# Patient Record
Sex: FEMALE | Race: WHITE | NOT HISPANIC OR LATINO | URBAN - METROPOLITAN AREA
[De-identification: names, ages, dates, MRNs, and addresses within clinical notes are randomized per-mention and may not be internally consistent; named-entity substitution may affect disease eponyms.]

---

## 2017-09-11 ENCOUNTER — INPATIENT (INPATIENT)
Facility: HOSPITAL | Age: 72
LOS: 10 days | Discharge: HOME HEALTH | DRG: 674 | End: 2017-09-22
Attending: HOSPITALIST | Admitting: HOSPITALIST
Payer: MEDICARE

## 2017-09-11 VITALS
SYSTOLIC BLOOD PRESSURE: 122 MMHG | OXYGEN SATURATION: 98 % | HEIGHT: 62 IN | WEIGHT: 115.96 LBS | RESPIRATION RATE: 16 BRPM | HEART RATE: 87 BPM | DIASTOLIC BLOOD PRESSURE: 77 MMHG | TEMPERATURE: 98 F

## 2017-09-11 DIAGNOSIS — Z90.49 ACQUIRED ABSENCE OF OTHER SPECIFIED PARTS OF DIGESTIVE TRACT: Chronic | ICD-10-CM

## 2017-09-11 LAB
ALBUMIN SERPL ELPH-MCNC: 3.1 G/DL — LOW (ref 3.3–5.2)
ALP SERPL-CCNC: 172 U/L — HIGH (ref 40–120)
ALT FLD-CCNC: 36 U/L — HIGH
ANION GAP SERPL CALC-SCNC: 24 MMOL/L — HIGH (ref 5–17)
APPEARANCE UR: ABNORMAL
APTT BLD: 24 SEC — LOW (ref 27.5–37.4)
AST SERPL-CCNC: 31 U/L — SIGNIFICANT CHANGE UP
BASOPHILS # BLD AUTO: 0 K/UL — SIGNIFICANT CHANGE UP (ref 0–0.2)
BASOPHILS NFR BLD AUTO: 0.1 % — SIGNIFICANT CHANGE UP (ref 0–2)
BILIRUB SERPL-MCNC: 0.2 MG/DL — LOW (ref 0.4–2)
BILIRUB UR-MCNC: NEGATIVE — SIGNIFICANT CHANGE UP
BUN SERPL-MCNC: 78 MG/DL — HIGH (ref 8–20)
CALCIUM SERPL-MCNC: 9 MG/DL — SIGNIFICANT CHANGE UP (ref 8.6–10.2)
CHLORIDE SERPL-SCNC: 86 MMOL/L — LOW (ref 98–107)
CK SERPL-CCNC: 82 U/L — SIGNIFICANT CHANGE UP (ref 25–170)
CO2 SERPL-SCNC: 19 MMOL/L — LOW (ref 22–29)
COLOR SPEC: YELLOW — SIGNIFICANT CHANGE UP
CREAT ?TM UR-MCNC: 57 MG/DL — SIGNIFICANT CHANGE UP
CREAT SERPL-MCNC: 6.45 MG/DL — HIGH (ref 0.5–1.3)
DIFF PNL FLD: ABNORMAL
EOSINOPHIL # BLD AUTO: 0.1 K/UL — SIGNIFICANT CHANGE UP (ref 0–0.5)
EOSINOPHIL NFR BLD AUTO: 0.8 % — SIGNIFICANT CHANGE UP (ref 0–6)
GLUCOSE SERPL-MCNC: 116 MG/DL — HIGH (ref 70–115)
GLUCOSE UR QL: NEGATIVE MG/DL — SIGNIFICANT CHANGE UP
HCT VFR BLD CALC: 33.7 % — LOW (ref 37–47)
HGB BLD-MCNC: 11.6 G/DL — LOW (ref 12–16)
INR BLD: 1.03 RATIO — SIGNIFICANT CHANGE UP (ref 0.88–1.16)
KETONES UR-MCNC: NEGATIVE — SIGNIFICANT CHANGE UP
LEUKOCYTE ESTERASE UR-ACNC: ABNORMAL
LYMPHOCYTES # BLD AUTO: 1.3 K/UL — SIGNIFICANT CHANGE UP (ref 1–4.8)
LYMPHOCYTES # BLD AUTO: 15.1 % — LOW (ref 20–55)
MCHC RBC-ENTMCNC: 33.4 PG — HIGH (ref 27–31)
MCHC RBC-ENTMCNC: 34.4 G/DL — SIGNIFICANT CHANGE UP (ref 32–36)
MCV RBC AUTO: 97.1 FL — SIGNIFICANT CHANGE UP (ref 81–99)
MONOCYTES # BLD AUTO: 0.6 K/UL — SIGNIFICANT CHANGE UP (ref 0–0.8)
MONOCYTES NFR BLD AUTO: 6.9 % — SIGNIFICANT CHANGE UP (ref 3–10)
NEUTROPHILS # BLD AUTO: 6.5 K/UL — SIGNIFICANT CHANGE UP (ref 1.8–8)
NEUTROPHILS NFR BLD AUTO: 77 % — HIGH (ref 37–73)
NITRITE UR-MCNC: POSITIVE
PH UR: 6.5 — SIGNIFICANT CHANGE UP (ref 5–8)
PLATELET # BLD AUTO: 242 K/UL — SIGNIFICANT CHANGE UP (ref 150–400)
POTASSIUM SERPL-MCNC: 3 MMOL/L — LOW (ref 3.5–5.3)
POTASSIUM SERPL-SCNC: 3 MMOL/L — LOW (ref 3.5–5.3)
PROT SERPL-MCNC: 7.3 G/DL — SIGNIFICANT CHANGE UP (ref 6.6–8.7)
PROT UR-MCNC: 100 MG/DL
PROTHROM AB SERPL-ACNC: 11.3 SEC — SIGNIFICANT CHANGE UP (ref 9.8–12.7)
RBC # BLD: 3.47 M/UL — LOW (ref 4.4–5.2)
RBC # FLD: 13.5 % — SIGNIFICANT CHANGE UP (ref 11–15.6)
SODIUM SERPL-SCNC: 129 MMOL/L — LOW (ref 135–145)
SODIUM UR-SCNC: 42 MMOL/L — SIGNIFICANT CHANGE UP
SP GR SPEC: 1.01 — SIGNIFICANT CHANGE UP (ref 1.01–1.02)
TROPONIN T SERPL-MCNC: <0.01 NG/ML — SIGNIFICANT CHANGE UP (ref 0–0.06)
UROBILINOGEN FLD QL: NEGATIVE MG/DL — SIGNIFICANT CHANGE UP
WBC # BLD: 8.4 K/UL — SIGNIFICANT CHANGE UP (ref 4.8–10.8)
WBC # FLD AUTO: 8.4 K/UL — SIGNIFICANT CHANGE UP (ref 4.8–10.8)

## 2017-09-11 PROCEDURE — 70450 CT HEAD/BRAIN W/O DYE: CPT | Mod: 26

## 2017-09-11 PROCEDURE — 71010: CPT | Mod: 26

## 2017-09-11 PROCEDURE — 74176 CT ABD & PELVIS W/O CONTRAST: CPT | Mod: 26

## 2017-09-11 RX ORDER — CEFTRIAXONE 500 MG/1
1 INJECTION, POWDER, FOR SOLUTION INTRAMUSCULAR; INTRAVENOUS ONCE
Qty: 0 | Refills: 0 | Status: COMPLETED | OUTPATIENT
Start: 2017-09-11 | End: 2017-09-11

## 2017-09-11 RX ORDER — ALPRAZOLAM 0.25 MG
0.25 TABLET ORAL ONCE
Qty: 0 | Refills: 0 | Status: DISCONTINUED | OUTPATIENT
Start: 2017-09-11 | End: 2017-09-11

## 2017-09-11 RX ORDER — SODIUM CHLORIDE 9 MG/ML
1500 INJECTION INTRAMUSCULAR; INTRAVENOUS; SUBCUTANEOUS ONCE
Qty: 0 | Refills: 0 | Status: COMPLETED | OUTPATIENT
Start: 2017-09-11 | End: 2017-09-11

## 2017-09-11 RX ORDER — ONDANSETRON 8 MG/1
4 TABLET, FILM COATED ORAL ONCE
Qty: 0 | Refills: 0 | Status: COMPLETED | OUTPATIENT
Start: 2017-09-11 | End: 2017-09-11

## 2017-09-11 RX ORDER — POTASSIUM CHLORIDE 20 MEQ
40 PACKET (EA) ORAL ONCE
Qty: 0 | Refills: 0 | Status: COMPLETED | OUTPATIENT
Start: 2017-09-11 | End: 2017-09-11

## 2017-09-11 RX ORDER — POTASSIUM CHLORIDE 20 MEQ
10 PACKET (EA) ORAL ONCE
Qty: 0 | Refills: 0 | Status: DISCONTINUED | OUTPATIENT
Start: 2017-09-11 | End: 2017-09-11

## 2017-09-11 RX ADMIN — SODIUM CHLORIDE 750 MILLILITER(S): 9 INJECTION INTRAMUSCULAR; INTRAVENOUS; SUBCUTANEOUS at 20:24

## 2017-09-11 RX ADMIN — Medication 40 MILLIEQUIVALENT(S): at 21:33

## 2017-09-11 RX ADMIN — CEFTRIAXONE 100 GRAM(S): 500 INJECTION, POWDER, FOR SOLUTION INTRAMUSCULAR; INTRAVENOUS at 21:33

## 2017-09-11 RX ADMIN — Medication 0.25 MILLIGRAM(S): at 23:33

## 2017-09-11 RX ADMIN — ONDANSETRON 4 MILLIGRAM(S): 8 TABLET, FILM COATED ORAL at 21:38

## 2017-09-11 NOTE — ED PROVIDER NOTE - MEDICAL DECISION MAKING DETAILS
ataxia possibly due to subacute cva vs dehydration vs colitis. will r/o ICH, C diff. urinary incontinence likely due to UTI. no concerns for cauda equina or spinal cord pathology  -cth -ekg -labs -ivf -zofran -cxr -likely admit

## 2017-09-11 NOTE — ED ADULT TRIAGE NOTE - CHIEF COMPLAINT QUOTE
patient not feeling well states that she has diahrrea and not been able to hold anything down - patient states that she has been fallin

## 2017-09-11 NOTE — ED ADULT NURSE NOTE - OBJECTIVE STATEMENT
assumed pt care at 2000.  pt awake alert and oriented x3 c/o generalized weakness, nausea, body aches, painful urination, new onset incontinence (stool and urine) and increased falling.  pt presents with right sided facial ecchymosis and scab to under eye.  unknown LOC, unknown if pt fell and hit head.

## 2017-09-11 NOTE — ED PROVIDER NOTE - OBJECTIVE STATEMENT
72yo F pmh COPD, MI? (not on asa or plavix or pci), colitis with chronic watery diarrhea, depression p/w multiple complaints, most acute of which is unsteady gait. for past 1 wk pt has felt ataxic, new onset, fallen multiple times, unwitnessed with head trauma, unknown LOC. most recent fall friday night. pt also c/o of nausea & decreased po intake. 72yo F pmh COPD, MI? (not on asa or plavix or pci), colitis with chronic watery diarrhea, depression p/w multiple complaints, most acute of which is unsteady gait. for past 1 wk pt has felt ataxic, new onset, fallen multiple times, unwitnessed with head trauma, unknown LOC. most recent fall friday night. pt also new urinary incontinence & dysuria over past 1 week. c/o of nausea & decreased po intake & generalized weakness. also has had chronic daily waterry diarrhea, no recent changes. denies any cp, sob, ha, abdominal pain, f/c, vertigo 72yo F pmh COPD, MI? (not on asa or plavix or pci), colitis with chronic watery diarrhea, depression, CKD (told she needs HD but refused) p/w multiple complaints, most acute of which is unsteady gait. for past 1 wk pt has felt ataxic, new onset, fallen multiple times, unwitnessed with head trauma, unknown LOC. most recent fall friday night. pt also new urinary incontinence & dysuria over past 1 week. c/o of nausea & decreased po intake & generalized weakness. also has had chronic daily watery diarrhea, no recent changes. denies any cp, sob, ha, abdominal pain, f/c, vertigo

## 2017-09-11 NOTE — ED PROVIDER NOTE - ATTENDING CONTRIBUTION TO CARE
pt with renal failure.  lkely dehydration + cri   aware of obstructive uropathy - though pt does not appear to be symptomatic at this time - will eval in am  admit to med

## 2017-09-11 NOTE — ED PROVIDER NOTE - CARE PLAN
Principal Discharge DX:	Chronic kidney disease, unspecified CKD stage  Secondary Diagnosis:	Hypokalemia  Secondary Diagnosis:	Cystitis

## 2017-09-12 DIAGNOSIS — E87.1 HYPO-OSMOLALITY AND HYPONATREMIA: ICD-10-CM

## 2017-09-12 DIAGNOSIS — Z29.9 ENCOUNTER FOR PROPHYLACTIC MEASURES, UNSPECIFIED: ICD-10-CM

## 2017-09-12 DIAGNOSIS — F41.9 ANXIETY DISORDER, UNSPECIFIED: ICD-10-CM

## 2017-09-12 DIAGNOSIS — N13.30 UNSPECIFIED HYDRONEPHROSIS: ICD-10-CM

## 2017-09-12 DIAGNOSIS — N18.9 CHRONIC KIDNEY DISEASE, UNSPECIFIED: ICD-10-CM

## 2017-09-12 DIAGNOSIS — I25.10 ATHEROSCLEROTIC HEART DISEASE OF NATIVE CORONARY ARTERY WITHOUT ANGINA PECTORIS: ICD-10-CM

## 2017-09-12 DIAGNOSIS — E87.6 HYPOKALEMIA: ICD-10-CM

## 2017-09-12 DIAGNOSIS — N20.0 CALCULUS OF KIDNEY: ICD-10-CM

## 2017-09-12 LAB
ANION GAP SERPL CALC-SCNC: 19 MMOL/L — HIGH (ref 5–17)
BUN SERPL-MCNC: 78 MG/DL — HIGH (ref 8–20)
CALCIUM SERPL-MCNC: 8.3 MG/DL — LOW (ref 8.6–10.2)
CHLORIDE SERPL-SCNC: 95 MMOL/L — LOW (ref 98–107)
CO2 SERPL-SCNC: 18 MMOL/L — LOW (ref 22–29)
CORTIS AM PEAK SERPL-MCNC: 21.3 UG/DL — HIGH (ref 6–18.4)
CREAT SERPL-MCNC: 6.44 MG/DL — HIGH (ref 0.5–1.3)
GLUCOSE SERPL-MCNC: 100 MG/DL — SIGNIFICANT CHANGE UP (ref 70–115)
HCT VFR BLD CALC: 30.5 % — LOW (ref 37–47)
HGB BLD-MCNC: 10.5 G/DL — LOW (ref 12–16)
MCHC RBC-ENTMCNC: 33.4 PG — HIGH (ref 27–31)
MCHC RBC-ENTMCNC: 34.4 G/DL — SIGNIFICANT CHANGE UP (ref 32–36)
MCV RBC AUTO: 97.1 FL — SIGNIFICANT CHANGE UP (ref 81–99)
PHOSPHATE SERPL-MCNC: 5.4 MG/DL — HIGH (ref 2.4–4.7)
PLATELET # BLD AUTO: 257 K/UL — SIGNIFICANT CHANGE UP (ref 150–400)
POTASSIUM SERPL-MCNC: 3.7 MMOL/L — SIGNIFICANT CHANGE UP (ref 3.5–5.3)
POTASSIUM SERPL-SCNC: 3.7 MMOL/L — SIGNIFICANT CHANGE UP (ref 3.5–5.3)
RBC # BLD: 3.14 M/UL — LOW (ref 4.4–5.2)
RBC # FLD: 13.8 % — SIGNIFICANT CHANGE UP (ref 11–15.6)
SODIUM SERPL-SCNC: 132 MMOL/L — LOW (ref 135–145)
URATE SERPL-MCNC: 13.9 MG/DL — HIGH (ref 2.4–5.7)
WBC # BLD: 9 K/UL — SIGNIFICANT CHANGE UP (ref 4.8–10.8)
WBC # FLD AUTO: 9 K/UL — SIGNIFICANT CHANGE UP (ref 4.8–10.8)

## 2017-09-12 PROCEDURE — 93010 ELECTROCARDIOGRAM REPORT: CPT

## 2017-09-12 PROCEDURE — 76775 US EXAM ABDO BACK WALL LIM: CPT | Mod: 26

## 2017-09-12 PROCEDURE — 93306 TTE W/DOPPLER COMPLETE: CPT | Mod: 26

## 2017-09-12 PROCEDURE — 99285 EMERGENCY DEPT VISIT HI MDM: CPT

## 2017-09-12 PROCEDURE — 12345: CPT | Mod: NC

## 2017-09-12 RX ORDER — MIRTAZAPINE 45 MG/1
1 TABLET, ORALLY DISINTEGRATING ORAL
Qty: 0 | Refills: 0 | COMMUNITY

## 2017-09-12 RX ORDER — DIPHENOXYLATE HCL/ATROPINE 2.5-.025MG
0 TABLET ORAL
Qty: 0 | Refills: 0 | COMMUNITY

## 2017-09-12 RX ORDER — SODIUM BICARBONATE 1 MEQ/ML
1300 SYRINGE (ML) INTRAVENOUS
Qty: 0 | Refills: 0 | Status: DISCONTINUED | OUTPATIENT
Start: 2017-09-12 | End: 2017-09-22

## 2017-09-12 RX ORDER — BUPRENORPHINE 10 UG/H
1 PATCH, EXTENDED RELEASE TRANSDERMAL
Qty: 0 | Refills: 0 | COMMUNITY

## 2017-09-12 RX ORDER — MORPHINE SULFATE 50 MG/1
2 CAPSULE, EXTENDED RELEASE ORAL EVERY 6 HOURS
Qty: 0 | Refills: 0 | Status: DISCONTINUED | OUTPATIENT
Start: 2017-09-12 | End: 2017-09-14

## 2017-09-12 RX ORDER — TRAZODONE HCL 50 MG
1 TABLET ORAL
Qty: 0 | Refills: 0 | COMMUNITY

## 2017-09-12 RX ORDER — VENLAFAXINE HCL 75 MG
1 CAPSULE, EXT RELEASE 24 HR ORAL
Qty: 0 | Refills: 0 | COMMUNITY

## 2017-09-12 RX ORDER — SODIUM CHLORIDE 9 MG/ML
1000 INJECTION, SOLUTION INTRAVENOUS
Qty: 0 | Refills: 0 | Status: DISCONTINUED | OUTPATIENT
Start: 2017-09-12 | End: 2017-09-12

## 2017-09-12 RX ORDER — ALPRAZOLAM 0.25 MG
0.25 TABLET ORAL THREE TIMES A DAY
Qty: 0 | Refills: 0 | Status: DISCONTINUED | OUTPATIENT
Start: 2017-09-12 | End: 2017-09-19

## 2017-09-12 RX ORDER — ACETAMINOPHEN 500 MG
650 TABLET ORAL EVERY 6 HOURS
Qty: 0 | Refills: 0 | Status: DISCONTINUED | OUTPATIENT
Start: 2017-09-12 | End: 2017-09-22

## 2017-09-12 RX ORDER — DEXTROSE MONOHYDRATE, SODIUM CHLORIDE, AND POTASSIUM CHLORIDE 50; .745; 4.5 G/1000ML; G/1000ML; G/1000ML
1000 INJECTION, SOLUTION INTRAVENOUS
Qty: 0 | Refills: 0 | Status: DISCONTINUED | OUTPATIENT
Start: 2017-09-12 | End: 2017-09-12

## 2017-09-12 RX ORDER — CEFTRIAXONE 500 MG/1
1 INJECTION, POWDER, FOR SOLUTION INTRAMUSCULAR; INTRAVENOUS EVERY 24 HOURS
Qty: 0 | Refills: 0 | Status: DISCONTINUED | OUTPATIENT
Start: 2017-09-12 | End: 2017-09-15

## 2017-09-12 RX ORDER — SODIUM CHLORIDE 9 MG/ML
1000 INJECTION, SOLUTION INTRAVENOUS
Qty: 0 | Refills: 0 | Status: DISCONTINUED | OUTPATIENT
Start: 2017-09-12 | End: 2017-09-18

## 2017-09-12 RX ADMIN — Medication 1300 MILLIGRAM(S): at 12:26

## 2017-09-12 RX ADMIN — Medication 0.25 MILLIGRAM(S): at 12:29

## 2017-09-12 RX ADMIN — SODIUM CHLORIDE 125 MILLILITER(S): 9 INJECTION, SOLUTION INTRAVENOUS at 16:06

## 2017-09-12 RX ADMIN — CEFTRIAXONE 100 GRAM(S): 500 INJECTION, POWDER, FOR SOLUTION INTRAMUSCULAR; INTRAVENOUS at 20:16

## 2017-09-12 RX ADMIN — SODIUM CHLORIDE 125 MILLILITER(S): 9 INJECTION, SOLUTION INTRAVENOUS at 22:08

## 2017-09-12 RX ADMIN — MORPHINE SULFATE 2 MILLIGRAM(S): 50 CAPSULE, EXTENDED RELEASE ORAL at 18:59

## 2017-09-12 RX ADMIN — MORPHINE SULFATE 2 MILLIGRAM(S): 50 CAPSULE, EXTENDED RELEASE ORAL at 08:39

## 2017-09-12 RX ADMIN — Medication 1300 MILLIGRAM(S): at 19:16

## 2017-09-12 RX ADMIN — DEXTROSE MONOHYDRATE, SODIUM CHLORIDE, AND POTASSIUM CHLORIDE 100 MILLILITER(S): 50; .745; 4.5 INJECTION, SOLUTION INTRAVENOUS at 06:41

## 2017-09-12 NOTE — H&P ADULT - FAMILY HISTORY
Mother  Still living? Unknown  Family history of chronic renal impairment, Age at diagnosis: Age Unknown

## 2017-09-12 NOTE — ED CLERICAL - NS ED CLERK UNITS
2GUL/mon &  ISO mon & /2GUL [id number] [facility] mon &  [id number]

## 2017-09-12 NOTE — H&P ADULT - HISTORY OF PRESENT ILLNESS
72 y/o female with CRI, B/L kidney stones started to feel right side abdominal pain 10 days ago. pain radiated to the right flank. chills and loss of apatite CT scan show obstructing stone in the right ureteropelvic junction with moderate hydronephrosis. Dr Evans, Urologist was contacted by ER , who recommended to keep the patient NPO for morning intervention.

## 2017-09-12 NOTE — H&P ADULT - PMH
Anxiety    Hearing loss, unspecified hearing loss type, unspecified laterality    Kidney stones    MI (myocardial infarction)

## 2017-09-12 NOTE — PROGRESS NOTE ADULT - PROBLEM SELECTOR PLAN 2
Treat  kidney stone Obstructive uropathy as above.  Continue IVF  CKD stage unspecified.  B/C 78/6.4.  Will contact PMD for baseline. Treat  kidney stone Obstructive uropathy as above.  Continue IVF  CKD stage unspecified.  B/C 78/6.4.  Dr Ruiz contacted for baseline renal fct which was normal.  Patient with ARF. Nephrology Consult pending. Treat  kidney stone Obstructive uropathy as above.  Continue IVF  CKD stage unspecified.  B/C 78/6.4.  Dr Ruiz contacted for baseline renal fct which was normal.  Patient with ARF. Nephrology Consult appreciated.

## 2017-09-12 NOTE — CONSULT NOTE ADULT - ASSESSMENT
Etiology of renal insufficiency is unclear. Pt has a long hx of bilateral nephrolithiasis. Has a 4mm right UPJ stone. and no hydronephrosis on left. Pt has no pain on exam.

## 2017-09-12 NOTE — CONSULT NOTE ADULT - PROBLEM SELECTOR RECOMMENDATION 9
Would hydrate and reevaluate with a renal sonogram . Doubt stone is causing this degree of renal insufficiency . However should hydro persist and renal insufficiency not improve with hydration , a right percutaneous nephrostomy could be placed by IR. Pt should not be anticoagulated. Will follow.

## 2017-09-12 NOTE — CONSULT NOTE ADULT - SUBJECTIVE AND OBJECTIVE BOX
HPI:  70 y/o female with CKD, B/L kidney stones started to feel right side abdominal pain 10 days ago. pain radiated to the right flank. chills and loss of apatite CT scan show obstructing stone in the right ureteropelvic junction with moderate hydronephrosis. Dr Evans, Urologist was contacted by ER. Found to have cr 6 denies NSAID use takes excedrin almost daily for HA. No urinary complaints no edema sitting up in ER bed saying " I am going to go home to day doc"    ROS: denies HA CP N/V/D no SOB no uremic symptoms      PAST MEDICAL & SURGICAL HISTORY:  Anxiety  Hearing loss, unspecified hearing loss type, unspecified laterality  Kidney stones  MI (myocardial infarction)  S/P cholecystectomy   DM 2, MO, hypothyroidism, prior DVT and IVC filter; Cholecystectomy    FAMILY HISTORY:  Family history of chronic renal impairment  [place]    Social History:Non smoker    MEDICATIONS  (STANDING):  cefTRIAXone   IVPB 1 Gram(s) IV Intermittent every 24 hours  sodium bicarbonate 1300 milliGRAM(s) Oral three times a day before meals  sodium chloride 0.45%. 1000 milliLiter(s) (100 mL/Hr) IV Continuous <Continuous>    MEDICATIONS  (PRN):  morphine  - Injectable 2 milliGRAM(s) IV Push every 6 hours PRN Moderate Pain (4 - 6)  ALPRAZolam 0.25 milliGRAM(s) Oral three times a day PRN anxiety   Meds reviewed    Vital Signs Last 24 Hrs  T(C): 36.2 (12 Sep 2017 11:17), Max: 36.9 (12 Sep 2017 07:27)  T(F): 97.1 (12 Sep 2017 11:17), Max: 98.4 (12 Sep 2017 07:27)  HR: 101 (12 Sep 2017 11:17) (63 - 101)  BP: 123/62 (12 Sep 2017 11:17) (116/57 - 143/63)  BP(mean): --  RR: 26 (12 Sep 2017 11:17) (16 - 26)  SpO2: 96% (12 Sep 2017 11:17) (96% - 100%)  Daily Height in cm: 157.48 (11 Sep 2017 19:20)    Daily     PHYSICAL EXAM:    GENERAL: appears chronically ill, NAD  HEAD:  Atraumatic, Normocephalic  EYES: EOMI  NECK: Supple, neck  veins full  NERVOUS SYSTEM:  Alert & Oriented X3  CHEST/LUNG: Clear to percussion bilaterally; No rales  HEART: Regular rate and rhythm; No murmurs  ABDOMEN: Soft, Nontender, Nondistended; Bowel sounds present  EXTREMITIES:  No edema       LABS:                        10.5   9.0   )-----------( 257      ( 12 Sep 2017 09:04 )             30.5     09-12    132<L>  |  95<L>  |  78.0<H>  ----------------------------<  100  3.7   |  18.0<L>  |  6.44<H>    Ca    8.3<L>      12 Sep 2017 09:04    TPro  7.3  /  Alb  3.1<L>  /  TBili  0.2<L>  /  DBili  x   /  AST  31  /  ALT  36<H>  /  AlkPhos  172<H>      PT/INR - ( 11 Sep 2017 20:34 )   PT: 11.3 sec;   INR: 1.03 ratio         PTT - ( 11 Sep 2017 20:34 )  PTT:24.0 sec  Urinalysis Basic - ( 11 Sep 2017 20:57 )    Color: Yellow / Appearance: Cloudy / S.010 / pH: x  Gluc: x / Ketone: Negative  / Bili: Negative / Urobili: Negative mg/dL   Blood: x / Protein: 100 mg/dL / Nitrite: Positive   Leuk Esterase: Moderate / RBC: 11-25 /HPF / WBC 6-10   Sq Epi: x / Non Sq Epi: Few / Bacteria: Moderate              RADIOLOGY & ADDITIONAL TESTS:

## 2017-09-12 NOTE — PROGRESS NOTE ADULT - SUBJECTIVE AND OBJECTIVE BOX
INTERVAL HPI/OVERNIGHT EVENTS:  HPI:  72 y/o female with CRI, B/L kidney stones started to feel right side abdominal pain 10 days ago. pain radiated to the right flank. chills and loss of apatite CT scan show obstructing stone in the right ureteropelvic junction with moderate hydronephrosis. Dr Evans, Urologist was contacted by ER , who recommended to keep the patient NPO for morning intervention. (12 Sep 2017 02:14)    The patient was seen and examined.  She is sitting up in bed A&O in NAD conversing freely.  The patient  reports that she is feeling better and is not in any pain. She denies any new actute complaints.    She thought she was feeling well enough to go home.  She has an obstructing 4 mm Right ureteropelvic jct kidney stone and a Urology consult is pending.  Rocephin 1 gm iv q 24 hrs was started for a UTI. Test results, consultant recommendations and The Plan of Care were including the possibility of ureteral stent were discussed with the patient.      MEDICATIONS  (STANDING):  sodium chloride 0.9% with potassium chloride 20 mEq/L 1000 milliLiter(s) (100 mL/Hr) IV Continuous <Continuous>  cefTRIAXone   IVPB 1 Gram(s) IV Intermittent every 24 hours    MEDICATIONS  (PRN):  morphine  - Injectable 2 milliGRAM(s) IV Push every 6 hours PRN Moderate Pain (4 - 6)      Allergies:  No Known Allergies        Vital Signs Last 24 Hrs  T(C): 36.9 (12 Sep 2017 07:27), Max: 36.9 (12 Sep 2017 07:27)  T(F): 98.4 (12 Sep 2017 07:27), Max: 98.4 (12 Sep 2017 07:27)  HR: 94 (12 Sep 2017 07:27) (63 - 94)  BP: 116/57 (12 Sep 2017 07:27) (116/57 - 143/63)  BP(mean): --  RR: 20 (12 Sep 2017 07:27) (16 - 20)  SpO2: 88% (12 Sep 2017 07:27) (88% - 100%)    General:   HEENT:  NCAT,  PERRLA, EOMI, Nares Patent, Pharynx Clear, Uvula midline,   Neck: no lymphadenopathy, no JVD,   Lungs: Clear to auscultation, no wheezes, no rhonchi, no rales b/l.  CV: RRR,  S1,S2,  no Murmur  ABD: +BS x 4; soft,  nontender, no distension,  No guarding,   MS: FROM throughout.  Muscle tone and strength equal b/l ,  no deformity  EXT:  No cyanosis, no clubbing, no edema b/l  +2 dorsalis pedis b/l  Neuro: A&O x 3; CN II- XII grossly intact.  No focal deficits,  No sensory or motor deficits. (Strength 5/5)     LABS:                          10.5   9.0   )-----------( 257      ( 12 Sep 2017 09:04 )             30.5     09-12    132<L>  |  95<L>  |  78.0<H>  ----------------------------<  100  3.7   |  18.0<L>  |  6.44<H>    Ca    8.3<L>      12 Sep 2017 09:04    TPro  7.3  /  Alb  3.1<L>  /  TBili  0.2<L>  /  DBili  x   /  AST  31  /  ALT  36<H>  /  AlkPhos  172<H>      PT/INR - ( 11 Sep 2017 20:34 )   PT: 11.3 sec;   INR: 1.03 ratio         PTT - ( 11 Sep 2017 20:34 )  PTT:24.0 sec  Urinalysis Basic - ( 11 Sep 2017 20:57 )    Color: Yellow / Appearance: Cloudy / S.010 / pH: x  Gluc: x / Ketone: Negative  / Bili: Negative / Urobili: Negative mg/dL   Blood: x / Protein: 100 mg/dL / Nitrite: Positive   Leuk Esterase: Moderate / RBC: 11-25 /HPF / WBC 6-10   Sq Epi: x / Non Sq Epi: Few / Bacteria: Moderate      RADIOLOGY & ADDITIONAL TESTS:  CT HEAD:  No acute hemorrhage, infarct, mass, or mass effect.  CXR:  No acute cardiopulmonary disease.  CT A&P:  Obstructing 4 mm right ureteropelvic junction calculus                Indeterminate left adrenal nodule. INTERVAL HPI/OVERNIGHT EVENTS:  HPI:  72 y/o female with CRI, B/L kidney stones started to feel right side abdominal pain 10 days ago. pain radiated to the right flank. chills and loss of apatite CT scan show obstructing stone in the right ureteropelvic junction with moderate hydronephrosis. Dr Evans, Urologist was contacted by ER , who recommended to keep the patient NPO for morning intervention. (12 Sep 2017 02:14)    The patient was seen and examined.  She is sitting up in bed A&O in NAD conversing freely.  The patient  reports that she is feeling better and is not in any pain. She denies any new acute complaints.    She thought she was feeling well enough to go home.  She has an obstructing 4 mm Right ureteropelvic jct kidney stone and a Urology consult is pending.  Rocephin 1 gm iv q 24 hrs was started for a UTI. Test results, consultant recommendations and The Plan of Care were including the possibility of ureteral stent were discussed with the patient.      MEDICATIONS  (STANDING):  sodium chloride 0.9% with potassium chloride 20 mEq/L 1000 milliLiter(s) (100 mL/Hr) IV Continuous <Continuous>  cefTRIAXone   IVPB 1 Gram(s) IV Intermittent every 24 hours    MEDICATIONS  (PRN):  morphine  - Injectable 2 milliGRAM(s) IV Push every 6 hours PRN Moderate Pain (4 - 6)      Allergies:  No Known Allergies        Vital Signs Last 24 Hrs  T(C): 36.9 (12 Sep 2017 07:27), Max: 36.9 (12 Sep 2017 07:27)  T(F): 98.4 (12 Sep 2017 07:27), Max: 98.4 (12 Sep 2017 07:27)  HR: 94 (12 Sep 2017 07:27) (63 - 94)  BP: 116/57 (12 Sep 2017 07:27) (116/57 - 143/63)  BP(mean): --  RR: 20 (12 Sep 2017 07:27) (16 - 20)  SpO2: 88% (12 Sep 2017 07:27) (88% - 100%)    General:   HEENT:  NCAT,  PERRLA, EOMI, Nares Patent, Pharynx Clear, Uvula midline,   Neck: no lymphadenopathy, no JVD,   Lungs: Clear to auscultation, no wheezes, no rhonchi, no rales b/l.  CV: RRR,  S1,S2,  no Murmur  ABD: +BS x 4; soft,  nontender, no distension,  No guarding,   MS: FROM throughout.  Muscle tone and strength equal b/l ,  no deformity  EXT:  No cyanosis, no clubbing, no edema b/l  +2 dorsalis pedis b/l  Neuro: A&O x 3; CN II- XII grossly intact.  No focal deficits,  No sensory or motor deficits. (Strength 5/5)     LABS:                          10.5   9.0   )-----------( 257      ( 12 Sep 2017 09:04 )             30.5     09-12    132<L>  |  95<L>  |  78.0<H>  ----------------------------<  100  3.7   |  18.0<L>  |  6.44<H>    Ca    8.3<L>      12 Sep 2017 09:04    TPro  7.3  /  Alb  3.1<L>  /  TBili  0.2<L>  /  DBili  x   /  AST  31  /  ALT  36<H>  /  AlkPhos  172<H>      PT/INR - ( 11 Sep 2017 20:34 )   PT: 11.3 sec;   INR: 1.03 ratio         PTT - ( 11 Sep 2017 20:34 )  PTT:24.0 sec  Urinalysis Basic - ( 11 Sep 2017 20:57 )    Color: Yellow / Appearance: Cloudy / S.010 / pH: x  Gluc: x / Ketone: Negative  / Bili: Negative / Urobili: Negative mg/dL   Blood: x / Protein: 100 mg/dL / Nitrite: Positive   Leuk Esterase: Moderate / RBC: 11-25 /HPF / WBC 6-10   Sq Epi: x / Non Sq Epi: Few / Bacteria: Moderate      RADIOLOGY & ADDITIONAL TESTS:  CT HEAD:  No acute hemorrhage, infarct, mass, or mass effect.  CXR:  No acute cardiopulmonary disease.  CT A&P:  Obstructing 4 mm right ureteropelvic junction calculus                Indeterminate left adrenal nodule.

## 2017-09-12 NOTE — PROGRESS NOTE ADULT - PROBLEM SELECTOR PLAN 1
Right ureteropelvic junction stone with moderate hydronephrosis on CT scan with a hx of b/l kidney stones.  Continue IVF.  Urology Consult pending.  NPO for Urology intervention by Dr. Evans. Right ureteropelvic junction stone with moderate hydronephrosis on CT scan with a hx of b/l kidney stones.  Continue IVF.  Urology Consult, Dr. Evans, pending with possible intervention by IR. Right ureteropelvic junction stone with moderate hydronephrosis on CT scan with a hx of b/l kidney stones.  Continue IVF.  Urology Consult, Dr. Evans, pending.  Nephrology consult appreciated.  Recommend hydration and if no improvement in renal fct possible intervention by IR for a renal stent. Right ureteropelvic junction stone with moderate hydronephrosis on CT scan with a hx of b/l kidney stones.  Continue IVF.  Urology Consult, Dr. Evans, pending.  Nephrology consult appreciated.  Recommend hydration and if no improvement in renal fct possible intervention by IR for a nephrostomy tube.

## 2017-09-12 NOTE — CONSULT NOTE ADULT - SUBJECTIVE AND OBJECTIVE BOX
INTERVAL HPI/OVERNIGHT EVENTS:    MEDICATIONS  (STANDING):  cefTRIAXone   IVPB 1 Gram(s) IV Intermittent every 24 hours  sodium bicarbonate 1300 milliGRAM(s) Oral three times a day before meals    MEDICATIONS  (PRN):  morphine  - Injectable 2 milliGRAM(s) IV Push every 6 hours PRN Moderate Pain (4 - 6)      Allergies    No Known Allergies    Intolerances        Vital Signs Last 24 Hrs  T(C): 36.9 (12 Sep 2017 07:27), Max: 36.9 (12 Sep 2017 07:27)  T(F): 98.4 (12 Sep 2017 07:27), Max: 98.4 (12 Sep 2017 07:27)  HR: 94 (12 Sep 2017 07:27) (63 - 94)  BP: 116/57 (12 Sep 2017 07:) (116/57 - 143/63)  BP(mean): --  RR: 20 (12 Sep 2017 07:27) (16 - 20)  SpO2: 88% (12 Sep 2017 07:) (88% - 100%)     ON PE:  General: alert and awake  Abdomen: soft ND/NT BS+ No flank pain noted  : bladder not disteneded.    LABS:                        10.5   9.0   )-----------( 257      ( 12 Sep 2017 09:04 )             30.5     09-12    132<L>  |  95<L>  |  78.0<H>  ----------------------------<  100  3.7   |  18.0<L>  |  6.44<H>    Ca    8.3<L>      12 Sep 2017 09:04    TPro  7.3  /  Alb  3.1<L>  /  TBili  0.2<L>  /  DBili  x   /  AST  31  /  ALT  36<H>  /  AlkPhos  172<H>      PT/INR - ( 11 Sep 2017 20:34 )   PT: 11.3 sec;   INR: 1.03 ratio         PTT - ( 11 Sep 2017 20:34 )  PTT:24.0 sec  Urinalysis Basic - ( 11 Sep 2017 20:57 )    Color: Yellow / Appearance: Cloudy / S.010 / pH: x  Gluc: x / Ketone: Negative  / Bili: Negative / Urobili: Negative mg/dL   Blood: x / Protein: 100 mg/dL / Nitrite: Positive   Leuk Esterase: Moderate / RBC: 11-25 /HPF / WBC 6-10   Sq Epi: x / Non Sq Epi: Few / Bacteria: Moderate        RADIOLOGY & ADDITIONAL TESTS:

## 2017-09-12 NOTE — PROGRESS NOTE ADULT - ASSESSMENT
This 72 yo female with a pmh of CRI, CAD This 70 yo female with a pmh of CAD, CRI, b/l Kidney stones started to feel right sided abdominal pain 10 days ago.  The pain radiated to her right flank with chills and loss of appetite.  She was noted on CT Scan to have an obstructing stone in the right ureteropelvic junction with moderate hydro nephrosis.  Urine analysis revealed a UTI and she was started on Rocephin 1 gm IV q 24 hrs.  She reports she is feeling better and her pain is being controlled with morphine prn.  CTA&P also showed a indeterminate left adrenal nodule.  A Urology Consult with Dr. Evans is pending and the patient is being kept NPO for a Urology intervention.   The Plan of Care was discussed with the patient and Dr. Garcia. This 72 yo female with a pmh of CAD, anxiety, depression, ulcerative colitis, , CRI, b/l Kidney stones started to feel right sided abdominal pain 10 days ago.  The pain radiated to her right flank with chills and loss of appetite.  She was noted on CT Scan to have an obstructing stone in the right ureteropelvic junction with moderate hydro nephrosis.  Urine analysis revealed a UTI and she was started on Rocephin 1 gm IV q 24 hrs.  She reports she is feeling better and her pain is being controlled with morphine prn.  CTA&P also showed a indeterminate left adrenal nodule.  A Urology Consult with Dr. Evans is pending and the patient is being kept NPO for a Urology intervention. A Nephrol;ogy consult is pending for ARF B/C 78/6.4.  The Plan of Care was discussed with the patient and Dr. Garcia.  Family was updated by Dr. Jimenez via telephone.  The patient does not have the capacity to leave AMA at this time.  She may not sign out AMA. This 72 yo female with a pmh of CAD, anxiety, depression, ulcerative colitis, , CRI, b/l Kidney stones started to feel right sided abdominal pain 10 days ago.  The pain radiated to her right flank with chills and loss of appetite.  She was noted on CT Scan to have an obstructing stone in the right ureteropelvic junction with moderate hydro nephrosis.  Urine analysis revealed a UTI and she was started on Rocephin 1 gm IV q 24 hrs.  She reports she is feeling better and her pain is being controlled with morphine prn.  CTA&P also showed a indeterminate left adrenal nodule.  A Urology Consult with Dr. Evnas is pending and the patient is being kept NPO for a Urology intervention. A Nephrol;ogy consult is recommended hydration and if no improvement nephrology stent by IR.  The Plan of Care was discussed with the patient and Dr. Garcia.  Family was updated by Dr. Jimenez via telephone.  The patient does not have the capacity to leave AMA at this time.  She may not sign out AMA. This 70 yo female with a pmh of CAD, anxiety, depression, ulcerative colitis, , CRI, b/l Kidney stones started to feel right sided abdominal pain 10 days ago.  The pain radiated to her right flank with chills and loss of appetite.  She was noted on CT Scan to have an obstructing stone in the right ureteropelvic junction with moderate hydro nephrosis.  Urine analysis revealed a UTI and she was started on Rocephin 1 gm IV q 24 hrs.  She reports she is feeling better and her pain is being controlled with morphine prn.  CTA&P also showed a indeterminate left adrenal nodule.  A Urology Consult with Dr. Evans is pending and the patient is being kept NPO for a Urology intervention. A Nephrol;ogy consult is recommended hydration and if no improvement placement of a nephrostomy stent by IR.  The Plan of Care was discussed with the patient and Dr. Garcia.  Family was updated by Dr. Jimenez via telephone.  The patient does not have the capacity to leave AMA at this time.  She may not sign out AMA.

## 2017-09-12 NOTE — ED ADULT NURSE REASSESSMENT NOTE - NS ED NURSE REASSESS COMMENT FT1
MD Ernst @ bedside for assessment
Report given to TRENTON Humphries, Pt to be moved to [id number]
pt transported to ultrasound, prior to transport pt remained a/ox3 with no signs of acute distress.  report given to TRENTON Shrestha
Pt baseline mental status, requesting tylenol for HA 2/10 pain, MD Lange made aware, MD states he will order IV tylenol for pt, pending MD orders @ this time, pt updated on POC, warm blanket provided

## 2017-09-12 NOTE — PROGRESS NOTE ADULT - SUBJECTIVE AND OBJECTIVE BOX
pt has right hydronephrosis and renal failure. Have discussed with IR to have right nephrostomy placed tomorrow Please hold anticoagulants.

## 2017-09-12 NOTE — CONSULT NOTE ADULT - ASSESSMENT
CLIFF etiology? there is an CLIFF etiology? denies NSAID use looks euvolemic  pt has R hydronephrosis and CLIFF may need PCNT   -Stone is not that big doubt this is etiology of CLIFF  -Will check RPRN seroligies  -At this point will check renal sono  -Cont IVF I have adjusted them  -Need strict I&Os, 24hr urine  -Urology f/u noted  -AM labs

## 2017-09-13 DIAGNOSIS — N13.2 HYDRONEPHROSIS WITH RENAL AND URETERAL CALCULOUS OBSTRUCTION: ICD-10-CM

## 2017-09-13 DIAGNOSIS — N17.9 ACUTE KIDNEY FAILURE, UNSPECIFIED: ICD-10-CM

## 2017-09-13 DIAGNOSIS — N20.0 CALCULUS OF KIDNEY: ICD-10-CM

## 2017-09-13 DIAGNOSIS — F41.8 OTHER SPECIFIED ANXIETY DISORDERS: ICD-10-CM

## 2017-09-13 DIAGNOSIS — N39.0 URINARY TRACT INFECTION, SITE NOT SPECIFIED: ICD-10-CM

## 2017-09-13 DIAGNOSIS — K51.90 ULCERATIVE COLITIS, UNSPECIFIED, WITHOUT COMPLICATIONS: ICD-10-CM

## 2017-09-13 LAB
-  AMIKACIN: SIGNIFICANT CHANGE UP
-  AMPICILLIN/SULBACTAM: SIGNIFICANT CHANGE UP
-  AMPICILLIN: SIGNIFICANT CHANGE UP
-  AZTREONAM: SIGNIFICANT CHANGE UP
-  CEFAZOLIN: SIGNIFICANT CHANGE UP
-  CEFEPIME: SIGNIFICANT CHANGE UP
-  CEFOXITIN: SIGNIFICANT CHANGE UP
-  CEFTAZIDIME: SIGNIFICANT CHANGE UP
-  CEFTRIAXONE: SIGNIFICANT CHANGE UP
-  CIPROFLOXACIN: SIGNIFICANT CHANGE UP
-  ERTAPENEM: SIGNIFICANT CHANGE UP
-  GENTAMICIN: SIGNIFICANT CHANGE UP
-  IMIPENEM: SIGNIFICANT CHANGE UP
-  LEVOFLOXACIN: SIGNIFICANT CHANGE UP
-  MEROPENEM: SIGNIFICANT CHANGE UP
-  NITROFURANTOIN: SIGNIFICANT CHANGE UP
-  PIPERACILLIN/TAZOBACTAM: SIGNIFICANT CHANGE UP
-  TOBRAMYCIN: SIGNIFICANT CHANGE UP
-  TRIMETHOPRIM/SULFAMETHOXAZOLE: SIGNIFICANT CHANGE UP
ANION GAP SERPL CALC-SCNC: 20 MMOL/L — HIGH (ref 5–17)
BUN SERPL-MCNC: 77 MG/DL — HIGH (ref 8–20)
CALCIUM SERPL-MCNC: 8.2 MG/DL — LOW (ref 8.6–10.2)
CHLORIDE SERPL-SCNC: 97 MMOL/L — LOW (ref 98–107)
CO2 SERPL-SCNC: 21 MMOL/L — LOW (ref 22–29)
CREAT SERPL-MCNC: 6.59 MG/DL — HIGH (ref 0.5–1.3)
CREATININE, URINE RESULT: 33 MG/DL — SIGNIFICANT CHANGE UP
CULTURE RESULTS: SIGNIFICANT CHANGE UP
GLUCOSE SERPL-MCNC: 83 MG/DL — SIGNIFICANT CHANGE UP (ref 70–115)
HCT VFR BLD CALC: 28.4 % — LOW (ref 37–47)
HGB BLD-MCNC: 9.7 G/DL — LOW (ref 12–16)
MCHC RBC-ENTMCNC: 33.9 PG — HIGH (ref 27–31)
MCHC RBC-ENTMCNC: 34.2 G/DL — SIGNIFICANT CHANGE UP (ref 32–36)
MCV RBC AUTO: 99.3 FL — HIGH (ref 81–99)
METHOD TYPE: SIGNIFICANT CHANGE UP
ORGANISM # SPEC MICROSCOPIC CNT: SIGNIFICANT CHANGE UP
ORGANISM # SPEC MICROSCOPIC CNT: SIGNIFICANT CHANGE UP
PLATELET # BLD AUTO: 299 K/UL — SIGNIFICANT CHANGE UP (ref 150–400)
POTASSIUM SERPL-MCNC: 3.8 MMOL/L — SIGNIFICANT CHANGE UP (ref 3.5–5.3)
POTASSIUM SERPL-SCNC: 3.8 MMOL/L — SIGNIFICANT CHANGE UP (ref 3.5–5.3)
PROT ?TM UR-MCNC: 66 MG/DL — HIGH (ref 0–12)
RBC # BLD: 2.86 M/UL — LOW (ref 4.4–5.2)
RBC # FLD: 13.5 % — SIGNIFICANT CHANGE UP (ref 11–15.6)
SODIUM SERPL-SCNC: 138 MMOL/L — SIGNIFICANT CHANGE UP (ref 135–145)
SPECIMEN SOURCE: SIGNIFICANT CHANGE UP
WBC # BLD: 8.2 K/UL — SIGNIFICANT CHANGE UP (ref 4.8–10.8)
WBC # FLD AUTO: 8.2 K/UL — SIGNIFICANT CHANGE UP (ref 4.8–10.8)

## 2017-09-13 PROCEDURE — 50432 PLMT NEPHROSTOMY CATHETER: CPT | Mod: RT

## 2017-09-13 PROCEDURE — 99223 1ST HOSP IP/OBS HIGH 75: CPT

## 2017-09-13 PROCEDURE — 99233 SBSQ HOSP IP/OBS HIGH 50: CPT

## 2017-09-13 RX ORDER — VENLAFAXINE HCL 75 MG
75 CAPSULE, EXT RELEASE 24 HR ORAL DAILY
Qty: 0 | Refills: 0 | Status: DISCONTINUED | OUTPATIENT
Start: 2017-09-13 | End: 2017-09-14

## 2017-09-13 RX ORDER — ASPIRIN/CALCIUM CARB/MAGNESIUM 324 MG
81 TABLET ORAL DAILY
Qty: 0 | Refills: 0 | Status: DISCONTINUED | OUTPATIENT
Start: 2017-09-14 | End: 2017-09-15

## 2017-09-13 RX ORDER — DIPHENOXYLATE HCL/ATROPINE 2.5-.025MG
2 TABLET ORAL
Qty: 0 | Refills: 0 | COMMUNITY

## 2017-09-13 RX ORDER — ALPRAZOLAM 0.25 MG
1 TABLET ORAL
Qty: 0 | Refills: 0 | COMMUNITY

## 2017-09-13 RX ADMIN — MORPHINE SULFATE 2 MILLIGRAM(S): 50 CAPSULE, EXTENDED RELEASE ORAL at 20:40

## 2017-09-13 RX ADMIN — Medication 0.25 MILLIGRAM(S): at 22:12

## 2017-09-13 RX ADMIN — CEFTRIAXONE 100 GRAM(S): 500 INJECTION, POWDER, FOR SOLUTION INTRAMUSCULAR; INTRAVENOUS at 20:09

## 2017-09-13 RX ADMIN — MORPHINE SULFATE 2 MILLIGRAM(S): 50 CAPSULE, EXTENDED RELEASE ORAL at 20:09

## 2017-09-13 NOTE — PROGRESS NOTE ADULT - PROBLEM SELECTOR PLAN 9
restart xanax and home meds restart xanax   family requesting psych evaluation as believe patient takes too much (effexor, wellbutrin, trazadone, xanax, remeron)

## 2017-09-13 NOTE — PROGRESS NOTE ADULT - ASSESSMENT
70 yo female with a pmh of CAD, anxiety, depression, ulcerative colitis, , CRI, b/l Kidney stones started to feel right sided abdominal pain 10 days ago.  The pain radiated to her right flank with chills and loss of appetite.  Frequent falls per family.    Patient found to have right moderate hydronephrosis with small stone on CT a/p and underwent right nephrostomy tube placement by IR on 9/13/17.

## 2017-09-13 NOTE — PROGRESS NOTE ADULT - SUBJECTIVE AND OBJECTIVE BOX
seen for ARF    no acute complaints  wants to go home  ROS negative    MEDICATIONS  (STANDING):  cefTRIAXone   IVPB 1 Gram(s) IV Intermittent every 24 hours  sodium bicarbonate 1300 milliGRAM(s) Oral three times a day before meals  sodium chloride 0.45% 1000 milliLiter(s) (125 mL/Hr) IV Continuous <Continuous>  venlafaxine XR. 75 milliGRAM(s) Oral daily    MEDICATIONS  (PRN):  morphine  - Injectable 2 milliGRAM(s) IV Push every 6 hours PRN Moderate Pain (4 - 6)  ALPRAZolam 0.25 milliGRAM(s) Oral three times a day PRN anxiety  acetaminophen   Tablet. 650 milliGRAM(s) Oral every 6 hours PRN mild to moderate pain      Allergies    No Known Allergies    Vital Signs Last 24 Hrs  T(C): 36.7 (13 Sep 2017 15:00), Max: 36.9 (12 Sep 2017 20:31)  T(F): 98 (13 Sep 2017 15:00), Max: 98.4 (12 Sep 2017 20:31)  HR: 84 (13 Sep 2017 15:00) (84 - 96)  BP: 123/78 (13 Sep 2017 15:00) (101/66 - 146/86)  BP(mean): --  RR: 18 (13 Sep 2017 15:00) (18 - 20)  SpO2: 94% (13 Sep 2017 15:00) (94% - 97%)    PHYSICAL EXAM:    GENERAL: NAD  CHEST/LUNG: ctab  HEART: Regular rate and rhythm; S1 S2  ABDOMEN: Soft, Nontender, Nondistended; Bowel sounds present  EXTREMITIES:  no edema  : + right nephro tube   NERVOUS SYSTEM:  alert, responsive, nonfocal, episodes of confusion    LABS:                        9.7    8.2   )-----------( 299      ( 13 Sep 2017 06:30 )             28.4     09-13    138  |  97<L>  |  77.0<H>  ----------------------------<  83  3.8   |  21.0<L>  |  6.59<H>    Ca    8.2<L>      13 Sep 2017 06:30  Phos  5.4     09-12    TPro  7.3  /  Alb  3.1<L>  /  TBili  0.2<L>  /  DBili  x   /  AST  31  /  ALT  36<H>  /  AlkPhos  172<H>      PT/INR - ( 11 Sep 2017 20:34 )   PT: 11.3 sec;   INR: 1.03 ratio         PTT - ( 11 Sep 2017 20:34 )  PTT:24.0 sec  Urinalysis Basic - ( 11 Sep 2017 20:57 )    Color: Yellow / Appearance: Cloudy / S.010 / pH: x  Gluc: x / Ketone: Negative  / Bili: Negative / Urobili: Negative mg/dL   Blood: x / Protein: 100 mg/dL / Nitrite: Positive   Leuk Esterase: Moderate / RBC: 11-25 /HPF / WBC 6-10   Sq Epi: x / Non Sq Epi: Few / Bacteria: Moderate        CAPILLARY BLOOD GLUCOSE            RADIOLOGY & ADDITIONAL TESTS:

## 2017-09-13 NOTE — BEHAVIORAL HEALTH ASSESSMENT NOTE - NSBHSUICPROTECTFACT_PSY_A_CORE
Identifies reasons for living/Supportive social network or family/Responsibility to family and others

## 2017-09-13 NOTE — PROGRESS NOTE ADULT - SUBJECTIVE AND OBJECTIVE BOX
INTERVAL HPI/OVERNIGHT EVENTS: No new complaints.    MEDICATIONS  (STANDING):  cefTRIAXone   IVPB 1 Gram(s) IV Intermittent every 24 hours  sodium bicarbonate 1300 milliGRAM(s) Oral three times a day before meals  sodium chloride 0.45% 1000 milliLiter(s) (125 mL/Hr) IV Continuous <Continuous>    MEDICATIONS  (PRN):  morphine  - Injectable 2 milliGRAM(s) IV Push every 6 hours PRN Moderate Pain (4 - 6)  ALPRAZolam 0.25 milliGRAM(s) Oral three times a day PRN anxiety  acetaminophen   Tablet. 650 milliGRAM(s) Oral every 6 hours PRN mild to moderate pain      Allergies    No Known Allergies    Intolerances        Vital Signs Last 24 Hrs  T(C): 36.7 (13 Sep 2017 08:20), Max: 36.9 (12 Sep 2017 20:31)  T(F): 98.1 (13 Sep 2017 08:20), Max: 98.4 (12 Sep 2017 20:31)  HR: 91 (13 Sep 2017 08:20) (88 - 101)  BP: 105/52 (13 Sep 2017 08:20) (101/66 - 146/86)  BP(mean): --  RR: 20 (13 Sep 2017 08:20) (19 - 26)  SpO2: 94% (13 Sep 2017 04:00) (94% - 97%)    ROS:  as per HPI     ON PE:  General: Well developed; well nourished; in no acute distress  Head: Normocephalic; atraumatic  Respiratory: No tachypnea  Gastrointestinal: Soft non-tender non-distended;  Genitourinary: No costovertebral angle tenderness.  Urinary bladder is clinically not distended  Extremities: Normal range of motion, No edema  Neurological: Alert and oriented x4  Skin: Warm and dry. No acute rash  Musculoskeletal: Normal gait, tone, without deformities  Psychiatric: Cooperative and appropriate      LABS:                        9.7    8.2   )-----------( 299      ( 13 Sep 2017 06:30 )             28.4     09-13    138  |  97<L>  |  77.0<H>  ----------------------------<  83  3.8   |  21.0<L>  |  6.59<H>    Ca    8.2<L>      13 Sep 2017 06:30  Phos  5.4         TPro  7.3  /  Alb  3.1<L>  /  TBili  0.2<L>  /  DBili  x   /  AST  31  /  ALT  36<H>  /  AlkPhos  172<H>      PT/INR - ( 11 Sep 2017 20:34 )   PT: 11.3 sec;   INR: 1.03 ratio         PTT - ( 11 Sep 2017 20:34 )  PTT:24.0 sec  Urinalysis Basic - ( 11 Sep 2017 20:57 )    Color: Yellow / Appearance: Cloudy / S.010 / pH: x  Gluc: x / Ketone: Negative  / Bili: Negative / Urobili: Negative mg/dL   Blood: x / Protein: 100 mg/dL / Nitrite: Positive   Leuk Esterase: Moderate / RBC: 11-25 /HPF / WBC 6-10   Sq Epi: x / Non Sq Epi: Few / Bacteria: Moderate        RADIOLOGY & ADDITIONAL TESTS:

## 2017-09-13 NOTE — BEHAVIORAL HEALTH ASSESSMENT NOTE - NSBHMEDSOTHERFT_PSY_A_CORE
per [facility] patient is prescribed effexor 75 mg daily and remeron 30 mg qhs only at this time

## 2017-09-13 NOTE — BEHAVIORAL HEALTH ASSESSMENT NOTE - SUMMARY
71 year old woman, history  of major depression for years, no prior psychiatric hospitalizations or suicide attempts, remote history of ETOH abuse, domiciled alone, ,  in nursing home, admitted  with abdominal pain found to have hydronephrosis.  Patient reports stopping antidepressants 4 weeks prior to current admission and is not interested in restarting psychotropics at this time.   would continue xanax however per [facility]inet has been prescribed 0.5 mg TID for the past year and found this dose helpful  patient not currently interested in restarting effexor, and also felt it disturbed her sleep   -if patient agrees would restart remeron at 7.5 mg qhs as this was helpful for sleep

## 2017-09-13 NOTE — PROGRESS NOTE ADULT - SUBJECTIVE AND OBJECTIVE BOX
VIR Procedure Note    Diagnosis: Right obstructive uropathy   Procedure: Right PCN yielded 60cc of grossly purulent fluid. The fluid did not drain well on gravity due to thickness.    Complications: None  Blood loss: 5cc  Sedation: Moderate Sedation    Plan: Gravity drain, hopefully as he urine clears, the tube will drain well. Flush wioth 10cc NS BID to prevent clogging    Zarina Xiong MD  VIR

## 2017-09-13 NOTE — BEHAVIORAL HEALTH ASSESSMENT NOTE - RISK ASSESSMENT
Chronic risk factors inlcude hx of depression. Acute risk factors is medical illness. However patient denies history of suicide attempts, denies current suicidal ideation plan or intent, is not hopeless, identifies reasons to stay alive for her grandchildren, does not have guns, is not abusing substances, has supportive family. Patient assessed to not be at imminent risk of harm to self or others.

## 2017-09-13 NOTE — BEHAVIORAL HEALTH ASSESSMENT NOTE - OTHER PAST PSYCHIATRIC HISTORY (INCLUDE DETAILS REGARDING ONSET, COURSE OF ILLNESS, INPATIENT/OUTPATIENT TREATMENT)
hx of depression for years, given diagnosis of MDD  no hospitalizations, no suicide attempts  in treatment with Dr. Barrios  [phone] at [facility]

## 2017-09-13 NOTE — BEHAVIORAL HEALTH ASSESSMENT NOTE - NSBHCONSULTFOLLOWAFTERCARE_PSY_A_CORE FT
return to treatment with Dr. Barrios at [facility]; patient would benefit from having appointment rescheduled as she missed last appointment, and following appointment was canceled by MD.

## 2017-09-13 NOTE — BEHAVIORAL HEALTH ASSESSMENT NOTE - HPI (INCLUDE ILLNESS QUALITY, SEVERITY, DURATION, TIMING, CONTEXT, MODIFYING FACTORS, ASSOCIATED SIGNS AND SYMPTOMS)
71 year old woman, history  of major depression for years, no prior psychiatric hospitalizations or suicide attempts, remote history of ETOH abuse, domiciled alone, ,  in nursing home, admitted  with abdominal pain found to have hydronephrosis.  Patient reports chronically being depressed  reports worthlessness, guilt, chronic difficulty sleeping, decreased appetite, denies anhedonia and states she enjoys her grandchildren. She denies history of manic symptoms AH, VH and paranoia. She states that she has not been able to see her sons grandchildren as frequently recently because son  from his wife and she can only see the grandchildren when they are with her son, reports this has been a stressor for her.     She reports she decided to stop taking all of her medications about 1 month ago, and has seen no change in her mood since that time, states she does not feel Effexor was helpful at all, states remeron was helpful for sleep and that she has taken Xanax for 20 years as prescribed by PCP and that it is very helpful for her anxiety. She reports she is not interested in restarting psychotropics at this time.

## 2017-09-14 LAB
ANION GAP SERPL CALC-SCNC: 20 MMOL/L — HIGH (ref 5–17)
BUN SERPL-MCNC: 80 MG/DL — HIGH (ref 8–20)
C DIFF BY PCR RESULT: SIGNIFICANT CHANGE UP
C DIFF TOX GENS STL QL NAA+PROBE: SIGNIFICANT CHANGE UP
C-ANCA SER-ACNC: NEGATIVE — SIGNIFICANT CHANGE UP
C3 SERPL-MCNC: 110 MG/DL — SIGNIFICANT CHANGE UP (ref 80–180)
C4 SERPL-MCNC: 32 MG/DL — SIGNIFICANT CHANGE UP (ref 10–45)
CALCIUM SERPL-MCNC: 8.7 MG/DL — SIGNIFICANT CHANGE UP (ref 8.6–10.2)
CHLORIDE SERPL-SCNC: 95 MMOL/L — LOW (ref 98–107)
CO2 SERPL-SCNC: 21 MMOL/L — LOW (ref 22–29)
CREAT SERPL-MCNC: 6.95 MG/DL — HIGH (ref 0.5–1.3)
CULTURE RESULTS: NO GROWTH — SIGNIFICANT CHANGE UP
DSDNA AB SER-ACNC: <12 IU/ML — SIGNIFICANT CHANGE UP
GBM IGG SER-ACNC: <0.2 U — SIGNIFICANT CHANGE UP
GLUCOSE SERPL-MCNC: 130 MG/DL — HIGH (ref 70–115)
HCT VFR BLD CALC: 33 % — LOW (ref 37–47)
HGB BLD-MCNC: 11.3 G/DL — LOW (ref 12–16)
MCHC RBC-ENTMCNC: 33.8 PG — HIGH (ref 27–31)
MCHC RBC-ENTMCNC: 34.2 G/DL — SIGNIFICANT CHANGE UP (ref 32–36)
MCV RBC AUTO: 98.8 FL — SIGNIFICANT CHANGE UP (ref 81–99)
P-ANCA SER-ACNC: NEGATIVE — SIGNIFICANT CHANGE UP
PLATELET # BLD AUTO: 353 K/UL — SIGNIFICANT CHANGE UP (ref 150–400)
POTASSIUM SERPL-MCNC: 3.5 MMOL/L — SIGNIFICANT CHANGE UP (ref 3.5–5.3)
POTASSIUM SERPL-SCNC: 3.5 MMOL/L — SIGNIFICANT CHANGE UP (ref 3.5–5.3)
PROT SERPL-MCNC: 5.4 G/DL — LOW (ref 6–8.3)
PROT SERPL-MCNC: 5.4 G/DL — LOW (ref 6–8.3)
RBC # BLD: 3.34 M/UL — LOW (ref 4.4–5.2)
RBC # FLD: 13.9 % — SIGNIFICANT CHANGE UP (ref 11–15.6)
SODIUM SERPL-SCNC: 136 MMOL/L — SIGNIFICANT CHANGE UP (ref 135–145)
SPECIMEN SOURCE: SIGNIFICANT CHANGE UP
WBC # BLD: 9.4 K/UL — SIGNIFICANT CHANGE UP (ref 4.8–10.8)
WBC # FLD AUTO: 9.4 K/UL — SIGNIFICANT CHANGE UP (ref 4.8–10.8)

## 2017-09-14 PROCEDURE — 99233 SBSQ HOSP IP/OBS HIGH 50: CPT

## 2017-09-14 RX ORDER — MIRTAZAPINE 45 MG/1
7.5 TABLET, ORALLY DISINTEGRATING ORAL AT BEDTIME
Qty: 0 | Refills: 0 | Status: DISCONTINUED | OUTPATIENT
Start: 2017-09-14 | End: 2017-09-14

## 2017-09-14 RX ADMIN — Medication 1300 MILLIGRAM(S): at 17:02

## 2017-09-14 RX ADMIN — Medication 650 MILLIGRAM(S): at 17:02

## 2017-09-14 RX ADMIN — Medication 650 MILLIGRAM(S): at 19:04

## 2017-09-14 RX ADMIN — Medication 1300 MILLIGRAM(S): at 12:26

## 2017-09-14 RX ADMIN — MORPHINE SULFATE 2 MILLIGRAM(S): 50 CAPSULE, EXTENDED RELEASE ORAL at 09:26

## 2017-09-14 RX ADMIN — Medication 0.25 MILLIGRAM(S): at 22:13

## 2017-09-14 RX ADMIN — MORPHINE SULFATE 2 MILLIGRAM(S): 50 CAPSULE, EXTENDED RELEASE ORAL at 10:00

## 2017-09-14 RX ADMIN — Medication 81 MILLIGRAM(S): at 12:26

## 2017-09-14 RX ADMIN — CEFTRIAXONE 100 GRAM(S): 500 INJECTION, POWDER, FOR SOLUTION INTRAMUSCULAR; INTRAVENOUS at 20:56

## 2017-09-14 RX ADMIN — Medication 1300 MILLIGRAM(S): at 06:49

## 2017-09-14 NOTE — PROGRESS NOTE ADULT - PROBLEM SELECTOR PLAN 9
restart xanax   family requesting psych evaluation as believe patient takes too much (effexor, wellbutrin, trazadone, xanax, remeron)  patient wants to continue only xanax, will d/c all others.

## 2017-09-14 NOTE — PROGRESS NOTE ADULT - SUBJECTIVE AND OBJECTIVE BOX
seen for ARF    no acute complaints. confusion much improved  ROS negative      MEDICATIONS  (STANDING):  cefTRIAXone   IVPB 1 Gram(s) IV Intermittent every 24 hours  sodium bicarbonate 1300 milliGRAM(s) Oral three times a day before meals  sodium chloride 0.45% 1000 milliLiter(s) (125 mL/Hr) IV Continuous <Continuous>  aspirin enteric coated 81 milliGRAM(s) Oral daily    MEDICATIONS  (PRN):  morphine  - Injectable 2 milliGRAM(s) IV Push every 6 hours PRN Moderate Pain (4 - 6)  ALPRAZolam 0.25 milliGRAM(s) Oral three times a day PRN anxiety  acetaminophen   Tablet. 650 milliGRAM(s) Oral every 6 hours PRN mild to moderate pain      Allergies    No Known Allergies    Vital Signs Last 24 Hrs  T(C): 37.3 (14 Sep 2017 05:00), Max: 37.3 (14 Sep 2017 05:00)  T(F): 99.1 (14 Sep 2017 05:00), Max: 99.1 (14 Sep 2017 05:00)  HR: 80 (14 Sep 2017 05:00) (80 - 96)  BP: 119/69 (14 Sep 2017 05:00) (114/74 - 133/84)  BP(mean): --  RR: 18 (14 Sep 2017 05:00) (18 - 18)  SpO2: 94% (13 Sep 2017 15:00) (94% - 95%)    PHYSICAL EXAM:    GENERAL: NAD,  CHEST/LUNG ctab  HEART: Regular rate and rhythm; S1 S2; no murmurs noted  ABDOMEN: Soft, Nontender, Nondistended; Bowel sounds present  EXTREMITIES:  no edema.   : +right nephro tube, minimal urine  NERVOUS SYSTEM:  Alert & Oriented X3, nonfocal    LABS:                        11.3   9.4   )-----------( 353      ( 14 Sep 2017 06:11 )             33.0     09-14    136  |  95<L>  |  80.0<H>  ----------------------------<  130<H>  3.5   |  21.0<L>  |  6.95<H>    Ca    8.7      14 Sep 2017 06:11  Phos  5.4     09-12    TPro  5.4<L>  /  Alb  x   /  TBili  x   /  DBili  x   /  AST  x   /  ALT  x   /  AlkPhos  x   09-13          CAPILLARY BLOOD GLUCOSE            RADIOLOGY & ADDITIONAL TESTS:

## 2017-09-14 NOTE — PROGRESS NOTE ADULT - ASSESSMENT
Right hydronephrosis now with a functioning right nephrostomy tube placed.    Renal function is worse

## 2017-09-14 NOTE — PROGRESS NOTE ADULT - SUBJECTIVE AND OBJECTIVE BOX
INTERVAL HPI/OVERNIGHT EVENTS: Resting. Right nephrostomy tube draining well.       MEDICATIONS  (STANDING):  cefTRIAXone   IVPB 1 Gram(s) IV Intermittent every 24 hours  sodium bicarbonate 1300 milliGRAM(s) Oral three times a day before meals  sodium chloride 0.45% 1000 milliLiter(s) (125 mL/Hr) IV Continuous <Continuous>  aspirin enteric coated 81 milliGRAM(s) Oral daily    MEDICATIONS  (PRN):  morphine  - Injectable 2 milliGRAM(s) IV Push every 6 hours PRN Moderate Pain (4 - 6)  ALPRAZolam 0.25 milliGRAM(s) Oral three times a day PRN anxiety  acetaminophen   Tablet. 650 milliGRAM(s) Oral every 6 hours PRN mild to moderate pain      Allergies    No Known Allergies    Intolerances        Vital Signs Last 24 Hrs  T(C): 37.3 (14 Sep 2017 05:00), Max: 37.3 (14 Sep 2017 05:00)  T(F): 99.1 (14 Sep 2017 05:00), Max: 99.1 (14 Sep 2017 05:00)  HR: 80 (14 Sep 2017 05:00) (80 - 86)  BP: 119/69 (14 Sep 2017 05:00) (114/74 - 129/81)  BP(mean): --  RR: 18 (14 Sep 2017 05:00) (18 - 18)  SpO2: --     ON PE:  General: alert and awake  Abdomen: soft, nt, bs+    Right nephrostomy draining well.          LABS:                        11.3   9.4   )-----------( 353      ( 14 Sep 2017 06:11 )             33.0     09-14    136  |  95<L>  |  80.0<H>  ----------------------------<  130<H>  3.5   |  21.0<L>  |  6.95<H>    Ca    8.7      14 Sep 2017 06:11  Phos  5.4     09-12    TPro  5.4<L>  /  Alb  x   /  TBili  x   /  DBili  x   /  AST  x   /  ALT  x   /  AlkPhos  x   09-13          RADIOLOGY & ADDITIONAL TESTS:

## 2017-09-15 DIAGNOSIS — D89.2 HYPERGAMMAGLOBULINEMIA, UNSPECIFIED: ICD-10-CM

## 2017-09-15 LAB
% ALBUMIN: 42.9 % — SIGNIFICANT CHANGE UP
% ALPHA 1: 10.4 % — SIGNIFICANT CHANGE UP
% ALPHA 2: 16.2 % — SIGNIFICANT CHANGE UP
% BETA: 24.9 % — SIGNIFICANT CHANGE UP
% GAMMA, URINE: 42.3 % — SIGNIFICANT CHANGE UP
% GAMMA: 5.6 % — SIGNIFICANT CHANGE UP
% M SPIKE: 11.9 % — SIGNIFICANT CHANGE UP
ALBUMIN 24H MFR UR ELPH: 24.5 % — SIGNIFICANT CHANGE UP
ALBUMIN SERPL ELPH-MCNC: 2.3 G/DL — LOW (ref 3.6–5.5)
ALBUMIN/GLOB SERPL ELPH: 0.7 RATIO — SIGNIFICANT CHANGE UP
ALPHA1 GLOB 24H MFR UR ELPH: 8.2 % — SIGNIFICANT CHANGE UP
ALPHA1 GLOB SERPL ELPH-MCNC: 0.6 G/DL — HIGH (ref 0.1–0.4)
ALPHA2 GLOB 24H MFR UR ELPH: 16 % — SIGNIFICANT CHANGE UP
ALPHA2 GLOB SERPL ELPH-MCNC: 0.9 G/DL — SIGNIFICANT CHANGE UP (ref 0.5–1)
ANION GAP SERPL CALC-SCNC: 18 MMOL/L — HIGH (ref 5–17)
B-GLOBULIN 24H MFR UR ELPH: 9 % — SIGNIFICANT CHANGE UP
B-GLOBULIN SERPL ELPH-MCNC: 1.3 G/DL — HIGH (ref 0.5–1)
BUN SERPL-MCNC: 72 MG/DL — HIGH (ref 8–20)
CALCIUM SERPL-MCNC: 8.5 MG/DL — LOW (ref 8.6–10.2)
CHLORIDE SERPL-SCNC: 94 MMOL/L — LOW (ref 98–107)
CO2 SERPL-SCNC: 25 MMOL/L — SIGNIFICANT CHANGE UP (ref 22–29)
CREAT SERPL-MCNC: 7.13 MG/DL — HIGH (ref 0.5–1.3)
CULTURE RESULTS: SIGNIFICANT CHANGE UP
ERYTHROCYTE [SEDIMENTATION RATE] IN BLOOD: 60 MM/HR — HIGH (ref 0–20)
GAMMA GLOBULIN: 0.3 G/DL — LOW (ref 0.6–1.6)
GLUCOSE SERPL-MCNC: 124 MG/DL — HIGH (ref 70–115)
INTERPRETATION 24H UR IFE-IMP: SIGNIFICANT CHANGE UP
INTERPRETATION 24H UR IFE-IMP: SIGNIFICANT CHANGE UP
INTERPRETATION SERPL IFE-IMP: SIGNIFICANT CHANGE UP
M PROTEIN 24H UR ELPH-MRATE: PRESENT
M-SPIKE: 0.6 G/DL — HIGH (ref 0–0)
POTASSIUM SERPL-MCNC: 3 MMOL/L — LOW (ref 3.5–5.3)
POTASSIUM SERPL-SCNC: 3 MMOL/L — LOW (ref 3.5–5.3)
PROT ?TM UR-MCNC: 66 MG/DL — HIGH (ref 0–12)
PROT PATTERN 24H UR ELPH-IMP: SIGNIFICANT CHANGE UP
PROT PATTERN SERPL ELPH-IMP: SIGNIFICANT CHANGE UP
SODIUM SERPL-SCNC: 137 MMOL/L — SIGNIFICANT CHANGE UP (ref 135–145)
TOTAL VOLUME - 24 HOUR: SIGNIFICANT CHANGE UP ML
URINE CREATININE CALCULATION: SIGNIFICANT CHANGE UP G/24 H (ref 0.8–1.8)

## 2017-09-15 RX ORDER — CALCIUM ACETATE 667 MG
667 TABLET ORAL
Qty: 0 | Refills: 0 | Status: DISCONTINUED | OUTPATIENT
Start: 2017-09-15 | End: 2017-09-22

## 2017-09-15 RX ORDER — POTASSIUM CHLORIDE 20 MEQ
40 PACKET (EA) ORAL ONCE
Qty: 0 | Refills: 0 | Status: COMPLETED | OUTPATIENT
Start: 2017-09-15 | End: 2017-09-15

## 2017-09-15 RX ORDER — CEFTRIAXONE 500 MG/1
1 INJECTION, POWDER, FOR SOLUTION INTRAMUSCULAR; INTRAVENOUS ONCE
Qty: 0 | Refills: 0 | Status: COMPLETED | OUTPATIENT
Start: 2017-09-15 | End: 2017-09-15

## 2017-09-15 RX ORDER — SODIUM CHLORIDE 9 MG/ML
1000 INJECTION INTRAMUSCULAR; INTRAVENOUS; SUBCUTANEOUS
Qty: 0 | Refills: 0 | Status: DISCONTINUED | OUTPATIENT
Start: 2017-09-15 | End: 2017-09-18

## 2017-09-15 RX ORDER — ONDANSETRON 8 MG/1
4 TABLET, FILM COATED ORAL EVERY 6 HOURS
Qty: 0 | Refills: 0 | Status: DISCONTINUED | OUTPATIENT
Start: 2017-09-15 | End: 2017-09-22

## 2017-09-15 RX ADMIN — Medication 650 MILLIGRAM(S): at 05:30

## 2017-09-15 RX ADMIN — SODIUM CHLORIDE 83 MILLILITER(S): 9 INJECTION INTRAMUSCULAR; INTRAVENOUS; SUBCUTANEOUS at 18:29

## 2017-09-15 RX ADMIN — CEFTRIAXONE 100 GRAM(S): 500 INJECTION, POWDER, FOR SOLUTION INTRAMUSCULAR; INTRAVENOUS at 17:00

## 2017-09-15 RX ADMIN — Medication 650 MILLIGRAM(S): at 22:45

## 2017-09-15 RX ADMIN — Medication 650 MILLIGRAM(S): at 11:40

## 2017-09-15 RX ADMIN — Medication 0.25 MILLIGRAM(S): at 16:26

## 2017-09-15 RX ADMIN — Medication 667 MILLIGRAM(S): at 22:45

## 2017-09-15 RX ADMIN — Medication 1300 MILLIGRAM(S): at 11:39

## 2017-09-15 RX ADMIN — Medication 650 MILLIGRAM(S): at 23:15

## 2017-09-15 RX ADMIN — Medication 650 MILLIGRAM(S): at 12:20

## 2017-09-15 RX ADMIN — Medication 1300 MILLIGRAM(S): at 17:01

## 2017-09-15 RX ADMIN — Medication 650 MILLIGRAM(S): at 04:58

## 2017-09-15 RX ADMIN — Medication 40 MILLIEQUIVALENT(S): at 18:30

## 2017-09-15 RX ADMIN — Medication 1300 MILLIGRAM(S): at 06:25

## 2017-09-15 RX ADMIN — Medication 0.25 MILLIGRAM(S): at 22:45

## 2017-09-15 RX ADMIN — ONDANSETRON 4 MILLIGRAM(S): 8 TABLET, FILM COATED ORAL at 17:00

## 2017-09-15 RX ADMIN — Medication 81 MILLIGRAM(S): at 11:39

## 2017-09-15 NOTE — CONSULT NOTE ADULT - SUBJECTIVE AND OBJECTIVE BOX
[facility]  MD Concha Bobo MD  [phone]  Answering Sylvia : [phone]        BECCA [id number]      HPI:    70 y/o female with CKD, B/L kidney stones started to feel right side abdominal pain 10 days ago. pain radiated to the right flank. chills and loss of apatite CT scan show obstructing stone in the right ureteropelvic junction with moderate hydronephrosis.   HAS  had R nephrostomy tube inserted.  Noted to have renal failure on admissionB Cason 77 and creatinine of 6.45.  no significant improvement of renal functions since insertion of nephrostomy tube.  mild degree of anemia with hemoglobin of 11.3 with normocytic normochrom white cell count and  platelet counts normal.total serum protein 5.4  with albumin of 2.3.  Serum immunofixation revealing IgG Kappa band    PAST MEDICAL & SURGICAL HISTORY:  Anxiety  Hearing loss, unspecified hearing loss type, unspecified laterality  Kidney stones  MI (myocardial infarction)  S/P cholecystectomy      ANTIBIOTICS  cefTRIAXone   IVPB 1 Gram(s) IV Intermittent every 24 hours      Allergies    No Known Allergies    Intolerances        SOCIAL HISTORY:    FAMILY HISTORY:  Family history of chronic renal impairment      Vital Signs Last 24 Hrs  T(C): 37.3 (15 Sep 2017 05:08), Max: 37.3 (15 Sep 2017 05:08)  T(F): 99.1 (15 Sep 2017 05:08), Max: 99.1 (15 Sep 2017 05:08)  HR: 87 (15 Sep 2017 05:08) (82 - 87)  BP: 128/83 (15 Sep 2017 05:08) (120/69 - 146/84)  BP(mean): --  RR: 18 (15 Sep 2017 05:08) (18 - 20)  SpO2: --  Drug Dosing Weight  Height (cm): 157.48 (11 Sep 2017 19:20)  Weight (kg): 52.6 (11 Sep 2017 19:20)  BMI (kg/m2): 21.2 (11 Sep 2017 19:20)  BSA (m2): 1.52 (11 Sep 2017 19:20)      REVIEW OF SYSTEMS:    CONSTITUTIONAL:  As per HPI.    HEENT:  Eyes:  No diplopia or blurred vision. ENT:  No earache, sore throat or runny nose.    CARDIOVASCULAR:  No pressure, squeezing, strangling, tightness, heaviness or aching about the chest, neck, axilla or epigastrium.    RESPIRATORY:  No cough, shortness of breath, PND or orthopnea.    GASTROINTESTINAL:  No nausea, vomiting or diarrhea.    GENITOURINARY:  No dysuria, frequency or urgency.    MUSCULOSKELETAL:  As per HPI.    SKIN:  No change in skin, hair or nails.    NEUROLOGIC:  No paresthesias, fasciculations, seizures or weakness.    PSYCHIATRIC:  No disorder of thought or mood.    ENDOCRINE:  No heat or cold intolerance, polyuria or polydipsia.    HEMATOLOGICAL:  No easy bruising or bleeding.           PHYSICAL EXAMINATION:    GENERAL: The patient is a well-developed, well-nourished _____in no apparent distress. ___ is alert and oriented x3.    VITAL SIGNS:     HEENT: Head is normocephalic and atraumatic. Extraocular muscles are intact. Pupils are equal, round, and reactive to light and accommodation. Nares appeared normal. Mouth is well hydrated and without lesions. Mucous membranes are moist. Posterior pharynx clear of any exudate or lesions.    NECK: Supple. No carotid bruits.  No lymphadenopathy or thyromegaly.    LUNGS: Clear to auscultation.    HEART: Regular rate and rhythm without murmur.    ABDOMEN: Soft, nontender, and nondistended.  Positive bowel sounds.  No hepatosplenomegaly was noted.    EXTREMITIES: Without any cyanosis, clubbing, rash, lesions or edema.    NEUROLOGIC: Cranial nerves II through XII are grossly intact.    PSYCHIATRIC: Flat affect, but denies suicidal or homicidal ideations.  SKIN: No ulceration or induration present.    MICROBIOLOGY:      LABS:                        11.3   9.4   )-----------( 353      ( 14 Sep 2017 06:11 )             33.0     09-15    137  |  94<L>  |  72.0<H>  ----------------------------<  124<H>  3.0<L>   |  25.0  |  7.13<H>    Ca    8.5<L>      15 Sep 2017 08:10    TPro  5.4<L>  /  Alb  2.3<L>  /  TBili  x   /  DBili  x   /  AST  x   /  ALT  x   /  AlkPhos  x   09-13          RADIOLOGY & ADDITIONAL STUDIES:      ASSESSMENT:    71-year-old female presents with acute renal failure associated with hydronephrosis of right kidney from obstructing stone. No significant improvement of kidney functions after insertion of nephrostomy tube.  Serum immunofixation showing suspicious IgG kappa Band  ? Rule out presence of underlying plasma celldisorder  PLAN:  Will order free serum light chains and 24 hr  urine protein studies  consider bone marrow  examination pending results of above studies      TONE HECK MD

## 2017-09-15 NOTE — PROGRESS NOTE ADULT - SUBJECTIVE AND OBJECTIVE BOX
NEPHROLOGY INTERVAL HPI/OVERNIGHT EVENTS:    Feels OK   Some nauseau  UO around 1 L  No SOB or CP   + Iv bicarbonate    MEDICATIONS  (STANDING):  sodium bicarbonate 1300 milliGRAM(s) Oral three times a day before meals  sodium chloride 0.45% 1000 milliLiter(s) (125 mL/Hr) IV Continuous <Continuous>  aspirin enteric coated 81 milliGRAM(s) Oral daily    MEDICATIONS  (PRN):  morphine  - Injectable 2 milliGRAM(s) IV Push every 6 hours PRN Moderate Pain (4 - 6)  ALPRAZolam 0.25 milliGRAM(s) Oral three times a day PRN anxiety  acetaminophen   Tablet. 650 milliGRAM(s) Oral every 6 hours PRN mild to moderate pain  ondansetron Injectable 4 milliGRAM(s) IV Push every 6 hours PRN Nausea and/or Vomiting      Allergies    No Known Allergies    Intolerances          Vital Signs Last 24 Hrs  T(C): 36.6 (15 Sep 2017 16:43), Max: 37.3 (15 Sep 2017 05:08)  T(F): 97.8 (15 Sep 2017 16:43), Max: 99.1 (15 Sep 2017 05:08)  HR: 87 (15 Sep 2017 16:43) (77 - 87)  BP: 126/68 (15 Sep 2017 16:43) (126/68 - 146/84)  BP(mean): --  RR: 18 (15 Sep 2017 16:43) (17 - 20)  SpO2: --  Daily     Daily   I&O's Detail    14 Sep 2017 07:01  -  15 Sep 2017 07:00  --------------------------------------------------------  IN:    Oral Fluid: 1330 mL    Solution: 50 mL  Total IN: 1380 mL    OUT:    Nephrostomy Tube: 175 mL    Voided: 490 mL  Total OUT: 665 mL    Total NET: 715 mL      15 Sep 2017 07:01  -  15 Sep 2017 17:50  --------------------------------------------------------  IN:    Oral Fluid: 1140 mL  Total IN: 1140 mL    OUT:    Nephrostomy Tube: 115 mL  Total OUT: 115 mL    Total NET: 1025 mL        I&O's Summary    14 Sep 2017 07:01  -  15 Sep 2017 07:00  --------------------------------------------------------  IN: 1380 mL / OUT: 665 mL / NET: 715 mL    15 Sep 2017 07:01  -  15 Sep 2017 17:50  --------------------------------------------------------  IN: 1140 mL / OUT: 115 mL / NET: 1025 mL        PHYSICAL EXAM:    GENERAL: NAD,   HEAD:  No edema , anicteric  NECK: Supple, No JVD,   CHEST/LUNG: EAE, Clear , No wheeze   HEART: Regular rate and rhythm; No rub   ABDOMEN: Soft, Nontender, Nondistended;+ R PCNT draining clear  EXTREMITIES:  min edema . No Vasculitis        LABS:                        11.3   9.4   )-----------( 353      ( 14 Sep 2017 06:11 )             33.0     09-15    137  |  94<L>  |  72.0<H>  ----------------------------<  124<H>  3.0<L>   |  25.0  |  7.13<H>    Ca    8.5<L>      15 Sep 2017 08:10                  RADIOLOGY & ADDITIONAL TESTS:

## 2017-09-15 NOTE — PROGRESS NOTE ADULT - SUBJECTIVE AND OBJECTIVE BOX
INTERVAL HPI/OVERNIGHT EVENTS:    MEDICATIONS  (STANDING):  cefTRIAXone   IVPB 1 Gram(s) IV Intermittent every 24 hours  sodium bicarbonate 1300 milliGRAM(s) Oral three times a day before meals  sodium chloride 0.45% 1000 milliLiter(s) (125 mL/Hr) IV Continuous <Continuous>  aspirin enteric coated 81 milliGRAM(s) Oral daily    MEDICATIONS  (PRN):  morphine  - Injectable 2 milliGRAM(s) IV Push every 6 hours PRN Moderate Pain (4 - 6)  ALPRAZolam 0.25 milliGRAM(s) Oral three times a day PRN anxiety  acetaminophen   Tablet. 650 milliGRAM(s) Oral every 6 hours PRN mild to moderate pain      Allergies    No Known Allergies    Intolerances        Vital Signs Last 24 Hrs  T(C): 37.3 (15 Sep 2017 05:08), Max: 37.3 (15 Sep 2017 05:08)  T(F): 99.1 (15 Sep 2017 05:08), Max: 99.1 (15 Sep 2017 05:08)  HR: 87 (15 Sep 2017 05:08) (77 - 87)  BP: 128/83 (15 Sep 2017 05:08) (120/69 - 146/84)  BP(mean): --  RR: 18 (15 Sep 2017 05:08) (17 - 20)  SpO2: --     ON PE:  General: alert and awake  Abdomen: N- tube draining well  : bladder not distended.    LABS:                        11.3   9.4   )-----------( 353      ( 14 Sep 2017 06:11 )             33.0     09-14    136  |  95<L>  |  80.0<H>  ----------------------------<  130<H>  3.5   |  21.0<L>  |  6.95<H>    Ca    8.7      14 Sep 2017 06:11    TPro  5.4<L>  /  Alb  2.3<L>  /  TBili  x   /  DBili  x   /  AST  x   /  ALT  x   /  AlkPhos  x   09-13          RADIOLOGY & ADDITIONAL TESTS:

## 2017-09-15 NOTE — PROGRESS NOTE ADULT - ASSESSMENT
70 yo female with a pmh of CAD, anxiety, depression, ulcerative colitis, , CRI, b/l Kidney stones started to feel right sided abdominal pain 10 days ago.  The pain radiated to her right flank with chills and loss of appetite.  Frequent falls per family.    Patient found to have right moderate hydronephrosis with small stone on CT a/p and underwent right nephrostomy tube placement by IR on 9/13/17.  No improvement in bun/cr on IVF and nephro tube, immunofixation with bands identified. Dr Figueroa, oncology following.

## 2017-09-15 NOTE — PROGRESS NOTE ADULT - SUBJECTIVE AND OBJECTIVE BOX
seen for arf, encephalopathy    no acute complaints. mild nausea  ROS negative     MEDICATIONS  (STANDING):  cefTRIAXone   IVPB 1 Gram(s) IV Intermittent every 24 hours  sodium bicarbonate 1300 milliGRAM(s) Oral three times a day before meals  sodium chloride 0.45% 1000 milliLiter(s) (125 mL/Hr) IV Continuous <Continuous>  aspirin enteric coated 81 milliGRAM(s) Oral daily    MEDICATIONS  (PRN):  morphine  - Injectable 2 milliGRAM(s) IV Push every 6 hours PRN Moderate Pain (4 - 6)  ALPRAZolam 0.25 milliGRAM(s) Oral three times a day PRN anxiety  acetaminophen   Tablet. 650 milliGRAM(s) Oral every 6 hours PRN mild to moderate pain  ondansetron Injectable 4 milliGRAM(s) IV Push every 6 hours PRN Nausea and/or Vomiting      Allergies    No Known Allergies    Intolerances      Vital Signs Last 24 Hrs  T(C): 36.8 (15 Sep 2017 10:10), Max: 37.3 (15 Sep 2017 05:08)  T(F): 98.3 (15 Sep 2017 10:10), Max: 99.1 (15 Sep 2017 05:08)  HR: 77 (15 Sep 2017 10:10) (77 - 87)  BP: 129/77 (15 Sep 2017 10:10) (120/69 - 146/84)  BP(mean): --  RR: 17 (15 Sep 2017 10:10) (17 - 20)  SpO2: --    PHYSICAL EXAM:    GENERAL: NAD  CHEST/LUNG: ctab  HEART: Regular rate and rhythm; S1 S2; no murmurs noted  ABDOMEN: Soft, Nontender, Nondistended; Bowel sounds present  EXTREMITIES: no edema  : right nephro tube  NERVOUS SYSTEM:  Alert & Oriented X3, nonfocal    LABS:                        11.3   9.4   )-----------( 353      ( 14 Sep 2017 06:11 )             33.0     09-15    137  |  94<L>  |  72.0<H>  ----------------------------<  124<H>  3.0<L>   |  25.0  |  7.13<H>    Ca    8.5<L>      15 Sep 2017 08:10    TPro  5.4<L>  /  Alb  2.3<L>  /  TBili  x   /  DBili  x   /  AST  x   /  ALT  x   /  AlkPhos  x   09-13          CAPILLARY BLOOD GLUCOSE            RADIOLOGY & ADDITIONAL TESTS:

## 2017-09-16 DIAGNOSIS — N13.2 HYDRONEPHROSIS WITH RENAL AND URETERAL CALCULOUS OBSTRUCTION: ICD-10-CM

## 2017-09-16 LAB
ANION GAP SERPL CALC-SCNC: 19 MMOL/L — HIGH (ref 5–17)
AUTO DIFF PNL BLD: ABNORMAL
BUN SERPL-MCNC: 66 MG/DL — HIGH (ref 8–20)
C3 SERPL-MCNC: 139 MG/DL — SIGNIFICANT CHANGE UP (ref 80–180)
C4 SERPL-MCNC: 34 MG/DL — SIGNIFICANT CHANGE UP (ref 10–45)
CALCIUM SERPL-MCNC: 8.6 MG/DL — SIGNIFICANT CHANGE UP (ref 8.6–10.2)
CHLORIDE SERPL-SCNC: 97 MMOL/L — LOW (ref 98–107)
CO2 SERPL-SCNC: 26 MMOL/L — SIGNIFICANT CHANGE UP (ref 22–29)
CREAT SERPL-MCNC: 6.3 MG/DL — HIGH (ref 0.5–1.3)
GLUCOSE SERPL-MCNC: 99 MG/DL — SIGNIFICANT CHANGE UP (ref 70–115)
HCT VFR BLD CALC: 29.6 % — LOW (ref 37–47)
HGB BLD-MCNC: 10.2 G/DL — LOW (ref 12–16)
MCHC RBC-ENTMCNC: 34.3 PG — HIGH (ref 27–31)
MCHC RBC-ENTMCNC: 34.5 G/DL — SIGNIFICANT CHANGE UP (ref 32–36)
MCV RBC AUTO: 99.7 FL — HIGH (ref 81–99)
PHOSPHATE SERPL-MCNC: 6.6 MG/DL — HIGH (ref 2.4–4.7)
PLATELET # BLD AUTO: 343 K/UL — SIGNIFICANT CHANGE UP (ref 150–400)
POTASSIUM SERPL-MCNC: 3.1 MMOL/L — LOW (ref 3.5–5.3)
POTASSIUM SERPL-SCNC: 3.1 MMOL/L — LOW (ref 3.5–5.3)
RBC # BLD: 2.97 M/UL — LOW (ref 4.4–5.2)
RBC # FLD: 13.7 % — SIGNIFICANT CHANGE UP (ref 11–15.6)
SODIUM SERPL-SCNC: 142 MMOL/L — SIGNIFICANT CHANGE UP (ref 135–145)
WBC # BLD: 6.2 K/UL — SIGNIFICANT CHANGE UP (ref 4.8–10.8)
WBC # FLD AUTO: 6.2 K/UL — SIGNIFICANT CHANGE UP (ref 4.8–10.8)

## 2017-09-16 PROCEDURE — 99233 SBSQ HOSP IP/OBS HIGH 50: CPT

## 2017-09-16 RX ORDER — DIPHENOXYLATE HCL/ATROPINE 2.5-.025MG
1 TABLET ORAL THREE TIMES A DAY
Qty: 0 | Refills: 0 | Status: DISCONTINUED | OUTPATIENT
Start: 2017-09-16 | End: 2017-09-20

## 2017-09-16 RX ORDER — POTASSIUM CHLORIDE 20 MEQ
40 PACKET (EA) ORAL ONCE
Qty: 0 | Refills: 0 | Status: COMPLETED | OUTPATIENT
Start: 2017-09-16 | End: 2017-09-16

## 2017-09-16 RX ADMIN — Medication 667 MILLIGRAM(S): at 08:59

## 2017-09-16 RX ADMIN — Medication 0.25 MILLIGRAM(S): at 09:03

## 2017-09-16 RX ADMIN — SODIUM CHLORIDE 83 MILLILITER(S): 9 INJECTION INTRAMUSCULAR; INTRAVENOUS; SUBCUTANEOUS at 06:35

## 2017-09-16 RX ADMIN — SODIUM CHLORIDE 83 MILLILITER(S): 9 INJECTION INTRAMUSCULAR; INTRAVENOUS; SUBCUTANEOUS at 15:41

## 2017-09-16 RX ADMIN — Medication 650 MILLIGRAM(S): at 05:52

## 2017-09-16 RX ADMIN — Medication 667 MILLIGRAM(S): at 15:40

## 2017-09-16 RX ADMIN — Medication 650 MILLIGRAM(S): at 06:20

## 2017-09-16 RX ADMIN — Medication 650 MILLIGRAM(S): at 21:33

## 2017-09-16 RX ADMIN — Medication 1300 MILLIGRAM(S): at 16:53

## 2017-09-16 RX ADMIN — Medication 667 MILLIGRAM(S): at 21:33

## 2017-09-16 RX ADMIN — Medication 0.25 MILLIGRAM(S): at 21:33

## 2017-09-16 RX ADMIN — Medication 650 MILLIGRAM(S): at 22:00

## 2017-09-16 RX ADMIN — Medication 667 MILLIGRAM(S): at 13:00

## 2017-09-16 RX ADMIN — Medication 1 TABLET(S): at 18:28

## 2017-09-16 RX ADMIN — Medication 40 MILLIEQUIVALENT(S): at 08:58

## 2017-09-16 RX ADMIN — Medication 1300 MILLIGRAM(S): at 06:34

## 2017-09-16 RX ADMIN — Medication 1300 MILLIGRAM(S): at 13:00

## 2017-09-16 NOTE — PROGRESS NOTE ADULT - ASSESSMENT
72 yo female with a pmh of CAD, anxiety, depression, ulcerative colitis, , CRI, b/l Kidney stones started to feel right sided abdominal pain 10 days ago.  The pain radiated to her right flank with chills and loss of appetite.  Frequent falls per family.    Patient found to have right moderate hydronephrosis with small stone on CT a/p and underwent right nephrostomy tube placement by IR on 9/13/17.  No improvement in bun/cr on IVF and nephro tube, immunofixation with bands identified. Dr Figueroa, oncology following.

## 2017-09-16 NOTE — PROGRESS NOTE ADULT - ASSESSMENT
Progressive renal failure ( Baseline creat ? -- Will have to call PMD Dr Ruiz when office open )  S/P R PCNT for obstructing stone  CVD - RPGN Serology negative thus far  + IFA/ + UIFA noted   ?? FH of hearing loss and renal disease ---> ? [place]'s   Normal looking kidneys on sonogram     At this point she is not uremic , hyperkalemic or fluid overloaded  However, if her renal function does not turn around, I explained to her that she will need HD   Luckily , labs better today with IVF   Will continue for now   Follow up pending serology   ASA held , in event go to renal biopsy   Follow up  Serum IgG's , Serum free light chains and Urine Eosinophils   Continue IV NS     Urology follow up noted     Hypokalemia - Dose x 1     RO - Phoslo added

## 2017-09-16 NOTE — PROGRESS NOTE ADULT - SUBJECTIVE AND OBJECTIVE BOX
BECCA JARRELL    [id number]    71y      Female    Patient is a 71y old  Female who presents with a chief complaint of right side abdominal pain, chills and loss of apetite (12 Sep 2017 02:14)      INTERVAL HPI/OVERNIGHT EVENTS:  patient is feeling better, her mentation  has improved, no more confusion, denies fever, chills, chest pain or SOB    REVIEW OF SYSTEMS:    CONSTITUTIONAL: No fever, some fatigue  RESPIRATORY: No cough, No shortness of breath  CARDIOVASCULAR: No chest pain, palpitations  GASTROINTESTINAL: No abdominal, No nausea, vomiting  NEUROLOGICAL: No headaches,  loss of strength.  MISCELLANEOUS: No joint swelling or pain       Vital Signs Last 24 Hrs  T(C): 36.8 (16 Sep 2017 07:34), Max: 36.8 (16 Sep 2017 05:25)  T(F): 98.2 (16 Sep 2017 07:34), Max: 98.2 (16 Sep 2017 05:25)  HR: 78 (16 Sep 2017 07:34) (78 - 96)  BP: 124/76 (16 Sep 2017 07:34) (124/76 - 146/86)  BP(mean): --  RR: 18 (16 Sep 2017 07:34) (18 - 18)  SpO2: 98% (16 Sep 2017 07:34) (96% - 98%)    PHYSICAL EXAM:    GENERAL: Elderly female looking comfortable  HEENT: PERRL, +EOMI  NECK: soft, Supple, No JVD,   CHEST/LUNG: Clear to auscultate bilaterally; No wheezing  HEART: S1S2+, Regular rate and rhythm; No murmurs  ABDOMEN: Soft, Nontender, Nondistended; Bowel sounds present  EXTREMITIES:  1+ Peripheral Pulses, No edema  SKIN: No rashes or lesions  NEURO: AAOX3, no focal deficits, no motor r sensory loss  PSYCH: normal mood  Nephrostomy tube in place     LABS:                        10.2   6.2   )-----------( 343      ( 16 Sep 2017 06:32 )             29.6     09-16    142  |  97<L>  |  66.0<H>  ----------------------------<  99  3.1<L>   |  26.0  |  6.30<H>    Ca    8.6      16 Sep 2017 06:32  Phos  6.6     09-16              I&O's Summary    15 Sep 2017 07:01  -  16 Sep 2017 07:00  --------------------------------------------------------  IN: 2659 mL / OUT: 1065 mL / NET: 1594 mL        MEDICATIONS  (STANDING):  sodium bicarbonate 1300 milliGRAM(s) Oral three times a day before meals  sodium chloride 0.45% 1000 milliLiter(s) (125 mL/Hr) IV Continuous <Continuous>  calcium acetate 667 milliGRAM(s) Oral four times a day with meals  sodium chloride 0.9%. 1000 milliLiter(s) (83 mL/Hr) IV Continuous <Continuous>    MEDICATIONS  (PRN):  morphine  - Injectable 2 milliGRAM(s) IV Push every 6 hours PRN Moderate Pain (4 - 6)  ALPRAZolam 0.25 milliGRAM(s) Oral three times a day PRN anxiety  acetaminophen   Tablet. 650 milliGRAM(s) Oral every 6 hours PRN mild to moderate pain  ondansetron Injectable 4 milliGRAM(s) IV Push every 6 hours PRN Nausea and/or Vomiting

## 2017-09-16 NOTE — PROGRESS NOTE ADULT - SUBJECTIVE AND OBJECTIVE BOX
NEPHROLOGY INTERVAL HPI/OVERNIGHT EVENTS:    Comfortable flat  No SOB or CP   Net UO 1 L ( 400 ml in R PCNT )   No new complaints     MEDICATIONS  (STANDING):  sodium bicarbonate 1300 milliGRAM(s) Oral three times a day before meals  sodium chloride 0.45% 1000 milliLiter(s) (125 mL/Hr) IV Continuous <Continuous>  calcium acetate 667 milliGRAM(s) Oral four times a day with meals  sodium chloride 0.9%. 1000 milliLiter(s) (83 mL/Hr) IV Continuous <Continuous>    MEDICATIONS  (PRN):  morphine  - Injectable 2 milliGRAM(s) IV Push every 6 hours PRN Moderate Pain (4 - 6)  ALPRAZolam 0.25 milliGRAM(s) Oral three times a day PRN anxiety  acetaminophen   Tablet. 650 milliGRAM(s) Oral every 6 hours PRN mild to moderate pain  ondansetron Injectable 4 milliGRAM(s) IV Push every 6 hours PRN Nausea and/or Vomiting      Allergies    No Known Allergies    Intolerances            Vital Signs Last 24 Hrs  T(C): 36.8 (16 Sep 2017 07:34), Max: 36.8 (16 Sep 2017 05:25)  T(F): 98.2 (16 Sep 2017 07:34), Max: 98.2 (16 Sep 2017 05:25)  HR: 78 (16 Sep 2017 07:34) (78 - 96)  BP: 124/76 (16 Sep 2017 07:34) (124/76 - 146/86)  BP(mean): --  RR: 18 (16 Sep 2017 07:34) (18 - 18)  SpO2: 98% (16 Sep 2017 07:34) (96% - 98%)  Daily     Daily   I&O's Detail    15 Sep 2017 07:01  -  16 Sep 2017 07:00  --------------------------------------------------------  IN:    Oral Fluid: 1580 mL    sodium chloride 0.9%.: 1079 mL  Total IN: 2659 mL    OUT:    Nephrostomy Tube: 415 mL    Voided: 650 mL  Total OUT: 1065 mL    Total NET: 1594 mL        I&O's Summary    15 Sep 2017 07:01  -  16 Sep 2017 07:00  --------------------------------------------------------  IN: 2659 mL / OUT: 1065 mL / NET: 1594 mL        PHYSICAL EXAM:  GENERAL: NAD,   HEAD:  No edema , anicteric  NECK: Supple, No JVD,   CHEST/LUNG: EAE, Clear , No wheeze   HEART: Regular rate and rhythm; No rub   ABDOMEN: Soft, Nontender, Nondistended;+ R PCNT draining clear  EXTREMITIES:  min edema . No Vasculitis. No asterixis     LABS:                        10.2   6.2   )-----------( 343      ( 16 Sep 2017 06:32 )             29.6     09-16    142  |  97<L>  |  66.0<H>  ----------------------------<  99  3.1<L>   |  26.0  |  6.30<H>    Ca    8.6      16 Sep 2017 06:32  Phos  6.6     09-16          Phosphorus Level, Serum: 6.6 mg/dL (09-16 @ 06:32)          RADIOLOGY & ADDITIONAL TESTS:

## 2017-09-16 NOTE — PROGRESS NOTE ADULT - SUBJECTIVE AND OBJECTIVE BOX
INTERVAL HPI/OVERNIGHT EVENTS:    MEDICATIONS  (STANDING):  sodium bicarbonate 1300 milliGRAM(s) Oral three times a day before meals  sodium chloride 0.45% 1000 milliLiter(s) (125 mL/Hr) IV Continuous <Continuous>  calcium acetate 667 milliGRAM(s) Oral four times a day with meals  sodium chloride 0.9%. 1000 milliLiter(s) (83 mL/Hr) IV Continuous <Continuous>    MEDICATIONS  (PRN):  morphine  - Injectable 2 milliGRAM(s) IV Push every 6 hours PRN Moderate Pain (4 - 6)  ALPRAZolam 0.25 milliGRAM(s) Oral three times a day PRN anxiety  acetaminophen   Tablet. 650 milliGRAM(s) Oral every 6 hours PRN mild to moderate pain  ondansetron Injectable 4 milliGRAM(s) IV Push every 6 hours PRN Nausea and/or Vomiting      Allergies    No Known Allergies    Intolerances        Vital Signs Last 24 Hrs  T(C): 36.8 (16 Sep 2017 07:34), Max: 36.8 (16 Sep 2017 05:25)  T(F): 98.2 (16 Sep 2017 07:34), Max: 98.2 (16 Sep 2017 05:25)  HR: 78 (16 Sep 2017 07:34) (78 - 96)  BP: 124/76 (16 Sep 2017 07:34) (124/76 - 146/86)  BP(mean): --  RR: 18 (16 Sep 2017 07:34) (18 - 18)  SpO2: 98% (16 Sep 2017 07:34) (96% - 98%)     ON PE:  General: alert and awake  Abdomen: soft ND/NT BS+  : Bladder not distended    LABS:                        10.2   6.2   )-----------( 343      ( 16 Sep 2017 06:32 )             29.6     09-16    142  |  97<L>  |  66.0<H>  ----------------------------<  99  3.1<L>   |  26.0  |  6.30<H>    Ca    8.6      16 Sep 2017 06:32  Phos  6.6     09-16            RADIOLOGY & ADDITIONAL TESTS:

## 2017-09-17 LAB
ANION GAP SERPL CALC-SCNC: 17 MMOL/L — SIGNIFICANT CHANGE UP (ref 5–17)
BUN SERPL-MCNC: 56 MG/DL — HIGH (ref 8–20)
CALCIUM SERPL-MCNC: 8.5 MG/DL — LOW (ref 8.6–10.2)
CHLORIDE SERPL-SCNC: 102 MMOL/L — SIGNIFICANT CHANGE UP (ref 98–107)
CO2 SERPL-SCNC: 24 MMOL/L — SIGNIFICANT CHANGE UP (ref 22–29)
CREAT SERPL-MCNC: 5.91 MG/DL — HIGH (ref 0.5–1.3)
GLUCOSE SERPL-MCNC: 80 MG/DL — SIGNIFICANT CHANGE UP (ref 70–115)
HCT VFR BLD CALC: 29.2 % — LOW (ref 37–47)
HGB BLD-MCNC: 9.5 G/DL — LOW (ref 12–16)
MAGNESIUM SERPL-MCNC: 1.8 MG/DL — SIGNIFICANT CHANGE UP (ref 1.6–2.6)
MCHC RBC-ENTMCNC: 32.5 G/DL — SIGNIFICANT CHANGE UP (ref 32–36)
MCHC RBC-ENTMCNC: 33.5 PG — HIGH (ref 27–31)
MCV RBC AUTO: 102.8 FL — HIGH (ref 81–99)
PHOSPHATE SERPL-MCNC: 6.2 MG/DL — HIGH (ref 2.4–4.7)
PLATELET # BLD AUTO: 304 K/UL — SIGNIFICANT CHANGE UP (ref 150–400)
POTASSIUM SERPL-MCNC: 3.6 MMOL/L — SIGNIFICANT CHANGE UP (ref 3.5–5.3)
POTASSIUM SERPL-SCNC: 3.6 MMOL/L — SIGNIFICANT CHANGE UP (ref 3.5–5.3)
RBC # BLD: 2.84 M/UL — LOW (ref 4.4–5.2)
RBC # FLD: 13.4 % — SIGNIFICANT CHANGE UP (ref 11–15.6)
SODIUM SERPL-SCNC: 143 MMOL/L — SIGNIFICANT CHANGE UP (ref 135–145)
WBC # BLD: 7.9 K/UL — SIGNIFICANT CHANGE UP (ref 4.8–10.8)
WBC # FLD AUTO: 7.9 K/UL — SIGNIFICANT CHANGE UP (ref 4.8–10.8)

## 2017-09-17 PROCEDURE — 99232 SBSQ HOSP IP/OBS MODERATE 35: CPT

## 2017-09-17 RX ORDER — ERYTHROPOIETIN 10000 [IU]/ML
20000 INJECTION, SOLUTION INTRAVENOUS; SUBCUTANEOUS
Qty: 0 | Refills: 0 | Status: DISCONTINUED | OUTPATIENT
Start: 2017-09-17 | End: 2017-09-22

## 2017-09-17 RX ORDER — FOLIC ACID 0.8 MG
1 TABLET ORAL DAILY
Qty: 0 | Refills: 0 | Status: DISCONTINUED | OUTPATIENT
Start: 2017-09-17 | End: 2017-09-22

## 2017-09-17 RX ADMIN — Medication 1300 MILLIGRAM(S): at 16:42

## 2017-09-17 RX ADMIN — Medication 667 MILLIGRAM(S): at 16:41

## 2017-09-17 RX ADMIN — SODIUM CHLORIDE 83 MILLILITER(S): 9 INJECTION INTRAMUSCULAR; INTRAVENOUS; SUBCUTANEOUS at 03:21

## 2017-09-17 RX ADMIN — Medication 1 TABLET(S): at 13:31

## 2017-09-17 RX ADMIN — Medication 667 MILLIGRAM(S): at 22:02

## 2017-09-17 RX ADMIN — SODIUM CHLORIDE 83 MILLILITER(S): 9 INJECTION INTRAMUSCULAR; INTRAVENOUS; SUBCUTANEOUS at 22:10

## 2017-09-17 RX ADMIN — SODIUM CHLORIDE 83 MILLILITER(S): 9 INJECTION INTRAMUSCULAR; INTRAVENOUS; SUBCUTANEOUS at 13:27

## 2017-09-17 RX ADMIN — Medication 650 MILLIGRAM(S): at 03:50

## 2017-09-17 RX ADMIN — Medication 1300 MILLIGRAM(S): at 06:12

## 2017-09-17 RX ADMIN — Medication 1300 MILLIGRAM(S): at 13:25

## 2017-09-17 RX ADMIN — Medication 667 MILLIGRAM(S): at 13:25

## 2017-09-17 RX ADMIN — Medication 650 MILLIGRAM(S): at 03:20

## 2017-09-17 RX ADMIN — Medication 0.25 MILLIGRAM(S): at 13:24

## 2017-09-17 RX ADMIN — Medication 0.25 MILLIGRAM(S): at 03:20

## 2017-09-17 RX ADMIN — Medication 650 MILLIGRAM(S): at 23:30

## 2017-09-17 RX ADMIN — Medication 650 MILLIGRAM(S): at 22:03

## 2017-09-17 RX ADMIN — Medication 667 MILLIGRAM(S): at 07:55

## 2017-09-17 RX ADMIN — Medication 0.25 MILLIGRAM(S): at 22:02

## 2017-09-17 RX ADMIN — Medication 1 MILLIGRAM(S): at 13:26

## 2017-09-17 NOTE — PROGRESS NOTE ADULT - SUBJECTIVE AND OBJECTIVE BOX
INTERVAL HPI/OVERNIGHT EVENTS:    MEDICATIONS  (STANDING):  sodium bicarbonate 1300 milliGRAM(s) Oral three times a day before meals  sodium chloride 0.45% 1000 milliLiter(s) (125 mL/Hr) IV Continuous <Continuous>  calcium acetate 667 milliGRAM(s) Oral four times a day with meals  sodium chloride 0.9%. 1000 milliLiter(s) (83 mL/Hr) IV Continuous <Continuous>  epoetin les Injectable 52428 Unit(s) SubCutaneous every 7 days  folic acid 1 milliGRAM(s) Oral daily    MEDICATIONS  (PRN):  morphine  - Injectable 2 milliGRAM(s) IV Push every 6 hours PRN Moderate Pain (4 - 6)  ALPRAZolam 0.25 milliGRAM(s) Oral three times a day PRN anxiety  acetaminophen   Tablet. 650 milliGRAM(s) Oral every 6 hours PRN mild to moderate pain  ondansetron Injectable 4 milliGRAM(s) IV Push every 6 hours PRN Nausea and/or Vomiting  diphenoxylate/atropine 1 Tablet(s) Oral three times a day PRN Diarrhea      Allergies    No Known Allergies    Intolerances        Vital Signs Last 24 Hrs  T(C): 36.9 (17 Sep 2017 08:24), Max: 37.1 (17 Sep 2017 05:00)  T(F): 98.4 (17 Sep 2017 08:24), Max: 98.7 (17 Sep 2017 05:00)  HR: 78 (17 Sep 2017 08:24) (78 - 88)  BP: 134/75 (17 Sep 2017 08:24) (134/75 - 148/66)  BP(mean): --  RR: 18 (17 Sep 2017 08:24) (18 - 18)  SpO2: 98% (17 Sep 2017 08:24) (97% - 98%)     ON PE:  General: alert and awake  Abdomen: N-tube draining well  : bladder not clinically distended    LABS:                        9.5    7.9   )-----------( 304      ( 17 Sep 2017 08:31 )             29.2     09-17    143  |  102  |  56.0<H>  ----------------------------<  80  3.6   |  24.0  |  5.91<H>    Ca    8.5<L>      17 Sep 2017 08:31  Phos  6.2     09-17  Mg     1.8     09-17            RADIOLOGY & ADDITIONAL TESTS:

## 2017-09-17 NOTE — PROGRESS NOTE ADULT - SUBJECTIVE AND OBJECTIVE BOX
BECCA JARRELL    [id number]    71y      Female    Patient is a 71y old  Female who presents with a chief complaint of right side abdominal pain, chills and loss of apetite (12 Sep 2017 02:14)      INTERVAL HPI/OVERNIGHT EVENTS:    Patient is feeling better, her mentation  has improved, no more confusion, her kidney function is improving very slowly, denies fever, chills, chest pain or SOB    REVIEW OF SYSTEMS:    CONSTITUTIONAL: No fever, some fatigue  RESPIRATORY: No cough, No shortness of breath  CARDIOVASCULAR: No chest pain, palpitations  GASTROINTESTINAL: No abdominal, No nausea, vomiting  NEUROLOGICAL: No headaches,  loss of strength.  MISCELLANEOUS: No joint swelling or pain       Vital Signs Last 24 Hrs  T(C): 36.9 (17 Sep 2017 08:24), Max: 37.1 (17 Sep 2017 05:00)  T(F): 98.4 (17 Sep 2017 08:24), Max: 98.7 (17 Sep 2017 05:00)  HR: 78 (17 Sep 2017 08:24) (78 - 88)  BP: 134/75 (17 Sep 2017 08:24) (134/75 - 148/66)  BP(mean): --  RR: 18 (17 Sep 2017 08:24) (18 - 18)  SpO2: 98% (17 Sep 2017 08:24) (97% - 98%)    PHYSICAL EXAM:    GENERAL: Elderly female looking comfortable  HEENT: PERRL, +EOMI  NECK: soft, Supple, No JVD,   CHEST/LUNG: Clear to auscultate bilaterally; No wheezing  HEART: S1S2+, Regular rate and rhythm; No murmurs  ABDOMEN: Soft, Nontender, Nondistended; Bowel sounds present  EXTREMITIES:  1+ Peripheral Pulses, No edema  SKIN: No rashes or lesions  NEURO: AAOX3, no focal deficits, no motor r sensory loss  PSYCH: normal mood  Nephrostomy tube in place       LABS:                        9.5    7.9   )-----------( 304      ( 17 Sep 2017 08:31 )             29.2     09-17    143  |  102  |  56.0<H>  ----------------------------<  80  3.6   |  24.0  |  5.91<H>    Ca    8.5<L>      17 Sep 2017 08:31  Phos  6.2     09-17  Mg     1.8     09-17              I&O's Summary    16 Sep 2017 07:01  -  17 Sep 2017 07:00  --------------------------------------------------------  IN: 1459 mL / OUT: 200 mL / NET: 1259 mL    17 Sep 2017 07:01  -  17 Sep 2017 13:14  --------------------------------------------------------  IN: 360 mL / OUT: 1 mL / NET: 359 mL        MEDICATIONS  (STANDING):  sodium bicarbonate 1300 milliGRAM(s) Oral three times a day before meals  sodium chloride 0.45% 1000 milliLiter(s) (125 mL/Hr) IV Continuous <Continuous>  calcium acetate 667 milliGRAM(s) Oral four times a day with meals  sodium chloride 0.9%. 1000 milliLiter(s) (83 mL/Hr) IV Continuous <Continuous>  epoetin les Injectable 25568 Unit(s) SubCutaneous every 7 days  folic acid 1 milliGRAM(s) Oral daily    MEDICATIONS  (PRN):  morphine  - Injectable 2 milliGRAM(s) IV Push every 6 hours PRN Moderate Pain (4 - 6)  ALPRAZolam 0.25 milliGRAM(s) Oral three times a day PRN anxiety  acetaminophen   Tablet. 650 milliGRAM(s) Oral every 6 hours PRN mild to moderate pain  ondansetron Injectable 4 milliGRAM(s) IV Push every 6 hours PRN Nausea and/or Vomiting  diphenoxylate/atropine 1 Tablet(s) Oral three times a day PRN Diarrhea

## 2017-09-17 NOTE — PROGRESS NOTE ADULT - SUBJECTIVE AND OBJECTIVE BOX
NEPHROLOGY INTERVAL HPI/OVERNIGHT EVENTS:    Fells better   No SOB laying flat  Clear urine in R PCNT    MEDICATIONS  (STANDING):  sodium bicarbonate 1300 milliGRAM(s) Oral three times a day before meals  sodium chloride 0.45% 1000 milliLiter(s) (125 mL/Hr) IV Continuous <Continuous>  calcium acetate 667 milliGRAM(s) Oral four times a day with meals  sodium chloride 0.9%. 1000 milliLiter(s) (83 mL/Hr) IV Continuous <Continuous>    MEDICATIONS  (PRN):  morphine  - Injectable 2 milliGRAM(s) IV Push every 6 hours PRN Moderate Pain (4 - 6)  ALPRAZolam 0.25 milliGRAM(s) Oral three times a day PRN anxiety  acetaminophen   Tablet. 650 milliGRAM(s) Oral every 6 hours PRN mild to moderate pain  ondansetron Injectable 4 milliGRAM(s) IV Push every 6 hours PRN Nausea and/or Vomiting  diphenoxylate/atropine 1 Tablet(s) Oral three times a day PRN Diarrhea      Allergies    No Known Allergies    Intolerances        Vital Signs Last 24 Hrs  T(C): 36.9 (17 Sep 2017 08:24), Max: 37.1 (17 Sep 2017 05:00)  T(F): 98.4 (17 Sep 2017 08:24), Max: 98.7 (17 Sep 2017 05:00)  HR: 78 (17 Sep 2017 08:24) (78 - 88)  BP: 134/75 (17 Sep 2017 08:24) (134/75 - 148/66)  BP(mean): --  RR: 18 (17 Sep 2017 08:24) (18 - 18)  SpO2: 98% (17 Sep 2017 08:24) (97% - 98%)  Daily     Daily   I&O's Detail    16 Sep 2017 07:01  -  17 Sep 2017 07:00  --------------------------------------------------------  IN:    Oral Fluid: 380 mL    sodium chloride 0.9%.: 1079 mL  Total IN: 1459 mL    OUT:    Nephrostomy Tube: 200 mL  Total OUT: 200 mL    Total NET: 1259 mL      17 Sep 2017 07:01  -  17 Sep 2017 12:13  --------------------------------------------------------  IN:    Oral Fluid: 360 mL  Total IN: 360 mL    OUT:    Voided: 1 mL  Total OUT: 1 mL    Total NET: 359 mL        I&O's Summary    16 Sep 2017 07:01  -  17 Sep 2017 07:00  --------------------------------------------------------  IN: 1459 mL / OUT: 200 mL / NET: 1259 mL    17 Sep 2017 07:01  -  17 Sep 2017 12:13  --------------------------------------------------------  IN: 360 mL / OUT: 1 mL / NET: 359 mL        PHYSICAL EXAM:  GENERAL: NAD,   HEAD:  No edema , anicteric  NECK: Supple, No JVD,   CHEST/LUNG: EAE, Clear , No wheeze   HEART: Regular rate and rhythm; No rub   ABDOMEN: Soft, Nontender, Nondistended;+ R PCNT draining clear  EXTREMITIES:  min edema . No Vasculitis. No asterixis       LABS:                        9.5    7.9   )-----------( 304      ( 17 Sep 2017 08:31 )             29.2     09-17    143  |  102  |  56.0<H>  ----------------------------<  80  3.6   |  24.0  |  5.91<H>    Ca    8.5<L>      17 Sep 2017 08:31  Phos  6.2     09-17  Mg     1.8     09-17          Phosphorus Level, Serum: 6.2 mg/dL (09-17 @ 08:31)  Magnesium, Serum: 1.8 mg/dL (09-17 @ 08:31)          RADIOLOGY & ADDITIONAL TESTS:

## 2017-09-17 NOTE — PROGRESS NOTE ADULT - ASSESSMENT
Progressive renal failure ( Baseline creat ? -- Will have to call PMD Dr Ruiz when office open )  S/P R PCNT for obstructing stone  CVD - RPGN Serology negative thus far  + IFA/ + UIFA noted   ?? FH of hearing loss and renal disease ---> ? [place]'s   Normal looking kidneys on sonogram     At this point she is not uremic , hyperkalemic or fluid overloaded  However, if her renal function does not turn around, I explained to her that she will need HD   Luckily , labs trending better  with IVF   Will continue for now   Follow up pending serology ordered 9-15  ASA held , in event go to renal biopsy   Follow up  Serum IgG's , Serum free light chains and Urine Eosinophils   Continue IV NS     Urology follow up noted     Anemia - Add epogen and folate     RO - Phoslo added

## 2017-09-18 DIAGNOSIS — N17.9 ACUTE KIDNEY FAILURE, UNSPECIFIED: ICD-10-CM

## 2017-09-18 LAB
ANION GAP SERPL CALC-SCNC: 18 MMOL/L — HIGH (ref 5–17)
AUTO DIFF PNL BLD: NEGATIVE — SIGNIFICANT CHANGE UP
BUN SERPL-MCNC: 51 MG/DL — HIGH (ref 8–20)
C-ANCA SER-ACNC: NEGATIVE — SIGNIFICANT CHANGE UP
CALCIUM SERPL-MCNC: 8.2 MG/DL — LOW (ref 8.6–10.2)
CHLORIDE SERPL-SCNC: 104 MMOL/L — SIGNIFICANT CHANGE UP (ref 98–107)
CO2 SERPL-SCNC: 23 MMOL/L — SIGNIFICANT CHANGE UP (ref 22–29)
CREAT SERPL-MCNC: 5.55 MG/DL — HIGH (ref 0.5–1.3)
GLUCOSE SERPL-MCNC: 85 MG/DL — SIGNIFICANT CHANGE UP (ref 70–115)
HCT VFR BLD CALC: 24.8 % — LOW (ref 37–47)
HGB BLD-MCNC: 8.2 G/DL — LOW (ref 12–16)
IGA FLD-MCNC: 1140 MG/DL — HIGH (ref 68–378)
IGA FLD-MCNC: 1170 MG/DL — HIGH (ref 68–378)
IGG FLD-MCNC: 349 MG/DL — LOW (ref 694–1618)
IGG FLD-MCNC: 369 MG/DL — LOW (ref 694–1618)
IGG4 SER-MCNC: 15.3 MG/DL — SIGNIFICANT CHANGE UP (ref 2.4–121)
IGM SERPL-MCNC: 22 MG/DL — LOW (ref 40–230)
IGM SERPL-MCNC: 23 MG/DL — LOW (ref 40–230)
KAPPA LC SER QL IFE: 55.4 MG/DL — HIGH (ref 0.33–1.94)
KAPPA LC SER QL IFE: 55.4 MG/DL — HIGH (ref 0.33–1.94)
KAPPA LC SER QL IFE: 78.4 MG/DL — HIGH (ref 0.33–1.94)
KAPPA LC SER QL IFE: 78.4 MG/DL — HIGH (ref 0.33–1.94)
KAPPA/LAMBDA FREE LIGHT CHAIN RATIO, SERUM: 34.41 RATIO — HIGH (ref 0.26–1.65)
KAPPA/LAMBDA FREE LIGHT CHAIN RATIO, SERUM: 34.41 RATIO — HIGH (ref 0.26–1.65)
KAPPA/LAMBDA FREE LIGHT CHAIN RATIO, SERUM: 46.67 RATIO — HIGH (ref 0.26–1.65)
KAPPA/LAMBDA FREE LIGHT CHAIN RATIO, SERUM: 46.67 RATIO — HIGH (ref 0.26–1.65)
LAMBDA LC SER QL IFE: 1.61 MG/DL — SIGNIFICANT CHANGE UP (ref 0.57–2.63)
LAMBDA LC SER QL IFE: 1.61 MG/DL — SIGNIFICANT CHANGE UP (ref 0.57–2.63)
LAMBDA LC SER QL IFE: 1.68 MG/DL — SIGNIFICANT CHANGE UP (ref 0.57–2.63)
LAMBDA LC SER QL IFE: 1.68 MG/DL — SIGNIFICANT CHANGE UP (ref 0.57–2.63)
MCHC RBC-ENTMCNC: 33.1 G/DL — SIGNIFICANT CHANGE UP (ref 32–36)
MCHC RBC-ENTMCNC: 33.1 PG — HIGH (ref 27–31)
MCV RBC AUTO: 100 FL — HIGH (ref 81–99)
P-ANCA SER-ACNC: NEGATIVE — SIGNIFICANT CHANGE UP
PHOSPHATE SERPL-MCNC: 6 MG/DL — HIGH (ref 2.4–4.7)
PLATELET # BLD AUTO: 258 K/UL — SIGNIFICANT CHANGE UP (ref 150–400)
POTASSIUM SERPL-MCNC: 2.9 MMOL/L — CRITICAL LOW (ref 3.5–5.3)
POTASSIUM SERPL-SCNC: 2.9 MMOL/L — CRITICAL LOW (ref 3.5–5.3)
RBC # BLD: 2.48 M/UL — LOW (ref 4.4–5.2)
RBC # FLD: 13.9 % — SIGNIFICANT CHANGE UP (ref 11–15.6)
SODIUM SERPL-SCNC: 145 MMOL/L — SIGNIFICANT CHANGE UP (ref 135–145)
WBC # BLD: 6.6 K/UL — SIGNIFICANT CHANGE UP (ref 4.8–10.8)
WBC # FLD AUTO: 6.6 K/UL — SIGNIFICANT CHANGE UP (ref 4.8–10.8)

## 2017-09-18 PROCEDURE — 99232 SBSQ HOSP IP/OBS MODERATE 35: CPT

## 2017-09-18 RX ORDER — SODIUM CHLORIDE 9 MG/ML
1000 INJECTION, SOLUTION INTRAVENOUS
Qty: 0 | Refills: 0 | Status: DISCONTINUED | OUTPATIENT
Start: 2017-09-18 | End: 2017-09-22

## 2017-09-18 RX ORDER — POTASSIUM CHLORIDE 20 MEQ
20 PACKET (EA) ORAL ONCE
Qty: 0 | Refills: 0 | Status: COMPLETED | OUTPATIENT
Start: 2017-09-18 | End: 2017-09-18

## 2017-09-18 RX ORDER — TAMSULOSIN HYDROCHLORIDE 0.4 MG/1
0.4 CAPSULE ORAL AT BEDTIME
Qty: 0 | Refills: 0 | Status: DISCONTINUED | OUTPATIENT
Start: 2017-09-18 | End: 2017-09-22

## 2017-09-18 RX ADMIN — Medication 1300 MILLIGRAM(S): at 06:36

## 2017-09-18 RX ADMIN — Medication 650 MILLIGRAM(S): at 22:14

## 2017-09-18 RX ADMIN — Medication 20 MILLIEQUIVALENT(S): at 08:57

## 2017-09-18 RX ADMIN — Medication 1300 MILLIGRAM(S): at 12:22

## 2017-09-18 RX ADMIN — Medication 1300 MILLIGRAM(S): at 17:11

## 2017-09-18 RX ADMIN — Medication 650 MILLIGRAM(S): at 04:26

## 2017-09-18 RX ADMIN — SODIUM CHLORIDE 83 MILLILITER(S): 9 INJECTION INTRAMUSCULAR; INTRAVENOUS; SUBCUTANEOUS at 12:28

## 2017-09-18 RX ADMIN — Medication 667 MILLIGRAM(S): at 12:22

## 2017-09-18 RX ADMIN — ONDANSETRON 4 MILLIGRAM(S): 8 TABLET, FILM COATED ORAL at 12:23

## 2017-09-18 RX ADMIN — ERYTHROPOIETIN 20000 UNIT(S): 10000 INJECTION, SOLUTION INTRAVENOUS; SUBCUTANEOUS at 17:49

## 2017-09-18 RX ADMIN — Medication 650 MILLIGRAM(S): at 22:45

## 2017-09-18 RX ADMIN — Medication 0.25 MILLIGRAM(S): at 03:13

## 2017-09-18 RX ADMIN — Medication 667 MILLIGRAM(S): at 17:12

## 2017-09-18 RX ADMIN — Medication 667 MILLIGRAM(S): at 08:57

## 2017-09-18 RX ADMIN — Medication 1 MILLIGRAM(S): at 12:21

## 2017-09-18 RX ADMIN — Medication 1 TABLET(S): at 22:13

## 2017-09-18 RX ADMIN — Medication 650 MILLIGRAM(S): at 03:13

## 2017-09-18 RX ADMIN — TAMSULOSIN HYDROCHLORIDE 0.4 MILLIGRAM(S): 0.4 CAPSULE ORAL at 22:12

## 2017-09-18 RX ADMIN — Medication 0.25 MILLIGRAM(S): at 17:48

## 2017-09-18 RX ADMIN — Medication 667 MILLIGRAM(S): at 22:12

## 2017-09-18 RX ADMIN — SODIUM CHLORIDE 100 MILLILITER(S): 9 INJECTION, SOLUTION INTRAVENOUS at 17:49

## 2017-09-18 NOTE — PROGRESS NOTE ADULT - ASSESSMENT
Progressive renal failure some improvement w IVF   ( I called PMD Dr Ruiz he tells me BUN/Cr was 12/0.6 on 11/1/16)  S/P R PCNT for obstructing stone clear UOP from PCNT  CVD - RPGN Serology negative thus far  + IFA/ + UIFA noted   Has FH of hearing loss and renal disease ---> ? [place]'s   Normal looking kidneys on sonogram   May need renal Bx if no meaningful recovery    Urology follow up noted     Anemia - Add epogen and folate     RO - cont Phoslo

## 2017-09-18 NOTE — PROGRESS NOTE ADULT - SUBJECTIVE AND OBJECTIVE BOX
INTERVAL HPI/OVERNIGHT EVENTS: Patient without new complaints.  No pain.    MEDICATIONS  (STANDING):  sodium bicarbonate 1300 milliGRAM(s) Oral three times a day before meals  calcium acetate 667 milliGRAM(s) Oral four times a day with meals  epoetin les Injectable 81252 Unit(s) SubCutaneous every 7 days  folic acid 1 milliGRAM(s) Oral daily  sodium chloride 0.45% 1000 milliLiter(s) (100 mL/Hr) IV Continuous <Continuous>    MEDICATIONS  (PRN):  morphine  - Injectable 2 milliGRAM(s) IV Push every 6 hours PRN Moderate Pain (4 - 6)  ALPRAZolam 0.25 milliGRAM(s) Oral three times a day PRN anxiety  acetaminophen   Tablet. 650 milliGRAM(s) Oral every 6 hours PRN mild to moderate pain  ondansetron Injectable 4 milliGRAM(s) IV Push every 6 hours PRN Nausea and/or Vomiting  diphenoxylate/atropine 1 Tablet(s) Oral three times a day PRN Diarrhea      Allergies    No Known Allergies    Intolerances        Vital Signs Last 24 Hrs  T(C): 36.8 (18 Sep 2017 04:38), Max: 36.8 (18 Sep 2017 04:38)  T(F): 98.2 (18 Sep 2017 04:38), Max: 98.2 (18 Sep 2017 04:38)  HR: 65 (18 Sep 2017 04:38) (65 - 82)  BP: 140/73 (18 Sep 2017 04:38) (140/73 - 157/78)  BP(mean): --  RR: 18 (18 Sep 2017 04:38) (18 - 18)  SpO2: 94% (18 Sep 2017 04:38) (94% - 97%)    ROS:  Constitutional: No fever, weight loss or fatigue  Respiratory: No cough, wheezing, chills or hemoptysis  Cardiovascular: No chest pain, palpitations, shortness of breath, dizziness or leg swelling  Genitourinary: No dysuria, frequency, hematuria or incontinence  Skin: No itching, burning, rashes or lesions   Musculoskeletal: No joint pain or swelling; No muscle, back or extremity pain  Psychiatric: No depression, anxiety, mood swings or difficulty sleeping  Allergy and Immunologic: No hives or eczema     ON PE:  General: Well developed; well nourished; in no acute distress  Head: Normocephalic; atraumatic  Respiratory: No tachypnea  Gastrointestinal: Soft non-tender non-distended;  Genitourinary: No costovertebral angle tenderness.  Urinary bladder is clinically not distended  right NT in place draining clear  Extremities: Normal range of motion, No edema  Neurological: Alert and oriented x4  Skin: Warm and dry. No acute rash  Psychiatric: Cooperative and appropriate      LABS:                        8.2    6.6   )-----------( 258      ( 18 Sep 2017 06:19 )             24.8     09-18    145  |  104  |  51.0<H>  ----------------------------<  85  2.9<LL>   |  23.0  |  5.55<H>    Ca    8.2<L>      18 Sep 2017 06:19  Phos  6.0     09-18  Mg     1.8     09-17            RADIOLOGY & ADDITIONAL TESTS:

## 2017-09-18 NOTE — PROGRESS NOTE ADULT - SUBJECTIVE AND OBJECTIVE BOX
NEPHROLOGY INTERVAL HPI/OVERNIGHT EVENTS:    Examined earlier  Looks comfortable    MEDICATIONS  (STANDING):  sodium bicarbonate 1300 milliGRAM(s) Oral three times a day before meals  sodium chloride 0.45% 1000 milliLiter(s) (125 mL/Hr) IV Continuous <Continuous>  calcium acetate 667 milliGRAM(s) Oral four times a day with meals  sodium chloride 0.9%. 1000 milliLiter(s) (83 mL/Hr) IV Continuous <Continuous>  epoetin les Injectable 01825 Unit(s) SubCutaneous every 7 days  folic acid 1 milliGRAM(s) Oral daily    MEDICATIONS  (PRN):  morphine  - Injectable 2 milliGRAM(s) IV Push every 6 hours PRN Moderate Pain (4 - 6)  ALPRAZolam 0.25 milliGRAM(s) Oral three times a day PRN anxiety  acetaminophen   Tablet. 650 milliGRAM(s) Oral every 6 hours PRN mild to moderate pain  ondansetron Injectable 4 milliGRAM(s) IV Push every 6 hours PRN Nausea and/or Vomiting  diphenoxylate/atropine 1 Tablet(s) Oral three times a day PRN Diarrhea      Allergies    No Known Allergies    Intolerances        Vital Signs Last 24 Hrs  T(C): 36.8 (18 Sep 2017 04:38), Max: 36.8 (18 Sep 2017 04:38)  T(F): 98.2 (18 Sep 2017 04:38), Max: 98.2 (18 Sep 2017 04:38)  HR: 65 (18 Sep 2017 04:38) (65 - 82)  BP: 140/73 (18 Sep 2017 04:38) (140/73 - 157/78)  BP(mean): --  RR: 18 (18 Sep 2017 04:38) (18 - 18)  SpO2: 94% (18 Sep 2017 04:38) (94% - 97%)  Daily     Daily Weight in k.1 (18 Sep 2017 10:05)    PHYSICAL EXAM:  GENERAL: NAD,   HEAD:  No edema , anicteric  NECK: Supple, No JVD,   CHEST/LUNG: EAE, Clear , No wheeze   HEART: Regular rate and rhythm; No rub   ABDOMEN: Soft, Nontender, Nondistended;+ R PCNT draining clear  EXTREMITIES:  min edema . No Vasculitis. No asterixis       LABS:                        8.2    6.6   )-----------( 258      ( 18 Sep 2017 06:19 )             24.8         145  |  104  |  51.0<H>  ----------------------------<  85  2.9<LL>   |  23.0  |  5.55<H>    Ca    8.2<L>      18 Sep 2017 06:19  Phos  6.0       Mg     1.8               Phosphorus Level, Serum: 6.0 mg/dL ( @ 06:19)          RADIOLOGY & ADDITIONAL TESTS:

## 2017-09-18 NOTE — PROGRESS NOTE ADULT - SUBJECTIVE AND OBJECTIVE BOX
BECCA JARRELL    [id number]    71y      Female    Patient is a 71y old  Female who presents with a chief complaint of right side abdominal pain, chills and loss of apetite (12 Sep 2017 02:14)      INTERVAL HPI/OVERNIGHT EVENTS:    Patient is feeling better, her kidney function is improving very slowly, denies fever, chills, chest pain or SOB    REVIEW OF SYSTEMS:    CONSTITUTIONAL: No fever, some fatigue  RESPIRATORY: No cough, No shortness of breath  CARDIOVASCULAR: No chest pain, palpitations  GASTROINTESTINAL: No abdominal, No nausea, vomiting  NEUROLOGICAL: No headaches,  loss of strength.  MISCELLANEOUS: No joint swelling or pain     Vital Signs Last 24 Hrs  T(C): 36.8 (18 Sep 2017 04:38), Max: 36.8 (18 Sep 2017 04:38)  T(F): 98.2 (18 Sep 2017 04:38), Max: 98.2 (18 Sep 2017 04:38)  HR: 65 (18 Sep 2017 04:38) (65 - 82)  BP: 140/73 (18 Sep 2017 04:38) (140/73 - 157/78)  RR: 18 (18 Sep 2017 04:38) (18 - 18)  SpO2: 94% (18 Sep 2017 04:38) (94% - 97%)    PHYSICAL EXAM:    GENERAL: Elderly female looking comfortable  HEENT: PERRL, +EOMI  NECK: soft, Supple, No JVD,   CHEST/LUNG: Clear to auscultate bilaterally; No wheezing  HEART: S1S2+, Regular rate and rhythm; No murmurs  ABDOMEN: Soft, Nontender, Nondistended; Bowel sounds present  EXTREMITIES:  1+ Peripheral Pulses, No edema  SKIN: No rashes or lesions  NEURO: AAOX3, no focal deficits, no motor r sensory loss  PSYCH: normal mood  Nephrostomy tube in place       LABS:                        8.2    6.6   )-----------( 258      ( 18 Sep 2017 06:19 )             24.8     09-18    145  |  104  |  51.0<H>  ----------------------------<  85  2.9<LL>   |  23.0  |  5.55<H>    Ca    8.2<L>      18 Sep 2017 06:19  Phos  6.0     09-18  Mg     1.8     09-17              I&O's Summary    17 Sep 2017 07:01  -  18 Sep 2017 07:00  --------------------------------------------------------  IN: 2179 mL / OUT: 3 mL / NET: 2176 mL        MEDICATIONS  (STANDING):  sodium bicarbonate 1300 milliGRAM(s) Oral three times a day before meals  calcium acetate 667 milliGRAM(s) Oral four times a day with meals  epoetin les Injectable 17923 Unit(s) SubCutaneous every 7 days  folic acid 1 milliGRAM(s) Oral daily  sodium chloride 0.45% 1000 milliLiter(s) (100 mL/Hr) IV Continuous <Continuous>    MEDICATIONS  (PRN):  morphine  - Injectable 2 milliGRAM(s) IV Push every 6 hours PRN Moderate Pain (4 - 6)  ALPRAZolam 0.25 milliGRAM(s) Oral three times a day PRN anxiety  acetaminophen   Tablet. 650 milliGRAM(s) Oral every 6 hours PRN mild to moderate pain  ondansetron Injectable 4 milliGRAM(s) IV Push every 6 hours PRN Nausea and/or Vomiting  diphenoxylate/atropine 1 Tablet(s) Oral three times a day PRN Diarrhea

## 2017-09-19 LAB
ANION GAP SERPL CALC-SCNC: 18 MMOL/L — HIGH (ref 5–17)
BUN SERPL-MCNC: 41 MG/DL — HIGH (ref 8–20)
CALCIUM SERPL-MCNC: 8.6 MG/DL — SIGNIFICANT CHANGE UP (ref 8.6–10.2)
CHLORIDE SERPL-SCNC: 98 MMOL/L — SIGNIFICANT CHANGE UP (ref 98–107)
CO2 SERPL-SCNC: 24 MMOL/L — SIGNIFICANT CHANGE UP (ref 22–29)
CREAT SERPL-MCNC: 5.1 MG/DL — HIGH (ref 0.5–1.3)
GLUCOSE SERPL-MCNC: 110 MG/DL — SIGNIFICANT CHANGE UP (ref 70–115)
KAPPA LC 24H UR-MCNC: 28.9 MG/DL — HIGH
KAPPA LC 24H UR-MRATE: 346.8 MG/24 H — SIGNIFICANT CHANGE UP
LAMBDA LC 24H UR-MCNC: 1.12 MG/DL — SIGNIFICANT CHANGE UP
LAMBDA LC 24H UR-MRATE: 13.44 MG/24 H — SIGNIFICANT CHANGE UP
MAGNESIUM SERPL-MCNC: 1.4 MG/DL — LOW (ref 1.6–2.6)
POTASSIUM SERPL-MCNC: 3.1 MMOL/L — LOW (ref 3.5–5.3)
POTASSIUM SERPL-SCNC: 3.1 MMOL/L — LOW (ref 3.5–5.3)
SODIUM SERPL-SCNC: 140 MMOL/L — SIGNIFICANT CHANGE UP (ref 135–145)
SPECIMEN VOL 24H UR: 1200 ML/24 H — SIGNIFICANT CHANGE UP

## 2017-09-19 PROCEDURE — 99232 SBSQ HOSP IP/OBS MODERATE 35: CPT

## 2017-09-19 RX ORDER — FERROUS SULFATE 325(65) MG
325 TABLET ORAL DAILY
Qty: 0 | Refills: 0 | Status: DISCONTINUED | OUTPATIENT
Start: 2017-09-19 | End: 2017-09-22

## 2017-09-19 RX ORDER — POTASSIUM CHLORIDE 20 MEQ
20 PACKET (EA) ORAL ONCE
Qty: 0 | Refills: 0 | Status: COMPLETED | OUTPATIENT
Start: 2017-09-19 | End: 2017-09-19

## 2017-09-19 RX ORDER — POTASSIUM CHLORIDE 20 MEQ
20 PACKET (EA) ORAL ONCE
Qty: 0 | Refills: 0 | Status: DISCONTINUED | OUTPATIENT
Start: 2017-09-19 | End: 2017-09-19

## 2017-09-19 RX ORDER — POTASSIUM CHLORIDE 20 MEQ
40 PACKET (EA) ORAL ONCE
Qty: 0 | Refills: 0 | Status: DISCONTINUED | OUTPATIENT
Start: 2017-09-19 | End: 2017-09-19

## 2017-09-19 RX ORDER — MAGNESIUM SULFATE 500 MG/ML
1 VIAL (ML) INJECTION ONCE
Qty: 0 | Refills: 0 | Status: COMPLETED | OUTPATIENT
Start: 2017-09-19 | End: 2017-09-19

## 2017-09-19 RX ADMIN — Medication 20 MILLIEQUIVALENT(S): at 21:40

## 2017-09-19 RX ADMIN — Medication 1300 MILLIGRAM(S): at 06:10

## 2017-09-19 RX ADMIN — Medication 1 MILLIGRAM(S): at 12:15

## 2017-09-19 RX ADMIN — Medication 0.25 MILLIGRAM(S): at 08:57

## 2017-09-19 RX ADMIN — Medication 100 GRAM(S): at 12:18

## 2017-09-19 RX ADMIN — Medication 325 MILLIGRAM(S): at 12:14

## 2017-09-19 RX ADMIN — Medication 667 MILLIGRAM(S): at 08:57

## 2017-09-19 RX ADMIN — Medication 650 MILLIGRAM(S): at 06:45

## 2017-09-19 RX ADMIN — Medication 0.25 MILLIGRAM(S): at 00:08

## 2017-09-19 RX ADMIN — Medication 667 MILLIGRAM(S): at 17:59

## 2017-09-19 RX ADMIN — Medication 0.25 MILLIGRAM(S): at 18:03

## 2017-09-19 RX ADMIN — Medication 1300 MILLIGRAM(S): at 17:59

## 2017-09-19 RX ADMIN — Medication 1300 MILLIGRAM(S): at 12:15

## 2017-09-19 RX ADMIN — Medication 20 MILLIEQUIVALENT(S): at 13:17

## 2017-09-19 RX ADMIN — TAMSULOSIN HYDROCHLORIDE 0.4 MILLIGRAM(S): 0.4 CAPSULE ORAL at 21:32

## 2017-09-19 RX ADMIN — Medication 650 MILLIGRAM(S): at 06:10

## 2017-09-19 RX ADMIN — Medication 667 MILLIGRAM(S): at 12:14

## 2017-09-19 RX ADMIN — Medication 667 MILLIGRAM(S): at 21:32

## 2017-09-19 NOTE — PROGRESS NOTE ADULT - PROBLEM SELECTOR PLAN 1
Oncology following, Found to have elevated IgA level, Oncology is going to do bone marrow biopsy in AM, Skeletal survey is ordered as well, will follow.

## 2017-09-19 NOTE — PROGRESS NOTE ADULT - ASSESSMENT
Progressive renal failure some improvement w IVF   Light chain nephropathy  IgA monoclonal gammopathy plans for BM Bx- Heme eval noted  ( I called PMD Dr Ruiz he tells me BUN/Cr was 12/0.6 on 11/1/16)  S/P R PCNT for obstructing stone clear UOP from PCNT  CVD - RPGN Serology negative thus far  + IFA/ + UIFA noted   Has FH of hearing loss and renal disease ---> ? [place]'s   Normal looking kidneys on sonogram   May need renal Bx if no meaningful recovery    Urology follow up noted     Anemia - Add epogen and folate     RO - cont Phoslo

## 2017-09-19 NOTE — PROGRESS NOTE ADULT - PROBLEM SELECTOR PLAN 1
daily chem 10    pain control as needed    should be discharged with the right nephrostomy    Will manage the stone as an outpatient    Will follow

## 2017-09-19 NOTE — PROGRESS NOTE ADULT - SUBJECTIVE AND OBJECTIVE BOX
INTERVAL HPI/OVERNIGHT EVENTS:   Feels well.  No pain.  Right nephrostomy draining clear urine  MEDICATIONS  (STANDING):  sodium bicarbonate 1300 milliGRAM(s) Oral three times a day before meals  calcium acetate 667 milliGRAM(s) Oral four times a day with meals  epoetin les Injectable 75847 Unit(s) SubCutaneous every 7 days  folic acid 1 milliGRAM(s) Oral daily  sodium chloride 0.45% 1000 milliLiter(s) (100 mL/Hr) IV Continuous <Continuous>  tamsulosin 0.4 milliGRAM(s) Oral at bedtime    MEDICATIONS  (PRN):  morphine  - Injectable 2 milliGRAM(s) IV Push every 6 hours PRN Moderate Pain (4 - 6)  ALPRAZolam 0.25 milliGRAM(s) Oral three times a day PRN anxiety  acetaminophen   Tablet. 650 milliGRAM(s) Oral every 6 hours PRN mild to moderate pain  ondansetron Injectable 4 milliGRAM(s) IV Push every 6 hours PRN Nausea and/or Vomiting  diphenoxylate/atropine 1 Tablet(s) Oral three times a day PRN Diarrhea      Allergies    No Known Allergies    Intolerances        Vital Signs Last 24 Hrs  T(C): 36.9 (19 Sep 2017 04:35), Max: 37.2 (18 Sep 2017 17:51)  T(F): 98.5 (19 Sep 2017 04:35), Max: 98.9 (18 Sep 2017 17:51)  HR: 90 (19 Sep 2017 04:35) (77 - 90)  BP: 146/85 (19 Sep 2017 04:35) (139/77 - 147/83)  BP(mean): --  RR: 18 (19 Sep 2017 04:35) (18 - 18)  SpO2: 97% (18 Sep 2017 17:51) (97% - 97%)     ON PE:  General: alert and awake  Abdomen: soft, nt, nd, bs+       LABS:                        8.2    6.6   )-----------( 258      ( 18 Sep 2017 06:19 )             24.8     09-19    140  |  98  |  41.0<H>  ----------------------------<  110  3.1<L>   |  24.0  |  5.10<H>    Ca    8.6      19 Sep 2017 06:58  Phos  6.0     09-18  Mg     1.4     09-19            RADIOLOGY & ADDITIONAL TESTS:

## 2017-09-19 NOTE — PROGRESS NOTE ADULT - SUBJECTIVE AND OBJECTIVE BOX
NEPHROLOGY INTERVAL HPI/OVERNIGHT EVENTS:    Examined earlier  Feeling better  OK UOP    MEDICATIONS  (STANDING):  sodium bicarbonate 1300 milliGRAM(s) Oral three times a day before meals  calcium acetate 667 milliGRAM(s) Oral four times a day with meals  epoetin les Injectable 95916 Unit(s) SubCutaneous every 7 days  folic acid 1 milliGRAM(s) Oral daily  sodium chloride 0.45% 1000 milliLiter(s) (100 mL/Hr) IV Continuous <Continuous>  tamsulosin 0.4 milliGRAM(s) Oral at bedtime  ferrous    sulfate 325 milliGRAM(s) Oral daily    MEDICATIONS  (PRN):  morphine  - Injectable 2 milliGRAM(s) IV Push every 6 hours PRN Moderate Pain (4 - 6)  ALPRAZolam 0.25 milliGRAM(s) Oral three times a day PRN anxiety  acetaminophen   Tablet. 650 milliGRAM(s) Oral every 6 hours PRN mild to moderate pain  ondansetron Injectable 4 milliGRAM(s) IV Push every 6 hours PRN Nausea and/or Vomiting  diphenoxylate/atropine 1 Tablet(s) Oral three times a day PRN Diarrhea      Allergies    No Known Allergies    Intolerances        Vital Signs Last 24 Hrs  T(C): 36.7 (19 Sep 2017 10:50), Max: 37.2 (18 Sep 2017 17:51)  T(F): 98.1 (19 Sep 2017 10:50), Max: 98.9 (18 Sep 2017 17:51)  HR: 91 (19 Sep 2017 10:50) (78 - 91)  BP: 156/88 (19 Sep 2017 10:50) (146/85 - 156/88)  BP(mean): --  RR: 18 (19 Sep 2017 10:50) (18 - 18)  SpO2: 97% (18 Sep 2017 17:51) (97% - 97%)  Daily     Daily     PHYSICAL EXAM:  GENERAL: NAD,   HEAD:  No edema , anicteric  NECK: Supple, No JVD,   CHEST/LUNG: EAE, Clear , No wheeze   HEART: Regular rate and rhythm; No rub   ABDOMEN: Soft, Nontender, Nondistended;+ R PCNT draining clear  EXTREMITIES:  min edema . No Vasculitis. No asterixis       LABS:                        8.2    6.6   )-----------( 258      ( 18 Sep 2017 06:19 )             24.8     09-19    140  |  98  |  41.0<H>  ----------------------------<  110  3.1<L>   |  24.0  |  5.10<H>    Ca    8.6      19 Sep 2017 06:58  Phos  6.0     09-18  Mg     1.4     09-19          Magnesium, Serum: 1.4 mg/dL (09-19 @ 06:58)          RADIOLOGY & ADDITIONAL TESTS:

## 2017-09-19 NOTE — CHART NOTE - NSCHARTNOTEFT_GEN_A_CORE
Medicine PA Note    Called to evaluate patient fall from standing near shower in patient bathroom,  found by nursing sitting on floor. As per patient, slippery shower floor caused her imbalance and did not have dizziness, loc, visual disturbances or nausea or other symptoms prior to fall.        VSS      PHYSICAL EXAM:   · CONSTITUTIONAL: Well appearing, well nourished, awake, alert, oriented to person, place, time/situation and in no apparent distress.  · ENMT: Airway patent, . Mouth with normal mucosa.  · EYES: Clear bilaterally, pupils equal, round and reactive to light.  · CARDIAC: Normal rate, regular rhythm.  Heart sounds S1, S2.  No murmurs, rubs or gallops.  · RESPIRATORY: Breath sounds clear and equal bilaterally.  · GASTROINTESTINAL: Abdomen soft, non-tender, no guarding.  · MUSCULOSKELETAL: Spine appears normal, range of motion is not limited, no muscle or joint tenderness  · NEUROLOGICAL: Alert and oriented, no focal deficits, no motor or sensory deficits.  · SKIN: Skin normal color for race, warm, dry and intact. No evidence of rash.      A/P:  FALL,  no injury    1) monitor vitals per routine  2) assistance with shower   3) cont. mgmt per attending

## 2017-09-19 NOTE — PROGRESS NOTE ADULT - SUBJECTIVE AND OBJECTIVE BOX
BECCA JARRELL    [id number]    71y      Female    Patient is a 71y old  Female who presents with a chief complaint of right side abdominal pain, chills and loss of apetite (12 Sep 2017 02:14)      INTERVAL HPI/OVERNIGHT EVENTS:    Patient is feeling better, her kidney function is improving very slowly, denies fever, chills, chest pain or SOB, She found to have elevated IgA level as well as some drop in H&H    REVIEW OF SYSTEMS:    CONSTITUTIONAL: No fever, some fatigue  RESPIRATORY: No cough, No shortness of breath  CARDIOVASCULAR: No chest pain, palpitations  GASTROINTESTINAL: No abdominal, No nausea, vomiting  NEUROLOGICAL: No headaches,  loss of strength.  MISCELLANEOUS: No joint swelling or pain       Vital Signs Last 24 Hrs  T(C): 37.4 (19 Sep 2017 14:04), Max: 37.4 (19 Sep 2017 14:04)  T(F): 99.3 (19 Sep 2017 14:04), Max: 99.3 (19 Sep 2017 14:04)  HR: 98 (19 Sep 2017 14:04) (78 - 98)  BP: 151/86 (19 Sep 2017 14:04) (146/85 - 156/88)  RR: 19 (19 Sep 2017 14:04) (18 - 19)  SpO2: 97% (18 Sep 2017 17:51) (97% - 97%)    PHYSICAL EXAM:     GENERAL: Elderly female looking comfortable  HEENT: PERRL, +EOMI  NECK: soft, Supple, No JVD,   CHEST/LUNG: Clear to auscultate bilaterally; No wheezing  HEART: S1S2+, Regular rate and rhythm; No murmurs  ABDOMEN: Soft, Nontender, Nondistended; Bowel sounds present  EXTREMITIES:  1+ Peripheral Pulses, No edema  SKIN: No rashes or lesions  NEURO: AAOX3, no focal deficits, no motor r sensory loss  PSYCH: normal mood  Nephrostomy tube in place       LABS:                        8.2    6.6   )-----------( 258      ( 18 Sep 2017 06:19 )             24.8     09-19    140  |  98  |  41.0<H>  ----------------------------<  110  3.1<L>   |  24.0  |  5.10<H>    Ca    8.6      19 Sep 2017 06:58  Phos  6.0     09-18  Mg     1.4     09-19              I&O's Summary    18 Sep 2017 07:01  -  19 Sep 2017 07:00  --------------------------------------------------------  IN: 2961 mL / OUT: 410 mL / NET: 2551 mL    19 Sep 2017 07:01  -  19 Sep 2017 14:41  --------------------------------------------------------  IN: 0 mL / OUT: 100 mL / NET: -100 mL        MEDICATIONS  (STANDING):  sodium bicarbonate 1300 milliGRAM(s) Oral three times a day before meals  calcium acetate 667 milliGRAM(s) Oral four times a day with meals  epoetin les Injectable 84034 Unit(s) SubCutaneous every 7 days  folic acid 1 milliGRAM(s) Oral daily  sodium chloride 0.45% 1000 milliLiter(s) (100 mL/Hr) IV Continuous <Continuous>  tamsulosin 0.4 milliGRAM(s) Oral at bedtime  ferrous    sulfate 325 milliGRAM(s) Oral daily  potassium chloride   Powder 40 milliEquivalent(s) Oral once    MEDICATIONS  (PRN):  morphine  - Injectable 2 milliGRAM(s) IV Push every 6 hours PRN Moderate Pain (4 - 6)  ALPRAZolam 0.25 milliGRAM(s) Oral three times a day PRN anxiety  acetaminophen   Tablet. 650 milliGRAM(s) Oral every 6 hours PRN mild to moderate pain  ondansetron Injectable 4 milliGRAM(s) IV Push every 6 hours PRN Nausea and/or Vomiting  diphenoxylate/atropine 1 Tablet(s) Oral three times a day PRN Diarrhea

## 2017-09-19 NOTE — PROGRESS NOTE ADULT - SUBJECTIVE AND OBJECTIVE BOX
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 Detailed discuaaion on plans for a BM examintio, rational and side effects and alternatives.    Vital Signs Last 24 Hrs  T(C): 36.9 (19 Sep 2017 04:35), Max: 37.2 (18 Sep 2017 17:51)  T(F): 98.5 (19 Sep 2017 04:35), Max: 98.9 (18 Sep 2017 17:51)  HR: 90 (19 Sep 2017 04:35) (78 - 90)  BP: 146/85 (19 Sep 2017 04:35) (146/85 - 147/83)  BP(mean): --  RR: 18 (19 Sep 2017 04:35) (18 - 18)  SpO2: 97% (18 Sep 2017 17:51) (97% - 97%)  ANTIBIOTICS  epoetin les Injectable 28715 Unit(s) SubCutaneous every 7 days      Allergies    No Known Allergies    Intolerances        REVIEW OF SYSTEMS:    CONSTITUTIONAL:  As per HPI.    HEENT:  Eyes:  No diplopia or blurred vision. ENT:  No earache, sore throat or runny nose.    CARDIOVASCULAR:  No pressure, squeezing, strangling, tightness, heaviness or aching about the chest, neck, axilla or epigastrium.    RESPIRATORY:  No cough, shortness of breath, PND or orthopnea.    GASTROINTESTINAL:  No nausea, vomiting or diarrhea.    GENITOURINARY:  No dysuria, frequency or urgency.    MUSCULOSKELETAL:  As per HPI.    SKIN:  No change in skin, hair or nails.    NEUROLOGIC:  CNI,MOTOR WNL, SENSORY WNL.    ENDOCRINE:  No heat or cold intolerance, polyuria or polydipsia.    HEMATOLOGICAL:  No easy bruising or bleeding.           PHYSICAL EXAMINATION:    GENERAL: The patient is a well-developed, well-nourished _____in no apparent distress. ___ is alert and oriented x3.    VITAL SIGNS:     HEENT: Head is normocephalic and atraumatic. Extraocular muscles are intact. Pupils are equal, round, and reactive to light and accommodation. Nares appeared normal. Mouth is well hydrated and without lesions. Mucous membranes are moist. Posterior pharynx clear of any exudate or lesions.    NECK: Supple. No carotid bruits.  No lymphadenopathy or thyromegaly.    LUNGS: Clear to auscultation.    HEART: Regular rate and rhythm without murmur.    ABDOMEN: Soft, nontender, and nondistended.  Positive bowel sounds.  No hepatosplenomegaly was noted.    EXTREMITIES: Without any cyanosis, clubbing, rash, lesions or edema.    NEUROLOGIC: Cranial nerves II through XII are grossly intact.    PSYCHIATRIC: Flat affect, but denies suicidal or homicidal ideations.  SKIN: No ulceration or induration present.      LABS:                        8.2    6.6   )-----------( 258      ( 18 Sep 2017 06:19 )             24.8     09-19    140  |  98  |  41.0<H>  ----------------------------<  110  3.1<L>   |  24.0  |  5.10<H>    Ca    8.6      19 Sep 2017 06:58  Phos  6.0     09-18  Mg     1.4     09-19      ASSESSMENT:  IgA monoclonal gammopathy  ARF      PLAN:  Plan BM examination in tomorrow  Skeletal survey ordered.  Entire visit entailed 35 min, 50% spent on counselling.      Yung Huffman M.D.

## 2017-09-20 LAB
ANION GAP SERPL CALC-SCNC: 15 MMOL/L — SIGNIFICANT CHANGE UP (ref 5–17)
BUN SERPL-MCNC: 41 MG/DL — HIGH (ref 8–20)
CALCIUM SERPL-MCNC: 8.7 MG/DL — SIGNIFICANT CHANGE UP (ref 8.6–10.2)
CHLORIDE SERPL-SCNC: 102 MMOL/L — SIGNIFICANT CHANGE UP (ref 98–107)
CO2 SERPL-SCNC: 25 MMOL/L — SIGNIFICANT CHANGE UP (ref 22–29)
CREAT SERPL-MCNC: 4.92 MG/DL — HIGH (ref 0.5–1.3)
GLUCOSE SERPL-MCNC: 97 MG/DL — SIGNIFICANT CHANGE UP (ref 70–115)
HCT VFR BLD CALC: 27.5 % — LOW (ref 37–47)
HGB BLD-MCNC: 8.9 G/DL — LOW (ref 12–16)
MAGNESIUM SERPL-MCNC: 1.9 MG/DL — SIGNIFICANT CHANGE UP (ref 1.6–2.6)
MCHC RBC-ENTMCNC: 32.4 G/DL — SIGNIFICANT CHANGE UP (ref 32–36)
MCHC RBC-ENTMCNC: 33.3 PG — HIGH (ref 27–31)
MCV RBC AUTO: 103 FL — HIGH (ref 81–99)
PLATELET # BLD AUTO: 229 K/UL — SIGNIFICANT CHANGE UP (ref 150–400)
POTASSIUM SERPL-MCNC: 3.5 MMOL/L — SIGNIFICANT CHANGE UP (ref 3.5–5.3)
POTASSIUM SERPL-SCNC: 3.5 MMOL/L — SIGNIFICANT CHANGE UP (ref 3.5–5.3)
RBC # BLD: 2.67 M/UL — LOW (ref 4.4–5.2)
RBC # FLD: 13.1 % — SIGNIFICANT CHANGE UP (ref 11–15.6)
SODIUM SERPL-SCNC: 142 MMOL/L — SIGNIFICANT CHANGE UP (ref 135–145)
WBC # BLD: 6.6 K/UL — SIGNIFICANT CHANGE UP (ref 4.8–10.8)
WBC # FLD AUTO: 6.6 K/UL — SIGNIFICANT CHANGE UP (ref 4.8–10.8)

## 2017-09-20 PROCEDURE — 88188 FLOWCYTOMETRY/READ 9-15: CPT

## 2017-09-20 PROCEDURE — 99232 SBSQ HOSP IP/OBS MODERATE 35: CPT

## 2017-09-20 PROCEDURE — 77074 RADEX OSSEOUS SURVEY LMTD: CPT | Mod: 26

## 2017-09-20 RX ORDER — ALPRAZOLAM 0.25 MG
0.25 TABLET ORAL THREE TIMES A DAY
Qty: 0 | Refills: 0 | Status: DISCONTINUED | OUTPATIENT
Start: 2017-09-20 | End: 2017-09-22

## 2017-09-20 RX ORDER — TIOTROPIUM BROMIDE 18 UG/1
1 CAPSULE ORAL; RESPIRATORY (INHALATION) DAILY
Qty: 0 | Refills: 0 | Status: DISCONTINUED | OUTPATIENT
Start: 2017-09-20 | End: 2017-09-22

## 2017-09-20 RX ORDER — ASPIRIN/CALCIUM CARB/MAGNESIUM 324 MG
81 TABLET ORAL DAILY
Qty: 0 | Refills: 0 | Status: DISCONTINUED | OUTPATIENT
Start: 2017-09-20 | End: 2017-09-22

## 2017-09-20 RX ORDER — POTASSIUM CHLORIDE 20 MEQ
40 PACKET (EA) ORAL ONCE
Qty: 0 | Refills: 0 | Status: COMPLETED | OUTPATIENT
Start: 2017-09-20 | End: 2017-09-20

## 2017-09-20 RX ORDER — MORPHINE SULFATE 50 MG/1
2 CAPSULE, EXTENDED RELEASE ORAL EVERY 6 HOURS
Qty: 0 | Refills: 0 | Status: DISCONTINUED | OUTPATIENT
Start: 2017-09-20 | End: 2017-09-22

## 2017-09-20 RX ORDER — CARVEDILOL PHOSPHATE 80 MG/1
3.12 CAPSULE, EXTENDED RELEASE ORAL EVERY 12 HOURS
Qty: 0 | Refills: 0 | Status: DISCONTINUED | OUTPATIENT
Start: 2017-09-20 | End: 2017-09-22

## 2017-09-20 RX ORDER — PANTOPRAZOLE SODIUM 20 MG/1
40 TABLET, DELAYED RELEASE ORAL
Qty: 0 | Refills: 0 | Status: DISCONTINUED | OUTPATIENT
Start: 2017-09-20 | End: 2017-09-22

## 2017-09-20 RX ORDER — ALPRAZOLAM 0.25 MG
0.5 TABLET ORAL ONCE
Qty: 0 | Refills: 0 | Status: DISCONTINUED | OUTPATIENT
Start: 2017-09-20 | End: 2017-09-20

## 2017-09-20 RX ORDER — DEXAMETHASONE 0.5 MG/5ML
20 ELIXIR ORAL DAILY
Qty: 0 | Refills: 0 | Status: DISCONTINUED | OUTPATIENT
Start: 2017-09-20 | End: 2017-09-22

## 2017-09-20 RX ORDER — DIPHENOXYLATE HCL/ATROPINE 2.5-.025MG
1 TABLET ORAL
Qty: 0 | Refills: 0 | Status: DISCONTINUED | OUTPATIENT
Start: 2017-09-20 | End: 2017-09-22

## 2017-09-20 RX ORDER — ALPRAZOLAM 0.25 MG
0.25 TABLET ORAL ONCE
Qty: 0 | Refills: 0 | Status: DISCONTINUED | OUTPATIENT
Start: 2017-09-20 | End: 2017-09-20

## 2017-09-20 RX ADMIN — Medication 0.25 MILLIGRAM(S): at 08:33

## 2017-09-20 RX ADMIN — Medication 1 MILLIGRAM(S): at 12:25

## 2017-09-20 RX ADMIN — Medication 0.25 MILLIGRAM(S): at 00:34

## 2017-09-20 RX ADMIN — Medication 667 MILLIGRAM(S): at 12:25

## 2017-09-20 RX ADMIN — Medication 667 MILLIGRAM(S): at 08:33

## 2017-09-20 RX ADMIN — Medication 1300 MILLIGRAM(S): at 17:55

## 2017-09-20 RX ADMIN — CARVEDILOL PHOSPHATE 3.12 MILLIGRAM(S): 80 CAPSULE, EXTENDED RELEASE ORAL at 17:55

## 2017-09-20 RX ADMIN — Medication 40 MILLIEQUIVALENT(S): at 17:54

## 2017-09-20 RX ADMIN — Medication 650 MILLIGRAM(S): at 00:34

## 2017-09-20 RX ADMIN — Medication 0.5 MILLIGRAM(S): at 12:56

## 2017-09-20 RX ADMIN — TAMSULOSIN HYDROCHLORIDE 0.4 MILLIGRAM(S): 0.4 CAPSULE ORAL at 21:17

## 2017-09-20 RX ADMIN — Medication 20 MILLIGRAM(S): at 17:55

## 2017-09-20 RX ADMIN — Medication 1300 MILLIGRAM(S): at 06:27

## 2017-09-20 RX ADMIN — Medication 650 MILLIGRAM(S): at 01:00

## 2017-09-20 RX ADMIN — Medication 667 MILLIGRAM(S): at 21:17

## 2017-09-20 RX ADMIN — Medication 1 TABLET(S): at 21:17

## 2017-09-20 RX ADMIN — Medication 667 MILLIGRAM(S): at 17:54

## 2017-09-20 RX ADMIN — Medication 1300 MILLIGRAM(S): at 12:25

## 2017-09-20 RX ADMIN — Medication 325 MILLIGRAM(S): at 12:25

## 2017-09-20 NOTE — PROGRESS NOTE ADULT - SUBJECTIVE AND OBJECTIVE BOX
BECCA JARRELL    [id number]    72y      Female    Patient is a 72y old  Female who presents with a chief complaint of right side abdominal pain, chills and loss of apetite (12 Sep 2017 02:14)      INTERVAL HPI/OVERNIGHT EVENTS:    Patient is feeling better, her kidney function is improving very slowly, denies fever, chills, chest pain or SOB, She found to have elevated IgA level as well as some drop in H&H, getting bone marrow biopsy.     REVIEW OF SYSTEMS:    CONSTITUTIONAL: No fever, some fatigue  RESPIRATORY: No cough, No shortness of breath  CARDIOVASCULAR: No chest pain, palpitations  GASTROINTESTINAL: No abdominal, No nausea, vomiting  NEUROLOGICAL: No headaches,  loss of strength.  MISCELLANEOUS: No joint swelling or pain       Vital Signs Last 24 Hrs  T(C): 36.8 (20 Sep 2017 09:43), Max: 36.8 (20 Sep 2017 09:43)  T(F): 98.3 (20 Sep 2017 09:43), Max: 98.3 (20 Sep 2017 09:43)  HR: 90 (20 Sep 2017 09:43) (76 - 90)  BP: 157/89 (20 Sep 2017 09:43) (135/78 - 157/89)  RR: 18 (20 Sep 2017 09:43) (18 - 20)  SpO2: 94% (20 Sep 2017 04:40) (94% - 98%)    PHYSICAL EXAM:    GENERAL: Elderly female looking comfortable  HEENT: PERRL, +EOMI  NECK: soft, Supple, No JVD,   CHEST/LUNG: Clear to auscultate bilaterally; No wheezing  HEART: S1S2+, Regular rate and rhythm; No murmurs  ABDOMEN: Soft, Nontender, Nondistended; Bowel sounds present  EXTREMITIES:  1+ Peripheral Pulses, No edema  SKIN: No rashes or lesions  NEURO: AAOX3, no focal deficits, no motor r sensory loss  PSYCH: normal mood  Nephrostomy tube in place      LABS:                        8.9    6.6   )-----------( 229      ( 20 Sep 2017 05:52 )             27.5     09-20    142  |  102  |  41.0<H>  ----------------------------<  97  3.5   |  25.0  |  4.92<H>    Ca    8.7      20 Sep 2017 05:52  Mg     1.9     09-20              I&O's Summary    19 Sep 2017 07:01  -  20 Sep 2017 07:00  --------------------------------------------------------  IN: 200 mL / OUT: 160 mL / NET: 40 mL    20 Sep 2017 07:01  -  20 Sep 2017 14:52  --------------------------------------------------------  IN: 720 mL / OUT: 3 mL / NET: 717 mL        MEDICATIONS  (STANDING):  sodium bicarbonate 1300 milliGRAM(s) Oral three times a day before meals  calcium acetate 667 milliGRAM(s) Oral four times a day with meals  epoetin les Injectable 57082 Unit(s) SubCutaneous every 7 days  folic acid 1 milliGRAM(s) Oral daily  sodium chloride 0.45% 1000 milliLiter(s) (100 mL/Hr) IV Continuous <Continuous>  tamsulosin 0.4 milliGRAM(s) Oral at bedtime  ferrous    sulfate 325 milliGRAM(s) Oral daily  potassium chloride    Tablet ER 40 milliEquivalent(s) Oral once    MEDICATIONS  (PRN):  acetaminophen   Tablet. 650 milliGRAM(s) Oral every 6 hours PRN mild to moderate pain  ondansetron Injectable 4 milliGRAM(s) IV Push every 6 hours PRN Nausea and/or Vomiting  diphenoxylate/atropine 1 Tablet(s) Oral three times a day PRN Diarrhea  ALPRAZolam 0.25 milliGRAM(s) Oral three times a day PRN Anxiety  morphine  - Injectable 2 milliGRAM(s) IV Push every 6 hours PRN Severe Pain (7 - 10)

## 2017-09-20 NOTE — PROCEDURE NOTE - PROCEDURE
<<-----Click on this checkbox to enter Procedure Bone marrow aspirate &biopsy  09/20/2017  Bone biopsy and aspirate  Active  ESYGACO

## 2017-09-20 NOTE — PROGRESS NOTE ADULT - SUBJECTIVE AND OBJECTIVE BOX
[facility]  MD Concha Bobo MD  [phone]  Answering Sylvia : [phone]    [id number]    INTERVAL HPI/OVERNIGHT EVENTS:  patient and family seen and advised on my plan to perform a BM asp/biopsy. Detailed discussion done re. specifics of the procedure, side effects and potential complication as well as rationasl for the procedure. Patient also advised on subsequent plans for mgt. based on my suspicion of a plasma cell dyscrasia.      Vital Signs Last 24 Hrs  T(C): 36.8 (20 Sep 2017 09:43), Max: 36.8 (20 Sep 2017 09:43)  T(F): 98.3 (20 Sep 2017 09:43), Max: 98.3 (20 Sep 2017 09:43)  HR: 90 (20 Sep 2017 09:43) (76 - 90)  BP: 157/89 (20 Sep 2017 09:43) (135/78 - 157/89)  BP(mean): --  RR: 18 (20 Sep 2017 09:43) (18 - 20)  SpO2: 94% (20 Sep 2017 04:40) (94% - 98%)  ANTIBIOTICS  epoetin les Injectable 69288 Unit(s) SubCutaneous every 7 days      Allergies    No Known Allergies    Intolerances        REVIEW OF SYSTEMS:    CONSTITUTIONAL:  As per HPI.    HEENT:  Eyes:  No diplopia or blurred vision. ENT:  No earache, sore throat or runny nose.    CARDIOVASCULAR:  No pressure, squeezing, strangling, tightness, heaviness or aching about the chest, neck, axilla or epigastrium.    RESPIRATORY:  No cough, shortness of breath, PND or orthopnea.    GASTROINTESTINAL:  No nausea, vomiting or diarrhea.    GENITOURINARY:  No dysuria, frequency or urgency.    MUSCULOSKELETAL:  As per HPI.    SKIN:  No change in skin, hair or nails.    NEUROLOGIC:  CNI,MOTOR WNL, SENSORY WNL.    ENDOCRINE:  No heat or cold intolerance, polyuria or polydipsia.    HEMATOLOGICAL:  No easy bruising or bleeding.           PHYSICAL EXAMINATION:    GENERAL: The patient is a well-developed, well-nourished _____in no apparent distress. ___ is alert and oriented x3.    VITAL SIGNS:     HEENT: Head is normocephalic and atraumatic. Extraocular muscles are intact. Pupils are equal, round, and reactive to light and accommodation. Nares appeared normal. Mouth is well hydrated and without lesions. Mucous membranes are moist. Posterior pharynx clear of any exudate or lesions.    NECK: Supple. No carotid bruits.  No lymphadenopathy or thyromegaly.    LUNGS: Clear to auscultation.    HEART: Regular rate and rhythm without murmur.    ABDOMEN: Soft, nontender, and nondistended.  Positive bowel sounds.  No hepatosplenomegaly was noted.    NEUROLOGIC: Cranial nerves II through XII are grossly intact.    PSYCHIATRIC: Flat affect, but denies suicidal or homicidal ideations.  SKIN: No ulceration or induration present.      LABS:                        8.9    6.6   )-----------( 229      ( 20 Sep 2017 05:52 )             27.5     09-20    142  |  102  |  41.0<H>  ----------------------------<  97  3.5   |  25.0  |  4.92<H>    Ca    8.7      20 Sep 2017 05:52  Mg     1.9     09-20    ASSESSMENT:  Monoclonal gammopathy  ARF  ACD      PLAN:  Plan to empirically y start on Decadron 20 mg daily x 4 days.  start on moderate hydration daily.  Monitor renal function daily.  start on Allopurinol 100 mg daily.  Entire visit entailed 45 min, 50% spent on counselling.        Yung Huffman M.D.

## 2017-09-20 NOTE — PROCEDURE NOTE - ADDITIONAL PROCEDURE DETAILS
Area of the left posterior iliac crest aseptically cleansed. lidocaine was infiltrated and jamshidi and [place] needle used to get a good aspirate and biopsy with minimal bleeding. Patient tolerated it well

## 2017-09-20 NOTE — PROGRESS NOTE ADULT - SUBJECTIVE AND OBJECTIVE BOX
INTERVAL HPI/OVERNIGHT EVENTS:  No pain.  Nephrostomy draining well.      MEDICATIONS  (STANDING):  sodium bicarbonate 1300 milliGRAM(s) Oral three times a day before meals  calcium acetate 667 milliGRAM(s) Oral four times a day with meals  epoetin les Injectable 30054 Unit(s) SubCutaneous every 7 days  folic acid 1 milliGRAM(s) Oral daily  sodium chloride 0.45% 1000 milliLiter(s) (100 mL/Hr) IV Continuous <Continuous>  tamsulosin 0.4 milliGRAM(s) Oral at bedtime  ferrous    sulfate 325 milliGRAM(s) Oral daily    MEDICATIONS  (PRN):  acetaminophen   Tablet. 650 milliGRAM(s) Oral every 6 hours PRN mild to moderate pain  ondansetron Injectable 4 milliGRAM(s) IV Push every 6 hours PRN Nausea and/or Vomiting  diphenoxylate/atropine 1 Tablet(s) Oral three times a day PRN Diarrhea      Allergies    No Known Allergies    Intolerances        Vital Signs Last 24 Hrs  T(C): 36.6 (20 Sep 2017 04:40), Max: 37.4 (19 Sep 2017 14:04)  T(F): 97.8 (20 Sep 2017 04:40), Max: 99.3 (19 Sep 2017 14:04)  HR: 76 (20 Sep 2017 04:40) (76 - 98)  BP: 135/78 (20 Sep 2017 04:40) (135/78 - 156/88)  BP(mean): --  RR: 18 (20 Sep 2017 04:40) (18 - 20)  SpO2: 94% (20 Sep 2017 04:40) (94% - 98%)     ON PE:  General: alert and awake  Abdomen: soft, nt, nd, bs+    Right nephrostomy is draining well.       LABS:                        8.2    6.6   )-----------( 258      ( 18 Sep 2017 06:19 )             24.8     09-19    140  |  98  |  41.0<H>  ----------------------------<  110  3.1<L>   |  24.0  |  5.10<H>    Ca    8.6      19 Sep 2017 06:58  Phos  6.0     09-18  Mg     1.4     09-19            RADIOLOGY & ADDITIONAL TESTS:

## 2017-09-20 NOTE — PROGRESS NOTE ADULT - PROBLEM SELECTOR PLAN 1
Oncology following, Found to have elevated IgA level, Oncology is going to do bone marrow biopsy tomorrow, Skeletal survey done result pending, will follow.

## 2017-09-20 NOTE — PROGRESS NOTE ADULT - SUBJECTIVE AND OBJECTIVE BOX
Patient seen and examined    Feels fine  no c/o CP SOB NV   awaiting BM bx when seen earlier  mild swelling feet    Vital Signs Last 24 Hrs  T(C): 36.9 (09-20-17 @ 16:29), Max: 36.9 (09-20-17 @ 16:29)  T(F): 98.5 (09-20-17 @ 16:29), Max: 98.5 (09-20-17 @ 16:29)  HR: 90 (09-20-17 @ 16:29) (76 - 90)  BP: 144/89 (09-20-17 @ 16:29) (135/78 - 157/89)  BP(mean): --  RR: 18 (09-20-17 @ 16:29) (18 - 20)  SpO2: 93% (09-20-17 @ 16:29) (93% - 98%)    Chest   clear  CV   no murmur  Abd   soft, NT BS+  Extr   mild edema  Neuro  grossly iintact motor    Patient overall stable  Labs and Meds reviewed    Continue same treatment        20 Sep 2017 05:52    142    |  102    |  41.0   ----------------------------<  97     3.5     |  25.0   |  4.92     Ca    8.7        20 Sep 2017 05:52  Mg     1.9       20 Sep 2017 05:52                            8.9    6.6   )-----------( 229      ( 20 Sep 2017 05:52 )             27.5

## 2017-09-21 ENCOUNTER — RESULT REVIEW (OUTPATIENT)
Age: 72
End: 2017-09-21

## 2017-09-21 LAB
ANION GAP SERPL CALC-SCNC: 18 MMOL/L — HIGH (ref 5–17)
BUN SERPL-MCNC: 42 MG/DL — HIGH (ref 8–20)
CALCIUM SERPL-MCNC: 9.8 MG/DL — SIGNIFICANT CHANGE UP (ref 8.6–10.2)
CHLORIDE SERPL-SCNC: 99 MMOL/L — SIGNIFICANT CHANGE UP (ref 98–107)
CO2 SERPL-SCNC: 26 MMOL/L — SIGNIFICANT CHANGE UP (ref 22–29)
CREAT SERPL-MCNC: 4.57 MG/DL — HIGH (ref 0.5–1.3)
GLUCOSE SERPL-MCNC: 125 MG/DL — HIGH (ref 70–115)
HCT VFR BLD CALC: 31.2 % — LOW (ref 37–47)
HGB BLD-MCNC: 10.6 G/DL — LOW (ref 12–16)
MCHC RBC-ENTMCNC: 34 G/DL — SIGNIFICANT CHANGE UP (ref 32–36)
MCHC RBC-ENTMCNC: 34.5 PG — HIGH (ref 27–31)
MCV RBC AUTO: 101.6 FL — HIGH (ref 81–99)
PLATELET # BLD AUTO: 258 K/UL — SIGNIFICANT CHANGE UP (ref 150–400)
POTASSIUM SERPL-MCNC: 4.2 MMOL/L — SIGNIFICANT CHANGE UP (ref 3.5–5.3)
POTASSIUM SERPL-SCNC: 4.2 MMOL/L — SIGNIFICANT CHANGE UP (ref 3.5–5.3)
RBC # BLD: 3.07 M/UL — LOW (ref 4.4–5.2)
RBC # FLD: 13.9 % — SIGNIFICANT CHANGE UP (ref 11–15.6)
SODIUM SERPL-SCNC: 143 MMOL/L — SIGNIFICANT CHANGE UP (ref 135–145)
WBC # BLD: 5.9 K/UL — SIGNIFICANT CHANGE UP (ref 4.8–10.8)
WBC # FLD AUTO: 5.9 K/UL — SIGNIFICANT CHANGE UP (ref 4.8–10.8)

## 2017-09-21 PROCEDURE — 88305 TISSUE EXAM BY PATHOLOGIST: CPT | Mod: 26

## 2017-09-21 PROCEDURE — 88360 TUMOR IMMUNOHISTOCHEM/MANUAL: CPT | Mod: 26

## 2017-09-21 PROCEDURE — 99232 SBSQ HOSP IP/OBS MODERATE 35: CPT

## 2017-09-21 PROCEDURE — 85097 BONE MARROW INTERPRETATION: CPT

## 2017-09-21 PROCEDURE — 74000: CPT | Mod: 26

## 2017-09-21 PROCEDURE — 88313 SPECIAL STAINS GROUP 2: CPT | Mod: 26

## 2017-09-21 RX ORDER — SODIUM CHLORIDE 9 MG/ML
1000 INJECTION INTRAMUSCULAR; INTRAVENOUS; SUBCUTANEOUS
Qty: 0 | Refills: 0 | Status: DISCONTINUED | OUTPATIENT
Start: 2017-09-21 | End: 2017-09-22

## 2017-09-21 RX ADMIN — Medication 1300 MILLIGRAM(S): at 11:05

## 2017-09-21 RX ADMIN — Medication 667 MILLIGRAM(S): at 21:39

## 2017-09-21 RX ADMIN — Medication 650 MILLIGRAM(S): at 14:56

## 2017-09-21 RX ADMIN — Medication 1300 MILLIGRAM(S): at 17:09

## 2017-09-21 RX ADMIN — Medication 667 MILLIGRAM(S): at 12:26

## 2017-09-21 RX ADMIN — CARVEDILOL PHOSPHATE 3.12 MILLIGRAM(S): 80 CAPSULE, EXTENDED RELEASE ORAL at 06:14

## 2017-09-21 RX ADMIN — Medication 667 MILLIGRAM(S): at 09:02

## 2017-09-21 RX ADMIN — SODIUM CHLORIDE 80 MILLILITER(S): 9 INJECTION INTRAMUSCULAR; INTRAVENOUS; SUBCUTANEOUS at 13:34

## 2017-09-21 RX ADMIN — TAMSULOSIN HYDROCHLORIDE 0.4 MILLIGRAM(S): 0.4 CAPSULE ORAL at 21:39

## 2017-09-21 RX ADMIN — Medication 0.25 MILLIGRAM(S): at 00:01

## 2017-09-21 RX ADMIN — Medication 0.25 MILLIGRAM(S): at 23:43

## 2017-09-21 RX ADMIN — Medication 0.25 MILLIGRAM(S): at 14:31

## 2017-09-21 RX ADMIN — Medication 81 MILLIGRAM(S): at 12:26

## 2017-09-21 RX ADMIN — Medication 20 MILLIGRAM(S): at 06:14

## 2017-09-21 RX ADMIN — Medication 650 MILLIGRAM(S): at 14:32

## 2017-09-21 RX ADMIN — Medication 325 MILLIGRAM(S): at 11:05

## 2017-09-21 RX ADMIN — PANTOPRAZOLE SODIUM 40 MILLIGRAM(S): 20 TABLET, DELAYED RELEASE ORAL at 06:14

## 2017-09-21 RX ADMIN — Medication 1 MILLIGRAM(S): at 11:05

## 2017-09-21 RX ADMIN — CARVEDILOL PHOSPHATE 3.12 MILLIGRAM(S): 80 CAPSULE, EXTENDED RELEASE ORAL at 17:09

## 2017-09-21 RX ADMIN — Medication 1300 MILLIGRAM(S): at 06:14

## 2017-09-21 RX ADMIN — TIOTROPIUM BROMIDE 1 CAPSULE(S): 18 CAPSULE ORAL; RESPIRATORY (INHALATION) at 09:34

## 2017-09-21 RX ADMIN — Medication 1 TABLET(S): at 00:01

## 2017-09-21 RX ADMIN — Medication 667 MILLIGRAM(S): at 17:09

## 2017-09-21 NOTE — PROGRESS NOTE ADULT - PROBLEM SELECTOR PLAN 1
Oncology following, Found to have elevated IgA level, Oncology did bone marrow biopsy yesterday, Skeletal survey done shows no acute findings, has been started on oral steroids and allopunrinole, will continue

## 2017-09-21 NOTE — PROGRESS NOTE ADULT - SUBJECTIVE AND OBJECTIVE BOX
BECCA JARRELL    [id number]    72y      Female    Patient is a 72y old  Female who presents with a chief complaint of right side abdominal pain, chills and loss of apetite (12 Sep 2017 02:14)      INTERVAL HPI/OVERNIGHT EVENTS:    Patient is feeling better, her kidney function is improving very slowly, denies fever, chills, chest pain or SOB, She found to have elevated IgA level as well as some drop in H&H, got bone marrow biopsy yesterday    REVIEW OF SYSTEMS:    CONSTITUTIONAL: No fever, some fatigue  RESPIRATORY: No cough, No shortness of breath  CARDIOVASCULAR: No chest pain, palpitations  GASTROINTESTINAL: No abdominal, No nausea, vomiting  NEUROLOGICAL: No headaches,  loss of strength.  MISCELLANEOUS: No joint swelling or pain         Vital Signs Last 24 Hrs  T(C): 36.3 (21 Sep 2017 09:47), Max: 36.9 (20 Sep 2017 16:29)  T(F): 97.3 (21 Sep 2017 09:47), Max: 98.5 (20 Sep 2017 16:29)  HR: 78 (21 Sep 2017 09:47) (78 - 90)  BP: 149/85 (21 Sep 2017 09:47) (144/89 - 160/90)  BP(mean): --  RR: 18 (21 Sep 2017 09:47) (17 - 20)  SpO2: 96% (21 Sep 2017 04:35) (93% - 96%)    PHYSICAL EXAM:    GENERAL: Elderly female looking comfortable  HEENT: PERRL, +EOMI  NECK: soft, Supple, No JVD,   CHEST/LUNG: Clear to auscultate bilaterally; No wheezing  HEART: S1S2+, Regular rate and rhythm; No murmurs  ABDOMEN: Soft, Nontender, Nondistended; Bowel sounds present  EXTREMITIES:  1+ Peripheral Pulses, No edema  SKIN: No rashes or lesions  NEURO: AAOX3, no focal deficits, no motor r sensory loss  PSYCH: normal mood  Nephrostomy tube in place      LABS:                        10.6   5.9   )-----------( 258      ( 21 Sep 2017 07:12 )             31.2     09-21    143  |  99  |  42.0<H>  ----------------------------<  125<H>  4.2   |  26.0  |  4.57<H>    Ca    9.8      21 Sep 2017 07:12  Mg     1.9     09-20              I&O's Summary    20 Sep 2017 07:01  -  21 Sep 2017 07:00  --------------------------------------------------------  IN: 1460 mL / OUT: 127 mL / NET: 1333 mL        MEDICATIONS  (STANDING):  sodium bicarbonate 1300 milliGRAM(s) Oral three times a day before meals  calcium acetate 667 milliGRAM(s) Oral four times a day with meals  epoetin les Injectable 60343 Unit(s) SubCutaneous every 7 days  folic acid 1 milliGRAM(s) Oral daily  sodium chloride 0.45% 1000 milliLiter(s) (100 mL/Hr) IV Continuous <Continuous>  tamsulosin 0.4 milliGRAM(s) Oral at bedtime  ferrous    sulfate 325 milliGRAM(s) Oral daily  aspirin  chewable 81 milliGRAM(s) Oral daily  carvedilol 3.125 milliGRAM(s) Oral every 12 hours  tiotropium 18 MICROgram(s) Capsule 1 Capsule(s) Inhalation daily  dexamethasone     Tablet 20 milliGRAM(s) Oral daily  pantoprazole    Tablet 40 milliGRAM(s) Oral before breakfast  sodium chloride 0.9%. 1000 milliLiter(s) (80 mL/Hr) IV Continuous <Continuous>    MEDICATIONS  (PRN):  acetaminophen   Tablet. 650 milliGRAM(s) Oral every 6 hours PRN mild to moderate pain  ondansetron Injectable 4 milliGRAM(s) IV Push every 6 hours PRN Nausea and/or Vomiting  ALPRAZolam 0.25 milliGRAM(s) Oral three times a day PRN Anxiety  morphine  - Injectable 2 milliGRAM(s) IV Push every 6 hours PRN Severe Pain (7 - 10)  diphenoxylate/atropine 1 Tablet(s) Oral four times a day PRN Diarrhea

## 2017-09-21 NOTE — PROGRESS NOTE ADULT - PROBLEM SELECTOR PLAN 5
s/p 5 days of rocephin
resolved post IVF
s/p 5 days of rocephin
s/p 5 days of rocephin.
s/p 5 days of rocephin.
resolved post IVF
Potassium 3.7.  Hypokalemia resolved.

## 2017-09-21 NOTE — PROGRESS NOTE ADULT - PROBLEM SELECTOR PLAN 7
got supplement, will monitor
aspirin
got supplement, will monitor
resolved, trend
restart aspirin in am
PMH. Currently stable.  Outpatient regime and follow up.

## 2017-09-21 NOTE — PROGRESS NOTE ADULT - PROBLEM SELECTOR PLAN 1
Adjusted NT connections.  Perhaps the leakage is from a loose connection.  Creatinine appears to be slowly improving.

## 2017-09-21 NOTE — PROGRESS NOTE ADULT - PROBLEM SELECTOR PROBLEM 10
Anxiety and depression
Prophylactic measure

## 2017-09-21 NOTE — PROGRESS NOTE ADULT - PROBLEM SELECTOR PLAN 10
restart xanax   family requesting psych evaluation as believe patient takes too much (effexor, wellbutrin, trazadone, xanax, remeron)  patient wants to continue only xanax, will d/c all others.
restart xanax   family requesting psych evaluation as believe patient takes too much (effexor, wellbutrin, trazadone, xanax, remeron)  patient wants to continue only xanax, d/c all others, she is mentating well, she will follow her psych as out patient.
restart xanax   family requesting psych evaluation as believe patient takes too much (effexor, wellbutrin, trazadone, xanax, remeron)  patient wants to continue only xanax, d/c all others.
restart xanax   family requesting psych evaluation as believe patient takes too much (effexor, wellbutrin, trazadone, xanax, remeron)  patient wants to continue only xanax, will d/c all others.
DVT:  Sequential Compression Devices.

## 2017-09-21 NOTE — PROGRESS NOTE ADULT - PROBLEM SELECTOR PROBLEM 6
Hyponatremia
Coronary artery disease involving native coronary artery of native heart without angina pectoris
Hypokalemia
Hyponatremia
Hypokalemia

## 2017-09-21 NOTE — PROGRESS NOTE ADULT - PROBLEM SELECTOR PLAN 2
Stone management as outpatient.  Will obtain KUB to determine radio-opacity and to determine any change in stone.

## 2017-09-21 NOTE — PROGRESS NOTE ADULT - SUBJECTIVE AND OBJECTIVE BOX
[facility]  MD Concha Bobo MD  [phone]  Answering Sylvia : [phone]    [id number]    INTERVAL HPI/OVERNIGHT EVENTS:  Patient seen and examined. Looks well. Insist on going home tomorrow. Discussed risks of renal failure    Vital Signs Last 24 Hrs  T(C): 36.3 (21 Sep 2017 09:47), Max: 36.9 (20 Sep 2017 16:29)  T(F): 97.3 (21 Sep 2017 09:47), Max: 98.5 (20 Sep 2017 16:29)  HR: 78 (21 Sep 2017 09:47) (78 - 90)  BP: 149/85 (21 Sep 2017 09:47) (144/89 - 160/90)  BP(mean): --  RR: 18 (21 Sep 2017 09:47) (17 - 20)  SpO2: 96% (21 Sep 2017 04:35) (93% - 96%)  ANTIBIOTICS  epoetin les Injectable 73432 Unit(s) SubCutaneous every 7 days      Allergies    No Known Allergies    Intolerances        REVIEW OF SYSTEMS:    CONSTITUTIONAL:  As per HPI.    HEENT:  Eyes:  No diplopia or blurred vision. ENT:  No earache, sore throat or runny nose.    CARDIOVASCULAR:  No pressure, squeezing, strangling, tightness, heaviness or aching about the chest, neck, axilla or epigastrium.    RESPIRATORY:  No cough, shortness of breath, PND or orthopnea.    GASTROINTESTINAL:  No nausea, vomiting or diarrhea.    GENITOURINARY:  No dysuria, frequency or urgency.    MUSCULOSKELETAL:  As per HPI.    SKIN:  No change in skin, hair or nails.    NEUROLOGIC:  CNI,MOTOR WNL, SENSORY WNL.    ENDOCRINE:  No heat or cold intolerance, polyuria or polydipsia.    HEMATOLOGICAL:  No easy bruising or bleeding.           PHYSICAL EXAMINATION:    GENERAL: The patient is a well-developed, well-nourished _____in no apparent distress. ___ is alert and oriented x3.    VITAL SIGNS:     HEENT: Head is normocephalic and atraumatic. Extraocular muscles are intact. Pupils are equal, round, and reactive to light and accommodation. Nares appeared normal. Mouth is well hydrated and without lesions. Mucous membranes are moist. Posterior pharynx clear of any exudate or lesions.    NECK: Supple. No carotid bruits.  No lymphadenopathy or thyromegaly.    LUNGS: Clear to auscultation.    HEART: Regular rate and rhythm without murmur.    ABDOMEN: Soft, nontender, and nondistended.  Positive bowel sounds.  No hepatosplenomegaly was noted.post biopsy site clean, no bleed non tender    EXTREMITIES: Without any cyanosis, clubbing, rash, lesions or edema.    NEUROLOGIC: Cranial nerves II through XII are grossly intact.    PSYCHIATRIC: Flat affect, but denies suicidal or homicidal ideations.  SKIN: No ulceration or induration present.      LABS:                        10.6   5.9   )-----------( 258      ( 21 Sep 2017 07:12 )             31.2     09-21    143  |  99  |  42.0<H>  ----------------------------<  125<H>  4.2   |  26.0  |  4.57<H>    Ca    9.8      21 Sep 2017 07:12  Mg     1.9     09-20    ASSESSMENT:  ARF  IgA monoclonal gammopathy      PLAN:  Recommend continued hydration.  Allopurinol 100 mg daily continuously for now  Will follow      Yung Huffman M.D.

## 2017-09-21 NOTE — PROGRESS NOTE ADULT - PROBLEM SELECTOR PROBLEM 2
Nephrolithiasis
Nephrolithiasis
ARF (acute renal failure)
Hydronephrosis
Chronic kidney disease, unspecified CKD stage
ARF (acute renal failure)
Hydronephrosis

## 2017-09-21 NOTE — PROGRESS NOTE ADULT - PROBLEM SELECTOR PROBLEM 5
Hypokalemia
Hyponatremia
UTI (urinary tract infection)
Hyponatremia
UTI (urinary tract infection)

## 2017-09-21 NOTE — PROGRESS NOTE ADULT - PROBLEM SELECTOR PLAN 6
resolved post IVF
resolved, trend
resolved, trend
ASA was not started.

## 2017-09-21 NOTE — PROGRESS NOTE ADULT - ATTENDING COMMENTS
COPD: will resume Spiriva.
COPD: will resume Spiriva.
acute renal failure , hydronephrosis with small renal stone.  urology and nephrology consulted  d/w PMD Dr Huntley: patient with normal renal function.    IVF, IV abx for UTI and plans per urology/nephro.  renal ultrasound.  restart psych meds.    spoke to daughter over the phone in detail. all questions answered.  patient has moments of confusion and I suspect may not understand gravity of medical condition and thus may not be medically able to make informed decisions hayden re AMA.  She is under impression that she can get a prescription and go to PMD office for further management.

## 2017-09-21 NOTE — PROGRESS NOTE ADULT - SUBJECTIVE AND OBJECTIVE BOX
INTERVAL HPI/OVERNIGHT EVENTS:  The patient reports that she is having a little leak around NT.  Otherwise she is without complaints.    MEDICATIONS  (STANDING):  sodium bicarbonate 1300 milliGRAM(s) Oral three times a day before meals  calcium acetate 667 milliGRAM(s) Oral four times a day with meals  epoetin les Injectable 54232 Unit(s) SubCutaneous every 7 days  folic acid 1 milliGRAM(s) Oral daily  sodium chloride 0.45% 1000 milliLiter(s) (100 mL/Hr) IV Continuous <Continuous>  tamsulosin 0.4 milliGRAM(s) Oral at bedtime  ferrous    sulfate 325 milliGRAM(s) Oral daily  aspirin  chewable 81 milliGRAM(s) Oral daily  carvedilol 3.125 milliGRAM(s) Oral every 12 hours  tiotropium 18 MICROgram(s) Capsule 1 Capsule(s) Inhalation daily  dexamethasone     Tablet 20 milliGRAM(s) Oral daily  pantoprazole    Tablet 40 milliGRAM(s) Oral before breakfast  sodium chloride 0.9%. 1000 milliLiter(s) (80 mL/Hr) IV Continuous <Continuous>    MEDICATIONS  (PRN):  acetaminophen   Tablet. 650 milliGRAM(s) Oral every 6 hours PRN mild to moderate pain  ondansetron Injectable 4 milliGRAM(s) IV Push every 6 hours PRN Nausea and/or Vomiting  ALPRAZolam 0.25 milliGRAM(s) Oral three times a day PRN Anxiety  morphine  - Injectable 2 milliGRAM(s) IV Push every 6 hours PRN Severe Pain (7 - 10)  diphenoxylate/atropine 1 Tablet(s) Oral four times a day PRN Diarrhea      Allergies    No Known Allergies    Intolerances        Vital Signs Last 24 Hrs  T(C): 36.3 (21 Sep 2017 09:47), Max: 36.9 (20 Sep 2017 16:29)  T(F): 97.3 (21 Sep 2017 09:47), Max: 98.5 (20 Sep 2017 16:29)  HR: 78 (21 Sep 2017 09:47) (78 - 90)  BP: 149/85 (21 Sep 2017 09:47) (144/89 - 160/90)  BP(mean): --  RR: 18 (21 Sep 2017 09:47) (17 - 20)  SpO2: 96% (21 Sep 2017 04:35) (93% - 96%)    ROS:  as per HPI     ON PE:  General: Well developed; well nourished; in no acute distress  Head: Normocephalic; atraumatic  Respiratory: No tachypnea  Gastrointestinal: Soft non-tender non-distended;  Genitourinary: No costovertebral angle tenderness.  Urinary bladder is clinically not distended  Right NT in place - connection a little loose  Extremities: Normal range of motion, No edema  Neurological: Alert and oriented x4  Skin: Warm and dry. No acute rash  Musculoskeletal: Normal gait, tone, without deformities  Psychiatric: Cooperative and appropriate      LABS:                        10.6   5.9   )-----------( 258      ( 21 Sep 2017 07:12 )             31.2     09-21    143  |  99  |  42.0<H>  ----------------------------<  125<H>  4.2   |  26.0  |  4.57<H>    Ca    9.8      21 Sep 2017 07:12  Mg     1.9     09-20            RADIOLOGY & ADDITIONAL TESTS:

## 2017-09-21 NOTE — PROGRESS NOTE ADULT - PROBLEM SELECTOR PLAN 4
flomax  doubt stone is causing degree of renal failure
flomax, doubt stone is causing degree of renal failure, urology is following.
rocephin, f/u cultures
rocephin, f/u cultures
 Continue IVF Serum Cortisol results pending.
flomax, doubt stone is causing degree of renal failure, urology is following.

## 2017-09-21 NOTE — PROGRESS NOTE ADULT - PROBLEM SELECTOR PLAN 3
stable s/p placement of NT.
Nephro following, normal renal function per PMD Dr Ponce, LABS in chart, last cr 0.65, patient is s/p nephrostomy tube, her kidney function is improving slowly, will monitor BMP, daily weight, I/os, will continue with IV fluids, as per patient her mother was on HD for 10 years, given family Hx of kidney failure and hearing problem along with proteinuria, she might eventually need renal biopsy, she might has alpart syndrome, will follow BMP, renal function is improving slowly.
flomax  doubt stone is causing degree of renal failure
nephro following  normal renal function per PMD Dr Ponce--LABS in chart, last cr 0.65
nephro following, normal renal function per PMD Dr Ponce, LABS in chart, last cr 0.65, patient is s/p nephrostomy tube, her kidney function is improving slowly, will monitor BMP, daily weight, I/os, will continue with IV fluids, as per patient her mother was on HD for 10 years.
nephro following, normal renal function per PMD Dr Ponce, LABS in chart, last cr 0.65, patient is s/p nephrostomy tube, will monitor BMP, daily weight, I/os, will continue with IV fluids, as per patient her mother was on HD for 10 years.
flomax  doubt stone is causing degree of renal failure
As above.
Nephro following, normal renal function per PMD Dr Ponce, LABS in chart, last cr 0.65, patient is s/p nephrostomy tube, her kidney function is improving slowly, will monitor BMP, daily weight, I/os, will continue with IV fluids, as per patient her mother was on HD for 10 years, given family Hx of kidney failure and hearing problem along with proteinuria, she might eventually need renal biopsy, she might has alpart syndrome, will follow BMP.

## 2017-09-21 NOTE — PROGRESS NOTE ADULT - SUBJECTIVE AND OBJECTIVE BOX
Patient seen and examined    Feels fine; OOB/Ch  NS pain at BM Bx site  no c/o CP SOB NV   No swelling feet    Patient without any significant change  no specific c/o    Vital Signs Last 24 Hrs  T(C): 36.8 (09-21-17 @ 16:07), Max: 36.8 (09-21-17 @ 16:07)  T(F): 98.2 (09-21-17 @ 16:07), Max: 98.2 (09-21-17 @ 16:07)  HR: 90 (09-21-17 @ 16:07) (78 - 90)  BP: 139/83 (09-21-17 @ 16:07) (139/83 - 160/90)  BP(mean): --  RR: 18 (09-21-17 @ 16:07) (17 - 20)  SpO2: 92% (09-21-17 @ 16:07) (92% - 96%)    Chest   clear  CV   no murmur  Abd   soft, NT BS+  Extr   No edema  Neuro  grossly iintact motor      21 Sep 2017 07:12    143    |  99     |  42.0   ----------------------------<  125    4.2     |  26.0   |  4.57     Ca    9.8        21 Sep 2017 07:12  Mg     1.9       20 Sep 2017 05:52                            10.6   5.9   )-----------( 258      ( 21 Sep 2017 07:12 )             31.2       CLIFF - slowly improving  r/o MM/LCD - await Bx

## 2017-09-21 NOTE — PROGRESS NOTE ADULT - PROBLEM SELECTOR PROBLEM 4
Hyponatremia
Nephrolithiasis
UTI (urinary tract infection)
UTI (urinary tract infection)
Nephrolithiasis

## 2017-09-22 ENCOUNTER — TRANSCRIPTION ENCOUNTER (OUTPATIENT)
Age: 72
End: 2017-09-22

## 2017-09-22 VITALS
OXYGEN SATURATION: 95 % | HEART RATE: 70 BPM | TEMPERATURE: 98 F | SYSTOLIC BLOOD PRESSURE: 160 MMHG | RESPIRATION RATE: 17 BRPM | DIASTOLIC BLOOD PRESSURE: 89 MMHG

## 2017-09-22 LAB
ANION GAP SERPL CALC-SCNC: 17 MMOL/L — SIGNIFICANT CHANGE UP (ref 5–17)
BUN SERPL-MCNC: 45 MG/DL — HIGH (ref 8–20)
CALCIUM SERPL-MCNC: 9.4 MG/DL — SIGNIFICANT CHANGE UP (ref 8.6–10.2)
CHLORIDE SERPL-SCNC: 102 MMOL/L — SIGNIFICANT CHANGE UP (ref 98–107)
CO2 SERPL-SCNC: 26 MMOL/L — SIGNIFICANT CHANGE UP (ref 22–29)
CREAT SERPL-MCNC: 4.11 MG/DL — HIGH (ref 0.5–1.3)
GLUCOSE SERPL-MCNC: 93 MG/DL — SIGNIFICANT CHANGE UP (ref 70–115)
HCT VFR BLD CALC: 29.9 % — LOW (ref 37–47)
HGB BLD-MCNC: 9.9 G/DL — LOW (ref 12–16)
MAGNESIUM SERPL-MCNC: 1.9 MG/DL — SIGNIFICANT CHANGE UP (ref 1.8–2.6)
MCHC RBC-ENTMCNC: 33.1 G/DL — SIGNIFICANT CHANGE UP (ref 32–36)
MCHC RBC-ENTMCNC: 33.9 PG — HIGH (ref 27–31)
MCV RBC AUTO: 102.4 FL — HIGH (ref 81–99)
PHOSPHATE SERPL-MCNC: 4 MG/DL — SIGNIFICANT CHANGE UP (ref 2.4–4.7)
PLATELET # BLD AUTO: 271 K/UL — SIGNIFICANT CHANGE UP (ref 150–400)
POTASSIUM SERPL-MCNC: 3.7 MMOL/L — SIGNIFICANT CHANGE UP (ref 3.5–5.3)
POTASSIUM SERPL-SCNC: 3.7 MMOL/L — SIGNIFICANT CHANGE UP (ref 3.5–5.3)
RBC # BLD: 2.92 M/UL — LOW (ref 4.4–5.2)
RBC # FLD: 14 % — SIGNIFICANT CHANGE UP (ref 11–15.6)
SODIUM SERPL-SCNC: 145 MMOL/L — SIGNIFICANT CHANGE UP (ref 135–145)
URATE SERPL-MCNC: 9.1 MG/DL — HIGH (ref 2.4–5.7)
WBC # BLD: 9.7 K/UL — SIGNIFICANT CHANGE UP (ref 4.8–10.8)
WBC # FLD AUTO: 9.7 K/UL — SIGNIFICANT CHANGE UP (ref 4.8–10.8)

## 2017-09-22 PROCEDURE — 99239 HOSP IP/OBS DSCHRG MGMT >30: CPT

## 2017-09-22 RX ORDER — FLUTICASONE PROPIONATE AND SALMETEROL 50; 250 UG/1; UG/1
1 POWDER ORAL; RESPIRATORY (INHALATION)
Qty: 0 | Refills: 0 | COMMUNITY

## 2017-09-22 RX ORDER — DIPHENOXYLATE HCL/ATROPINE 2.5-.025MG
1 TABLET ORAL
Qty: 60 | Refills: 0 | OUTPATIENT
Start: 2017-09-22 | End: 2017-10-07

## 2017-09-22 RX ORDER — VENLAFAXINE HCL 75 MG
1 CAPSULE, EXT RELEASE 24 HR ORAL
Qty: 0 | Refills: 0 | COMMUNITY

## 2017-09-22 RX ORDER — ALPRAZOLAM 0.25 MG
1 TABLET ORAL
Qty: 0 | Refills: 0 | COMMUNITY

## 2017-09-22 RX ORDER — TAMSULOSIN HYDROCHLORIDE 0.4 MG/1
1 CAPSULE ORAL
Qty: 30 | Refills: 0 | OUTPATIENT
Start: 2017-09-22 | End: 2017-10-22

## 2017-09-22 RX ORDER — DEXAMETHASONE 0.5 MG/5ML
5 ELIXIR ORAL
Qty: 5 | Refills: 0 | OUTPATIENT
Start: 2017-09-22 | End: 2017-09-23

## 2017-09-22 RX ORDER — FERROUS SULFATE 325(65) MG
1 TABLET ORAL
Qty: 90 | Refills: 0 | OUTPATIENT
Start: 2017-09-22 | End: 2017-10-22

## 2017-09-22 RX ORDER — BUPROPION HYDROCHLORIDE 150 MG/1
1 TABLET, EXTENDED RELEASE ORAL
Qty: 0 | Refills: 0 | COMMUNITY

## 2017-09-22 RX ORDER — CARVEDILOL PHOSPHATE 80 MG/1
1 CAPSULE, EXTENDED RELEASE ORAL
Qty: 0 | Refills: 0 | COMMUNITY

## 2017-09-22 RX ORDER — FOLIC ACID 0.8 MG
1 TABLET ORAL
Qty: 0 | Refills: 0 | COMMUNITY
Start: 2017-09-22

## 2017-09-22 RX ORDER — TAMSULOSIN HYDROCHLORIDE 0.4 MG/1
1 CAPSULE ORAL
Qty: 30 | Refills: 0
Start: 2017-09-22 | End: 2017-10-22

## 2017-09-22 RX ORDER — VENLAFAXINE HCL 75 MG
2 CAPSULE, EXT RELEASE 24 HR ORAL
Qty: 0 | Refills: 0 | COMMUNITY

## 2017-09-22 RX ORDER — CARVEDILOL PHOSPHATE 80 MG/1
1 CAPSULE, EXTENDED RELEASE ORAL
Qty: 60 | Refills: 0 | OUTPATIENT
Start: 2017-09-22 | End: 2017-10-22

## 2017-09-22 RX ORDER — FLUTICASONE PROPIONATE AND SALMETEROL 50; 250 UG/1; UG/1
1 POWDER ORAL; RESPIRATORY (INHALATION)
Qty: 1 | Refills: 0
Start: 2017-09-22 | End: 2017-10-22

## 2017-09-22 RX ORDER — ALPRAZOLAM 0.25 MG
1 TABLET ORAL
Qty: 45 | Refills: 0 | OUTPATIENT
Start: 2017-09-22 | End: 2017-10-07

## 2017-09-22 RX ORDER — MIRTAZAPINE 45 MG/1
1 TABLET, ORALLY DISINTEGRATING ORAL
Qty: 0 | Refills: 0 | COMMUNITY

## 2017-09-22 RX ORDER — DIPHENOXYLATE HCL/ATROPINE 2.5-.025MG
1 TABLET ORAL
Qty: 0 | Refills: 0 | COMMUNITY

## 2017-09-22 RX ORDER — FLUTICASONE PROPIONATE AND SALMETEROL 50; 250 UG/1; UG/1
1 POWDER ORAL; RESPIRATORY (INHALATION)
Qty: 1 | Refills: 0 | OUTPATIENT
Start: 2017-09-22 | End: 2017-10-22

## 2017-09-22 RX ORDER — ACETAMINOPHEN 500 MG
2 TABLET ORAL
Qty: 0 | Refills: 0 | COMMUNITY
Start: 2017-09-22

## 2017-09-22 RX ORDER — PANTOPRAZOLE SODIUM 20 MG/1
1 TABLET, DELAYED RELEASE ORAL
Qty: 15 | Refills: 0 | OUTPATIENT
Start: 2017-09-22 | End: 2017-10-07

## 2017-09-22 RX ORDER — CALCIUM ACETATE 667 MG
1 TABLET ORAL
Qty: 0 | Refills: 0 | COMMUNITY
Start: 2017-09-22

## 2017-09-22 RX ORDER — TRAZODONE HCL 50 MG
1 TABLET ORAL
Qty: 0 | Refills: 0 | COMMUNITY

## 2017-09-22 RX ORDER — ALPRAZOLAM 0.25 MG
1 TABLET ORAL
Qty: 0 | Refills: 0 | COMMUNITY
Start: 2017-09-22 | End: 2017-10-07

## 2017-09-22 RX ADMIN — PANTOPRAZOLE SODIUM 40 MILLIGRAM(S): 20 TABLET, DELAYED RELEASE ORAL at 06:08

## 2017-09-22 RX ADMIN — CARVEDILOL PHOSPHATE 3.12 MILLIGRAM(S): 80 CAPSULE, EXTENDED RELEASE ORAL at 06:09

## 2017-09-22 RX ADMIN — Medication 81 MILLIGRAM(S): at 11:02

## 2017-09-22 RX ADMIN — Medication 20 MILLIGRAM(S): at 06:08

## 2017-09-22 RX ADMIN — Medication 0.25 MILLIGRAM(S): at 11:02

## 2017-09-22 RX ADMIN — Medication 650 MILLIGRAM(S): at 08:28

## 2017-09-22 RX ADMIN — Medication 1300 MILLIGRAM(S): at 11:02

## 2017-09-22 RX ADMIN — Medication 1300 MILLIGRAM(S): at 06:09

## 2017-09-22 RX ADMIN — CARVEDILOL PHOSPHATE 3.12 MILLIGRAM(S): 80 CAPSULE, EXTENDED RELEASE ORAL at 17:30

## 2017-09-22 RX ADMIN — Medication 325 MILLIGRAM(S): at 11:02

## 2017-09-22 RX ADMIN — Medication 1 MILLIGRAM(S): at 11:02

## 2017-09-22 RX ADMIN — Medication 667 MILLIGRAM(S): at 17:30

## 2017-09-22 RX ADMIN — Medication 667 MILLIGRAM(S): at 08:27

## 2017-09-22 RX ADMIN — Medication 650 MILLIGRAM(S): at 09:00

## 2017-09-22 RX ADMIN — Medication 1300 MILLIGRAM(S): at 17:30

## 2017-09-22 NOTE — DISCHARGE NOTE ADULT - CARE PLAN
Principal Discharge DX:	Acute renal failure, unspecified acute renal failure type  Goal:	Follow up with nephrology on Monday  Instructions for follow-up, activity and diet:	Low potassium and low protein diet, ask you nephrologist to check you kidney function, please drink plenty of water.  Secondary Diagnosis:	Hydronephrosis  Goal:	Follow up with Urology  Instructions for follow-up, activity and diet:	Nephrostomy tube care as jones have been educated  Secondary Diagnosis:	Hypokalemia  Goal:	resolved  Secondary Diagnosis:	Hyponatremia  Goal:	resolved  Secondary Diagnosis:	Nephrolithiasis  Goal:	urology follow up  Secondary Diagnosis:	UTI (urinary tract infection)  Goal:	resolved  Secondary Diagnosis:	Monoclonal paraproteinemia  Goal:	Follow up with Dr Huffman next week, he will follow your pending bone marrow biopsy. Principal Discharge DX:	Acute renal failure, unspecified acute renal failure type  Goal:	Follow up with nephrology on Monday  Instructions for follow-up, activity and diet:	Low potassium and low protein diet, ask you nephrologist to check you kidney function, please drink plenty of water.  Secondary Diagnosis:	Hydronephrosis  Goal:	Follow up with Urology  Instructions for follow-up, activity and diet:	Nephrostomy tube care as jones have been educated  Secondary Diagnosis:	Hypokalemia  Goal:	resolved  Secondary Diagnosis:	Hyponatremia  Goal:	resolved  Secondary Diagnosis:	Nephrolithiasis  Goal:	urology follow up  Secondary Diagnosis:	UTI (urinary tract infection)  Goal:	resolved  Secondary Diagnosis:	Monoclonal paraproteinemia  Goal:	Follow up with Dr Huffman next week Monday, he will follow your pending bone marrow biopsy.  Instructions for follow-up, activity and diet:	he will also get your blood work.

## 2017-09-22 NOTE — PROGRESS NOTE ADULT - SUBJECTIVE AND OBJECTIVE BOX
INTERVAL HPI/OVERNIGHT EVENTS:  Minimal discomfort from the right nephrostomy tube.    MEDICATIONS  (STANDING):  sodium bicarbonate 1300 milliGRAM(s) Oral three times a day before meals  calcium acetate 667 milliGRAM(s) Oral four times a day with meals  epoetin les Injectable 25502 Unit(s) SubCutaneous every 7 days  folic acid 1 milliGRAM(s) Oral daily  sodium chloride 0.45% 1000 milliLiter(s) (100 mL/Hr) IV Continuous <Continuous>  tamsulosin 0.4 milliGRAM(s) Oral at bedtime  ferrous    sulfate 325 milliGRAM(s) Oral daily  aspirin  chewable 81 milliGRAM(s) Oral daily  carvedilol 3.125 milliGRAM(s) Oral every 12 hours  tiotropium 18 MICROgram(s) Capsule 1 Capsule(s) Inhalation daily  dexamethasone     Tablet 20 milliGRAM(s) Oral daily  pantoprazole    Tablet 40 milliGRAM(s) Oral before breakfast  sodium chloride 0.9%. 1000 milliLiter(s) (80 mL/Hr) IV Continuous <Continuous>    MEDICATIONS  (PRN):  acetaminophen   Tablet. 650 milliGRAM(s) Oral every 6 hours PRN mild to moderate pain  ondansetron Injectable 4 milliGRAM(s) IV Push every 6 hours PRN Nausea and/or Vomiting  ALPRAZolam 0.25 milliGRAM(s) Oral three times a day PRN Anxiety  morphine  - Injectable 2 milliGRAM(s) IV Push every 6 hours PRN Severe Pain (7 - 10)  diphenoxylate/atropine 1 Tablet(s) Oral four times a day PRN Diarrhea      Allergies    No Known Allergies    Intolerances        Vital Signs Last 24 Hrs  T(C): 36.8 (22 Sep 2017 04:33), Max: 36.8 (21 Sep 2017 16:07)  T(F): 98.3 (22 Sep 2017 04:33), Max: 98.3 (22 Sep 2017 04:33)  HR: 70 (22 Sep 2017 04:33) (70 - 90)  BP: 160/89 (22 Sep 2017 04:33) (138/85 - 160/89)  BP(mean): --  RR: 17 (22 Sep 2017 04:33) (17 - 18)  SpO2: 95% (22 Sep 2017 04:33) (92% - 95%)     ON PE:  General: alert and awake  Abdomen: soft, nt, nd, bs+    Clear urine from the right nephrostomy tube       LABS:                        9.9    9.7   )-----------( 271      ( 22 Sep 2017 08:23 )             29.9     09-22    145  |  102  |  45.0<H>  ----------------------------<  93  3.7   |  26.0  |  4.11<H>    Ca    9.4      22 Sep 2017 08:23            RADIOLOGY & ADDITIONAL TESTS:

## 2017-09-22 NOTE — DISCHARGE NOTE ADULT - HOSPITAL COURSE
72 yo female with a pmh of CAD, anxiety, depression, ulcerative colitis, , CRI, b/l Kidney stones started to feel right sided abdominal pain 10 days ago.  The pain radiated to her right flank with chills and loss of appetite.  Frequent falls per family.     patient was admitted under medicine and was given IV fluids, she was found to have right moderate hydronephrosis with small stone on CT a/p and underwent right nephrostomy tube placement by IR on 9/13/17, patient was found to have worsening kidney function, Nephro consulted and followed, patient had normal renal function per PMD Dr Ponce, LABS in chart, last cr 0.65, patient is s/p nephrostomy tube, her kidney function is improving slowly, monitoring of BMP, daily weight, I/os, she was also given IV fluids, patient will be continue to have nephrostomy tube and she will be followed by urology as out patient, she was given Flomax as well for her stone.     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    She was found to have UTI and she got 5 days of rocephin, she has no more symptoms.   She also had Hyponatremia at the beginning of the course, it resolved post IVF and she was supplemented with potassium for her hypokalemia.     patient is doing better, she is being discharged home with home care for the Nephrostomy care, she is stable at the time of discharge.    Vital Signs Last 24 Hrs  T(C): 36.8 (22 Sep 2017 04:33), Max: 36.8 (21 Sep 2017 16:07)  T(F): 98.3 (22 Sep 2017 04:33), Max: 98.3 (22 Sep 2017 04:33)  HR: 70 (22 Sep 2017 04:33) (70 - 90)  BP: 160/89 (22 Sep 2017 04:33) (138/85 - 160/89)  RR: 17 (22 Sep 2017 04:33) (17 - 18)  SpO2: 95% (22 Sep 2017 04:33) (92% - 95%)     GENERAL: Elderly female looking comfortable  HEENT: PERRL, +EOMI  NECK: soft, Supple, No JVD,   CHEST/LUNG: Clear to auscultate bilaterally; No wheezing  HEART: S1S2+, Regular rate and rhythm; No murmurs  ABDOMEN: Soft, Nontender, Nondistended; Bowel sounds present  EXTREMITIES:  1+ Peripheral Pulses, No edema  SKIN: No rashes or lesions  NEURO: AAOX3, no focal deficits, no motor r sensory loss  PSYCH: normal mood  Nephrostomy tube in place    Total time spent 40 minutes 70 yo female with a pmh of CAD, anxiety, depression, ulcerative colitis, , CRI, b/l Kidney stones started to feel right sided abdominal pain 10 days ago.  The pain radiated to her right flank with chills and loss of appetite.  Frequent falls per family.     patient was admitted under medicine and was given IV fluids, she was found to have right moderate hydronephrosis with small stone on CT a/p and underwent right nephrostomy tube placement by IR on 9/13/17, patient was found to have worsening kidney function, Nephro consulted and followed, patient had normal renal function per PMD Dr Ponce, LABS in chart, last cr 0.65, patient is s/p nephrostomy tube, her kidney function is improving slowly, monitoring of BMP, daily weight, I/os, she was also given IV fluids, patient will be continue to have nephrostomy tube and she will be followed by urology as out patient, she was given Flomax as well for her stone.     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    She was found to have UTI and she got 5 days of rocephin, she has no more symptoms.   She also had Hyponatremia at the beginning of the course, it resolved post IVF and she was supplemented with potassium for her hypokalemia.     Patient is doing better, she is being discharged home with home care for the Nephrostomy care, she is stable at the time of discharge.    Vital Signs Last 24 Hrs  T(C): 36.8 (22 Sep 2017 04:33), Max: 36.8 (21 Sep 2017 16:07)  T(F): 98.3 (22 Sep 2017 04:33), Max: 98.3 (22 Sep 2017 04:33)  HR: 70 (22 Sep 2017 04:33) (70 - 90)  BP: 160/89 (22 Sep 2017 04:33) (138/85 - 160/89)  RR: 17 (22 Sep 2017 04:33) (17 - 18)  SpO2: 95% (22 Sep 2017 04:33) (92% - 95%)     GENERAL: Elderly female looking comfortable  HEENT: PERRL, +EOMI  NECK: soft, Supple, No JVD,   CHEST/LUNG: Clear to auscultate bilaterally; No wheezing  HEART: S1S2+, Regular rate and rhythm; No murmurs  ABDOMEN: Soft, Nontender, Nondistended; Bowel sounds present  EXTREMITIES:  1+ Peripheral Pulses, No edema  SKIN: No rashes or lesions  NEURO: AAOX3, no focal deficits, no motor r sensory loss  PSYCH: normal mood  Nephrostomy tube in place    Total time spent 40 minutes

## 2017-09-22 NOTE — PROGRESS NOTE ADULT - PROBLEM SELECTOR PLAN 1
Nephrostomy to gravity    When stable for discharge please discharge with the right nephrostomy    Pain control as needed    Will follow

## 2017-09-22 NOTE — PROGRESS NOTE ADULT - PROBLEM SELECTOR PROBLEM 1
Acute renal failure, unspecified acute renal failure type
Hydronephrosis with urinary obstruction due to renal calculus
Hydronephrosis with urinary obstruction due to renal calculus
Hydronephrosis with urinary obstruction due to ureteral calculus
Kidney stones
Hydronephrosis
Hydronephrosis with urinary obstruction due to renal calculus
Hydronephrosis with urinary obstruction due to ureteral calculus
Nephrolithiasis
Paraproteinemia
Kidney stones
Hydronephrosis
Paraproteinemia
Paraproteinemia

## 2017-09-22 NOTE — PROGRESS NOTE ADULT - SUBJECTIVE AND OBJECTIVE BOX
Patient seen and examined    Feels fine  no c/o CP SOB NV   No swelling feet    Vital Signs Last 24 Hrs  T(C): 36.8 (09-22-17 @ 04:33), Max: 36.8 (09-21-17 @ 16:07)  T(F): 98.3 (09-22-17 @ 04:33), Max: 98.3 (09-22-17 @ 04:33)  HR: 70 (09-22-17 @ 04:33) (70 - 90)  BP: 160/89 (09-22-17 @ 04:33) (138/85 - 160/89)  BP(mean): --  RR: 17 (09-22-17 @ 04:33) (17 - 18)  SpO2: 95% (09-22-17 @ 04:33) (92% - 95%)    Chest   clear  CV   no murmur  Abd   soft, NT BS+  Extr   No edema  Neuro  grossly iintact motor      22 Sep 2017 08:23    145    |  102    |  45.0   ----------------------------<  93     3.7     |  26.0   |  4.11     Ca    9.4        22 Sep 2017 08:23  Phos  4.0       21 Sep 2017 07:12  Mg     1.9       21 Sep 2017 07:12                            9.9    9.7   )-----------( 271      ( 22 Sep 2017 08:23 )             29.9

## 2017-09-22 NOTE — DISCHARGE NOTE ADULT - PLAN OF CARE
Follow up with nephrology on Monday Low potassium and low protein diet, ask you nephrologist to check you kidney function, please drink plenty of water. Follow up with Urology Nephrostomy tube care as jones have been educated resolved urology follow up Follow up with Dr Huffman next week, he will follow your pending bone marrow biopsy. Follow up with Dr Huffman next week Monday, he will follow your pending bone marrow biopsy. he will also get your blood work.

## 2017-09-22 NOTE — DISCHARGE NOTE ADULT - PROVIDER TOKENS
FREE:[LAST:[Nathan HERNANDEZ MD],PHONE:[(   )    -],FAX:[(   )    -],ADDRESS:[Address: [address], [place] [address]  Phone: [phone]  Urology, please follow with him next week, please call his office and get an appionment]],FREE:[LAST:[Isaias Mac MD],PHONE:[(   )    -],FAX:[(   )    -],ADDRESS:[Address: [address] [place] [address]  Phone: [phone]  Nephrologist, please follow him next week, please call his office and get an appiontment]],FREE:[LAST:[Armida Barragan I],PHONE:[(   )    -],FAX:[(   )    -],ADDRESS:[Address: [address] [place] [address]  Phone: [phone]  on Monday please call to make sure you have appiontment]],FREE:[LAST:[Dr Sara pérez],PHONE:[(   )    -],FAX:[(   )    -],ADDRESS:[PMD   in 1 week, please call his office and get an appiontment]]

## 2017-09-22 NOTE — DISCHARGE NOTE ADULT - REASON FOR ADMISSION
right side abdominal pain, chills and loss of apetite right side abdominal pain, chills and loss of apatite

## 2017-09-22 NOTE — DISCHARGE NOTE ADULT - MEDICATION SUMMARY - MEDICATIONS TO TAKE
I will START or STAY ON the medications listed below when I get home from the hospital:    dexamethasone 4 mg oral tablet  -- 5 tab(s) by mouth once a day tomorrow is last dose   -- Indication: For Elevated protien    aspirin 81 mg oral tablet  -- 1 tab(s) by mouth once a day  -- Indication: For Cardiac protection    acetaminophen 325 mg oral tablet  -- 2 tab(s) by mouth every 6 hours, As needed, mild to moderate pain  -- Indication: For Pain and fever     tamsulosin 0.4 mg oral capsule  -- 1 cap(s) by mouth once a day (at bedtime)  -- Indication: For Kidney stones    diphenoxylate-atropine 2.5 mg-0.025 mg oral tablet  -- 1 tab(s) by mouth 4 times a day, As Needed MDD:4  -- Indication: For Diarrhea    ALPRAZolam 0.5 mg oral tablet  -- 1 tab(s) by mouth 3 times a day MDD:3  -- Indication: For Anxity     carvedilol 3.125 mg oral tablet  -- 1 tab(s) by mouth 2 times a day  -- Indication: For HTN    Advair Diskus 250 mcg-50 mcg inhalation powder  -- 1 puff(s) inhaled 2 times a day  -- Indication: For SOB    Spiriva 18 mcg inhalation capsule  -- 1 cap(s) inhaled once a day  -- Indication: For SOB     ferrous sulfate 325 mg (65 mg elemental iron) oral tablet  -- 1 tab(s) by mouth 3 times a day  -- Indication: For Anemia    calcium acetate 667 mg oral tablet  -- 1 tab(s) by mouth 4 times a day  -- Indication: For Supplement    pantoprazole 40 mg oral delayed release tablet  -- 1 tab(s) by mouth once a day (before a meal)  -- Indication: For GERD    folic acid 1 mg oral tablet  -- 1 tab(s) by mouth once a day  -- Indication: For Supplement I will START or STAY ON the medications listed below when I get home from the hospital:    aspirin 81 mg oral tablet  -- 1 tab(s) by mouth once a day  -- Indication: For Cardiac protection    acetaminophen 325 mg oral tablet  -- 2 tab(s) by mouth every 6 hours, As needed, mild to moderate pain  -- Indication: For Pain and fever     tamsulosin 0.4 mg oral capsule  -- 1 cap(s) by mouth once a day (at bedtime)  -- Indication: For Kidney stones    diphenoxylate-atropine 2.5 mg-0.025 mg oral tablet  -- 1 tab(s) by mouth 4 times a day, As Needed MDD:4  -- Indication: For Diarrhea    allopurinol 100 mg oral tablet  -- 1 tab(s) by mouth once a day   -- It is very important that you take or use this exactly as directed.  Do not skip doses or discontinue unless directed by your doctor.  May cause drowsiness or dizziness.  Medication should be taken with plenty of water.    -- Indication: For Uric acid    ALPRAZolam 0.5 mg oral tablet  -- 1 tab(s) by mouth 3 times a day MDD:3  -- Indication: For Anxity     carvedilol 3.125 mg oral tablet  -- 1 tab(s) by mouth 2 times a day  -- Indication: For HTN    Advair Diskus 250 mcg-50 mcg inhalation powder  -- 1 puff(s) inhaled 2 times a day  -- Indication: For SOB    Spiriva 18 mcg inhalation capsule  -- 1 cap(s) inhaled once a day  -- Indication: For SOB     ferrous sulfate 325 mg (65 mg elemental iron) oral tablet  -- 1 tab(s) by mouth 3 times a day  -- Indication: For Anemia    calcium acetate 667 mg oral capsule  -- 1 cap(s) by mouth 3 times a day   -- Indication: For Lower phosphate     pantoprazole 40 mg oral delayed release tablet  -- 1 tab(s) by mouth once a day   -- It is very important that you take or use this exactly as directed.  Do not skip doses or discontinue unless directed by your doctor.  Obtain medical advice before taking any non-prescription drugs as some may affect the action of this medication.  Swallow whole.  Do not crush.    -- Indication: For GERD    folic acid 1 mg oral tablet  -- 1 tab(s) by mouth once a day   -- Indication: For Supplement

## 2017-09-22 NOTE — DISCHARGE NOTE ADULT - MEDICATION SUMMARY - MEDICATIONS TO STOP TAKING
I will STOP taking the medications listed below when I get home from the hospital:    dipnenoxylate - Atropine  -- 2.5 - 0.025  Take  1   4x daily    diphenoxylate-atropine 2.5 mg-0.025 mg oral tablet  -- 1 tab(s) by mouth 4 times a day, As Needed    mirtazapine 30 mg oral tablet  -- 1 tab(s) by mouth once a day    buPROPion 150 mg/12 hours (SR) oral tablet, extended release  -- 1 tab(s) by mouth 2 times a day    traZODone 50 mg oral tablet  -- 1 tab(s) by mouth once a day (at bedtime)    venlafaxine 75 mg oral tablet, extended release  -- 2 tab(s) by mouth once a day (in the morning)    venlafaxine 75 mg oral tablet, extended release  -- 1 tab(s) by mouth once a day after lunch

## 2017-09-22 NOTE — DISCHARGE NOTE ADULT - CARE PROVIDER_API CALL
Nathan HERNANDEZ MD,   Address: [address], [place] [address]  Phone: [phone]  Urology, please follow with him next week, please call his office and get an appionment  Phone: (   )    -  Fax: (   )    -    Isaias Mac MD,   Address: [address] [place] [address]  Phone: [phone]  Nephrologist, please follow him next week, please call his office and get an appiontment  Phone: (   )    -  Fax: (   )    -    Armida Barragan I,   Address: [address] [place] [address]  Phone: [phone]  on Monday please call to make sure you have appiontment  Phone: (   )    -  Fax: (   )    -    Dr Sara pérez,   PMD   in 1 week, please call his office and get an appiontment  Phone: (   )    -  Fax: (   )    -

## 2017-09-22 NOTE — DISCHARGE NOTE ADULT - PATIENT PORTAL LINK FT
“You can access the Follow[facility] Patient Portal, offered by [facility], by registering with the following website: [email or web address]”

## 2017-09-22 NOTE — PROGRESS NOTE ADULT - PROVIDER SPECIALTY LIST ADULT
Heme/Onc
Hospitalist
Intervent Radiology
Nephrology
Urology
Internal Medicine
Nephrology
Hospitalist
Urology
Urology
Hospitalist

## 2017-09-22 NOTE — DISCHARGE NOTE ADULT - NSFTFATTESTRESTRICTRD_GEN_ALL_CORE
Medical Assistant Note:  Audra Hayden presents today for Blood draw.    Patient seen by provider today: No.   present during visit today: Not Applicable.    Concerns: No Concerns.    Procedure:  Lab draw site: LAC, Needle type: BF, Gauge: 21.    Post Assessment:  Labs drawn without difficulty: Yes.    Discharge Plan:  Departure Mode: Ambulatory.    Face to Face Time: 5 minutes.    Dominga Cantor             ATTENDING CERTIFICATION

## 2017-09-22 NOTE — PROGRESS NOTE ADULT - ASSESSMENT
CLIFF - cause unclear  On Decadron for ? MM?LCD; BX results pending  Creat improving steadily - either spont or d/t steroids- unlikely d/t MM/LCD- improving too fast  Being d/c home - will f/u as outpt

## 2017-09-22 NOTE — DISCHARGE NOTE ADULT - SECONDARY DIAGNOSIS.
Hydronephrosis Hypokalemia Hyponatremia Nephrolithiasis UTI (urinary tract infection) Monoclonal paraproteinemia

## 2017-09-23 RX ORDER — PANTOPRAZOLE SODIUM 20 MG/1
1 TABLET, DELAYED RELEASE ORAL
Qty: 30 | Refills: 0
Start: 2017-09-23 | End: 2017-10-23

## 2017-09-23 RX ORDER — CALCIUM ACETATE 667 MG
1 TABLET ORAL
Qty: 90 | Refills: 0 | OUTPATIENT
Start: 2017-09-23 | End: 2017-10-23

## 2017-09-23 RX ORDER — FOLIC ACID 0.8 MG
1 TABLET ORAL
Qty: 30 | Refills: 0
Start: 2017-09-23 | End: 2017-10-23

## 2017-09-23 RX ORDER — ALLOPURINOL 300 MG
1 TABLET ORAL
Qty: 30 | Refills: 0 | OUTPATIENT
Start: 2017-09-23 | End: 2017-10-23

## 2017-09-26 LAB — TM INTERPRETATION: SIGNIFICANT CHANGE UP

## 2017-09-28 LAB — CHROM ANALY INTERPHASE BLD FISH-IMP: SIGNIFICANT CHANGE UP

## 2017-10-03 LAB — CHROM ANALY OVERALL INTERP SPEC-IMP: SIGNIFICANT CHANGE UP

## 2017-10-19 PROCEDURE — 83735 ASSAY OF MAGNESIUM: CPT

## 2017-10-19 PROCEDURE — 93005 ELECTROCARDIOGRAM TRACING: CPT

## 2017-10-19 PROCEDURE — 88275 CYTOGENETICS 100-300: CPT

## 2017-10-19 PROCEDURE — 82550 ASSAY OF CK (CPK): CPT

## 2017-10-19 PROCEDURE — 82533 TOTAL CORTISOL: CPT

## 2017-10-19 PROCEDURE — 83516 IMMUNOASSAY NONANTIBODY: CPT

## 2017-10-19 PROCEDURE — 84300 ASSAY OF URINE SODIUM: CPT

## 2017-10-19 PROCEDURE — 84100 ASSAY OF PHOSPHORUS: CPT

## 2017-10-19 PROCEDURE — 84155 ASSAY OF PROTEIN SERUM: CPT

## 2017-10-19 PROCEDURE — 88184 FLOWCYTOMETRY/ TC 1 MARKER: CPT

## 2017-10-19 PROCEDURE — 96374 THER/PROPH/DIAG INJ IV PUSH: CPT

## 2017-10-19 PROCEDURE — 74176 CT ABD & PELVIS W/O CONTRAST: CPT

## 2017-10-19 PROCEDURE — 83521 IG LIGHT CHAINS FREE EACH: CPT

## 2017-10-19 PROCEDURE — 87493 C DIFF AMPLIFIED PROBE: CPT

## 2017-10-19 PROCEDURE — 88271 CYTOGENETICS DNA PROBE: CPT

## 2017-10-19 PROCEDURE — 74018 RADEX ABDOMEN 1 VIEW: CPT

## 2017-10-19 PROCEDURE — 86160 COMPLEMENT ANTIGEN: CPT

## 2017-10-19 PROCEDURE — 96375 TX/PRO/DX INJ NEW DRUG ADDON: CPT

## 2017-10-19 PROCEDURE — 88313 SPECIAL STAINS GROUP 2: CPT

## 2017-10-19 PROCEDURE — 88264 CHROMOSOME ANALYSIS 20-25: CPT

## 2017-10-19 PROCEDURE — 88360 TUMOR IMMUNOHISTOCHEM/MANUAL: CPT

## 2017-10-19 PROCEDURE — 93306 TTE W/DOPPLER COMPLETE: CPT

## 2017-10-19 PROCEDURE — 82570 ASSAY OF URINE CREATININE: CPT

## 2017-10-19 PROCEDURE — 85610 PROTHROMBIN TIME: CPT

## 2017-10-19 PROCEDURE — 85027 COMPLETE CBC AUTOMATED: CPT

## 2017-10-19 PROCEDURE — 86225 DNA ANTIBODY NATIVE: CPT

## 2017-10-19 PROCEDURE — C1894: CPT

## 2017-10-19 PROCEDURE — 84165 PROTEIN E-PHORESIS SERUM: CPT

## 2017-10-19 PROCEDURE — 36415 COLL VENOUS BLD VENIPUNCTURE: CPT

## 2017-10-19 PROCEDURE — 88280 CHROMOSOME KARYOTYPE STUDY: CPT

## 2017-10-19 PROCEDURE — 94640 AIRWAY INHALATION TREATMENT: CPT

## 2017-10-19 PROCEDURE — C1769: CPT

## 2017-10-19 PROCEDURE — 81001 URINALYSIS AUTO W/SCOPE: CPT

## 2017-10-19 PROCEDURE — 84484 ASSAY OF TROPONIN QUANT: CPT

## 2017-10-19 PROCEDURE — 86334 IMMUNOFIX E-PHORESIS SERUM: CPT

## 2017-10-19 PROCEDURE — 80053 COMPREHEN METABOLIC PANEL: CPT

## 2017-10-19 PROCEDURE — 86036 ANCA SCREEN EACH ANTIBODY: CPT

## 2017-10-19 PROCEDURE — 80048 BASIC METABOLIC PNL TOTAL CA: CPT

## 2017-10-19 PROCEDURE — 71045 X-RAY EXAM CHEST 1 VIEW: CPT

## 2017-10-19 PROCEDURE — 82784 ASSAY IGA/IGD/IGG/IGM EACH: CPT

## 2017-10-19 PROCEDURE — 87086 URINE CULTURE/COLONY COUNT: CPT

## 2017-10-19 PROCEDURE — 99285 EMERGENCY DEPT VISIT HI MDM: CPT | Mod: 25

## 2017-10-19 PROCEDURE — 84550 ASSAY OF BLOOD/URIC ACID: CPT

## 2017-10-19 PROCEDURE — 88305 TISSUE EXAM BY PATHOLOGIST: CPT

## 2017-10-19 PROCEDURE — 77074 RADEX OSSEOUS SURVEY LMTD: CPT

## 2017-10-19 PROCEDURE — 70450 CT HEAD/BRAIN W/O DYE: CPT

## 2017-10-19 PROCEDURE — 87186 SC STD MICRODIL/AGAR DIL: CPT

## 2017-10-19 PROCEDURE — 88185 FLOWCYTOMETRY/TC ADD-ON: CPT

## 2017-10-19 PROCEDURE — 76775 US EXAM ABDO BACK WALL LIM: CPT

## 2017-10-19 PROCEDURE — 76942 ECHO GUIDE FOR BIOPSY: CPT

## 2017-10-19 PROCEDURE — C1729: CPT

## 2017-10-19 PROCEDURE — 84166 PROTEIN E-PHORESIS/URINE/CSF: CPT

## 2017-10-19 PROCEDURE — 85730 THROMBOPLASTIN TIME PARTIAL: CPT

## 2017-10-19 PROCEDURE — 85652 RBC SED RATE AUTOMATED: CPT

## 2017-10-19 PROCEDURE — 85097 BONE MARROW INTERPRETATION: CPT

## 2017-10-19 PROCEDURE — 88237 TISSUE CULTURE BONE MARROW: CPT

## 2018-03-01 ENCOUNTER — INPATIENT (INPATIENT)
Facility: HOSPITAL | Age: 73
LOS: 4 days | Discharge: HOME HEALTH | DRG: 191 | End: 2018-03-06
Attending: INTERNAL MEDICINE | Admitting: INTERNAL MEDICINE
Payer: MEDICARE

## 2018-03-01 VITALS
DIASTOLIC BLOOD PRESSURE: 64 MMHG | HEIGHT: 62 IN | HEART RATE: 100 BPM | RESPIRATION RATE: 20 BRPM | SYSTOLIC BLOOD PRESSURE: 106 MMHG | OXYGEN SATURATION: 94 % | WEIGHT: 104.06 LBS | TEMPERATURE: 99 F

## 2018-03-01 DIAGNOSIS — F17.200 NICOTINE DEPENDENCE, UNSPECIFIED, UNCOMPLICATED: ICD-10-CM

## 2018-03-01 DIAGNOSIS — J44.1 CHRONIC OBSTRUCTIVE PULMONARY DISEASE WITH (ACUTE) EXACERBATION: ICD-10-CM

## 2018-03-01 DIAGNOSIS — F41.9 ANXIETY DISORDER, UNSPECIFIED: ICD-10-CM

## 2018-03-01 DIAGNOSIS — Z29.9 ENCOUNTER FOR PROPHYLACTIC MEASURES, UNSPECIFIED: ICD-10-CM

## 2018-03-01 DIAGNOSIS — Z90.49 ACQUIRED ABSENCE OF OTHER SPECIFIED PARTS OF DIGESTIVE TRACT: Chronic | ICD-10-CM

## 2018-03-01 DIAGNOSIS — C90.00 MULTIPLE MYELOMA NOT HAVING ACHIEVED REMISSION: ICD-10-CM

## 2018-03-01 PROBLEM — I21.3 ST ELEVATION (STEMI) MYOCARDIAL INFARCTION OF UNSPECIFIED SITE: Chronic | Status: ACTIVE | Noted: 2017-09-11

## 2018-03-01 LAB
ALBUMIN SERPL ELPH-MCNC: 3.4 G/DL — SIGNIFICANT CHANGE UP (ref 3.3–5.2)
ALP SERPL-CCNC: 62 U/L — SIGNIFICANT CHANGE UP (ref 40–120)
ALT FLD-CCNC: 6 U/L — SIGNIFICANT CHANGE UP
ANION GAP SERPL CALC-SCNC: 14 MMOL/L — SIGNIFICANT CHANGE UP (ref 5–17)
ANISOCYTOSIS BLD QL: SLIGHT — SIGNIFICANT CHANGE UP
APTT BLD: 26.7 SEC — LOW (ref 27.5–37.4)
AST SERPL-CCNC: 9 U/L — SIGNIFICANT CHANGE UP
BASE EXCESS BLDA CALC-SCNC: -0.5 MMOL/L — SIGNIFICANT CHANGE UP (ref -2–2)
BASOPHILS # BLD AUTO: 0.1 K/UL — SIGNIFICANT CHANGE UP (ref 0–0.2)
BASOPHILS NFR BLD AUTO: 0 % — SIGNIFICANT CHANGE UP (ref 0–2)
BILIRUB SERPL-MCNC: 0.2 MG/DL — LOW (ref 0.4–2)
BUN SERPL-MCNC: 25 MG/DL — HIGH (ref 8–20)
CALCIUM SERPL-MCNC: 9.5 MG/DL — SIGNIFICANT CHANGE UP (ref 8.6–10.2)
CHLORIDE SERPL-SCNC: 98 MMOL/L — SIGNIFICANT CHANGE UP (ref 98–107)
CO2 SERPL-SCNC: 24 MMOL/L — SIGNIFICANT CHANGE UP (ref 22–29)
CREAT SERPL-MCNC: 1.49 MG/DL — HIGH (ref 0.5–1.3)
D DIMER BLD IA.RAPID-MCNC: 239 NG/ML DDU — HIGH
EOSINOPHIL # BLD AUTO: 0.1 K/UL — SIGNIFICANT CHANGE UP (ref 0–0.5)
EOSINOPHIL NFR BLD AUTO: 3 % — SIGNIFICANT CHANGE UP (ref 0–5)
GAS PNL BLDA: SIGNIFICANT CHANGE UP
GLUCOSE SERPL-MCNC: 106 MG/DL — SIGNIFICANT CHANGE UP (ref 70–115)
HCO3 BLDA-SCNC: 24 MMOL/L — SIGNIFICANT CHANGE UP (ref 20–26)
HCT VFR BLD CALC: 32 % — LOW (ref 37–47)
HGB BLD-MCNC: 10.6 G/DL — LOW (ref 12–16)
HOROWITZ INDEX BLDA+IHG-RTO: SIGNIFICANT CHANGE UP
INR BLD: 1.01 RATIO — SIGNIFICANT CHANGE UP (ref 0.88–1.16)
LACTATE BLDV-MCNC: 1.2 MMOL/L — SIGNIFICANT CHANGE UP (ref 0.5–2)
LYMPHOCYTES # BLD AUTO: 1.3 K/UL — SIGNIFICANT CHANGE UP (ref 1–4.8)
LYMPHOCYTES # BLD AUTO: 8 % — LOW (ref 20–55)
MACROCYTES BLD QL: SLIGHT — SIGNIFICANT CHANGE UP
MCHC RBC-ENTMCNC: 31 PG — SIGNIFICANT CHANGE UP (ref 27–31)
MCHC RBC-ENTMCNC: 33.1 G/DL — SIGNIFICANT CHANGE UP (ref 32–36)
MCV RBC AUTO: 93.6 FL — SIGNIFICANT CHANGE UP (ref 81–99)
MICROCYTES BLD QL: SLIGHT — SIGNIFICANT CHANGE UP
MONOCYTES # BLD AUTO: 1.7 K/UL — HIGH (ref 0–0.8)
MONOCYTES NFR BLD AUTO: 12 % — HIGH (ref 3–10)
NEUTROPHILS # BLD AUTO: 6.1 K/UL — SIGNIFICANT CHANGE UP (ref 1.8–8)
NEUTROPHILS NFR BLD AUTO: 69 % — SIGNIFICANT CHANGE UP (ref 37–73)
NEUTS BAND # BLD: 5 % — SIGNIFICANT CHANGE UP (ref 0–8)
NT-PROBNP SERPL-SCNC: 767 PG/ML — HIGH (ref 0–300)
OVALOCYTES BLD QL SMEAR: SLIGHT — SIGNIFICANT CHANGE UP
PCO2 BLDA: 42 MMHG — SIGNIFICANT CHANGE UP (ref 35–45)
PH BLDA: 7.38 — SIGNIFICANT CHANGE UP (ref 7.35–7.45)
PLAT MORPH BLD: NORMAL — SIGNIFICANT CHANGE UP
PLATELET # BLD AUTO: 96 K/UL — LOW (ref 150–400)
PLATELET COUNT - ESTIMATE: ABNORMAL
PO2 BLDA: 86 MMHG — SIGNIFICANT CHANGE UP (ref 83–108)
POIKILOCYTOSIS BLD QL AUTO: SLIGHT — SIGNIFICANT CHANGE UP
POTASSIUM SERPL-MCNC: 3.6 MMOL/L — SIGNIFICANT CHANGE UP (ref 3.5–5.3)
POTASSIUM SERPL-SCNC: 3.6 MMOL/L — SIGNIFICANT CHANGE UP (ref 3.5–5.3)
PROT SERPL-MCNC: 6.1 G/DL — LOW (ref 6.6–8.7)
PROTHROM AB SERPL-ACNC: 11.1 SEC — SIGNIFICANT CHANGE UP (ref 9.8–12.7)
RBC # BLD: 3.42 M/UL — LOW (ref 4.4–5.2)
RBC # FLD: 17.8 % — HIGH (ref 11–15.6)
RBC BLD AUTO: ABNORMAL
SAO2 % BLDA: 94 % — LOW (ref 95–99)
SODIUM SERPL-SCNC: 136 MMOL/L — SIGNIFICANT CHANGE UP (ref 135–145)
TROPONIN T SERPL-MCNC: <0.01 NG/ML — SIGNIFICANT CHANGE UP (ref 0–0.06)
VARIANT LYMPHS # BLD: 3 % — SIGNIFICANT CHANGE UP (ref 0–6)
WBC # BLD: 9.9 K/UL — SIGNIFICANT CHANGE UP (ref 4.8–10.8)
WBC # FLD AUTO: 9.9 K/UL — SIGNIFICANT CHANGE UP (ref 4.8–10.8)

## 2018-03-01 PROCEDURE — 71045 X-RAY EXAM CHEST 1 VIEW: CPT | Mod: 26

## 2018-03-01 PROCEDURE — 99223 1ST HOSP IP/OBS HIGH 75: CPT

## 2018-03-01 PROCEDURE — 93010 ELECTROCARDIOGRAM REPORT: CPT

## 2018-03-01 PROCEDURE — 99285 EMERGENCY DEPT VISIT HI MDM: CPT

## 2018-03-01 RX ORDER — ALPRAZOLAM 0.25 MG
0.25 TABLET ORAL ONCE
Qty: 0 | Refills: 0 | Status: DISCONTINUED | OUTPATIENT
Start: 2018-03-01 | End: 2018-03-02

## 2018-03-01 RX ORDER — PIPERACILLIN AND TAZOBACTAM 4; .5 G/20ML; G/20ML
2.25 INJECTION, POWDER, LYOPHILIZED, FOR SOLUTION INTRAVENOUS ONCE
Qty: 0 | Refills: 0 | Status: COMPLETED | OUTPATIENT
Start: 2018-03-01 | End: 2018-03-01

## 2018-03-01 RX ORDER — IPRATROPIUM/ALBUTEROL SULFATE 18-103MCG
3 AEROSOL WITH ADAPTER (GRAM) INHALATION ONCE
Qty: 0 | Refills: 0 | Status: COMPLETED | OUTPATIENT
Start: 2018-03-01 | End: 2018-03-01

## 2018-03-01 RX ORDER — POTASSIUM ACETATE 196 MG/ML
0 INJECTION, SOLUTION INTRAVENOUS
Qty: 0 | Refills: 0 | COMMUNITY

## 2018-03-01 RX ORDER — ALPRAZOLAM 0.25 MG
0.25 TABLET ORAL ONCE
Qty: 0 | Refills: 0 | Status: DISCONTINUED | OUTPATIENT
Start: 2018-03-01 | End: 2018-03-01

## 2018-03-01 RX ORDER — SODIUM CHLORIDE 9 MG/ML
3 INJECTION INTRAMUSCULAR; INTRAVENOUS; SUBCUTANEOUS ONCE
Qty: 0 | Refills: 0 | Status: COMPLETED | OUTPATIENT
Start: 2018-03-01 | End: 2018-03-01

## 2018-03-01 RX ORDER — ACETAMINOPHEN 500 MG
650 TABLET ORAL ONCE
Qty: 0 | Refills: 0 | Status: COMPLETED | OUTPATIENT
Start: 2018-03-01 | End: 2018-03-02

## 2018-03-01 RX ORDER — VANCOMYCIN HCL 1 G
700 VIAL (EA) INTRAVENOUS ONCE
Qty: 0 | Refills: 0 | Status: COMPLETED | OUTPATIENT
Start: 2018-03-01 | End: 2018-03-01

## 2018-03-01 RX ADMIN — Medication 3 MILLILITER(S): at 23:27

## 2018-03-01 RX ADMIN — Medication 150 MILLIGRAM(S): at 22:33

## 2018-03-01 RX ADMIN — SODIUM CHLORIDE 3 MILLILITER(S): 9 INJECTION INTRAMUSCULAR; INTRAVENOUS; SUBCUTANEOUS at 18:29

## 2018-03-01 RX ADMIN — Medication 3 MILLILITER(S): at 18:32

## 2018-03-01 RX ADMIN — Medication 0.25 MILLIGRAM(S): at 18:32

## 2018-03-01 RX ADMIN — Medication 125 MILLIGRAM(S): at 18:32

## 2018-03-01 NOTE — ED PROVIDER NOTE - CONSTITUTIONAL, MLM
normal... Well appearing, thin, awake, alert, oriented to person, place, time/situation and in mild distress.

## 2018-03-01 NOTE — H&P ADULT - PROBLEM SELECTOR PLAN 3
- smoking cessation counseling provided  - nicotine replacement therapy offerred and accepted, low dose nicotine patch - smoking cessation counseling provided  - nicotine replacement therapy offered and accepted, low dose nicotine patch

## 2018-03-01 NOTE — ED PROVIDER NOTE - MEDICAL DECISION MAKING DETAILS
Will assess response to COPD treatment, will investigate for cardiac cause given hx of CAD as well as PE given hx of Myeloma and smoking.

## 2018-03-01 NOTE — ED PROVIDER NOTE - PLAN OF CARE
Monitor SOB and cough, wheezing on exam, failed outpt Levaquin tx, pt likely in COPD exacerbation  CBC, CMP, Troponin, EKG, Blood cx, d-dimer   CXR  IV Solumedrol, Duoneb  Vanc and zosyn, pending cultures

## 2018-03-01 NOTE — ED ADULT TRIAGE NOTE - ACCOMPANIED BY
The patient's mother came to the office today to drop off forms to Dr. Perales for her employer.    EMT/paramedic

## 2018-03-01 NOTE — H&P ADULT - ASSESSMENT
72 year-old female active smoker, with COPD, multiple myeloma, and CKD, who presents to the ED with a COPD exacerbation. 72 year-old female active smoker, with COPD, multiple myeloma, and CKD, who presents to the ED with a COPD exacerbation. Initially with hypoxemia (high 80's RA) in the ED, with improvement s/p nebs and NC O2. She warrants admission for IV steroids and aggressive treatment of acute COPD exacerbation.

## 2018-03-01 NOTE — ED PROVIDER NOTE - CARE PLAN
Principal Discharge DX:	COPD exacerbation  Goal:	Monitor  Assessment and plan of treatment:	SOB and cough, wheezing on exam, failed outpt Levaquin tx, pt likely in COPD exacerbation  CBC, CMP, Troponin, EKG, Blood cx, d-dimer   CXR  IV Solumedrol, Duoneb  Vanc and zosyn, pending cultures

## 2018-03-01 NOTE — ED PROVIDER NOTE - ENMT, MLM
Airway patent, Nasal mucosa clear. Mouth with dry mucosa, Throat has no vesicles, no oropharyngeal exudates and uvula is midline.

## 2018-03-01 NOTE — ED ADULT TRIAGE NOTE - CHIEF COMPLAINT QUOTE
Patient brought in by ambulance A/Ox3, c/o increased SOB, patient on levaquin for bronchitis x 1 week.  Hx of multiple myeloma.

## 2018-03-01 NOTE — H&P ADULT - PROBLEM SELECTOR PLAN 1
- admit to medicine resident service  -  bed  - ambulate as tolerated  - DASH-TLC diet  - duonebs q6  - albuterol q3 PRN  - s/p solu-medrol load, continue 60 mg IV q 6  - budesonide 0.5 mg BID  - azithromycin 500 mg PO x 5 days as adjuvant   - incentive spirometry  - smoking cessation  - NC O2 titrate SpO2 90-96%  - pulm consult in AM - admit to medicine resident service  -  bed  - ambulate as tolerated  - DASH-TLC diet  - duonebs q6  - albuterol q3 PRN  - s/p solu-medrol load, continue 60 mg IV q 6  - budesonide 0.5 mg BID  - azithromycin 500 mg PO x 5 days as adjuvant   - incentive spirometry  - smoking cessation  - NC O2 titrate SpO2 90-96%  - pulm consult in AM  - watch electrolytes, f/u AM labs  - f/u RVP

## 2018-03-01 NOTE — H&P ADULT - NSHPPHYSICALEXAM_GEN_ALL_CORE
Vital Signs Last 24 Hrs  T(C): 37.7 (01 Mar 2018 16:10), Max: 37.7 (01 Mar 2018 16:10)  T(F): 99.9 (01 Mar 2018 16:10), Max: 99.9 (01 Mar 2018 16:10)  HR: 100 (01 Mar 2018 13:57) (100 - 100)  BP: 106/64 (01 Mar 2018 13:57) (106/64 - 106/64)  RR: 20 (01 Mar 2018 13:57) (20 - 20)  SpO2: 94% (01 Mar 2018 13:57) (94% - 94%)    General: thin adult female sitting upright on stretcher, in no acute distress.    HEENT: Pupils equal, reactive to light.  Symmetric.  NECK: Supple, no lymphadenopathy, trachea midline  RESP: diffuse inspiratory and expiratory wheezes, diffuse rhonchi posteriorly, vigorous coughing with deep inspiration  CV: Regular rate and rhythm, no murmurs appreciated  ABD: Soft, nondistended, nontender, normoactive bowel sounds  EXT: No edema, nontender  SKIN: Warm and well perfused, no rashes noted.  NEURO: Alert, oriented, interactive, nonfocal

## 2018-03-01 NOTE — ED ADULT NURSE NOTE - OBJECTIVE STATEMENT
Patient found laying in stretcher, awake, alert, As per family, patient has been having some periods of confusing since 2/23/18 , breathing unlabored at present time.  Patient states has had SOB, and cough since Friday.  productive cough with green phlegm

## 2018-03-01 NOTE — ED PROVIDER NOTE - PROGRESS NOTE DETAILS
pt still with wheezing - but does feel better on oxygen sats improved with o2 from the low 90s - will admit for copd exacerbation and treat for possible failed CAP

## 2018-03-01 NOTE — H&P ADULT - PROBLEM SELECTOR PLAN 2
- patient is receiving chemotherapy as outpatient, last dose 2/26/18 - patient is receiving chemotherapy as outpatient, most recent dose 2/26/18

## 2018-03-01 NOTE — ED PROVIDER NOTE - OBJECTIVE STATEMENT
73 yo female with PMH multiple comorbidities including COPD, CAD, Multiple Myeloma, presenting to the ED with worsening SOB for 1 week. Patient also with associated dry cough that since become productive of clear-yellow sputum. Patient was seen by PMD 5 days ago and given Levaquin which she has been taking with actual worsening in her symptoms since then. Of note patient last received chemotherapy for her MM 5 days ago. Patient otherwise denies any fevers, chills, rhinorrhea, nausea, vomiting, chest or abdominal pain, diarrhea, rash, sick contacts, or recent travel.

## 2018-03-01 NOTE — H&P ADULT - HISTORY OF PRESENT ILLNESS
72 year-old female active smoker, with COPD, multiple myeloma, and CKD, who presents to the ED with 1 week of increasing SOB, cough, and increased sputum production.  She denies fevers, chills, body aches, N/V/D, sick contacts or recent travel. She states that she had some leftover levaquin at home, and took 500 mg for 5 days, without relief of her symptoms. Of note, she received her most recent dose of Revlimid on 2/26/18.

## 2018-03-01 NOTE — ED ADULT NURSE NOTE - PMH
Anxiety    Hearing loss, unspecified hearing loss type, unspecified laterality    Kidney stones    MI (myocardial infarction)    Multiple myeloma

## 2018-03-01 NOTE — ED PROVIDER NOTE - ENMT NEGATIVE STATEMENT, MLM
Nose: no nasal congestion and no nasal drainage.Mouth/Throat: no dysphagia, no hoarseness and no throat

## 2018-03-01 NOTE — H&P ADULT - PROBLEM SELECTOR PLAN 4
- continue home xanax 0.5 mg BID PRN and cymbalta 30 mg daily - continue home xanax 0.5 mg BID PRN and cymbalta 30 mg daily  - I spoke to pharmacy advised that cymbalta is not recommended in pations with Cr clearance <30.  She has been on Cymbalta since 9/2017 when her Cr clearance was significantly less than it is now, so I feel that abrupt discontinuation of a SSRI is likely more harmful at this time than continuation despite her reduced renal function.

## 2018-03-02 ENCOUNTER — TRANSCRIPTION ENCOUNTER (OUTPATIENT)
Age: 73
End: 2018-03-02

## 2018-03-02 DIAGNOSIS — R09.02 HYPOXEMIA: ICD-10-CM

## 2018-03-02 DIAGNOSIS — J10.1 INFLUENZA DUE TO OTHER IDENTIFIED INFLUENZA VIRUS WITH OTHER RESPIRATORY MANIFESTATIONS: ICD-10-CM

## 2018-03-02 DIAGNOSIS — N18.9 CHRONIC KIDNEY DISEASE, UNSPECIFIED: ICD-10-CM

## 2018-03-02 LAB
ANION GAP SERPL CALC-SCNC: 16 MMOL/L — SIGNIFICANT CHANGE UP (ref 5–17)
BUN SERPL-MCNC: 26 MG/DL — HIGH (ref 8–20)
CALCIUM SERPL-MCNC: 9.1 MG/DL — SIGNIFICANT CHANGE UP (ref 8.6–10.2)
CHLORIDE SERPL-SCNC: 97 MMOL/L — LOW (ref 98–107)
CO2 SERPL-SCNC: 23 MMOL/L — SIGNIFICANT CHANGE UP (ref 22–29)
CREAT SERPL-MCNC: 1.43 MG/DL — HIGH (ref 0.5–1.3)
FLUAV H3 RNA SPEC QL NAA+PROBE: DETECTED
GLUCOSE SERPL-MCNC: 128 MG/DL — HIGH (ref 70–115)
MAGNESIUM SERPL-MCNC: 1.9 MG/DL — SIGNIFICANT CHANGE UP (ref 1.6–2.6)
PHOSPHATE SERPL-MCNC: 3.7 MG/DL — SIGNIFICANT CHANGE UP (ref 2.4–4.7)
POTASSIUM SERPL-MCNC: 4.1 MMOL/L — SIGNIFICANT CHANGE UP (ref 3.5–5.3)
POTASSIUM SERPL-SCNC: 4.1 MMOL/L — SIGNIFICANT CHANGE UP (ref 3.5–5.3)
RAPID RVP RESULT: DETECTED
SODIUM SERPL-SCNC: 136 MMOL/L — SIGNIFICANT CHANGE UP (ref 135–145)

## 2018-03-02 PROCEDURE — 99233 SBSQ HOSP IP/OBS HIGH 50: CPT | Mod: GC

## 2018-03-02 PROCEDURE — 71250 CT THORAX DX C-: CPT | Mod: 26

## 2018-03-02 PROCEDURE — 99222 1ST HOSP IP/OBS MODERATE 55: CPT

## 2018-03-02 PROCEDURE — 93970 EXTREMITY STUDY: CPT | Mod: 26

## 2018-03-02 RX ORDER — ACETAMINOPHEN 500 MG
650 TABLET ORAL EVERY 6 HOURS
Qty: 0 | Refills: 0 | Status: DISCONTINUED | OUTPATIENT
Start: 2018-03-02 | End: 2018-03-06

## 2018-03-02 RX ORDER — SACCHAROMYCES BOULARDII 250 MG
250 POWDER IN PACKET (EA) ORAL
Qty: 0 | Refills: 0 | Status: DISCONTINUED | OUTPATIENT
Start: 2018-03-02 | End: 2018-03-06

## 2018-03-02 RX ORDER — BUDESONIDE, MICRONIZED 100 %
0.5 POWDER (GRAM) MISCELLANEOUS
Qty: 0 | Refills: 0 | Status: DISCONTINUED | OUTPATIENT
Start: 2018-03-01 | End: 2018-03-02

## 2018-03-02 RX ORDER — ALBUTEROL 90 UG/1
2.5 AEROSOL, METERED ORAL
Qty: 0 | Refills: 0 | Status: DISCONTINUED | OUTPATIENT
Start: 2018-03-01 | End: 2018-03-06

## 2018-03-02 RX ORDER — DULOXETINE HYDROCHLORIDE 30 MG/1
30 CAPSULE, DELAYED RELEASE ORAL DAILY
Qty: 0 | Refills: 0 | Status: DISCONTINUED | OUTPATIENT
Start: 2018-03-02 | End: 2018-03-06

## 2018-03-02 RX ORDER — PANTOPRAZOLE SODIUM 20 MG/1
40 TABLET, DELAYED RELEASE ORAL
Qty: 0 | Refills: 0 | Status: DISCONTINUED | OUTPATIENT
Start: 2018-03-01 | End: 2018-03-06

## 2018-03-02 RX ORDER — IPRATROPIUM/ALBUTEROL SULFATE 18-103MCG
3 AEROSOL WITH ADAPTER (GRAM) INHALATION EVERY 6 HOURS
Qty: 0 | Refills: 0 | Status: DISCONTINUED | OUTPATIENT
Start: 2018-03-01 | End: 2018-03-06

## 2018-03-02 RX ORDER — TAMSULOSIN HYDROCHLORIDE 0.4 MG/1
0.4 CAPSULE ORAL AT BEDTIME
Qty: 0 | Refills: 0 | Status: DISCONTINUED | OUTPATIENT
Start: 2018-03-01 | End: 2018-03-06

## 2018-03-02 RX ORDER — MAGNESIUM SULFATE 500 MG/ML
1 VIAL (ML) INJECTION ONCE
Qty: 0 | Refills: 0 | Status: COMPLETED | OUTPATIENT
Start: 2018-03-02 | End: 2018-03-02

## 2018-03-02 RX ORDER — CARVEDILOL PHOSPHATE 80 MG/1
3.12 CAPSULE, EXTENDED RELEASE ORAL EVERY 12 HOURS
Qty: 0 | Refills: 0 | Status: DISCONTINUED | OUTPATIENT
Start: 2018-03-01 | End: 2018-03-02

## 2018-03-02 RX ORDER — ENOXAPARIN SODIUM 100 MG/ML
30 INJECTION SUBCUTANEOUS EVERY 24 HOURS
Qty: 0 | Refills: 0 | Status: DISCONTINUED | OUTPATIENT
Start: 2018-03-02 | End: 2018-03-06

## 2018-03-02 RX ORDER — NICOTINE POLACRILEX 2 MG
1 GUM BUCCAL DAILY
Qty: 0 | Refills: 0 | Status: DISCONTINUED | OUTPATIENT
Start: 2018-03-02 | End: 2018-03-06

## 2018-03-02 RX ORDER — FOLIC ACID 0.8 MG
1 TABLET ORAL DAILY
Qty: 0 | Refills: 0 | Status: DISCONTINUED | OUTPATIENT
Start: 2018-03-01 | End: 2018-03-06

## 2018-03-02 RX ORDER — ALPRAZOLAM 0.25 MG
0.5 TABLET ORAL
Qty: 0 | Refills: 0 | Status: DISCONTINUED | OUTPATIENT
Start: 2018-03-01 | End: 2018-03-06

## 2018-03-02 RX ORDER — ASPIRIN/CALCIUM CARB/MAGNESIUM 324 MG
81 TABLET ORAL DAILY
Qty: 0 | Refills: 0 | Status: DISCONTINUED | OUTPATIENT
Start: 2018-03-01 | End: 2018-03-06

## 2018-03-02 RX ORDER — AZITHROMYCIN 500 MG/1
500 TABLET, FILM COATED ORAL DAILY
Qty: 0 | Refills: 0 | Status: COMPLETED | OUTPATIENT
Start: 2018-03-01 | End: 2018-03-06

## 2018-03-02 RX ORDER — SODIUM CHLORIDE 9 MG/ML
500 INJECTION, SOLUTION INTRAVENOUS ONCE
Qty: 0 | Refills: 0 | Status: COMPLETED | OUTPATIENT
Start: 2018-03-02 | End: 2018-03-02

## 2018-03-02 RX ADMIN — Medication 30 MILLIGRAM(S): at 13:15

## 2018-03-02 RX ADMIN — Medication 60 MILLIGRAM(S): at 06:51

## 2018-03-02 RX ADMIN — AZITHROMYCIN 500 MILLIGRAM(S): 500 TABLET, FILM COATED ORAL at 13:16

## 2018-03-02 RX ADMIN — ENOXAPARIN SODIUM 30 MILLIGRAM(S): 100 INJECTION SUBCUTANEOUS at 00:58

## 2018-03-02 RX ADMIN — Medication 0.25 MILLIGRAM(S): at 00:58

## 2018-03-02 RX ADMIN — Medication 650 MILLIGRAM(S): at 01:08

## 2018-03-02 RX ADMIN — Medication 650 MILLIGRAM(S): at 17:07

## 2018-03-02 RX ADMIN — Medication 650 MILLIGRAM(S): at 18:00

## 2018-03-02 RX ADMIN — Medication 0.5 MILLIGRAM(S): at 21:00

## 2018-03-02 RX ADMIN — Medication 3 MILLILITER(S): at 15:30

## 2018-03-02 RX ADMIN — Medication 1 PATCH: at 13:15

## 2018-03-02 RX ADMIN — Medication 60 MILLIGRAM(S): at 00:58

## 2018-03-02 RX ADMIN — DULOXETINE HYDROCHLORIDE 30 MILLIGRAM(S): 30 CAPSULE, DELAYED RELEASE ORAL at 13:15

## 2018-03-02 RX ADMIN — PANTOPRAZOLE SODIUM 40 MILLIGRAM(S): 20 TABLET, DELAYED RELEASE ORAL at 06:51

## 2018-03-02 RX ADMIN — Medication 3 MILLILITER(S): at 20:54

## 2018-03-02 RX ADMIN — Medication 0.5 MILLIGRAM(S): at 11:19

## 2018-03-02 RX ADMIN — Medication 250 MILLIGRAM(S): at 06:52

## 2018-03-02 RX ADMIN — Medication 81 MILLIGRAM(S): at 13:16

## 2018-03-02 RX ADMIN — Medication 3 MILLILITER(S): at 02:57

## 2018-03-02 RX ADMIN — Medication 250 MILLIGRAM(S): at 17:07

## 2018-03-02 RX ADMIN — TAMSULOSIN HYDROCHLORIDE 0.4 MILLIGRAM(S): 0.4 CAPSULE ORAL at 21:00

## 2018-03-02 RX ADMIN — Medication 60 MILLIGRAM(S): at 17:07

## 2018-03-02 RX ADMIN — Medication 60 MILLIGRAM(S): at 13:16

## 2018-03-02 RX ADMIN — Medication 60 MILLIGRAM(S): at 23:16

## 2018-03-02 RX ADMIN — Medication 1 MILLIGRAM(S): at 13:15

## 2018-03-02 RX ADMIN — Medication 100 GRAM(S): at 13:15

## 2018-03-02 RX ADMIN — ENOXAPARIN SODIUM 30 MILLIGRAM(S): 100 INJECTION SUBCUTANEOUS at 23:16

## 2018-03-02 RX ADMIN — SODIUM CHLORIDE 1000 MILLILITER(S): 9 INJECTION, SOLUTION INTRAVENOUS at 19:56

## 2018-03-02 RX ADMIN — CARVEDILOL PHOSPHATE 3.12 MILLIGRAM(S): 80 CAPSULE, EXTENDED RELEASE ORAL at 17:06

## 2018-03-02 RX ADMIN — Medication 3 MILLILITER(S): at 08:51

## 2018-03-02 RX ADMIN — Medication 650 MILLIGRAM(S): at 00:58

## 2018-03-02 NOTE — PROGRESS NOTE ADULT - SUBJECTIVE AND OBJECTIVE BOX
72 year-old female active smoker, with COPD, multiple myeloma, and CKD, who is admitted with a COPD exacerbation 2/2 influenza, which did not respond to levaquin.  She reports feeling "much better this morning", however she also endorses sadness about the recent loss of her .      MEDICATIONS  (STANDING):  ALBUTerol/ipratropium for Nebulization 3 milliLiter(s) Nebulizer every 6 hours  aspirin enteric coated 81 milliGRAM(s) Oral daily  azithromycin   Tablet 500 milliGRAM(s) Oral daily  buDESOnide   0.5 milliGRAM(s) Respule 0.5 milliGRAM(s) Inhalation two times a day  carvedilol 3.125 milliGRAM(s) Oral every 12 hours  DULoxetine 30 milliGRAM(s) Oral daily  enoxaparin Injectable 30 milliGRAM(s) SubCutaneous every 24 hours  folic acid 1 milliGRAM(s) Oral daily  methylPREDNISolone sodium succinate Injectable 60 milliGRAM(s) IV Push every 6 hours  nicotine -   7 mG/24Hr(s) Patch 1 patch Transdermal daily  oseltamivir 30 milliGRAM(s) Oral daily  pantoprazole    Tablet 40 milliGRAM(s) Oral before breakfast  saccharomyces boulardii 250 milliGRAM(s) Oral two times a day  tamsulosin 0.4 milliGRAM(s) Oral at bedtime    MEDICATIONS  (PRN):  ALBUTerol    0.083% 2.5 milliGRAM(s) Nebulizer every 3 hours PRN Shortness of Breath and/or Wheezing  ALPRAZolam 0.5 milliGRAM(s) Oral two times a day PRN anxiety      Vital Signs Last 24 Hrs  T(C): 36.6 (02 Mar 2018 06:49), Max: 37.7 (01 Mar 2018 16:10)  T(F): 97.8 (02 Mar 2018 06:49), Max: 99.9 (01 Mar 2018 16:10)  HR: 79 (02 Mar 2018 06:49) (60 - 100)  BP: 93/54 (02 Mar 2018 06:49) (93/54 - 110/70)  RR: 20 (02 Mar 2018 06:49) (20 - 20)  SpO2: 97% (02 Mar 2018 06:49) (94% - 98%)    General: thin adult female sitting upright on stretcher, in no acute distress.    HEENT: Pupils equal, reactive to light.  Symmetric.  NECK: Supple, no lymphadenopathy, trachea midline  RESP: end expiratory wheezes posteriorly, normal inspiratory effort  CV: Regular rate and rhythm, no murmurs appreciated, cap refill <2 sec  ABD: Soft, nondistended, nontender, normoactive bowel sounds  EXT: No edema, nontender  SKIN: Warm and well perfused, no rashes noted.  NEURO: Alert, oriented, interactive, nonfocal  PSYCH: affect sad, speech soft Patient is a 72y old  Female who presents with a chief complaint of Shortness of breath (01 Mar 2018 23:13)    Patient is a 72 year-old female active smoker, with COPD, multiple myeloma, and CKD, who is admitted with a COPD exacerbation 2/2 influenza, which did not respond to levaquin.  She reports feeling "much better this morning", however she also endorses sadness about the recent loss of her .    ROS: No chest pain, palpitations, light headedness/dizziness, fevers/chills, abdominal pain, n/v, diarrhea/constipation, dysuria or increased urinary frequency.,    Vital Signs Last 24 Hrs  T(C): 36.6 (02 Mar 2018 06:49), Max: 37.7 (01 Mar 2018 16:10)  T(F): 97.8 (02 Mar 2018 06:49), Max: 99.9 (01 Mar 2018 16:10)  HR: 79 (02 Mar 2018 06:49) (60 - 100)  BP: 93/54 (02 Mar 2018 06:49) (93/54 - 110/70)  RR: 20 (02 Mar 2018 06:49) (20 - 20)  SpO2: 97% (02 Mar 2018 06:49) (94% - 98%)    PHysical exam:   General: thin adult female sitting upright on stretcher, in no acute distress.    HEENT: eomi, perrla   RESP: end expiratory wheezes posteriorly, normal inspiratory effort  CV: Regular rate and rhythm, no murmurs appreciated, cap refill <2 sec  ABD: Soft, nondistended, nontender, normoactive bowel sounds  EXT: No edema, nontender  Skin: soft, grossly intact     MEDICATIONS  (STANDING):  ALBUTerol/ipratropium for Nebulization 3 milliLiter(s) Nebulizer every 6 hours  aspirin enteric coated 81 milliGRAM(s) Oral daily  azithromycin   Tablet 500 milliGRAM(s) Oral daily  buDESOnide   0.5 milliGRAM(s) Respule 0.5 milliGRAM(s) Inhalation two times a day  carvedilol 3.125 milliGRAM(s) Oral every 12 hours  DULoxetine 30 milliGRAM(s) Oral daily  enoxaparin Injectable 30 milliGRAM(s) SubCutaneous every 24 hours  folic acid 1 milliGRAM(s) Oral daily  methylPREDNISolone sodium succinate Injectable 60 milliGRAM(s) IV Push every 6 hours  nicotine -   7 mG/24Hr(s) Patch 1 patch Transdermal daily  oseltamivir 30 milliGRAM(s) Oral daily  pantoprazole    Tablet 40 milliGRAM(s) Oral before breakfast  saccharomyces boulardii 250 milliGRAM(s) Oral two times a day  tamsulosin 0.4 milliGRAM(s) Oral at bedtime    MEDICATIONS  (PRN):  ALBUTerol    0.083% 2.5 milliGRAM(s) Nebulizer every 3 hours PRN Shortness of Breath and/or Wheezing  ALPRAZolam 0.5 milliGRAM(s) Oral two times a day PRN anxiety

## 2018-03-02 NOTE — DISCHARGE NOTE ADULT - OTHER SIGNIFICANT FINDINGS
< from: CT Chest No Cont (03.02.18 @ 11:59) >  EXAM:  CT CHEST                          PROCEDURE DATE:  03/02/2018      INTERPRETATION:      CT tomographic sections of the chest were performed from the thoracic   inlet to the upper abdomen. Axial sections were performed followed by   reformatted parasagittal and coronal MIP reconstructions. No intravenous   contrast.      History:  Flu symptoms    Comparisons: Chest x-ray dated 3/1/2018    Findings:  Reticulonodular infiltrates predominating in the right upper   lobe. No pleural effusions. Linear scarring left lower lobe and both   apices..  6 mm pleural-based nodule left upper lobe on image #36.   Additional peripheral pleural-based subcentimeter nodules in right upper   lobe. The pulmonary vasculature and aorta are normal in caliber and   contour. Prominent calcifications in the mitral annulus. The heart size   is normal. No pericardial effusion. There is no evidence of   lymphadenopathy in the hilum, mediastinum or subcarinal area. The axilla   appears normal.    Bone window examination is normal for age.    Limited views of the upper abdomen demonstrate a normal size liver and   spleen. The adrenal glands are unremarkable.    Impression: Reticulonodular infiltrates bilaterally. Subcentimeter lung   nodules bilaterally.No pleural effusions.    KAMARI SHAFFER M.D., ATTENDING RADIOLOGIST  This document has been electronically signed. Mar  2 2018 12:29PM    < end of copied text >

## 2018-03-02 NOTE — DISCHARGE NOTE ADULT - CARE PROVIDER_API CALL
Jesus Ruiz), Internal Medicine  [address] [place] [address]  Phone: [phone]  Fax: [phone]    Yung Huffman), Hematology; Medical Oncology  [address] [place] [address]  Phone: [phone]  Fax: [phone]    Efrain Galvan (), Critical Care Medicine; Internal Medicine; Pulmonary Disease  [address] [place] [address]  Phone: [phone]  Fax: [phone]

## 2018-03-02 NOTE — PROGRESS NOTE ADULT - PROBLEM SELECTOR PLAN 4
- continue home xanax 0.5 mg BID PRN and cymbalta 30 mg daily  - I spoke to pharmacy advised that cymbalta is not recommended in pations with Cr clearance <30.  She has been on Cymbalta since 9/2017 when her Cr clearance was significantly less than it is now, so I feel that abrupt discontinuation of a SSRI is likely more harmful at this time than continuation despite her reduced renal function. - continue home xanax 0.5 mg BID PRN and cymbalta 30 mg daily  - I spoke to pharmacy advised that cymbalta is not recommended in patients with Cr clearance <30.  She has been on Cymbalta since 9/2017 when her Cr clearance was significantly less than it is now, so I feel that abrupt discontinuation of a SSRI is likely more harmful at this time than continuation despite her reduced renal function.

## 2018-03-02 NOTE — CONSULT NOTE ADULT - ASSESSMENT
COPD exacerbation, Flu positive, Myeloma, CKD  clinically improved, increased markings on CXR, no focal infiltrate, finished course of levaquin  very mild D dimer noted, will check duplex, but clinical improvement and Flu pos - argues against PE  Needs non contrast CT scan better evaluate lung parenchyma and exclude con comitant pna  continue medrol, nebs, tamiflu, abx  Pulmonary follow up as out pt  check IgG levels given myeloma  smoking cessation, home 02 evaluation prior to d/c

## 2018-03-02 NOTE — CONSULT NOTE ADULT - SUBJECTIVE AND OBJECTIVE BOX
PULMONARY CONSULT NOTE      BECCA JARRELLMACHELLE-[id number]    Patient is a 72y old  Female who presents with a chief complaint of Shortness of breath (01 Mar 2018 23:13)  smoker, hx COPD has home neb, advair and spiriva  worsening dyspnea and weakness, some cough, feels better currently , no chest pain, denies CAD  72 year-old female active smoker, with COPD, multiple myeloma, and CKD, who presents to the ED with 1 week of increasing SOB, cough, and increased sputum production.  She denies fevers, chills, body aches, N/V/D, sick contacts or recent travel. She states that she had some leftover levaquin at home, and took 500 mg for 5 days, without relief of her symptoms. Of note, she received her most recent dose of Revlimid on 2/26/18.   HISTORY OF PRESENT ILLNESS:  as above  MEDICATIONS  (STANDING):  ALBUTerol/ipratropium for Nebulization 3 milliLiter(s) Nebulizer every 6 hours  aspirin enteric coated 81 milliGRAM(s) Oral daily  azithromycin   Tablet 500 milliGRAM(s) Oral daily  buDESOnide   0.5 milliGRAM(s) Respule 0.5 milliGRAM(s) Inhalation two times a day  carvedilol 3.125 milliGRAM(s) Oral every 12 hours  DULoxetine 30 milliGRAM(s) Oral daily  enoxaparin Injectable 30 milliGRAM(s) SubCutaneous every 24 hours  folic acid 1 milliGRAM(s) Oral daily  magnesium sulfate  IVPB 1 Gram(s) IV Intermittent once  methylPREDNISolone sodium succinate Injectable 60 milliGRAM(s) IV Push every 6 hours  nicotine -   7 mG/24Hr(s) Patch 1 patch Transdermal daily  oseltamivir 30 milliGRAM(s) Oral daily  pantoprazole    Tablet 40 milliGRAM(s) Oral before breakfast  saccharomyces boulardii 250 milliGRAM(s) Oral two times a day  tamsulosin 0.4 milliGRAM(s) Oral at bedtime      MEDICATIONS  (PRN):  ALBUTerol    0.083% 2.5 milliGRAM(s) Nebulizer every 3 hours PRN Shortness of Breath and/or Wheezing  ALPRAZolam 0.5 milliGRAM(s) Oral two times a day PRN anxiety      Allergies    penicillin (Unknown)    Intolerances        PAST MEDICAL & SURGICAL HISTORY:  Multiple myeloma  Anxiety  Hearing loss, unspecified hearing loss type, unspecified laterality  Kidney stones  MI (myocardial infarction)  S/P cholecystectomy      FAMILY HISTORY:  Family history of renal failure (Mother)      SOCIAL HISTORY  Smoking History:     REVIEW OF SYSTEMS:    CONSTITUTIONAL:  No fevers, chills, sweats    HEENT:  Eyes:  No diplopia or blurred vision. ENT:  No earache, sore throat or runny nose.    CARDIOVASCULAR:  No pressure, squeezing, tightness, or heaviness about the chest; no palpitations.    RESPIRATORY:  see hpi    GASTROINTESTINAL:  No abdominal pain, nausea, vomiting or diarrhea.    GENITOURINARY:  No dysuria, frequency or urgency.    NEUROLOGIC:  No paresthesias, fasciculations, seizures or weakness.    PSYCHIATRIC:  No disorder of thought or mood.    Vital Signs Last 24 Hrs  T(C): 36.6 (02 Mar 2018 06:49), Max: 37.7 (01 Mar 2018 16:10)  T(F): 97.8 (02 Mar 2018 06:49), Max: 99.9 (01 Mar 2018 16:10)  HR: 79 (02 Mar 2018 06:49) (60 - 100)  BP: 93/54 (02 Mar 2018 06:49) (93/54 - 110/70)  BP(mean): --  RR: 20 (02 Mar 2018 06:49) (20 - 20)  SpO2: 97% (02 Mar 2018 06:49) (94% - 98%)    PHYSICAL EXAMINATION:    GENERAL: The patient is a well-developed, well-nourished in no apparent distress.     HEENT: Head is normocephalic and atraumatic. Extraocular muscles are intact. Mucous membranes are moist.     NECK: Supple.     LUNGS: moderate air entry bilaterally . Respirations unlabored    HEART: Regular rate and rhythm without murmur.    ABDOMEN: Soft, nontender, and nondistended.  No hepatosplenomegaly is noted.    EXTREMITIES: Without any cyanosis, clubbing, rash, lesions or edema.    NEUROLOGIC: Grossly intact.      LABS:                        10.6   9.9   )-----------( 96       ( 01 Mar 2018 17:16 )             32.0     03-02    136  |  97<L>  |  26.0<H>  ----------------------------<  128<H>  4.1   |  23.0  |  1.43<H>    Ca    9.1      02 Mar 2018 06:43  Phos  3.7     03-02  Mg     1.9     03-02    TPro  6.1<L>  /  Alb  3.4  /  TBili  0.2<L>  /  DBili  x   /  AST  9   /  ALT  6   /  AlkPhos  62  03-01    PT/INR - ( 01 Mar 2018 17:16 )   PT: 11.1 sec;   INR: 1.01 ratio         PTT - ( 01 Mar 2018 17:16 )  PTT:26.7 sec    ABG - ( 01 Mar 2018 16:54 )  pH: 7.38  /  pCO2: 42    /  pO2: 86    / HCO3: 24    / Base Excess: -0.5  /  SaO2: 94                CARDIAC MARKERS ( 01 Mar 2018 17:16 )  x     / <0.01 ng/mL / x     / x     / x          D-Dimer Assay, Quantitative: 239 ng/mL DDU (03-01-18 @ 17:16)    Serum Pro-Brain Natriuretic Peptide: 767 pg/mL (03-01-18 @ 17:16)        MICROBIOLOGY:    RADIOLOGY & ADDITIONAL STUDIES:  CXR reviewed and compared

## 2018-03-02 NOTE — DISCHARGE NOTE ADULT - PLAN OF CARE
prevent exacerbations and manage symptoms - you COPD was exacerbated by infection with Flu virus  - Finish taking Tamiflu as prescribed  - Take the steroid in tapered doses as prescribed  -  Take all other chronic medications as prescribed  - Follow up with your pulmonologist and your primary care doctor  - Avoid smoking as this will worsen your condition prevent worsening - follow up with you primary care doctor for monitoring of your kidney function continue treatment -Follow up with your oncologist reduce symptoms - take your medications as prescribed and follow up with your doctor stop smoking - follow up with your primary care doctor for more resources on helping you quit smoking - you COPD was exacerbated by infection with Flu virus  - Take the steroid in tapered doses as prescribed  -  Take all other chronic medications as prescribed  - Follow up with your pulmonologist and your primary care doctor  - Avoid smoking as this will worsen your condition

## 2018-03-02 NOTE — PROGRESS NOTE ADULT - PROBLEM SELECTOR PLAN 5
- avoid nephrotoxic medications  - renally dose medications as indicated - avoid nephrotoxic medications  - renally dose medications as indicated  -stage 3B. monitor closely and encourage oral hydration

## 2018-03-02 NOTE — PATIENT PROFILE ADULT. - NS TRANSFER PATIENT BELONGINGS
Clothing/Electronic Device (specify)/Cell Phone/PDA (specify)/Jewelry/Money (specify)/two rings, pocket book and ipad/Other belongings

## 2018-03-02 NOTE — DISCHARGE NOTE ADULT - CARE PROVIDERS DIRECT ADDRESSES
,DirectAddress_Unknown,DirectAddress_Unknown,[email or web address]

## 2018-03-02 NOTE — PROGRESS NOTE ADULT - PROBLEM SELECTOR PLAN 2
- patient is receiving chemotherapy as outpatient, most recent dose 2/26/18  - outpatient hem/onc f/u when discharged

## 2018-03-02 NOTE — DISCHARGE NOTE ADULT - CARE PLAN
Principal Discharge DX:	COPD exacerbation  Goal:	prevent exacerbations and manage symptoms  Assessment and plan of treatment:	- you COPD was exacerbated by infection with Flu virus  - Finish taking Tamiflu as prescribed  - Take the steroid in tapered doses as prescribed  -  Take all other chronic medications as prescribed  - Follow up with your pulmonologist and your primary care doctor  - Avoid smoking as this will worsen your condition  Secondary Diagnosis:	Chronic kidney disease, unspecified CKD stage  Goal:	prevent worsening  Assessment and plan of treatment:	- follow up with you primary care doctor for monitoring of your kidney function  Secondary Diagnosis:	Multiple myeloma  Goal:	continue treatment  Assessment and plan of treatment:	-Follow up with your oncologist  Secondary Diagnosis:	Anxiety  Goal:	reduce symptoms  Assessment and plan of treatment:	- take your medications as prescribed and follow up with your doctor  Secondary Diagnosis:	Smoking  Goal:	stop smoking  Assessment and plan of treatment:	- follow up with your primary care doctor for more resources on helping you quit smoking Principal Discharge DX:	COPD exacerbation  Goal:	prevent exacerbations and manage symptoms  Assessment and plan of treatment:	- you COPD was exacerbated by infection with Flu virus  - Take the steroid in tapered doses as prescribed  -  Take all other chronic medications as prescribed  - Follow up with your pulmonologist and your primary care doctor  - Avoid smoking as this will worsen your condition  Secondary Diagnosis:	Chronic kidney disease, unspecified CKD stage  Goal:	prevent worsening  Assessment and plan of treatment:	- follow up with you primary care doctor for monitoring of your kidney function  Secondary Diagnosis:	Multiple myeloma  Goal:	continue treatment  Assessment and plan of treatment:	-Follow up with your oncologist  Secondary Diagnosis:	Anxiety  Goal:	reduce symptoms  Assessment and plan of treatment:	- take your medications as prescribed and follow up with your doctor  Secondary Diagnosis:	Smoking  Goal:	stop smoking  Assessment and plan of treatment:	- follow up with your primary care doctor for more resources on helping you quit smoking

## 2018-03-02 NOTE — DISCHARGE NOTE ADULT - MEDICATION SUMMARY - MEDICATIONS TO STOP TAKING
I will STOP taking the medications listed below when I get home from the hospital:    carvedilol 3.125 mg oral tablet  -- 1 tab(s) by mouth 2 times a day    potassium acetate

## 2018-03-02 NOTE — DISCHARGE NOTE ADULT - PATIENT PORTAL LINK FT
You can access the [facility] Patient Portal, offered by [facility], by registering with the following website: [email or web address]

## 2018-03-02 NOTE — PROGRESS NOTE ADULT - PROBLEM SELECTOR PLAN 1
- influenza 2/2  - duonebs q6  - albuterol q3 PRN  - s/p solu-medrol load, continue 60 mg IV q 6  - budesonide 0.5 mg BID  - azithromycin 500 mg PO x 5 days as adjuvant   - incentive spirometry  - smoking cessation  - NC O2 titrate SpO2 90-96%  - pulm consulted  - watch electrolytes, f/u AM labs - influenza 2/2. with improvement since admission   -c/w steroids at current dose due to some persistent wheezing and rhonchi noted   -c/w tamiflu, zithro (day #2)'; florastor   (received 5 days of levaquin)   -c/w duonebs and c/w oxygen support as needed   - budesonide 0.5 mg BID  - smoking cessation  - pulm consulted and rec noted - CT chest noted   - watch electrolytes, f/u AM labs

## 2018-03-02 NOTE — DISCHARGE NOTE ADULT - MEDICATION SUMMARY - MEDICATIONS TO TAKE
I will START or STAY ON the medications listed below when I get home from the hospital:    predniSONE 10 mg oral tablet  -- 6 tab(s) by mouth once a day x1 day  4 tabs by mouth once a day x2 days  2 tabs by mouth once a day x2 days  1 tab by mouth once a day x3 days  -- It is very important that you take or use this exactly as directed.  Do not skip doses or discontinue unless directed by your doctor.  Obtain medical advice before taking any non-prescription drugs as some may affect the action of this medication.  Take with food or milk.    -- Indication: For COPD exacerbation    aspirin 81 mg oral tablet  -- 1 tab(s) by mouth once a day  -- Indication: For Stroke prevention    tamsulosin 0.4 mg oral capsule  -- 1 cap(s) by mouth once a day (at bedtime)  -- Indication: For urinary symptoms    Cymbalta 30 mg oral delayed release capsule  -- 1 cap(s) by mouth once a day  -- Indication: For Depression    diphenoxylate-atropine 2.5 mg-0.025 mg oral tablet  -- 1 tab(s) by mouth 4 times a day, As Needed MDD:4  -- Indication: For Diarrhea    ALPRAZolam 0.5 mg oral tablet  -- 1 tab(s) by mouth 2 times a day, As Needed  -- Indication: For Anxiety    Spiriva 18 mcg inhalation capsule  -- 1 cap(s) inhaled once a day  -- Indication: For COPD    Advair Diskus 250 mcg-50 mcg inhalation powder  -- 1 puff(s) inhaled 2 times a day  -- Indication: For COPD    Revlimid  -- Indication: For Multiple myeloma    pantoprazole 40 mg oral delayed release tablet  -- 1 tab(s) by mouth once a day   -- It is very important that you take or use this exactly as directed.  Do not skip doses or discontinue unless directed by your doctor.  Obtain medical advice before taking any non-prescription drugs as some may affect the action of this medication.  Swallow whole.  Do not crush.    -- Indication: For GERD    folic acid 1 mg oral tablet  -- 1 tab(s) by mouth once a day   -- Indication: For Supplement

## 2018-03-02 NOTE — PROGRESS NOTE ADULT - PROBLEM SELECTOR PLAN 6
- renally dosed lovenox for DVT ppx  - I offered  services as she appears to still be very upset about the recent passing of her , which she declined at this time bust states she may re-consider

## 2018-03-02 NOTE — PROGRESS NOTE ADULT - PROBLEM SELECTOR PLAN 3
- smoking cessation counseling provided  - nicotine replacement therapy offered and accepted, low dose nicotine patch

## 2018-03-02 NOTE — DISCHARGE NOTE ADULT - HOSPITAL COURSE
72 year-old female active smoker, with COPD, multiple myeloma, and CKD, who presents to the ED with 1 week of increasing SOB, cough, and increased sputum production.  She denies fevers, chills, body aches, N/V/D, sick contacts or recent travel. She states that she had some leftover levaquin at home, and took 500 mg for 5 days, without relief of her symptoms. Of note, she received her most recent dose of Revlimid on 2/26/18.     Patient was admitted and treated for COPD exacerbation due to influenza infection. Patient treated with Tamiflu, Azithromycin, Duonebs, Steroids.   Patient is medically optimized and stable for discharge.    Time spent coordinating discharge: 40mins 72 year-old female active smoker, with COPD, multiple myeloma, and CKD, who presents to the ED with 1 week of increasing SOB, cough, and increased sputum production.  She denies fevers, chills, body aches, N/V/D, sick contacts or recent travel. She states that she had some leftover levaquin at home, and took 500 mg for 5 days, without relief of her symptoms. Of note, she received her most recent dose of Revlimid on 2/26/18.     Patient was admitted and treated for COPD exacerbation due to influenza infection. Patient treated with Tamiflu, Azithromycin, Duonebs, Steroids, with slow clinical improvement. Patient required oxygen while inpatient, and was gradually weaned off.    Patient is medically optimized and stable for discharge.    Time spent coordinating discharge: 40mins 72 year-old female active smoker, with COPD, multiple myeloma, and CKD, who presents to the ED with 1 week of increasing SOB, cough, and increased sputum production.  She denies fevers, chills, body aches, N/V/D, sick contacts or recent travel. She states that she had some leftover levaquin at home, and took 500 mg for 5 days, without relief of her symptoms. Of note, she received her most recent dose of Revlimid on 2/26/18.     Patient was admitted and treated for COPD exacerbation due to influenza infection. Patient treated with Tamiflu, Azithromycin, Duonebs, Steroids, with slow clinical improvement. Patient required oxygen while inpatient, and also at discharge, failed 6 min walk test and so Pt discharged Home with Home Oxygen  2L    Patient is medically optimized and stable for discharge.    Disposition :  Home with Home Health care with Home Oxygen 2 L    6 Min walk test on the day of discharge:  Patient oxygen saturations:    SpO2 92% at rest on RA  SpO2 94% at rest with supp O2 2LPM  SpO2 87% ambulating  SpO2 90% ambulating with supp O2 2LPM    Total Time spent in discharge 40 minutes, more than 50 % time spent in counselling and co-ordination of patient's care

## 2018-03-02 NOTE — PATIENT PROFILE ADULT. - MEDICATIONS BROUGHT TO HOSPITAL, PROFILE
Patient notified regarding results.  Patient states she spoke to Dr. Gaffney on her OV about something OTC  to take for cramps in her legs,pt requesting name of medication.   no

## 2018-03-03 LAB
ANION GAP SERPL CALC-SCNC: 16 MMOL/L — SIGNIFICANT CHANGE UP (ref 5–17)
BUN SERPL-MCNC: 31 MG/DL — HIGH (ref 8–20)
CALCIUM SERPL-MCNC: 8.7 MG/DL — SIGNIFICANT CHANGE UP (ref 8.6–10.2)
CHLORIDE SERPL-SCNC: 101 MMOL/L — SIGNIFICANT CHANGE UP (ref 98–107)
CO2 SERPL-SCNC: 22 MMOL/L — SIGNIFICANT CHANGE UP (ref 22–29)
CREAT SERPL-MCNC: 1.17 MG/DL — SIGNIFICANT CHANGE UP (ref 0.5–1.3)
GLUCOSE SERPL-MCNC: 123 MG/DL — HIGH (ref 70–115)
MAGNESIUM SERPL-MCNC: 2.4 MG/DL — SIGNIFICANT CHANGE UP (ref 1.6–2.6)
PHOSPHATE SERPL-MCNC: 3.4 MG/DL — SIGNIFICANT CHANGE UP (ref 2.4–4.7)
POTASSIUM SERPL-MCNC: 4.2 MMOL/L — SIGNIFICANT CHANGE UP (ref 3.5–5.3)
POTASSIUM SERPL-SCNC: 4.2 MMOL/L — SIGNIFICANT CHANGE UP (ref 3.5–5.3)
SODIUM SERPL-SCNC: 139 MMOL/L — SIGNIFICANT CHANGE UP (ref 135–145)

## 2018-03-03 PROCEDURE — 99233 SBSQ HOSP IP/OBS HIGH 50: CPT

## 2018-03-03 PROCEDURE — 99232 SBSQ HOSP IP/OBS MODERATE 35: CPT | Mod: GC

## 2018-03-03 RX ADMIN — Medication 3 MILLILITER(S): at 08:54

## 2018-03-03 RX ADMIN — Medication 3 MILLILITER(S): at 20:27

## 2018-03-03 RX ADMIN — Medication 650 MILLIGRAM(S): at 06:39

## 2018-03-03 RX ADMIN — Medication 650 MILLIGRAM(S): at 21:38

## 2018-03-03 RX ADMIN — Medication 1 PATCH: at 12:04

## 2018-03-03 RX ADMIN — Medication 250 MILLIGRAM(S): at 06:36

## 2018-03-03 RX ADMIN — Medication 3 MILLILITER(S): at 14:56

## 2018-03-03 RX ADMIN — Medication 0.5 MILLIGRAM(S): at 21:38

## 2018-03-03 RX ADMIN — Medication 0.5 MILLIGRAM(S): at 12:08

## 2018-03-03 RX ADMIN — Medication 30 MILLIGRAM(S): at 12:05

## 2018-03-03 RX ADMIN — Medication 1 MILLIGRAM(S): at 12:05

## 2018-03-03 RX ADMIN — DULOXETINE HYDROCHLORIDE 30 MILLIGRAM(S): 30 CAPSULE, DELAYED RELEASE ORAL at 12:04

## 2018-03-03 RX ADMIN — TAMSULOSIN HYDROCHLORIDE 0.4 MILLIGRAM(S): 0.4 CAPSULE ORAL at 21:38

## 2018-03-03 RX ADMIN — Medication 60 MILLIGRAM(S): at 17:40

## 2018-03-03 RX ADMIN — AZITHROMYCIN 500 MILLIGRAM(S): 500 TABLET, FILM COATED ORAL at 12:05

## 2018-03-03 RX ADMIN — PANTOPRAZOLE SODIUM 40 MILLIGRAM(S): 20 TABLET, DELAYED RELEASE ORAL at 06:36

## 2018-03-03 RX ADMIN — Medication 60 MILLIGRAM(S): at 06:36

## 2018-03-03 RX ADMIN — Medication 250 MILLIGRAM(S): at 17:40

## 2018-03-03 RX ADMIN — Medication 81 MILLIGRAM(S): at 12:05

## 2018-03-03 NOTE — PROGRESS NOTE ADULT - PROBLEM SELECTOR PLAN 2
- patient is receiving chemotherapy as outpatient, most recent dose 2/26/18  - outpatient hem/onc f/u when discharged Managed out patient with Heme/Onc and chemo.

## 2018-03-03 NOTE — PROGRESS NOTE ADULT - PROBLEM SELECTOR PLAN 4
- continue home xanax 0.5 mg BID PRN and cymbalta 30 mg daily  - I spoke to pharmacy advised that cymbalta is not recommended in patients with Cr clearance <30.  She has been on Cymbalta since 9/2017 when her Cr clearance was significantly less than it is now, so I feel that abrupt discontinuation of a SSRI is likely more harmful at this time than continuation despite her reduced renal function. - continue home xanax 0.5 mg BID PRN and cymbalta 30 mg daily  - Stable, asymptomatic.   C/w: ALPRAZolam 0.5 milliGRAM(s) Oral two times a day PRN

## 2018-03-03 NOTE — PROGRESS NOTE ADULT - PROBLEM SELECTOR PLAN 1
- influenza 2/2. with improvement since admission   -c/w steroids at current dose due to some persistent wheezing and rhonchi noted   -c/w tamiflu, zithro (day #2)'; florastor   (received 5 days of levaquin)   -c/w duonebs and c/w oxygen support as needed   - budesonide 0.5 mg BID  - smoking cessation  - pulm consulted and rec noted - CT chest noted   - watch electrolytes, f/u AM labs - influenza 2/2. with improvement since admission   -c/w steroids at current dose due to some persistent wheezing and rhonchi noted   -c/w tamiflu, zithro (day #2)'; florastor   (received 5 days of levaquin)   -c/w duonebs and c/w oxygen support as needed   - budesonide 0.5 mg BID  - smoking cessation  - pulm consulted and rec noted - CT chest noted   decreased solumedrol from Q 6 to Q 12 Stable, improving but currently VERAS and b/l rhonchi. Likely 2/2 Influenza + URI.  C/w azithromycin   Tablet 500 milliGRAM(s) Oral daily end date 03/06/2018  oseltamivir 30 milliGRAM(s) Oral daily end date 03/06/2018  methylPREDNISolone sodium succinate Injectable 60 milliGRAM(s) IV Push every 6   ALBUTerol    0.083% 2.5 milliGRAM(s) Nebulizer every 3 hours PRN  ALBUTerol/ipratropium for Nebulization 3 milliLiter(s) Nebulizer every 6 hours

## 2018-03-03 NOTE — PROGRESS NOTE ADULT - SUBJECTIVE AND OBJECTIVE BOX
PULMONARY PROGRESS NOTE      [id number]    Patient is a 72y old  Female who presents with a chief complaint of Shortness of breath (02 Mar 2018 18:36)      INTERVAL HPI/OVERNIGHT EVENTS:  better than admission, not at baseline  no chest pain, sputum improved  MEDICATIONS  (STANDING):  ALBUTerol/ipratropium for Nebulization 3 milliLiter(s) Nebulizer every 6 hours  aspirin enteric coated 81 milliGRAM(s) Oral daily  azithromycin   Tablet 500 milliGRAM(s) Oral daily  DULoxetine 30 milliGRAM(s) Oral daily  enoxaparin Injectable 30 milliGRAM(s) SubCutaneous every 24 hours  folic acid 1 milliGRAM(s) Oral daily  methylPREDNISolone sodium succinate Injectable 60 milliGRAM(s) IV Push every 12 hours  nicotine -   7 mG/24Hr(s) Patch 1 patch Transdermal daily  oseltamivir 30 milliGRAM(s) Oral daily  pantoprazole    Tablet 40 milliGRAM(s) Oral before breakfast  saccharomyces boulardii 250 milliGRAM(s) Oral two times a day  tamsulosin 0.4 milliGRAM(s) Oral at bedtime      MEDICATIONS  (PRN):  acetaminophen   Tablet 650 milliGRAM(s) Oral every 6 hours PRN For Temp greater than 38 C (100.4 F)  acetaminophen   Tablet. 650 milliGRAM(s) Oral every 6 hours PRN Mild Pain (1 - 3)  ALBUTerol    0.083% 2.5 milliGRAM(s) Nebulizer every 3 hours PRN Shortness of Breath and/or Wheezing  ALPRAZolam 0.5 milliGRAM(s) Oral two times a day PRN anxiety      Allergies    penicillin (Unknown)    Intolerances        PAST MEDICAL & SURGICAL HISTORY:  Multiple myeloma  Anxiety  Hearing loss, unspecified hearing loss type, unspecified laterality  Kidney stones  MI (myocardial infarction)  S/P cholecystectomy      SOCIAL HISTORY  Smoking History:       REVIEW OF SYSTEMS:    CONSTITUTIONAL:  No distress    HEENT:  Eyes:  No diplopia or blurred vision. ENT:  No earache, sore throat or runny nose.    CARDIOVASCULAR:  No pressure, squeezing, tightness, heaviness or aching about the chest; no palpitations.    RESPIRATORY:  see hpi    GASTROINTESTINAL:  No nausea, vomiting or diarrhea.    GENITOURINARY:  No dysuria, frequency or urgency.    NEUROLOGIC:  No paresthesias, fasciculations, seizures or weakness.    PSYCHIATRIC:  No disorder of thought or mood.    Vital Signs Last 24 Hrs  T(C): 36.6 (03 Mar 2018 15:38), Max: 36.7 (03 Mar 2018 00:02)  T(F): 97.8 (03 Mar 2018 15:38), Max: 98 (03 Mar 2018 00:02)  HR: 107 (03 Mar 2018 15:38) (86 - 107)  BP: 90/58 (03 Mar 2018 15:38) (82/50 - 95/56)  BP(mean): --  RR: 20 (03 Mar 2018 15:38) (18 - 20)  SpO2: 93% (03 Mar 2018 15:38) (91% - 98%)    PHYSICAL EXAMINATION:    GENERAL: The patient is awake and alert in no apparent distress.     HEENT: Head is normocephalic and atraumatic. Extraocular muscles are intact. Mucous membranes are moist.    NECK: Supple.    LUNGS: moderate air entry with expiratory rhonchi bilat respirations unlabored    HEART: Regular rate and rhythm without murmur.    ABDOMEN: Soft, nontender, and nondistended.      EXTREMITIES: Without any cyanosis, clubbing, rash, lesions or edema.    NEUROLOGIC: Grossly intact.    LABS:    03-03    139  |  101  |  31.0<H>  ----------------------------<  123<H>  4.2   |  22.0  |  1.17    Ca    8.7      03 Mar 2018 08:49  Phos  3.4     03-03  Mg     2.4     03-03                  Serum Pro-Brain Natriuretic Peptide: 767 pg/mL (03-01-18 @ 17:16)          MICROBIOLOGY:    RADIOLOGY & ADDITIONAL STUDIES:  duplex and CT scan reviewed

## 2018-03-03 NOTE — PROGRESS NOTE ADULT - PROBLEM SELECTOR PLAN 3
- smoking cessation counseling provided  - nicotine replacement therapy offered and accepted, low dose nicotine patch Encouraged smoking cessation counseling provided  c/w nicotine -   7 mG/24Hr(s) Patch 1 patch Transdermal daily

## 2018-03-03 NOTE — PROGRESS NOTE ADULT - PROBLEM SELECTOR PLAN 6
- renally dosed lovenox for DVT ppx  - I offered  services as she appears to still be very upset about the recent passing of her , which she declined at this time bust states she may re-consider - renally dosed lovenox for DVT ppx DVT PPx: Renally dosed Lovenox, enoxaparin Injectable 30 milliGRAM(s) SQ QD

## 2018-03-03 NOTE — PROGRESS NOTE ADULT - SUBJECTIVE AND OBJECTIVE BOX
Patient is a 72y old  Female who presents with a chief complaint of Shortness of breath (02 Mar 2018 18:36)      INTERVAL HPI/OVERNIGHT EVENTS:  72y  Female seen and examined at bedside with no acute events overnight. Pt reports VERAS, tolerating PO intake, voiding, + bowel movements. Pt denies fever, chills, HA, SOB at rest, CP, abd pain, nausea, vomiting, LE pain.      Vital Signs Last 24 Hrs  T(C): 36.7 (03 Mar 2018 00:02), Max: 37.1 (02 Mar 2018 16:14)  T(F): 98 (03 Mar 2018 00:02), Max: 98.8 (02 Mar 2018 16:14)  HR: 97 (03 Mar 2018 00:02) (70 - 97)  BP: 95/56 (03 Mar 2018 00:02) (82/50 - 121/57)  BP(mean): --  RR: 19 (03 Mar 2018 00:02) (18 - 20)  SpO2: 92% (03 Mar 2018 00:02) (92% - 99%)  ABG - ( 01 Mar 2018 16:54 )  pH: 7.38  /  pCO2: 42    /  pO2: 86    / HCO3: 24    / Base Excess: -0.5  /  SaO2: 94            Daily     Daily   I&O's Detail        REVIEW OF SYSTEMS:    Noted above    PHYSICAL EXAM:    GENERAL: NAD, well-groomed, well-developed  HEAD:  Atraumatic, Normocephalic  EYES: EOMI, PERRLA, conjunctiva and sclera clear  NECK: Supple, No JVD, Normal thyroid  NERVOUS SYSTEM:  Alert & Oriented X3, Good concentration; Motor Strength 5/5 B/L upper and lower extremities  CHEST/LUNG: Clear to percussion bilaterally; Mild rt basilar rhonchi  HEART: Regular rate and rhythm; No murmurs, rubs, or gallops  ABDOMEN: Soft, Nontender, Nondistended; Bowel sounds present  EXTREMITIES:  2+ Peripheral Pulses, No clubbing, cyanosis, or edema  SKIN: No rashes or lesions      LABS:                        10.6   9.9   )-----------( 96       ( 01 Mar 2018 17:16 )             32.0     CBC Full  -  ( 01 Mar 2018 17:16 )  WBC Count : 9.9 K/uL  Hemoglobin : 10.6 g/dL  Hematocrit : 32.0 %  Platelet Count - Automated : 96 K/uL  Mean Cell Volume : 93.6 fl  Mean Cell Hemoglobin : 31.0 pg  Mean Cell Hemoglobin Concentration : 33.1 g/dL  Auto Neutrophil # : 6.1 K/uL  Auto Lymphocyte # : 1.3 K/uL  Auto Monocyte # : 1.7 K/uL  Auto Eosinophil # : 0.1 K/uL  Auto Basophil # : 0.1 K/uL  Auto Neutrophil % : 69.0 %  Auto Lymphocyte % : 8.0 %  Auto Monocyte % : 12.0 %  Auto Eosinophil % : 3.0 %  Auto Basophil % : 0.0 %    03-02    136  |  97<L>  |  26.0<H>  ----------------------------<  128<H>  4.1   |  23.0  |  1.43<H>    Ca    9.1      02 Mar 2018 06:43  Phos  3.7     03-02  Mg     1.9     03-02    TPro  6.1<L>  /  Alb  3.4  /  TBili  0.2<L>  /  DBili  x   /  AST  9   /  ALT  6   /  AlkPhos  62  03-01    PT/INR - ( 01 Mar 2018 17:16 )   PT: 11.1 sec;   INR: 1.01 ratio         PTT - ( 01 Mar 2018 17:16 )  PTT:26.7 sec        Serum Pro-Brain Natriuretic Peptide: 767 pg/mL (03-01 @ 17:16)    ABG - ( 01 Mar 2018 16:54 )  pH: 7.38  /  pCO2: 42    /  pO2: 86    / HCO3: 24    / Base Excess: -0.5  /  SaO2: 94                  LIVER FUNCTIONS - ( 01 Mar 2018 17:16 )  Alb: 3.4 g/dL / Pro: 6.1 g/dL / ALK PHOS: 62 U/L / ALT: 6 U/L / AST: 9 U/L / GGT: x             RADIOLOGY & ADDITIONAL TESTS:      ANTIMICROBIAL:  azithromycin   Tablet 500 milliGRAM(s) Oral daily  oseltamivir 30 milliGRAM(s) Oral daily    CARDIOVASCULAR:  tamsulosin 0.4 milliGRAM(s) Oral at bedtime    PULMONARY:  ALBUTerol    0.083% 2.5 milliGRAM(s) Nebulizer every 3 hours PRN  ALBUTerol/ipratropium for Nebulization 3 milliLiter(s) Nebulizer every 6 hours    NEUROLOGIC:  acetaminophen   Tablet 650 milliGRAM(s) Oral every 6 hours PRN  acetaminophen   Tablet. 650 milliGRAM(s) Oral every 6 hours PRN  ALPRAZolam 0.5 milliGRAM(s) Oral two times a day PRN  DULoxetine 30 milliGRAM(s) Oral daily    ONCOLOGIC:    HEMATOLOGIC:  aspirin enteric coated 81 milliGRAM(s) Oral daily  enoxaparin Injectable 30 milliGRAM(s) SubCutaneous every 24 hours    GATROINTESTINAL:  pantoprazole    Tablet 40 milliGRAM(s) Oral before breakfast     MEDS:    ENDO/METABOLIC:  methylPREDNISolone sodium succinate Injectable 60 milliGRAM(s) IV Push every 6 hours    IV FLUID/NUTRITION:  folic acid 1 milliGRAM(s) Oral daily    TOPICAL:    IMMUNOLOGIC & OTHER  nicotine -   7 mG/24Hr(s) Patch 1 patch Transdermal daily  saccharomyces boulardii 250 milliGRAM(s) Oral two times a day      Allergies    penicillin (Unknown)    Intolerances Patient is a 72y old  Female who presents with a chief complaint of Shortness of breath (02 Mar 2018 18:36)      INTERVAL HPI/OVERNIGHT EVENTS:  72y  Female seen and examined at bedside with no acute events overnight. Pt reports VERAS, tolerating PO intake, voiding, + bowel movements. Pt denies fever, chills, HA, SOB at rest, CP, abd pain, nausea, vomiting, LE pain.      Vital Signs Last 24 Hrs  T(C): 36.7 (03 Mar 2018 00:02), Max: 37.1 (02 Mar 2018 16:14)  T(F): 98 (03 Mar 2018 00:02), Max: 98.8 (02 Mar 2018 16:14)  HR: 97 (03 Mar 2018 00:02) (70 - 97)  BP: 95/56 (03 Mar 2018 00:02) (82/50 - 121/57)  BP(mean): --  RR: 19 (03 Mar 2018 00:02) (18 - 20)  SpO2: 92% (03 Mar 2018 00:02) (92% - 99%)  ABG - ( 01 Mar 2018 16:54 )  pH: 7.38  /  pCO2: 42    /  pO2: 86    / HCO3: 24    / Base Excess: -0.5  /  SaO2: 94            Daily     Daily   I&O's Detail        REVIEW OF SYSTEMS:    Noted above    PHYSICAL EXAM:    GENERAL: NAD, well-groomed, well-developed  HEAD:  Atraumatic, Normocephalic  EYES: EOMI, PERRLA, conjunctiva and sclera clear  NECK: Supple, No JVD, Normal thyroid  NERVOUS SYSTEM:  Alert & Oriented X3, Good concentration; Motor Strength 5/5 B/L upper and lower extremities  CHEST/LUNG: Clear to percussion bilaterally; Mild B/L basilar rhonchi  HEART: Regular rate and rhythm; No murmurs, rubs, or gallops  ABDOMEN: Soft, Nontender, Nondistended; Bowel sounds present  EXTREMITIES:  2+ Peripheral Pulses, No clubbing, cyanosis, or edema  SKIN: No rashes or lesions      LABS:                        10.6   9.9   )-----------( 96       ( 01 Mar 2018 17:16 )             32.0     CBC Full  -  ( 01 Mar 2018 17:16 )  WBC Count : 9.9 K/uL  Hemoglobin : 10.6 g/dL  Hematocrit : 32.0 %  Platelet Count - Automated : 96 K/uL  Mean Cell Volume : 93.6 fl  Mean Cell Hemoglobin : 31.0 pg  Mean Cell Hemoglobin Concentration : 33.1 g/dL  Auto Neutrophil # : 6.1 K/uL  Auto Lymphocyte # : 1.3 K/uL  Auto Monocyte # : 1.7 K/uL  Auto Eosinophil # : 0.1 K/uL  Auto Basophil # : 0.1 K/uL  Auto Neutrophil % : 69.0 %  Auto Lymphocyte % : 8.0 %  Auto Monocyte % : 12.0 %  Auto Eosinophil % : 3.0 %  Auto Basophil % : 0.0 %    03-02    136  |  97<L>  |  26.0<H>  ----------------------------<  128<H>  4.1   |  23.0  |  1.43<H>    Ca    9.1      02 Mar 2018 06:43  Phos  3.7     03-02  Mg     1.9     03-02    TPro  6.1<L>  /  Alb  3.4  /  TBili  0.2<L>  /  DBili  x   /  AST  9   /  ALT  6   /  AlkPhos  62  03-01    PT/INR - ( 01 Mar 2018 17:16 )   PT: 11.1 sec;   INR: 1.01 ratio         PTT - ( 01 Mar 2018 17:16 )  PTT:26.7 sec        Serum Pro-Brain Natriuretic Peptide: 767 pg/mL (03-01 @ 17:16)    ABG - ( 01 Mar 2018 16:54 )  pH: 7.38  /  pCO2: 42    /  pO2: 86    / HCO3: 24    / Base Excess: -0.5  /  SaO2: 94        LIVER FUNCTIONS - ( 01 Mar 2018 17:16 )  Alb: 3.4 g/dL / Pro: 6.1 g/dL / ALK PHOS: 62 U/L / ALT: 6 U/L / AST: 9 U/L / GGT: x             RADIOLOGY & ADDITIONAL TESTS:      ANTIMICROBIAL:  azithromycin   Tablet 500 milliGRAM(s) Oral daily  oseltamivir 30 milliGRAM(s) Oral daily    CARDIOVASCULAR:  tamsulosin 0.4 milliGRAM(s) Oral at bedtime    PULMONARY:  ALBUTerol    0.083% 2.5 milliGRAM(s) Nebulizer every 3 hours PRN  ALBUTerol/ipratropium for Nebulization 3 milliLiter(s) Nebulizer every 6 hours    NEUROLOGIC:  acetaminophen   Tablet 650 milliGRAM(s) Oral every 6 hours PRN  acetaminophen   Tablet. 650 milliGRAM(s) Oral every 6 hours PRN  ALPRAZolam 0.5 milliGRAM(s) Oral two times a day PRN  DULoxetine 30 milliGRAM(s) Oral daily    ONCOLOGIC:    HEMATOLOGIC:  aspirin enteric coated 81 milliGRAM(s) Oral daily  enoxaparin Injectable 30 milliGRAM(s) SubCutaneous every 24 hours    GATROINTESTINAL:  pantoprazole    Tablet 40 milliGRAM(s) Oral before breakfast     MEDS:    ENDO/METABOLIC:  methylPREDNISolone sodium succinate Injectable 60 milliGRAM(s) IV Push every 6 hours    IV FLUID/NUTRITION:  folic acid 1 milliGRAM(s) Oral daily    TOPICAL:    IMMUNOLOGIC & OTHER  nicotine -   7 mG/24Hr(s) Patch 1 patch Transdermal daily  saccharomyces boulardii 250 milliGRAM(s) Oral two times a day      Allergies    penicillin (Unknown)    Intolerances

## 2018-03-03 NOTE — PROGRESS NOTE ADULT - PROBLEM SELECTOR PLAN 5
- avoid nephrotoxic medications  - renally dose medications as indicated  -stage 3B. monitor closely and encourage oral hydration

## 2018-03-04 LAB
ANION GAP SERPL CALC-SCNC: 11 MMOL/L — SIGNIFICANT CHANGE UP (ref 5–17)
BUN SERPL-MCNC: 32 MG/DL — HIGH (ref 8–20)
CALCIUM SERPL-MCNC: 8.6 MG/DL — SIGNIFICANT CHANGE UP (ref 8.6–10.2)
CHLORIDE SERPL-SCNC: 104 MMOL/L — SIGNIFICANT CHANGE UP (ref 98–107)
CO2 SERPL-SCNC: 26 MMOL/L — SIGNIFICANT CHANGE UP (ref 22–29)
CREAT SERPL-MCNC: 1.11 MG/DL — SIGNIFICANT CHANGE UP (ref 0.5–1.3)
GLUCOSE SERPL-MCNC: 116 MG/DL — HIGH (ref 70–115)
POTASSIUM SERPL-MCNC: 3.9 MMOL/L — SIGNIFICANT CHANGE UP (ref 3.5–5.3)
POTASSIUM SERPL-SCNC: 3.9 MMOL/L — SIGNIFICANT CHANGE UP (ref 3.5–5.3)
SODIUM SERPL-SCNC: 141 MMOL/L — SIGNIFICANT CHANGE UP (ref 135–145)

## 2018-03-04 PROCEDURE — 99232 SBSQ HOSP IP/OBS MODERATE 35: CPT | Mod: GC

## 2018-03-04 RX ORDER — SODIUM CHLORIDE 9 MG/ML
1000 INJECTION INTRAMUSCULAR; INTRAVENOUS; SUBCUTANEOUS
Qty: 0 | Refills: 0 | Status: COMPLETED | OUTPATIENT
Start: 2018-03-04 | End: 2018-03-04

## 2018-03-04 RX ADMIN — Medication 650 MILLIGRAM(S): at 21:43

## 2018-03-04 RX ADMIN — Medication 3 MILLILITER(S): at 14:25

## 2018-03-04 RX ADMIN — Medication 81 MILLIGRAM(S): at 11:37

## 2018-03-04 RX ADMIN — Medication 650 MILLIGRAM(S): at 11:40

## 2018-03-04 RX ADMIN — Medication 250 MILLIGRAM(S): at 17:19

## 2018-03-04 RX ADMIN — DULOXETINE HYDROCHLORIDE 30 MILLIGRAM(S): 30 CAPSULE, DELAYED RELEASE ORAL at 11:37

## 2018-03-04 RX ADMIN — Medication 650 MILLIGRAM(S): at 21:13

## 2018-03-04 RX ADMIN — Medication 1 PATCH: at 11:38

## 2018-03-04 RX ADMIN — TAMSULOSIN HYDROCHLORIDE 0.4 MILLIGRAM(S): 0.4 CAPSULE ORAL at 21:13

## 2018-03-04 RX ADMIN — Medication 0.5 MILLIGRAM(S): at 21:13

## 2018-03-04 RX ADMIN — Medication 0.5 MILLIGRAM(S): at 11:37

## 2018-03-04 RX ADMIN — ENOXAPARIN SODIUM 30 MILLIGRAM(S): 100 INJECTION SUBCUTANEOUS at 03:57

## 2018-03-04 RX ADMIN — Medication 250 MILLIGRAM(S): at 05:26

## 2018-03-04 RX ADMIN — SODIUM CHLORIDE 100 MILLILITER(S): 9 INJECTION INTRAMUSCULAR; INTRAVENOUS; SUBCUTANEOUS at 21:13

## 2018-03-04 RX ADMIN — Medication 3 MILLILITER(S): at 08:15

## 2018-03-04 RX ADMIN — Medication 650 MILLIGRAM(S): at 10:02

## 2018-03-04 RX ADMIN — Medication 30 MILLIGRAM(S): at 14:43

## 2018-03-04 RX ADMIN — AZITHROMYCIN 500 MILLIGRAM(S): 500 TABLET, FILM COATED ORAL at 11:39

## 2018-03-04 RX ADMIN — Medication 3 MILLILITER(S): at 20:23

## 2018-03-04 RX ADMIN — Medication 60 MILLIGRAM(S): at 05:26

## 2018-03-04 RX ADMIN — Medication 60 MILLIGRAM(S): at 17:19

## 2018-03-04 RX ADMIN — Medication 1 PATCH: at 12:15

## 2018-03-04 RX ADMIN — PANTOPRAZOLE SODIUM 40 MILLIGRAM(S): 20 TABLET, DELAYED RELEASE ORAL at 05:26

## 2018-03-04 RX ADMIN — Medication 1 MILLIGRAM(S): at 11:37

## 2018-03-04 NOTE — PROGRESS NOTE ADULT - PROBLEM SELECTOR PLAN 3
Encouraged smoking cessation counseling provided  c/w nicotine -   7 mG/24Hr(s) Patch 1 patch Transdermal daily

## 2018-03-04 NOTE — PROGRESS NOTE ADULT - PROBLEM SELECTOR PLAN 1
Stable, improving but currently VERAS and b/l rhonchi. Likely 2/2 Influenza + URI.  C/w azithromycin   Tablet 500 milliGRAM(s) Oral daily end date 03/06/2018  oseltamivir 30 milliGRAM(s) Oral daily end date 03/06/2018  methylPREDNISolone sodium succinate Injectable 60 milliGRAM(s) IV Push every 12hr   ALBUTerol    0.083% 2.5 milliGRAM(s) Nebulizer every 3 hours PRN  ALBUTerol/ipratropium for Nebulization 3 milliLiter(s) Nebulizer every 6 hours

## 2018-03-04 NOTE — PROGRESS NOTE ADULT - SUBJECTIVE AND OBJECTIVE BOX
Resident Progress Note  Patient is a 72y old  Female who presents with a chief complaint of Shortness of breath (02 Mar 2018 18:36)    INTERVAL/OVERNIGHT EVENTS: Patient seen and examined bedside this morning, no acute overnight events. Reports some sob on ambulation to bathroom, otherwise feels about the same this morning as yesterday.    ROS: [place] pain, palpitations, light headedness/dizziness difficulty breathing/cough, fevers/chills, abdominal pain, n/v, diarrhea/constipation, dysuria or increased urinary frequency    Vital Signs Last 24 Hrs  T(C): 36.8 (04 Mar 2018 08:02), Max: 36.8 (04 Mar 2018 08:02)  T(F): 98.2 (04 Mar 2018 08:02), Max: 98.2 (04 Mar 2018 08:02)  HR: 80 (04 Mar 2018 08:02) (80 - 107)  BP: 98/61 (04 Mar 2018 08:02) (90/58 - 98/61)  RR: 18 (04 Mar 2018 08:02) (18 - 20)  SpO2: 92% (04 Mar 2018 08:02) (91% - 93%)    PHYSICAL EXAM:  General: Elderly female, in NAD  HEENT: NC AT EOMI, moist mucous membranes.  Neck: Supple,  No JVD.   Respiratory: good respiratory effort, +rhonchi bilateral anterior and posterior lung fields  Cardiovascular: RRR, no murmurs, rubs, or gallops.  Gastrointestinal: BS+, soft, non-tender, no masses palpable  Extremities: No peripheral edema.  Vascular: 2+ peripheral pulses.  Neurological: A/O x 3, no focal deficits.  Psychiatric: Normal mood, normal affect.  Musculoskeletal: moving all extremities  Skin: No rashes.    HEALTH ISSUES - PROBLEM Dx:  Hypoxemia: Hypoxemia  Influenza A: Influenza A  Chronic kidney disease, unspecified CKD stage: Chronic kidney disease, unspecified CKD stage  Preventive measure: Preventive measure  Anxiety: Anxiety  Smoking: Smoking  Multiple myeloma: Multiple myeloma  COPD exacerbation: COPD exacerbation    MEDICATIONS  (STANDING):  ALBUTerol/ipratropium for Nebulization 3 milliLiter(s) Nebulizer every 6 hours  aspirin enteric coated 81 milliGRAM(s) Oral daily  azithromycin   Tablet 500 milliGRAM(s) Oral daily  DULoxetine 30 milliGRAM(s) Oral daily  enoxaparin Injectable 30 milliGRAM(s) SubCutaneous every 24 hours  folic acid 1 milliGRAM(s) Oral daily  methylPREDNISolone sodium succinate Injectable 60 milliGRAM(s) IV Push every 12 hours  nicotine -   7 mG/24Hr(s) Patch 1 patch Transdermal daily  oseltamivir 30 milliGRAM(s) Oral daily  pantoprazole    Tablet 40 milliGRAM(s) Oral before breakfast  saccharomyces boulardii 250 milliGRAM(s) Oral two times a day  tamsulosin 0.4 milliGRAM(s) Oral at bedtime    MEDICATIONS  (PRN):  acetaminophen   Tablet 650 milliGRAM(s) Oral every 6 hours PRN For Temp greater than 38 C (100.4 F)  acetaminophen   Tablet. 650 milliGRAM(s) Oral every 6 hours PRN Mild Pain (1 - 3)  ALBUTerol    0.083% 2.5 milliGRAM(s) Nebulizer every 3 hours PRN Shortness of Breath and/or Wheezing  ALPRAZolam 0.5 milliGRAM(s) Oral two times a day PRN anxiety      LABS:    03-03    139  |  101  |  31.0<H>  ----------------------------<  123<H>  4.2   |  22.0  |  1.17    Ca    8.7      03 Mar 2018 08:49  Phos  3.4     03-03  Mg     2.4     03-03

## 2018-03-05 DIAGNOSIS — E87.0 HYPEROSMOLALITY AND HYPERNATREMIA: ICD-10-CM

## 2018-03-05 LAB
ANION GAP SERPL CALC-SCNC: 11 MMOL/L — SIGNIFICANT CHANGE UP (ref 5–17)
ANISOCYTOSIS BLD QL: SLIGHT — SIGNIFICANT CHANGE UP
BUN SERPL-MCNC: 25 MG/DL — HIGH (ref 8–20)
CALCIUM SERPL-MCNC: 7.7 MG/DL — LOW (ref 8.6–10.2)
CHLORIDE SERPL-SCNC: 110 MMOL/L — HIGH (ref 98–107)
CO2 SERPL-SCNC: 25 MMOL/L — SIGNIFICANT CHANGE UP (ref 22–29)
CREAT SERPL-MCNC: 0.83 MG/DL — SIGNIFICANT CHANGE UP (ref 0.5–1.3)
GLUCOSE SERPL-MCNC: 112 MG/DL — SIGNIFICANT CHANGE UP (ref 70–115)
HCT VFR BLD CALC: 29.2 % — LOW (ref 37–47)
HGB BLD-MCNC: 9.3 G/DL — LOW (ref 12–16)
HYPOCHROMIA BLD QL: SLIGHT — SIGNIFICANT CHANGE UP
IGA FLD-MCNC: 120 MG/DL — SIGNIFICANT CHANGE UP (ref 68–378)
IGG FLD-MCNC: 451 MG/DL — LOW (ref 694–1618)
IGM SERPL-MCNC: 42 MG/DL — SIGNIFICANT CHANGE UP (ref 40–230)
KAPPA LC SER QL IFE: 3.72 MG/DL — HIGH (ref 0.33–1.94)
KAPPA/LAMBDA FREE LIGHT CHAIN RATIO, SERUM: 1.06 RATIO — SIGNIFICANT CHANGE UP (ref 0.26–1.65)
LAMBDA LC SER QL IFE: 3.5 MG/DL — HIGH (ref 0.57–2.63)
LYMPHOCYTES # BLD AUTO: 15 % — LOW (ref 20–55)
MACROCYTES BLD QL: SLIGHT — SIGNIFICANT CHANGE UP
MAGNESIUM SERPL-MCNC: 2.1 MG/DL — SIGNIFICANT CHANGE UP (ref 1.6–2.6)
MCHC RBC-ENTMCNC: 30.9 PG — SIGNIFICANT CHANGE UP (ref 27–31)
MCHC RBC-ENTMCNC: 31.8 G/DL — LOW (ref 32–36)
MCV RBC AUTO: 97 FL — SIGNIFICANT CHANGE UP (ref 81–99)
MICROCYTES BLD QL: SLIGHT — SIGNIFICANT CHANGE UP
MONOCYTES NFR BLD AUTO: 11 % — HIGH (ref 3–10)
NEUTROPHILS NFR BLD AUTO: 71 % — SIGNIFICANT CHANGE UP (ref 37–73)
NEUTS BAND # BLD: 2 % — SIGNIFICANT CHANGE UP (ref 0–8)
NT-PROBNP SERPL-SCNC: 1699 PG/ML — HIGH (ref 0–300)
OVALOCYTES BLD QL SMEAR: SLIGHT — SIGNIFICANT CHANGE UP
PLAT MORPH BLD: NORMAL — SIGNIFICANT CHANGE UP
PLATELET # BLD AUTO: 204 K/UL — SIGNIFICANT CHANGE UP (ref 150–400)
POIKILOCYTOSIS BLD QL AUTO: SLIGHT — SIGNIFICANT CHANGE UP
POTASSIUM SERPL-MCNC: 4 MMOL/L — SIGNIFICANT CHANGE UP (ref 3.5–5.3)
POTASSIUM SERPL-SCNC: 4 MMOL/L — SIGNIFICANT CHANGE UP (ref 3.5–5.3)
RBC # BLD: 3.01 M/UL — LOW (ref 4.4–5.2)
RBC # FLD: 18.2 % — HIGH (ref 11–15.6)
RBC BLD AUTO: ABNORMAL
SODIUM SERPL-SCNC: 146 MMOL/L — HIGH (ref 135–145)
VARIANT LYMPHS # BLD: 1 % — SIGNIFICANT CHANGE UP (ref 0–6)
WBC # BLD: 7.5 K/UL — SIGNIFICANT CHANGE UP (ref 4.8–10.8)
WBC # FLD AUTO: 7.5 K/UL — SIGNIFICANT CHANGE UP (ref 4.8–10.8)

## 2018-03-05 PROCEDURE — 99232 SBSQ HOSP IP/OBS MODERATE 35: CPT | Mod: GC

## 2018-03-05 RX ORDER — SODIUM CHLORIDE 9 MG/ML
1000 INJECTION, SOLUTION INTRAVENOUS
Qty: 0 | Refills: 0 | Status: COMPLETED | OUTPATIENT
Start: 2018-03-05 | End: 2018-03-05

## 2018-03-05 RX ADMIN — Medication 60 MILLIGRAM(S): at 05:37

## 2018-03-05 RX ADMIN — Medication 650 MILLIGRAM(S): at 21:27

## 2018-03-05 RX ADMIN — PANTOPRAZOLE SODIUM 40 MILLIGRAM(S): 20 TABLET, DELAYED RELEASE ORAL at 05:38

## 2018-03-05 RX ADMIN — ENOXAPARIN SODIUM 30 MILLIGRAM(S): 100 INJECTION SUBCUTANEOUS at 21:27

## 2018-03-05 RX ADMIN — Medication 1 MILLIGRAM(S): at 12:15

## 2018-03-05 RX ADMIN — DULOXETINE HYDROCHLORIDE 30 MILLIGRAM(S): 30 CAPSULE, DELAYED RELEASE ORAL at 12:15

## 2018-03-05 RX ADMIN — Medication 60 MILLIGRAM(S): at 18:15

## 2018-03-05 RX ADMIN — Medication 81 MILLIGRAM(S): at 12:15

## 2018-03-05 RX ADMIN — Medication 250 MILLIGRAM(S): at 18:15

## 2018-03-05 RX ADMIN — ENOXAPARIN SODIUM 30 MILLIGRAM(S): 100 INJECTION SUBCUTANEOUS at 05:38

## 2018-03-05 RX ADMIN — Medication 30 MILLIGRAM(S): at 12:17

## 2018-03-05 RX ADMIN — Medication 3 MILLILITER(S): at 15:08

## 2018-03-05 RX ADMIN — AZITHROMYCIN 500 MILLIGRAM(S): 500 TABLET, FILM COATED ORAL at 15:08

## 2018-03-05 RX ADMIN — Medication 650 MILLIGRAM(S): at 05:38

## 2018-03-05 RX ADMIN — Medication 650 MILLIGRAM(S): at 22:00

## 2018-03-05 RX ADMIN — Medication 1 PATCH: at 15:10

## 2018-03-05 RX ADMIN — Medication 0.5 MILLIGRAM(S): at 12:15

## 2018-03-05 RX ADMIN — TAMSULOSIN HYDROCHLORIDE 0.4 MILLIGRAM(S): 0.4 CAPSULE ORAL at 21:27

## 2018-03-05 RX ADMIN — Medication 1 PATCH: at 12:15

## 2018-03-05 RX ADMIN — Medication 0.5 MILLIGRAM(S): at 21:27

## 2018-03-05 RX ADMIN — SODIUM CHLORIDE 125 MILLILITER(S): 9 INJECTION, SOLUTION INTRAVENOUS at 12:15

## 2018-03-05 RX ADMIN — Medication 650 MILLIGRAM(S): at 06:08

## 2018-03-05 RX ADMIN — Medication 250 MILLIGRAM(S): at 05:38

## 2018-03-05 RX ADMIN — Medication 3 MILLILITER(S): at 08:33

## 2018-03-05 RX ADMIN — Medication 3 MILLILITER(S): at 20:40

## 2018-03-05 NOTE — PROGRESS NOTE ADULT - PROBLEM SELECTOR PLAN 5
- avoid nephrotoxic medications  - renally dose medications as indicated  -stage 3B. monitor closely and encourage oral hydration Stable, asymptomatic.   C/w: ALPRAZolam 0.5 milliGRAM(s) Oral two times a day PRN

## 2018-03-05 NOTE — DIETITIAN INITIAL EVALUATION ADULT. - PROBLEM/PLAN-6
DISPLAY PLAN FREE TEXT Graft Donor Site Bandage (Optional-Leave Blank If You Don't Want In Note): Steri-strips and a pressure bandage were applied to the donor site.

## 2018-03-05 NOTE — PROGRESS NOTE ADULT - SUBJECTIVE AND OBJECTIVE BOX
PULMONARY PROGRESS NOTE      BECCA JARRELLMACHELLE-[id number]    Patient is a 72y old  Female who presents with a chief complaint of Shortness of breath (02 Mar 2018 18:36)      INTERVAL HPI/OVERNIGHT EVENTS:  feeling better   less cough and SOB    MEDICATIONS  (STANDING):  ALBUTerol/ipratropium for Nebulization 3 milliLiter(s) Nebulizer every 6 hours  aspirin enteric coated 81 milliGRAM(s) Oral daily  azithromycin   Tablet 500 milliGRAM(s) Oral daily  DULoxetine 30 milliGRAM(s) Oral daily  enoxaparin Injectable 30 milliGRAM(s) SubCutaneous every 24 hours  folic acid 1 milliGRAM(s) Oral daily  methylPREDNISolone sodium succinate Injectable 60 milliGRAM(s) IV Push every 12 hours  nicotine -   7 mG/24Hr(s) Patch 1 patch Transdermal daily  oseltamivir 30 milliGRAM(s) Oral daily  pantoprazole    Tablet 40 milliGRAM(s) Oral before breakfast  saccharomyces boulardii 250 milliGRAM(s) Oral two times a day  tamsulosin 0.4 milliGRAM(s) Oral at bedtime      MEDICATIONS  (PRN):  acetaminophen   Tablet 650 milliGRAM(s) Oral every 6 hours PRN For Temp greater than 38 C (100.4 F)  acetaminophen   Tablet. 650 milliGRAM(s) Oral every 6 hours PRN Mild Pain (1 - 3)  ALBUTerol    0.083% 2.5 milliGRAM(s) Nebulizer every 3 hours PRN Shortness of Breath and/or Wheezing  ALPRAZolam 0.5 milliGRAM(s) Oral two times a day PRN anxiety      Allergies    penicillin (Unknown)    Intolerances        PAST MEDICAL & SURGICAL HISTORY:  Multiple myeloma  Anxiety  Hearing loss, unspecified hearing loss type, unspecified laterality  Kidney stones  MI (myocardial infarction)  S/P cholecystectomy      SOCIAL HISTORY  Smoking History:       REVIEW OF SYSTEMS:    CONSTITUTIONAL:  No distress    HEENT:  Eyes:  No diplopia or blurred vision. ENT:  No earache, sore throat or runny nose.    CARDIOVASCULAR:  No pressure, squeezing, tightness, heaviness or aching about the chest; no palpitations.    RESPIRATORY:  above    GASTROINTESTINAL:  No nausea, vomiting or diarrhea.    GENITOURINARY:  No dysuria, frequency or urgency.    NEUROLOGIC:  No paresthesias, fasciculations, seizures or weakness.    Extremities: No cyanosis, clubbing or edema    PSYCHIATRIC:  No disorder of thought or mood.    Vital Signs Last 24 Hrs  T(C): 36.8 (05 Mar 2018 15:26), Max: 37.2 (05 Mar 2018 08:43)  T(F): 98.3 (05 Mar 2018 15:26), Max: 98.9 (05 Mar 2018 08:43)  HR: 98 (05 Mar 2018 15:26) (92 - 102)  BP: 112/70 (05 Mar 2018 15:26) (92/58 - 112/70)  BP(mean): --  RR: 20 (05 Mar 2018 15:26) (18 - 20)  SpO2: 95% (05 Mar 2018 15:26) (95% - 95%)    PHYSICAL EXAMINATION:    GENERAL: The patient is awake and alert in no apparent distress.     HEENT: Head is normocephalic and atraumatic. Extraocular muscles are intact. Mucous membranes are moist.    NECK: Supple.    LUNGS: improved air entry, bilat expiratory wheezes    HEART: Regular rate and rhythm without murmur.    ABDOMEN: Soft, nontender, and nondistended.      EXTREMITIES: Without any cyanosis, clubbing, rash, lesions or edema.    NEUROLOGIC: Grossly intact.    LABS:                        9.3    7.5   )-----------( 204      ( 05 Mar 2018 08:17 )             29.2     03-05    146<H>  |  110<H>  |  25.0<H>  ----------------------------<  112  4.0   |  25.0  |  0.83    Ca    7.7<L>      05 Mar 2018 08:17  Mg     2.1     03-05                  Serum Pro-Brain Natriuretic Peptide: 1699 pg/mL (03-05-18 @ 08:17)          MICROBIOLOGY:    RADIOLOGY & ADDITIONAL STUDIES:  < from: CT Chest No Cont (03.02.18 @ 11:59) >   EXAM:  CT CHEST                          PROCEDURE DATE:  03/02/2018          INTERPRETATION:      CT tomographic sections of the chest were performed from the thoracic   inlet to the upper abdomen. Axial sections were performed followed by   reformatted parasagittal and coronal MIP reconstructions. No intravenous   contrast.      History:  Flu symptoms    Comparisons: Chest x-ray dated 3/1/2018    Findings:  Reticulonodular infiltrates predominating in the right upper   lobe. No pleural effusions. Linear scarring left lower lobe and both   apices..  6 mm pleural-based nodule left upper lobe on image #36.   Additional peripheral pleural-based subcentimeter nodules in right upper   lobe. The pulmonary vasculature and aorta are normal in caliber and   contour. Prominent calcifications in the mitral annulus. The heart size   is normal. No pericardial effusion. There is no evidence of   lymphadenopathy in the hilum, mediastinum or subcarinal area. The axilla   appears normal.    Bone window examination is normal for age.    Limited views of the upper abdomen demonstrate a normal size liver and   spleen. The adrenal glands are unremarkable.    Impression: Reticulonodular infiltrates bilaterally. Subcentimeter lung   nodules bilaterally.No pleural effusions.                KAMARI SHAFFER M.D., ATTENDING RADIOLOGIST  This document has been electronically signed. Mar  2 2018 12:29PM    < end of copied text >

## 2018-03-05 NOTE — PROGRESS NOTE ADULT - PROBLEM SELECTOR PLAN 6
DVT PPx: Renally dosed Lovenox, enoxaparin Injectable 30 milliGRAM(s) SQ QD - avoid nephrotoxic medications  - renally dose medications as indicated  -stage 3B. monitor closely and encourage oral hydration

## 2018-03-05 NOTE — PROGRESS NOTE ADULT - PROBLEM SELECTOR PLAN 4
Stable, asymptomatic.   C/w: ALPRAZolam 0.5 milliGRAM(s) Oral two times a day PRN Encouraged smoking cessation counseling provided  c/w nicotine -   7 mG/24Hr(s) Patch 1 patch Transdermal daily

## 2018-03-05 NOTE — PROGRESS NOTE ADULT - PROBLEM SELECTOR PLAN 3
Encouraged smoking cessation counseling provided  c/w nicotine -   7 mG/24Hr(s) Patch 1 patch Transdermal daily Managed out patient with Heme/Onc and chemo.

## 2018-03-05 NOTE — PROGRESS NOTE ADULT - SUBJECTIVE AND OBJECTIVE BOX
Resident Progress Note  Patient is a 72y old  Female who presents with a chief complaint of Shortness of breath (02 Mar 2018 18:36)    INTERVAL/OVERNIGHT EVENTS: Patient seen and examined this morning, no acute overnight events, patient states that her VERAS is not as bad as yesterday. She ambulates to bathroom w/o oxygen, encouraged her to keep oxygen on.    ROS: denies chest pain, palpitations, light headedness/dizziness difficulty breathing/cough, fevers/chills, abdominal pain, n/v, diarrhea/constipation, dysuria or increased urinary frequency    Vital Signs Last 24 Hrs  T(C): 36.8 (04 Mar 2018 15:27), Max: 36.8 (04 Mar 2018 08:02)  T(F): 98.3 (04 Mar 2018 15:27), Max: 98.3 (04 Mar 2018 15:27)  HR: 102 (04 Mar 2018 16:31) (80 - 109)  BP: 92/58 (04 Mar 2018 16:31) (88/59 - 98/61)  RR: 18 (04 Mar 2018 15:27) (18 - 18)  SpO2: 95% (04 Mar 2018 15:27) (92% - 95%)    PHYSICAL EXAM:  General: Adult female, in NAD  HEENT: NC AT EOMI, moist mucous membranes.  Neck: Supple,  No JVD.   Respiratory: +occasional end expiratory wheezing Righ lung fields, Diffuse rhonchi bialteral lung fields, L>R, over all decreased compare to yesterday.  Cardiovascular: RRR, no murmurs, rubs, or gallops.  Gastrointestinal: BS+, soft, non-tender, no masses palpable  Extremities: No peripheral edema.  Vascular: 2+ peripheral pulses.  Neurological: A/O x 3, no focal deficits.  Psychiatric: Normal mood, normal affect.  Musculoskeletal: moving all extremities  Skin: No rashes.    HEALTH ISSUES - PROBLEM Dx:  Hypoxemia: Hypoxemia  Influenza A: Influenza A  Chronic kidney disease, unspecified CKD stage: Chronic kidney disease, unspecified CKD stage  Preventive measure: Preventive measure  Anxiety: Anxiety  Smoking: Smoking  Multiple myeloma: Multiple myeloma  COPD exacerbation: COPD exacerbation    MEDICATIONS  (STANDING):  ALBUTerol/ipratropium for Nebulization 3 milliLiter(s) Nebulizer every 6 hours  aspirin enteric coated 81 milliGRAM(s) Oral daily  azithromycin   Tablet 500 milliGRAM(s) Oral daily  DULoxetine 30 milliGRAM(s) Oral daily  enoxaparin Injectable 30 milliGRAM(s) SubCutaneous every 24 hours  folic acid 1 milliGRAM(s) Oral daily  methylPREDNISolone sodium succinate Injectable 60 milliGRAM(s) IV Push every 12 hours  nicotine -   7 mG/24Hr(s) Patch 1 patch Transdermal daily  oseltamivir 30 milliGRAM(s) Oral daily  pantoprazole    Tablet 40 milliGRAM(s) Oral before breakfast  saccharomyces boulardii 250 milliGRAM(s) Oral two times a day  tamsulosin 0.4 milliGRAM(s) Oral at bedtime    MEDICATIONS  (PRN):  acetaminophen   Tablet 650 milliGRAM(s) Oral every 6 hours PRN For Temp greater than 38 C (100.4 F)  acetaminophen   Tablet. 650 milliGRAM(s) Oral every 6 hours PRN Mild Pain (1 - 3)  ALBUTerol    0.083% 2.5 milliGRAM(s) Nebulizer every 3 hours PRN Shortness of Breath and/or Wheezing  ALPRAZolam 0.5 milliGRAM(s) Oral two times a day PRN anxiety      LABS:    03-04    141  |  104  |  32.0<H>  ----------------------------<  116<H>  3.9   |  26.0  |  1.11    Ca    8.6      04 Mar 2018 07:55  Phos  3.4     03-03  Mg     2.4     03-03

## 2018-03-05 NOTE — PROGRESS NOTE ADULT - PROBLEM SELECTOR PLAN 2
Managed out patient with Heme/Onc and chemo. CONTINUE Gentle Hydration  sinus tachycardia from dehydration

## 2018-03-06 VITALS
HEART RATE: 88 BPM | SYSTOLIC BLOOD PRESSURE: 123 MMHG | DIASTOLIC BLOOD PRESSURE: 78 MMHG | RESPIRATION RATE: 18 BRPM | TEMPERATURE: 98 F | OXYGEN SATURATION: 92 %

## 2018-03-06 LAB
ANION GAP SERPL CALC-SCNC: 11 MMOL/L — SIGNIFICANT CHANGE UP (ref 5–17)
BUN SERPL-MCNC: 20 MG/DL — SIGNIFICANT CHANGE UP (ref 8–20)
CALCIUM SERPL-MCNC: 7.2 MG/DL — LOW (ref 8.6–10.2)
CHLORIDE SERPL-SCNC: 107 MMOL/L — SIGNIFICANT CHANGE UP (ref 98–107)
CO2 SERPL-SCNC: 25 MMOL/L — SIGNIFICANT CHANGE UP (ref 22–29)
CREAT SERPL-MCNC: 0.66 MG/DL — SIGNIFICANT CHANGE UP (ref 0.5–1.3)
EOSINOPHIL # BLD AUTO: 0 K/UL — SIGNIFICANT CHANGE UP (ref 0–0.5)
EOSINOPHIL NFR BLD AUTO: 0 % — SIGNIFICANT CHANGE UP (ref 0–6)
GLUCOSE SERPL-MCNC: 114 MG/DL — SIGNIFICANT CHANGE UP (ref 70–115)
HCT VFR BLD CALC: 28.2 % — LOW (ref 37–47)
HGB BLD-MCNC: 9 G/DL — LOW (ref 12–16)
LYMPHOCYTES # BLD AUTO: 0.8 K/UL — LOW (ref 1–4.8)
LYMPHOCYTES # BLD AUTO: 15.5 % — LOW (ref 20–55)
MCHC RBC-ENTMCNC: 30.9 PG — SIGNIFICANT CHANGE UP (ref 27–31)
MCHC RBC-ENTMCNC: 31.9 G/DL — LOW (ref 32–36)
MCV RBC AUTO: 96.9 FL — SIGNIFICANT CHANGE UP (ref 81–99)
MONOCYTES # BLD AUTO: 0.3 K/UL — SIGNIFICANT CHANGE UP (ref 0–0.8)
MONOCYTES NFR BLD AUTO: 6.4 % — SIGNIFICANT CHANGE UP (ref 3–10)
NEUTROPHILS # BLD AUTO: 4 K/UL — SIGNIFICANT CHANGE UP (ref 1.8–8)
NEUTROPHILS NFR BLD AUTO: 77.7 % — HIGH (ref 37–73)
PLATELET # BLD AUTO: 194 K/UL — SIGNIFICANT CHANGE UP (ref 150–400)
POTASSIUM SERPL-MCNC: 3.7 MMOL/L — SIGNIFICANT CHANGE UP (ref 3.5–5.3)
POTASSIUM SERPL-SCNC: 3.7 MMOL/L — SIGNIFICANT CHANGE UP (ref 3.5–5.3)
RBC # BLD: 2.91 M/UL — LOW (ref 4.4–5.2)
RBC # FLD: 18 % — HIGH (ref 11–15.6)
SODIUM SERPL-SCNC: 143 MMOL/L — SIGNIFICANT CHANGE UP (ref 135–145)
WBC # BLD: 5.2 K/UL — SIGNIFICANT CHANGE UP (ref 4.8–10.8)
WBC # FLD AUTO: 5.2 K/UL — SIGNIFICANT CHANGE UP (ref 4.8–10.8)

## 2018-03-06 PROCEDURE — 84100 ASSAY OF PHOSPHORUS: CPT

## 2018-03-06 PROCEDURE — 99239 HOSP IP/OBS DSCHRG MGMT >30: CPT

## 2018-03-06 PROCEDURE — 82784 ASSAY IGA/IGD/IGG/IGM EACH: CPT

## 2018-03-06 PROCEDURE — 83605 ASSAY OF LACTIC ACID: CPT

## 2018-03-06 PROCEDURE — 99285 EMERGENCY DEPT VISIT HI MDM: CPT | Mod: 25

## 2018-03-06 PROCEDURE — 82803 BLOOD GASES ANY COMBINATION: CPT

## 2018-03-06 PROCEDURE — 83735 ASSAY OF MAGNESIUM: CPT

## 2018-03-06 PROCEDURE — 85027 COMPLETE CBC AUTOMATED: CPT

## 2018-03-06 PROCEDURE — 71250 CT THORAX DX C-: CPT

## 2018-03-06 PROCEDURE — 94640 AIRWAY INHALATION TREATMENT: CPT

## 2018-03-06 PROCEDURE — 85610 PROTHROMBIN TIME: CPT

## 2018-03-06 PROCEDURE — 87633 RESP VIRUS 12-25 TARGETS: CPT

## 2018-03-06 PROCEDURE — 93005 ELECTROCARDIOGRAM TRACING: CPT

## 2018-03-06 PROCEDURE — 85379 FIBRIN DEGRADATION QUANT: CPT

## 2018-03-06 PROCEDURE — 93970 EXTREMITY STUDY: CPT

## 2018-03-06 PROCEDURE — 94760 N-INVAS EAR/PLS OXIMETRY 1: CPT

## 2018-03-06 PROCEDURE — 80053 COMPREHEN METABOLIC PANEL: CPT

## 2018-03-06 PROCEDURE — 83880 ASSAY OF NATRIURETIC PEPTIDE: CPT

## 2018-03-06 PROCEDURE — 87040 BLOOD CULTURE FOR BACTERIA: CPT

## 2018-03-06 PROCEDURE — 85730 THROMBOPLASTIN TIME PARTIAL: CPT

## 2018-03-06 PROCEDURE — 36600 WITHDRAWAL OF ARTERIAL BLOOD: CPT

## 2018-03-06 PROCEDURE — 87798 DETECT AGENT NOS DNA AMP: CPT

## 2018-03-06 PROCEDURE — 87581 M.PNEUMON DNA AMP PROBE: CPT

## 2018-03-06 PROCEDURE — 71045 X-RAY EXAM CHEST 1 VIEW: CPT

## 2018-03-06 PROCEDURE — 84484 ASSAY OF TROPONIN QUANT: CPT

## 2018-03-06 PROCEDURE — 36415 COLL VENOUS BLD VENIPUNCTURE: CPT

## 2018-03-06 PROCEDURE — 87486 CHLMYD PNEUM DNA AMP PROBE: CPT

## 2018-03-06 PROCEDURE — 80048 BASIC METABOLIC PNL TOTAL CA: CPT

## 2018-03-06 PROCEDURE — 96374 THER/PROPH/DIAG INJ IV PUSH: CPT

## 2018-03-06 RX ADMIN — Medication 1 PATCH: at 13:36

## 2018-03-06 RX ADMIN — Medication 250 MILLIGRAM(S): at 05:00

## 2018-03-06 RX ADMIN — Medication 60 MILLIGRAM(S): at 05:00

## 2018-03-06 RX ADMIN — Medication 60 MILLIGRAM(S): at 13:36

## 2018-03-06 RX ADMIN — Medication 650 MILLIGRAM(S): at 10:15

## 2018-03-06 RX ADMIN — Medication 3 MILLILITER(S): at 14:21

## 2018-03-06 RX ADMIN — Medication 650 MILLIGRAM(S): at 10:57

## 2018-03-06 RX ADMIN — Medication 650 MILLIGRAM(S): at 04:22

## 2018-03-06 RX ADMIN — AZITHROMYCIN 500 MILLIGRAM(S): 500 TABLET, FILM COATED ORAL at 13:35

## 2018-03-06 RX ADMIN — DULOXETINE HYDROCHLORIDE 30 MILLIGRAM(S): 30 CAPSULE, DELAYED RELEASE ORAL at 13:35

## 2018-03-06 RX ADMIN — Medication 1 MILLIGRAM(S): at 13:35

## 2018-03-06 RX ADMIN — PANTOPRAZOLE SODIUM 40 MILLIGRAM(S): 20 TABLET, DELAYED RELEASE ORAL at 05:01

## 2018-03-06 RX ADMIN — Medication 0.5 MILLIGRAM(S): at 10:16

## 2018-03-06 RX ADMIN — Medication 650 MILLIGRAM(S): at 04:52

## 2018-03-06 RX ADMIN — Medication 30 MILLIGRAM(S): at 13:35

## 2018-03-06 RX ADMIN — Medication 3 MILLILITER(S): at 08:53

## 2018-03-06 RX ADMIN — Medication 0.5 MILLIGRAM(S): at 15:02

## 2018-03-06 RX ADMIN — Medication 81 MILLIGRAM(S): at 13:35

## 2018-03-06 NOTE — PROGRESS NOTE ADULT - SUBJECTIVE AND OBJECTIVE BOX
Resident Progress Note  Patient is a 72y old  Female who presents with a chief complaint of Shortness of breath (02 Mar 2018 18:36)    INTERVAL/OVERNIGHT EVENTS: Patient seen and examined bedside this morning, no acute overnight events. states she is no longer out of breath on exertion. No other complaints this morning.    ROS: Denies chest pain, palpitations, sob, light headedness/dizziness difficulty breathing/cough, fevers/chills, abdominal pain, n/v, diarrhea/constipation, dysuria or increased urinary frequency    Vital Signs Last 24 Hrs  T(C): 36.7 (06 Mar 2018 00:11), Max: 37.2 (05 Mar 2018 08:43)  T(F): 98.1 (06 Mar 2018 00:11), Max: 98.9 (05 Mar 2018 08:43)  HR: 91 (06 Mar 2018 00:11) (91 - 98)  BP: 118/64 (06 Mar 2018 00:11) (106/60 - 118/64)  RR: 14 (06 Mar 2018 00:11) (14 - 20)  SpO2: 95% (06 Mar 2018 00:11) (93% - 95%)    PHYSICAL EXAM:  General: Elderly female, in NAD  HEENT: NC AT EOMI, moist mucous membranes.  Neck: Supple  Respiratory: Good respiratory effort, + minimal rhonchi bilaterally, R>L  Cardiovascular: RRR, no murmurs, rubs, or gallops.  Gastrointestinal: BS+, soft, non-tender, no masses palpable  Extremities: No peripheral edema.  Vascular: 2+ peripheral pulses.  Neurological: A/O x 3, no focal deficits.  Psychiatric: Normal mood, normal affect.  Musculoskeletal: moving all extremities  Skin: No rashes.    HEALTH ISSUES - PROBLEM Dx:  Dehydration with hypernatremia: Dehydration with hypernatremia  Hypoxemia: Hypoxemia  Influenza A: Influenza A  Chronic kidney disease, unspecified CKD stage: Chronic kidney disease, unspecified CKD stage  Preventive measure: Preventive measure  Anxiety: Anxiety  Smoking: Smoking  Multiple myeloma: Multiple myeloma  COPD exacerbation: COPD exacerbation    MEDICATIONS  (STANDING):  ALBUTerol/ipratropium for Nebulization 3 milliLiter(s) Nebulizer every 6 hours  aspirin enteric coated 81 milliGRAM(s) Oral daily  azithromycin   Tablet 500 milliGRAM(s) Oral daily  DULoxetine 30 milliGRAM(s) Oral daily  enoxaparin Injectable 30 milliGRAM(s) SubCutaneous every 24 hours  folic acid 1 milliGRAM(s) Oral daily  methylPREDNISolone sodium succinate Injectable 60 milliGRAM(s) IV Push every 12 hours  nicotine -   7 mG/24Hr(s) Patch 1 patch Transdermal daily  oseltamivir 30 milliGRAM(s) Oral daily  pantoprazole    Tablet 40 milliGRAM(s) Oral before breakfast  saccharomyces boulardii 250 milliGRAM(s) Oral two times a day  tamsulosin 0.4 milliGRAM(s) Oral at bedtime    MEDICATIONS  (PRN):  acetaminophen   Tablet 650 milliGRAM(s) Oral every 6 hours PRN For Temp greater than 38 C (100.4 F)  acetaminophen   Tablet. 650 milliGRAM(s) Oral every 6 hours PRN Mild Pain (1 - 3)  ALBUTerol    0.083% 2.5 milliGRAM(s) Nebulizer every 3 hours PRN Shortness of Breath and/or Wheezing  ALPRAZolam 0.5 milliGRAM(s) Oral two times a day PRN anxiety      LABS:                        9.3    7.5   )-----------( 204      ( 05 Mar 2018 08:17 )             29.2     03-05    146<H>  |  110<H>  |  25.0<H>  ----------------------------<  112  4.0   |  25.0  |  0.83    Ca    7.7<L>      05 Mar 2018 08:17  Mg     2.1     03-05

## 2018-03-06 NOTE — PROGRESS NOTE ADULT - PROVIDER SPECIALTY LIST ADULT
Family Medicine
Pulmonology
Pulmonology
Family Medicine

## 2018-03-06 NOTE — PROGRESS NOTE ADULT - PROBLEM SELECTOR PLAN 7
DVT PPx: Renally dosed Lovenox, enoxaparin Injectable 30 milliGRAM(s) SQ QD
DVT PPx: Renally dosed Lovenox, enoxaparin Injectable 30 milliGRAM(s) SQ QD

## 2018-03-06 NOTE — CHART NOTE - NSCHARTNOTEFT_GEN_A_CORE
Patient oxygen saturations:    SpO2 92% at rest on RA  SpO2 94% at rest with supp O2 2LPM  SpO2 87% ambulating  SpO2 90% ambulating with supp O2 2LPM    Patient to be discharged home on oxygen

## 2018-03-06 NOTE — PROGRESS NOTE ADULT - PROBLEM SELECTOR PLAN 1
Likely 2/2 Influenza + URI.  Stable, improving, no VERAS and b/l minimal rhonchi.  azithromycin   Tablet 500 milliGRAM(s) Oral daily end date 03/06/2018  oseltamivir 30 milliGRAM(s) Oral daily end date 03/06/2018  methylPREDNISolone sodium succinate Injectable 60 milliGRAM(s) IV Push every 12hr, will switch to PO later today  Supplemental oxygen requirement has decreased significantly  ALBUTerol    0.083% 2.5 milliGRAM(s) Nebulizer every 3 hours PRN  ALBUTerol/ipratropium for Nebulization 3 milliLiter(s) Nebulizer every 6 hours

## 2018-03-06 NOTE — PROGRESS NOTE ADULT - ATTENDING COMMENTS
I have seen and examined the patient, reviewed the chart, labs and diagnostics and I agree with assessment and plan as above with Family medicine resident . Note Edited where needed.    still requiring 3 L supplemental oxygen for Influenza and COPD flare  slow recovery from Immunocompromised status from cancer and recent chemo  CT chest neg for PE  Continue Azithromycin for 5 days.   Continue IV SOLUMEDROL Q 12     Plan discussed with Patient and Nursing staff.
I have seen and examined the patient, reviewed the chart, labs and diagnostics and I agree with assessment and plan as above with Family medicine resident . Note Edited where needed.    Feeling much better. Breathing improved and saturating well on RA, but requiring Oxygen with ambulation. consulted social work/ for Arranging home Oxygen 2 L  Tapering prednisone  follow up with Oncology Dr Palacio for Myeloma     Plan discussed with Patient and Nursing staff, social work and case management
I have seen and examined the patient, reviewed the chart, labs and diagnostics and I agree with assessment and plan as above with Family medicine resident . Note Edited where needed.    still requiring 3 L supplemental oxygen for Influenza and COPD flare  slow recovery from Immunocompromised status from cancer and recent chemo  CT chest neg for PE  Continue Azithromycin for 5 days
Note addended where needed. Plan discussed with patient and pulmonary. I called her emergency contact (charisse - son) and discussed with him the plan of care. Likely d/c home in the next 1-2 days
I have seen and examined the patient, reviewed the chart, labs and diagnostics and I agree with assessment and plan as above with Family medicine resident . Note Edited where needed.    supplemental oxygen requirement down to 2 L NC this am for Influenza and COPD flare  slow recovery from Immunocompromised status from cancer and recent chemo  CT chest neg for PE  Continue Azithromycin for 5 days.   Continue IV SOLUMEDROL Q 12 today and start weaning from tomorrow     sinus Tachycardia from dehydration; Gave  ML     Plan discussed with Patient and Nursing staff.

## 2018-03-06 NOTE — PROGRESS NOTE ADULT - ASSESSMENT
COPD exacerbation, flu A positive, nicotine addiction  CT scan with diffuse tree in bud, bronchiectasis  bronchospasm on exam, slowly improving  no purulent sputum or fever  known myeloma, IgG levels pending  continue medrol , nebs, abx, add sputum CANDI  home 02 eval prior d/c
72 year-old female active smoker, with COPD, multiple myeloma, and CKD, who is admitted with a COPD exacerbation 2/2 influenza, which did not respond to levaquin.  She has improved significantly overnight  She is still requiring 2L NC at this time. Goal will be brisk titration down to RA as she has never needed O2 before, continued aggressive bronchodilatory and corticosteroid therapy with adjuvants as indicated, and smoking cessation.
AECOPD secondary to flu  responding to rx  nodules most likely inflammatory  will need f/u in 8 wks    Plan:  -no change in meds  -as above
72 year-old female active smoker, with COPD, multiple myeloma, and CKD, who is admitted with a COPD exacerbation 2/2 influenza, which did not respond to levaquin.  She has improved significantly overnight  She is still requiring 2L NC at this time. Goal will be brisk titration down to RA as she has never needed O2 before, continued aggressive bronchodilatory and corticosteroid therapy with adjuvants as indicated, and smoking cessation.
72 year-old female active smoker, with COPD, multiple myeloma, and CKD, who is admitted with a COPD exacerbation 2/2 influenza, which did not respond to levaquin.  She has improved significantly overnight  She is still requiring 2L NC at this time. Goal will be brisk titration down to RA as she has never needed O2 before, continued aggressive bronchodilatory and corticosteroid therapy with adjuvants as indicated, and smoking cessation. Patient is clinically improving, oxygen demand lesser than on admission.

## 2018-03-07 LAB
CULTURE RESULTS: SIGNIFICANT CHANGE UP
CULTURE RESULTS: SIGNIFICANT CHANGE UP
SPECIMEN SOURCE: SIGNIFICANT CHANGE UP
SPECIMEN SOURCE: SIGNIFICANT CHANGE UP

## 2018-03-16 PROBLEM — C90.00 MULTIPLE MYELOMA NOT HAVING ACHIEVED REMISSION: Chronic | Status: ACTIVE | Noted: 2018-03-01

## 2018-05-16 ENCOUNTER — APPOINTMENT (OUTPATIENT)
Dept: PULMONOLOGY | Facility: CLINIC | Age: 73
End: 2018-05-16
Payer: MEDICARE

## 2018-05-16 VITALS
HEIGHT: 61 IN | BODY MASS INDEX: 18.88 KG/M2 | RESPIRATION RATE: 16 BRPM | WEIGHT: 100 LBS | OXYGEN SATURATION: 96 % | HEART RATE: 107 BPM | SYSTOLIC BLOOD PRESSURE: 130 MMHG | DIASTOLIC BLOOD PRESSURE: 78 MMHG

## 2018-05-16 DIAGNOSIS — C90.00 MULTIPLE MYELOMA NOT HAVING ACHIEVED REMISSION: ICD-10-CM

## 2018-05-16 PROCEDURE — 94727 GAS DIL/WSHOT DETER LNG VOL: CPT

## 2018-05-16 PROCEDURE — 99215 OFFICE O/P EST HI 40 MIN: CPT | Mod: 25

## 2018-05-16 PROCEDURE — 94729 DIFFUSING CAPACITY: CPT

## 2018-05-16 PROCEDURE — 85018 HEMOGLOBIN: CPT | Mod: QW

## 2018-05-16 PROCEDURE — 94060 EVALUATION OF WHEEZING: CPT

## 2018-05-16 PROCEDURE — 94664 DEMO&/EVAL PT USE INHALER: CPT | Mod: 59

## 2018-05-16 RX ORDER — PANTOPRAZOLE 40 MG/1
40 TABLET, DELAYED RELEASE ORAL
Qty: 90 | Refills: 0 | Status: ACTIVE | COMMUNITY
Start: 2017-10-25

## 2018-05-16 RX ORDER — DULOXETINE HYDROCHLORIDE 30 MG/1
30 CAPSULE, DELAYED RELEASE PELLETS ORAL
Qty: 90 | Refills: 0 | Status: DISCONTINUED | COMMUNITY
Start: 2017-10-25 | End: 2018-05-16

## 2018-05-16 RX ORDER — FOLIC ACID 1 MG/1
1 TABLET ORAL
Qty: 90 | Refills: 0 | Status: ACTIVE | COMMUNITY
Start: 2017-10-25

## 2018-05-16 RX ORDER — TAMSULOSIN HYDROCHLORIDE 0.4 MG/1
0.4 CAPSULE ORAL
Qty: 90 | Refills: 0 | Status: ACTIVE | COMMUNITY
Start: 2017-10-25

## 2018-05-16 RX ORDER — LEVOFLOXACIN 500 MG/1
500 TABLET, FILM COATED ORAL
Qty: 7 | Refills: 0 | Status: DISCONTINUED | COMMUNITY
Start: 2018-01-30 | End: 2018-05-16

## 2018-05-16 RX ORDER — CIPROFLOXACIN HYDROCHLORIDE 500 MG/1
500 TABLET, FILM COATED ORAL
Qty: 14 | Refills: 0 | Status: DISCONTINUED | COMMUNITY
Start: 2018-03-23 | End: 2018-05-16

## 2018-05-16 RX ORDER — ALPRAZOLAM 0.5 MG/1
0.5 TABLET ORAL
Qty: 90 | Refills: 0 | Status: ACTIVE | COMMUNITY
Start: 2018-03-07

## 2018-05-16 RX ORDER — ZOLPIDEM TARTRATE 6.25 MG/1
6.25 TABLET, EXTENDED RELEASE ORAL
Qty: 20 | Refills: 0 | Status: DISCONTINUED | COMMUNITY
Start: 2017-11-15 | End: 2018-05-16

## 2018-05-16 RX ORDER — ZOLPIDEM TARTRATE 12.5 MG/1
12.5 TABLET, EXTENDED RELEASE ORAL
Qty: 15 | Refills: 0 | Status: DISCONTINUED | COMMUNITY
Start: 2018-01-03 | End: 2018-05-16

## 2018-05-16 RX ORDER — ALBUTEROL SULFATE 90 UG/1
108 (90 BASE) AEROSOL, METERED RESPIRATORY (INHALATION)
Qty: 1 | Refills: 5 | Status: ACTIVE | COMMUNITY
Start: 2018-05-16

## 2018-06-20 ENCOUNTER — FORM ENCOUNTER (OUTPATIENT)
Age: 73
End: 2018-06-20

## 2018-06-21 ENCOUNTER — OUTPATIENT (OUTPATIENT)
Dept: OUTPATIENT SERVICES | Facility: HOSPITAL | Age: 73
LOS: 1 days | End: 2018-06-21
Payer: MEDICARE

## 2018-06-21 ENCOUNTER — APPOINTMENT (OUTPATIENT)
Dept: CT IMAGING | Facility: CLINIC | Age: 73
End: 2018-06-21

## 2018-06-21 DIAGNOSIS — C90.00 MULTIPLE MYELOMA NOT HAVING ACHIEVED REMISSION: ICD-10-CM

## 2018-06-21 DIAGNOSIS — Z87.09 PERSONAL HISTORY OF OTHER DISEASES OF THE RESPIRATORY SYSTEM: ICD-10-CM

## 2018-06-21 DIAGNOSIS — Z90.49 ACQUIRED ABSENCE OF OTHER SPECIFIED PARTS OF DIGESTIVE TRACT: Chronic | ICD-10-CM

## 2018-06-21 DIAGNOSIS — R93.8 ABNORMAL FINDINGS ON DIAGNOSTIC IMAGING OF OTHER SPECIFIED BODY STRUCTURES: ICD-10-CM

## 2018-06-21 DIAGNOSIS — R06.2 WHEEZING: ICD-10-CM

## 2018-06-21 PROCEDURE — 71250 CT THORAX DX C-: CPT

## 2018-06-21 PROCEDURE — 71250 CT THORAX DX C-: CPT | Mod: 26

## 2018-06-29 ENCOUNTER — APPOINTMENT (OUTPATIENT)
Dept: PULMONOLOGY | Facility: CLINIC | Age: 73
End: 2018-06-29
Payer: MEDICARE

## 2018-06-29 VITALS — OXYGEN SATURATION: 98 % | HEART RATE: 81 BPM

## 2018-06-29 VITALS — SYSTOLIC BLOOD PRESSURE: 100 MMHG | DIASTOLIC BLOOD PRESSURE: 60 MMHG

## 2018-06-29 VITALS — WEIGHT: 96 LBS | BODY MASS INDEX: 18.14 KG/M2

## 2018-06-29 DIAGNOSIS — Z87.09 PERSONAL HISTORY OF OTHER DISEASES OF THE RESPIRATORY SYSTEM: ICD-10-CM

## 2018-06-29 DIAGNOSIS — R06.2 WHEEZING: ICD-10-CM

## 2018-06-29 DIAGNOSIS — R93.8 ABNORMAL FINDINGS ON DIAGNOSTIC IMAGING OF OTHER SPECIFIED BODY STRUCTURES: ICD-10-CM

## 2018-06-29 DIAGNOSIS — R05 COUGH: ICD-10-CM

## 2018-06-29 PROCEDURE — 99214 OFFICE O/P EST MOD 30 MIN: CPT | Mod: 25

## 2018-06-29 PROCEDURE — 94010 BREATHING CAPACITY TEST: CPT

## 2018-06-29 RX ORDER — ALBUTEROL SULFATE 90 UG/1
108 (90 BASE) AEROSOL, METERED RESPIRATORY (INHALATION)
Qty: 25 | Refills: 0 | Status: DISCONTINUED | COMMUNITY
Start: 2017-12-29 | End: 2018-06-29

## 2018-06-29 RX ORDER — METOPROLOL SUCCINATE 25 MG/1
25 TABLET, EXTENDED RELEASE ORAL
Qty: 90 | Refills: 0 | Status: ACTIVE | COMMUNITY
Start: 2018-06-14

## 2018-06-29 RX ORDER — FOLIC ACID 1 MG/1
1 TABLET ORAL
Refills: 0 | Status: DISCONTINUED | COMMUNITY
End: 2018-06-29

## 2018-06-29 RX ORDER — VALACYCLOVIR 1 G/1
1 TABLET, FILM COATED ORAL
Qty: 90 | Refills: 0 | Status: DISCONTINUED | COMMUNITY
Start: 2017-12-04 | End: 2018-06-29

## 2018-06-29 RX ORDER — ZOLPIDEM TARTRATE 5 MG/1
5 TABLET ORAL
Qty: 20 | Refills: 0 | Status: DISCONTINUED | COMMUNITY
Start: 2017-12-05 | End: 2018-06-29

## 2018-06-29 RX ORDER — CARVEDILOL 3.12 MG/1
3.12 TABLET, FILM COATED ORAL
Qty: 180 | Refills: 0 | Status: DISCONTINUED | COMMUNITY
Start: 2017-10-25 | End: 2018-06-29

## 2018-08-22 PROBLEM — F41.9 ANXIETY DISORDER, UNSPECIFIED: Chronic | Status: ACTIVE | Noted: 2017-09-12

## 2018-08-22 PROBLEM — N20.0 CALCULUS OF KIDNEY: Chronic | Status: ACTIVE | Noted: 2017-09-12

## 2018-08-22 PROBLEM — H91.90 UNSPECIFIED HEARING LOSS, UNSPECIFIED EAR: Chronic | Status: ACTIVE | Noted: 2017-09-12

## 2018-08-24 NOTE — PATIENT PROFILE ADULT. - PATIENT REPRESENTATIVE: ( YOU CAN CHOOSE ANY PERSON THAT CAN ASSIST YOU WITH YOUR HEALTH CARE PREFERENCES, DOES NOT HAVE TO BE A SPOUSE, IMMEDIATE FAMILY OR SIGNIFICANT OTHER/PARTNER)
EMERGENCY DEPARTMENT HISTORY AND PHYSICAL EXAM    Date: 8/24/2018  Patient Name: Saurav Bolaños    History of Presenting Illness     Chief Complaint   Patient presents with    Thumb Pain         History Provided By: Patient    Chief Complaint: Thumb pain  Duration: 2 Weeks  Timing:  Acute  Location: Right thumb  Quality: Dull  Severity: 2 out of 10  Modifying Factors: No modifying factors  Associated Symptoms: denies any other associated signs or symptoms    Additional History (Context):   4:02 PM  Saurav Bolaños is a 28 y.o. female with PMHX of Asthma, who presents to the emergency department C/O right thumb pain onset 2 weeks ago. Pt states that she smashed her thumb in a \"guard door\". Pt states that her Tetanus shot is UTD. Pt denies fever, chills, redness, previous injury to the right thumb, and any other sxs or complaints. PCP: None        Past History     Past Medical History:  Past Medical History:   Diagnosis Date    Asthma     Shoulder pain        Past Surgical History:  Past Surgical History:   Procedure Laterality Date    HX SHOULDER ARTHROSCOPY         Family History:  History reviewed. No pertinent family history. Social History:  Social History   Substance Use Topics    Smoking status: Never Smoker    Smokeless tobacco: Never Used    Alcohol use Yes      Comment: socially       Allergies:  No Known Allergies      Review of Systems   Review of Systems   Constitutional: Negative for chills and fever. Musculoskeletal: Positive for arthralgias (right thumb) and myalgias (right thumb). Skin: Negative for color change (redness). All other systems reviewed and are negative. Physical Exam     Vitals:    08/24/18 1555   BP: (!) 143/95   Pulse: 80   Resp: 16   Temp: 98 °F (36.7 °C)   SpO2: 100%   Weight: 127 kg (280 lb)   Height: 5' 9\" (1.753 m)     Physical Exam   Constitutional: She is oriented to person, place, and time. She appears well-developed and well-nourished.    Patient is obese   HENT:   Head: Normocephalic. Eyes: Conjunctivae are normal.   Neck: Normal range of motion. Pulmonary/Chest: Effort normal.   Musculoskeletal:        Hands:  Neurological: She is alert and oriented to person, place, and time. Skin: Skin is warm and dry. Diagnostic Study Results     Labs -   No results found for this or any previous visit (from the past 12 hour(s)). Radiologic Studies -   XR THUMB RT MIN 2 V   Final Result   IMPRESSION:    1. Distal thumb soft tissue periungual gas, consistent with laceration. CT Results  (Last 48 hours)    None        CXR Results  (Last 48 hours)    None          Medications given in the ED-  Medications   ibuprofen (MOTRIN) tablet 600 mg (not administered)         Medical Decision Making   I am the first provider for this patient. I reviewed the vital signs, available nursing notes, past medical history, past surgical history, family history and social history. Vital Signs-Reviewed the patient's vital signs. Pulse Oximetry Analysis - 100% on RA     Records Reviewed: Nursing Notes    Provider Notes (Medical Decision Making): Patient with crush injury to thumb. Xray negative, tetanus was already UTD, Patient does not wish to have nail removed. Laceration cleansed and will place on abx. No signs of severe infection noted. Patient given hand as needed for follow up. She understands reasons to return and is offering no questions or concerns at this time    Procedures:  Procedures    ED Course:   4:02 PM Initial assessment performed. The patients presenting problems have been discussed, and they are in agreement with the care plan formulated and outlined with them. I have encouraged them to ask questions as they arise throughout their visit. 4:40 PM Pt updated on all results and is offering no further questions at this time. Pt understands reasons for return.     Diagnosis and Disposition       DISCHARGE NOTE:  4:43 PM  Josselyn Estevez's  results have been reviewed with her. She has been counseled regarding her diagnosis, treatment, and plan. She verbally conveys understanding and agreement of the signs, symptoms, diagnosis, treatment and prognosis and additionally agrees to follow up as discussed. She also agrees with the care-plan and conveys that all of her questions have been answered. I have also provided discharge instructions for her that include: educational information regarding their diagnosis and treatment, and list of reasons why they would want to return to the ED prior to their follow-up appointment, should her condition change. She has been provided with education for proper emergency department utilization. CLINICAL IMPRESSION:    1. Crushing injury of finger, initial encounter    2. Injury of nail bed of finger of left hand, initial encounter        PLAN:  1. D/C Home  2. Current Discharge Medication List      START taking these medications    Details   cephALEXin (KEFLEX) 500 mg capsule Take 1 Cap by mouth four (4) times daily for 7 days. Qty: 28 Cap, Refills: 0           3. Follow-up Information     Follow up With Details Comments Contact Info    Severa Dines, MD Schedule an appointment as soon as possible for a visit in 2 days For follow up with hand injury [address] [place] [address]  [phone]      [facility] EMERGENCY DEPT Go to As needed, if symptoms worsen [address] [address]  [phone]        _______________________________    Attestations: This note is prepared by [facility], acting as Scribe for Gretchen Crimes FNP-BC. Gretchen Crimes FNP-BC:  The scribe's documentation has been prepared under my direction and personally reviewed by me in its entirety.   I confirm that the note above accurately reflects all work, treatment, procedures, and medical decision making performed by me.  _______________________________ Yes

## 2018-08-26 ENCOUNTER — FORM ENCOUNTER (OUTPATIENT)
Age: 73
End: 2018-08-26

## 2018-08-27 ENCOUNTER — APPOINTMENT (OUTPATIENT)
Dept: CT IMAGING | Facility: CLINIC | Age: 73
End: 2018-08-27
Payer: MEDICARE

## 2018-08-27 ENCOUNTER — OUTPATIENT (OUTPATIENT)
Dept: OUTPATIENT SERVICES | Facility: HOSPITAL | Age: 73
LOS: 1 days | End: 2018-08-27
Payer: MEDICARE

## 2018-08-27 DIAGNOSIS — R93.8 ABNORMAL FINDINGS ON DIAGNOSTIC IMAGING OF OTHER SPECIFIED BODY STRUCTURES: ICD-10-CM

## 2018-08-27 DIAGNOSIS — Z90.49 ACQUIRED ABSENCE OF OTHER SPECIFIED PARTS OF DIGESTIVE TRACT: Chronic | ICD-10-CM

## 2018-08-27 PROCEDURE — 71250 CT THORAX DX C-: CPT | Mod: 26

## 2018-08-27 PROCEDURE — 71250 CT THORAX DX C-: CPT

## 2018-08-31 ENCOUNTER — APPOINTMENT (OUTPATIENT)
Dept: PULMONOLOGY | Facility: CLINIC | Age: 73
End: 2018-08-31

## 2018-09-13 ENCOUNTER — OUTPATIENT (OUTPATIENT)
Dept: OUTPATIENT SERVICES | Facility: HOSPITAL | Age: 73
LOS: 1 days | End: 2018-09-13
Payer: MEDICARE

## 2018-09-13 DIAGNOSIS — Z90.49 ACQUIRED ABSENCE OF OTHER SPECIFIED PARTS OF DIGESTIVE TRACT: Chronic | ICD-10-CM

## 2018-09-13 DIAGNOSIS — J44.9 CHRONIC OBSTRUCTIVE PULMONARY DISEASE, UNSPECIFIED: ICD-10-CM

## 2018-10-04 PROCEDURE — 94618 PULMONARY STRESS TESTING: CPT

## 2018-10-04 PROCEDURE — G0424: CPT

## 2018-10-10 ENCOUNTER — APPOINTMENT (OUTPATIENT)
Dept: PULMONOLOGY | Facility: CLINIC | Age: 73
End: 2018-10-10

## 2019-01-01 NOTE — PROGRESS NOTE ADULT - PROBLEM SELECTOR PROBLEM 3
Hydronephrosis with urinary obstruction due to renal calculus
ARF (acute renal failure)
Nephrolithiasis
Hydronephrosis
Nephrolithiasis
ARF (acute renal failure)
Agree

## 2019-03-05 ENCOUNTER — INPATIENT (INPATIENT)
Facility: HOSPITAL | Age: 74
LOS: 2 days | Discharge: HOME | DRG: 813 | End: 2019-03-08
Attending: INTERNAL MEDICINE | Admitting: HOSPITALIST
Payer: MEDICARE

## 2019-03-05 VITALS
OXYGEN SATURATION: 97 % | RESPIRATION RATE: 20 BRPM | DIASTOLIC BLOOD PRESSURE: 65 MMHG | HEART RATE: 100 BPM | HEIGHT: 62 IN | SYSTOLIC BLOOD PRESSURE: 97 MMHG | WEIGHT: 93.92 LBS | TEMPERATURE: 99 F

## 2019-03-05 DIAGNOSIS — Z90.49 ACQUIRED ABSENCE OF OTHER SPECIFIED PARTS OF DIGESTIVE TRACT: Chronic | ICD-10-CM

## 2019-03-05 LAB
ALBUMIN SERPL ELPH-MCNC: 4.1 G/DL — SIGNIFICANT CHANGE UP (ref 3.3–5.2)
ALP SERPL-CCNC: 59 U/L — SIGNIFICANT CHANGE UP (ref 40–120)
ALT FLD-CCNC: 5 U/L — SIGNIFICANT CHANGE UP
ANION GAP SERPL CALC-SCNC: 12 MMOL/L — SIGNIFICANT CHANGE UP (ref 5–17)
APTT BLD: 29.7 SEC — SIGNIFICANT CHANGE UP (ref 27.5–36.3)
AST SERPL-CCNC: 8 U/L — SIGNIFICANT CHANGE UP
BILIRUB SERPL-MCNC: <0.2 MG/DL — LOW (ref 0.4–2)
BUN SERPL-MCNC: 16 MG/DL — SIGNIFICANT CHANGE UP (ref 8–20)
CALCIUM SERPL-MCNC: 9.3 MG/DL — SIGNIFICANT CHANGE UP (ref 8.6–10.2)
CHLORIDE SERPL-SCNC: 107 MMOL/L — SIGNIFICANT CHANGE UP (ref 98–107)
CO2 SERPL-SCNC: 23 MMOL/L — SIGNIFICANT CHANGE UP (ref 22–29)
CREAT SERPL-MCNC: 1.12 MG/DL — SIGNIFICANT CHANGE UP (ref 0.5–1.3)
EOSINOPHIL NFR BLD AUTO: 1 % — SIGNIFICANT CHANGE UP (ref 0–5)
GLUCOSE SERPL-MCNC: 91 MG/DL — SIGNIFICANT CHANGE UP (ref 70–115)
HCT VFR BLD CALC: 29.4 % — LOW (ref 37–47)
HGB BLD-MCNC: 10.5 G/DL — LOW (ref 12–16)
INR BLD: 0.87 RATIO — LOW (ref 0.88–1.16)
LYMPHOCYTES # BLD AUTO: 68 % — HIGH (ref 20–55)
MACROCYTES BLD QL: SIGNIFICANT CHANGE UP
MCHC RBC-ENTMCNC: 35.7 G/DL — SIGNIFICANT CHANGE UP (ref 32–36)
MCHC RBC-ENTMCNC: 38.5 PG — HIGH (ref 27–31)
MCV RBC AUTO: 107.7 FL — HIGH (ref 81–99)
MONOCYTES NFR BLD AUTO: 2 % — LOW (ref 3–10)
NEUTROPHILS NFR BLD AUTO: 28 % — LOW (ref 37–73)
PLAT MORPH BLD: NORMAL — SIGNIFICANT CHANGE UP
PLATELET # BLD AUTO: 10 K/UL — CRITICAL LOW (ref 150–400)
POLYCHROMASIA BLD QL SMEAR: SLIGHT — SIGNIFICANT CHANGE UP
POTASSIUM SERPL-MCNC: 3.6 MMOL/L — SIGNIFICANT CHANGE UP (ref 3.5–5.3)
POTASSIUM SERPL-SCNC: 3.6 MMOL/L — SIGNIFICANT CHANGE UP (ref 3.5–5.3)
PROT SERPL-MCNC: 6.8 G/DL — SIGNIFICANT CHANGE UP (ref 6.6–8.7)
PROTHROM AB SERPL-ACNC: 10 SEC — SIGNIFICANT CHANGE UP (ref 10–12.9)
RBC # BLD: 2.73 M/UL — LOW (ref 4.4–5.2)
RBC # FLD: 14.1 % — SIGNIFICANT CHANGE UP (ref 11–15.6)
RBC BLD AUTO: ABNORMAL
SODIUM SERPL-SCNC: 142 MMOL/L — SIGNIFICANT CHANGE UP (ref 135–145)
VARIANT LYMPHS # BLD: 1 % — SIGNIFICANT CHANGE UP (ref 0–6)
WBC # BLD: 4.7 K/UL — LOW (ref 4.8–10.8)
WBC # FLD AUTO: 4.7 K/UL — LOW (ref 4.8–10.8)

## 2019-03-05 PROCEDURE — 99285 EMERGENCY DEPT VISIT HI MDM: CPT

## 2019-03-05 NOTE — ED STATDOCS - PMH
Anxiety    COPD (chronic obstructive pulmonary disease)    Hearing loss, unspecified hearing loss type, unspecified laterality    Kidney stones    MI (myocardial infarction)    Multiple myeloma

## 2019-03-05 NOTE — ED PROVIDER NOTE - OBJECTIVE STATEMENT
malaika today oncologist   vibha dubon onvologist  platelet 10  1 yr ago treatment  multiple myloma  able to ambulate  increased bruising specific left lower leg to foot  no trauma  pcp zain  wants bone marrow biopsy 72 y/o F, with hx of anxiety, COPD, renal colic, MI, and multiple myeloma. presents to the ED from her oncologist office today for abnormal labs.  Pt states that she had recent blood work performed which showed a platelet count of 10.  Pt states that she has been undergoing treatment for her cancer for approximately 1.5 years.  Notes she is able to ambulate without difficulty.  Pt also states that she has recently noted several new bruises to her extremities.  Denies recent trauma, however states that she had a bruise on her left lower leg down to her ankle that appears to be worsening.  Pt was referred to the ED by her oncologist, who would like her to be admitted for a possible bone marrow biopsy.  denies fever. denies HA or neck pain. no chest pain or sob. no abd pain. no n/v/d. no urinary f/u/d. no back pain. no motor or sensory deficits. denies illicit drug use. no recent travel. no rash. no other acute issues symptoms or concerns    Oncologist: Dr. Huffman   PCP: Dr. Ruiz

## 2019-03-05 NOTE — ED STATDOCS - PROGRESS NOTE DETAILS
Patient is a 73 year old F with a PMHx of multiple myeloma, COPD and anxiety (States that she is on Advair, Spiriva, Folic acid, Flomax, Cymbalta and Xanax) who presents to the ED, sent by oncologist, for platelet count of 10.  Patient reports that she was seen at [facility] a few weeks ago and her platelet count was 20.  Was on chemotherapy in the past, not currently on chemotherapy.  Patient reports that she currently does have a headache.  Denies any one sided weakness/numbness.  Focused eval, protocol orders entered. Pt to be moved to main ED for complete evaluation by another provider.

## 2019-03-05 NOTE — ED ADULT TRIAGE NOTE - CHIEF COMPLAINT QUOTE
has multiple myeloma and gets infusions of medications for this. Noticed platelets drop. Leg bruising. Sent for IV steroids and possible platelets. No new pain.

## 2019-03-05 NOTE — ED PROVIDER NOTE - PHYSICAL EXAMINATION
neuro: CN II - XII intact, EOMI, PERRL, no papilledema, 5/5 muscle strength x 4 extremities, no sensory deficits, 2+ dtr globally, negative babinski, no ataxic gait, normal RAIN and FNT, normal romberg

## 2019-03-06 DIAGNOSIS — D69.6 THROMBOCYTOPENIA, UNSPECIFIED: ICD-10-CM

## 2019-03-06 LAB
ABO RH CONFIRMATION: SIGNIFICANT CHANGE UP
BLD GP AB SCN SERPL QL: SIGNIFICANT CHANGE UP
HCT VFR BLD CALC: 29.2 % — LOW (ref 37–47)
HGB BLD-MCNC: 10.3 G/DL — LOW (ref 12–16)
MCHC RBC-ENTMCNC: 35.3 G/DL — SIGNIFICANT CHANGE UP (ref 32–36)
MCHC RBC-ENTMCNC: 38.4 PG — HIGH (ref 27–31)
MCV RBC AUTO: 109 FL — HIGH (ref 81–99)
PLATELET # BLD AUTO: 11 K/UL — CRITICAL LOW (ref 150–400)
RBC # BLD: 2.68 M/UL — LOW (ref 4.4–5.2)
RBC # FLD: 14.3 % — SIGNIFICANT CHANGE UP (ref 11–15.6)
WBC # BLD: 2.5 K/UL — LOW (ref 4.8–10.8)
WBC # FLD AUTO: 2.5 K/UL — LOW (ref 4.8–10.8)

## 2019-03-06 PROCEDURE — 99223 1ST HOSP IP/OBS HIGH 75: CPT

## 2019-03-06 PROCEDURE — 12345: CPT | Mod: NC

## 2019-03-06 RX ORDER — IPRATROPIUM/ALBUTEROL SULFATE 18-103MCG
3 AEROSOL WITH ADAPTER (GRAM) INHALATION EVERY 6 HOURS
Qty: 0 | Refills: 0 | Status: DISCONTINUED | OUTPATIENT
Start: 2019-03-06 | End: 2019-03-08

## 2019-03-06 RX ORDER — IPRATROPIUM/ALBUTEROL SULFATE 18-103MCG
3 AEROSOL WITH ADAPTER (GRAM) INHALATION EVERY 6 HOURS
Qty: 0 | Refills: 0 | Status: DISCONTINUED | OUTPATIENT
Start: 2019-03-06 | End: 2019-03-06

## 2019-03-06 RX ORDER — ALBUTEROL 90 UG/1
2 AEROSOL, METERED ORAL EVERY 6 HOURS
Qty: 0 | Refills: 0 | Status: DISCONTINUED | OUTPATIENT
Start: 2019-03-06 | End: 2019-03-06

## 2019-03-06 RX ORDER — DULOXETINE HYDROCHLORIDE 30 MG/1
30 CAPSULE, DELAYED RELEASE ORAL DAILY
Qty: 0 | Refills: 0 | Status: DISCONTINUED | OUTPATIENT
Start: 2019-03-06 | End: 2019-03-08

## 2019-03-06 RX ORDER — IMMUNE GLOBULIN (HUMAN) 10 G/100ML
20 INJECTION INTRAVENOUS; SUBCUTANEOUS ONCE
Qty: 0 | Refills: 0 | Status: COMPLETED | OUTPATIENT
Start: 2019-03-06 | End: 2019-03-06

## 2019-03-06 RX ORDER — ALPRAZOLAM 0.25 MG
0.5 TABLET ORAL
Qty: 0 | Refills: 0 | Status: DISCONTINUED | OUTPATIENT
Start: 2019-03-06 | End: 2019-03-08

## 2019-03-06 RX ORDER — TIOTROPIUM BROMIDE 18 UG/1
1 CAPSULE ORAL; RESPIRATORY (INHALATION) DAILY
Qty: 0 | Refills: 0 | Status: DISCONTINUED | OUTPATIENT
Start: 2019-03-06 | End: 2019-03-08

## 2019-03-06 RX ORDER — FOLIC ACID 0.8 MG
1 TABLET ORAL DAILY
Qty: 0 | Refills: 0 | Status: DISCONTINUED | OUTPATIENT
Start: 2019-03-06 | End: 2019-03-08

## 2019-03-06 RX ORDER — DIPHENOXYLATE HCL/ATROPINE 2.5-.025MG
1 TABLET ORAL
Qty: 0 | Refills: 0 | Status: DISCONTINUED | OUTPATIENT
Start: 2019-03-06 | End: 2019-03-08

## 2019-03-06 RX ORDER — BUDESONIDE AND FORMOTEROL FUMARATE DIHYDRATE 160; 4.5 UG/1; UG/1
2 AEROSOL RESPIRATORY (INHALATION)
Qty: 0 | Refills: 0 | Status: DISCONTINUED | OUTPATIENT
Start: 2019-03-06 | End: 2019-03-08

## 2019-03-06 RX ORDER — NICOTINE POLACRILEX 2 MG
1 GUM BUCCAL DAILY
Qty: 0 | Refills: 0 | Status: DISCONTINUED | OUTPATIENT
Start: 2019-03-06 | End: 2019-03-08

## 2019-03-06 RX ADMIN — Medication 3 MILLILITER(S): at 08:39

## 2019-03-06 RX ADMIN — Medication 1 PATCH: at 16:44

## 2019-03-06 RX ADMIN — Medication 60 MILLIGRAM(S): at 09:22

## 2019-03-06 RX ADMIN — Medication 60 MILLIGRAM(S): at 13:12

## 2019-03-06 RX ADMIN — IMMUNE GLOBULIN (HUMAN) 50 GRAM(S): 10 INJECTION INTRAVENOUS; SUBCUTANEOUS at 03:46

## 2019-03-06 RX ADMIN — BUDESONIDE AND FORMOTEROL FUMARATE DIHYDRATE 2 PUFF(S): 160; 4.5 AEROSOL RESPIRATORY (INHALATION) at 21:42

## 2019-03-06 RX ADMIN — Medication 0.5 MILLIGRAM(S): at 21:31

## 2019-03-06 RX ADMIN — Medication 1 MILLIGRAM(S): at 13:12

## 2019-03-06 RX ADMIN — Medication 0.5 MILLIGRAM(S): at 13:11

## 2019-03-06 RX ADMIN — TIOTROPIUM BROMIDE 1 CAPSULE(S): 18 CAPSULE ORAL; RESPIRATORY (INHALATION) at 08:39

## 2019-03-06 RX ADMIN — Medication 60 MILLIGRAM(S): at 16:57

## 2019-03-06 RX ADMIN — Medication 1 PATCH: at 12:47

## 2019-03-06 RX ADMIN — DULOXETINE HYDROCHLORIDE 30 MILLIGRAM(S): 30 CAPSULE, DELAYED RELEASE ORAL at 12:49

## 2019-03-06 RX ADMIN — Medication 125 MILLIGRAM(S): at 01:38

## 2019-03-06 NOTE — H&P ADULT - NSHPSOCIALHISTORY_GEN_ALL_CORE
Smokes >2PPD for +50 years  Occasional etoh  No drug abuse.   Lives alone Smokes >2PPD for +50 years  Occasional etoh (wine on weekends and dinner parties)  No drug abuse.   Lives alone in a [facility] community.  passed away one year prior. Children live close by

## 2019-03-06 NOTE — H&P ADULT - NSHPOUTPATIENTPROVIDERS_GEN_ALL_CORE
Dr. Huffman (Heme/Onc)  [facility]  Dr. Lozoya (PMD) Dr. Huffman (Heme/Onc)  [facility]  Dr. Lozoya (PMD)  Pharmacy:[facility] ([place])

## 2019-03-06 NOTE — H&P ADULT - NSHPLABSRESULTS_GEN_ALL_CORE
CBC Full  -  ( 05 Mar 2019 21:48 )  WBC Count : 4.7 K/uL  Hemoglobin : 10.5 g/dL  Hematocrit : 29.4 %  Platelet Count - Automated : 10 K/uL  Mean Cell Volume : 107.7 fl  Mean Cell Hemoglobin : 38.5 pg  Mean Cell Hemoglobin Concentration : 35.7 g/dL  Auto Neutrophil # : x  Auto Lymphocyte # : x  Auto Monocyte # : x  Auto Eosinophil # : x  Auto Basophil # : x  Auto Neutrophil % : 28.0 %  Auto Lymphocyte % : 68.0 %  Auto Monocyte % : 2.0 %  Auto Eosinophil % : 1.0 %  Auto Basophil % : x    03-05    142  |  107  |  16.0  ----------------------------<  91  3.6   |  23.0  |  1.12    Ca    9.3      05 Mar 2019 21:48    TPro  6.8  /  Alb  4.1  /  TBili  <0.2<L>  /  DBili  x   /  AST  8   /  ALT  5   /  AlkPhos  59  03-05

## 2019-03-06 NOTE — H&P ADULT - ASSESSMENT
72 y/o female with a PMH of MM, COPD, colitis [facility] being [facility] from Heme/Onc's office because her platelet count came back at less than 10k admitted for ITP    admit to medicine service under Dr. Barreto  admit to monitored bed  activity bedrest  vitals per routine  diet dash/tlc      Idiopathic thrombocytopenia  -Pt had low platelets @ 29k and Revlimid was held  -Labs today show 10K.  -Solumedrol 125 mg x 1 , 40mg TID (recommended by Dr. Huffman)  - IVIG Started in ED  - Dr. Hufmfan to come see pt     COPD  -resume Spiriva  -Advair not in pharmacy. Pt will bring her own  - Pt already placed on solumedrol  - monitor closely    Depression with Anxiety  - resume home meds cymbalta  - ativan PRN for anxiety    50 year hx of smoking 2PPD  -councelled pt on risks of smoking  -Pt uninterested in quitting right now  -nicotine patch while in house 74 y/o female with a PMH of MM, COPD, colitis comes to [facility] after being sent over from Heme/Onc's office because her platelet count came back at less than 10k admitted for ITP    admit to medicine service under Dr. Barreto  admit to monitored bed  activity bedrest  vitals per routine  diet dash/tlc      Idiopathic thrombocytopenia  -Pt had low platelets @ 29k and Revlimid was held  -Labs today show 10K.  -Solumedrol 125 mg x 1 , 40mg TID (recommended by Dr. Huffman)  - IVIG Started in ED  - Dr. Huffman to come see pt     COPD  -resume Spiriva  -Advair not in pharmacy. Pt will bring her own  - Pt already placed on solumedrol  - monitor closely    Depression with Anxiety  - resume home meds cymbalta  - ativan PRN for anxiety    50 year hx of smoking 2PPD  -councelled pt on risks of smoking  -Pt uninterested in quitting right now  -nicotine patch while in house    Chronic colitis  -resume home pantoprazole. 74 y/o female with a PMH of MM, COPD, colitis comes to [facility] after being sent over from Heme/Onc's office because her platelet count came back at less than 10k admitted for ITP    admit to medicine service under Dr. Barreto  admit to monitored bed  activity bedrest  vitals per routine  diet dash/tlc      Idiopathic thrombocytopenia  -Pt had low platelets @ 29k and Revlimid was held  -Labs today show 10K.  -Solumedrol 125 mg x 1 , 40mg TID (recommended by Dr. Huffman)  - IVIG Started in ED  - Dr. Huffman to come see pt   - Fall precaution  - bed alarm in place      COPD  -resume Spiriva  -Advair not in pharmacy. Pt will bring her own  - Pt already placed on solumedrol  - monitor closely    HTN  -will hold home metoprolol as bp is controlled    Depression with Anxiety  - resume home meds cymbalta  - ativan PRN for anxiety    50 year hx of smoking 2PPD  -counseled pt on risks of smoking  -Pt uninterested in quitting right now  -nicotine patch while in house    Chronic colitis  -resume home pantoprazole. 74 y/o female with a PMH of MM, COPD, colitis comes to [facility] after being sent over from Heme/Onc's office because her platelet count came back at less than 10k admitted for ITP    admit to medicine service under Dr. Barreto  admit to monitored bed  activity bedrest  vitals per routine  diet dash/tlc      Idiopathic thrombocytopenia  - Pt had low platelets @ 29k recently and Revlimid was held  - Labs today show 10K.  - Solumedrol 125 mg x 1 , 40mg TID (recommended by Dr. Huffman)  - IVIG Started in ED  - Dr. Huffman to come see pt   - Fall precaution  - bed alarm in place      COPD not on home oxygen  - Resume Spiriva  - Advair not in pharmacy. Pt will bring her own  - Pt already placed on Solumedrol  - Duonebs q 6 hours as pt has prominent wheezing in all lung fields.   - monitor closely    Elevated BNP without dx of CHF (last echo from 3/18 showed EF of 30-35%)  - Elevated bnp @ 1699  - Not complaining of sob, no pitting edema  - Rales heard in lung fields     HTN  -will hold home metoprolol as bp is controlled    Depression with Anxiety  - resume home meds Cymbalta  - ativan PRN for anxiety    50 year hx of smoking 2PPD  -counseled pt on risks of smoking  -Pt uninterested in quitting right now  -nicotine patch while in house    Chronic colitis  -resume home pantoprazole.     Occasional diarrhea  -c/w with home lomotil as needed    DVT Prophylaxis: 72 y/o female with a PMH of MM, COPD, colitis comes to [facility] after being sent over from Heme/Onc's office because her platelet count came back at less than 10k admitted for ITP    admit to medicine service under Dr. Barreto  admit to monitored bed  activity bedrest  vitals per routine  diet dash/tlc      Severe Immune thrombocytopenia  - Pt had low platelets @ 29k recently and Revlimid was held  - Labs today show 10K.  - Solumedrol 125 mg x 1 , 40mg TID (recommended by Dr. Huffman)  - IVIG Started in ED  - Dr. Huffman to come see pt   -continue to trend platelet count  - Fall precaution  - bed alarm in place      COPD not on home oxygen  - Resume Spiriva  - Advair not in pharmacy. Pt will bring her own  - Pt already placed on Solumedrol  - Duonebs q 6 hours as pt has prominent wheezing in all lung fields.   - monitor closely    Elevated BNP without dx of CHF (last echo from 3/18 showed EF of 30-35%)  - Elevated bnp @ 1699  - Not complaining of sob, no pitting edema  - Rales heard in lung fields     HTN  -will hold home metoprolol as bp is controlled    Depression with Anxiety  - resume home meds Cymbalta  - ativan PRN for anxiety    50 year hx of smoking 2PPD  -counseled pt on risks of smoking  -Pt uninterested in quitting right now  -nicotine patch while in house    Chronic colitis  -resume home pantoprazole.     Occasional diarrhea  -c/w with home lomotil as needed    DVT Prophylaxis: 72 y/o female with a PMH of MM, COPD, colitis comes to [facility] after being sent over from Heme/Onc's office because her platelet count came back at less than 10k admitted for ITP    admit to medicine service under Dr. Barreto  admit to monitored bed  activity bedrest  vitals per routine  diet dash/tlc    Severe Immune thrombocytopenia  - Pt had low platelets @ 29k recently and Revlimid was held  - Labs today show 10K.  - Solumedrol 125 mg x 1 , 40mg TID (recommended by Dr. Huffman)  - IVIG Started in ED  - Dr. Huffman to come see pt   -continue to trend platelet count  - Fall precaution  - bed alarm in place      COPD not on home oxygen  - Resume Spiriva  - Advair not in pharmacy. Pt will bring her own  - Pt already placed on Solumedrol  - Duonebs q 6 hours as pt has prominent wheezing in all lung fields.   - monitor closely    CHF (last echo from 3/18 showed EF of 30-35%)  - Elevated bnp @ 1699  - Not complaining of sob, no pitting edema  - Rales heard in lung fields     HTN  -will hold home metoprolol as bp is controlled    Depression/Anxiety  - resume home meds Cymbalta  - ativan PRN for anxiety    Tobacco abuse  50 year hx of smoking 2PPD  -counseled pt on risks of smoking  -Pt uninterested in quitting right now  -nicotine patch while in house    Chronic colitis  -resume home pantoprazole.   -c/w with home lomotil as needed    DVT Prophylaxis: 72 y/o female with a PMH of MM, COPD, colitis comes to [facility] after being sent over from Heme/Onc's office because her platelet count came back at less than 10k admitted for ITP    admit to medicine service under Dr. Barreto  admit to monitored bed  activity bedrest  vitals per routine  diet dash/tlc    Severe Immune thrombocytopenia  - Pt had low platelets @ 29k recently and Revlimid was held  - Labs today show 10K.  - Solumedrol 125 mg x 1 , 40mg TID (recommended by Dr. Huffman)  - IVIG Started in ED  - Dr. Huffman to come see pt   - continue to trend platelet count  - Fall precaution  - bed alarm in place      COPD not on home oxygen  - Resume Spiriva  - Advair not in pharmacy. Pt will bring her own  - Pt already placed on Solumedrol  - Duonebs q 6 hours as pt has prominent wheezing in all lung fields.   - monitor closely    CHF (last echo from 3/18 showed EF of 30-35%)  - Elevated bnp @ 1699  - Not complaining of sob, no pitting edema  - Rales heard in lung fields     HTN  -will hold home metoprolol as bp is controlled    Depression/Anxiety  - resume home meds Cymbalta  - ativan PRN for anxiety    Tobacco abuse  50 year hx of smoking 2PPD  -counseled pt on risks of smoking  -Pt uninterested in quitting right now  -nicotine patch while in house    Chronic colitis  -resume home pantoprazole.   -c/w with home lomotil as needed    DVT Prophylaxis:   - Contraindicated as pt has a severe low platelet count with ecchymosis and bruising.     DISPO:Please confirm code status 74 y/o female with a PMH of MM, COPD, colitis comes to [facility] after being sent over from Heme/Onc's office because her platelet count came back at less than 10k admitted for ITP    admit to medicine service under Dr. Barreto  admit to monitored bed  activity bedrest  vitals per routine  diet dash/tlc    Severe Immune thrombocytopenia  - Pt had low platelets @ 29k recently and Revlimid was held  - Labs today show 10K.  - Solumedrol 125 mg x 1 , 40mg TID (recommended by Dr. Huffman)  - IVIG Started in ED  - Dr. Huffman to come see pt   - continue to trend platelet count  - Fall precaution  - bed alarm in place    Multiple Myeloma  - Pt confirmed dx 1 year ago via BM biopsy  - Pt was taking Revlimind but held since previous platelets @ 29k  - Pt has close follow up with Dr. Huffman who will come to see pt during hospitalization      COPD not on home oxygen  - Resume Spiriva  - Advair not in pharmacy. Pt will bring her own  - Pt already placed on Solumedrol  - Duonebs q 6 hours as pt has prominent wheezing in all lung fields.   - monitor closely    CHF (last echo from 3/18 showed EF of 30-35%)  - Elevated bnp @ 1699  - Not complaining of sob, no pitting edema  - Rales heard in lung fields     HTN  -will hold home metoprolol as bp is controlled    Depression/Anxiety  - resume home meds Cymbalta  - ativan PRN for anxiety    Tobacco abuse  50 year hx of smoking 2PPD  -counseled pt on risks of smoking  -Pt uninterested in quitting right now  -nicotine patch while in house    Chronic colitis  -resume home pantoprazole.   -c/w with home lomotil as needed    DVT Prophylaxis:   - Contraindicated as pt has a severe low platelet count with ecchymosis and bruising.     DISPO:Please confirm code status

## 2019-03-06 NOTE — H&P ADULT - HISTORY OF PRESENT ILLNESS
72 y/o female with a PMH of MM, COPD, colitis comes to [facility] after being sent over from Heme/Onc's office because her platelet count came back at less than 10k. Pt was recently placed on revlimid with a platelet count of 29k and Revlimid was held until count was to increase. Pt also has brusing and echymosis on her lower extremities b/l that patient states began appearing recently. Pt has bone marrow biopsy last september confirming her MM. Scheduled for a repeat but sent to ED. Pt denies any chest pain, shortness of breath, palpitations, nausea, vomiting, diarrhea, constipation. ROS otherwise negative. 74 y/o female with a PMH of MM, COPD, colitis comes to [facility] after being sent over from Heme/Onc's office because her platelet count came back at less than 10k. Pt was recently placed on revlimid with a platelet count of 29k and Revlimid was held until count was to increase. Pt also has brusing and echymosis on her lower extremities b/l that patient states began appearing recently. Pt has bone marrow biopsy last september confirming her MM. Scheduled for a repeat but sent to ED. Pt denies any chest pain, shortness of breath, palpitations, nausea, vomiting, diarrhea, constipation. ROS otherwise negative.     Med rec brought in by patient from Heme/Onc's office.

## 2019-03-06 NOTE — H&P ADULT - NSHPPHYSICALEXAM_GEN_ALL_CORE
Vital Signs Last 24 Hrs  T(C): 36.7 (06 Mar 2019 00:51), Max: 37 (05 Mar 2019 18:52)  T(F): 98.1 (06 Mar 2019 00:51), Max: 98.6 (05 Mar 2019 18:52)  HR: 89 (06 Mar 2019 00:51) (89 - 100)  BP: 114/56 (06 Mar 2019 00:51) (97/65 - 114/56)  RR: 16 (06 Mar 2019 00:51) (16 - 20)  SpO2: 98% (06 Mar 2019 00:51) (97% - 98%)    Physical exam:  GEN: NAD  HEAD: Normocephalic atraumatic  EYES: B/L constriction and reactivity to light  CARD:S1S2 present, no murmurs, rubs or gallops  RESP: b/l wheezing and rhonci in all lung fields  ABD: +bs. Non distended non tender. No pain on palpation  SKIN: B/L echymosis and bruising present greater on left than right.   NEURO: AAOx3, no focal deficits  PSYCH: Pleasant  EXT:+ pulses present b/l Vital Signs Last 24 Hrs  T(C): 36.7 (06 Mar 2019 00:51), Max: 37 (05 Mar 2019 18:52)  T(F): 98.1 (06 Mar 2019 00:51), Max: 98.6 (05 Mar 2019 18:52)  HR: 89 (06 Mar 2019 00:51) (89 - 100)  BP: 114/56 (06 Mar 2019 00:51) (97/65 - 114/56)  RR: 16 (06 Mar 2019 00:51) (16 - 20)  SpO2: 98% (06 Mar 2019 00:51) (97% - 98%)    Physical exam:  GEN: NAD  HEAD: Normocephalic atraumatic  EYES: B/L constriction and reactivity to light  CARD:S1S2 present, no murmurs, rubs or gallops  RESP: b/l wheezing and rales in all lung fields  ABD: +bs. Non distended non tender. No pain on palpation  SKIN: B/L ecchymosis and bruising present greater on left than right.   NEURO: AAOx3, no focal deficits  PSYCH: Pleasant  EXT:+ pulses present b/l

## 2019-03-06 NOTE — ED ADULT NURSE NOTE - OBJECTIVE STATEMENT
Patient presents from oncologist for low platelet count.  Pt a&ox3, skin warm and dry, denies chest pain/shortness of breath. Ambulates with steady gait, no complaints at this time.

## 2019-03-06 NOTE — ED ADULT NURSE NOTE - NSIMPLEMENTINTERV_GEN_ALL_ED
Implemented All Universal Safety Interventions:  [place] to call system. Call bell, personal items and telephone within reach. Instruct patient to call for assistance. Room bathroom lighting operational. Non-slip footwear when patient is off stretcher. Physically safe environment: no spills, clutter or unnecessary equipment. Stretcher in lowest position, wheels locked, appropriate side rails in place.

## 2019-03-06 NOTE — CONSULT NOTE ADULT - SUBJECTIVE AND OBJECTIVE BOX
[facility]  MD Concha Bobo MD  [phone]  Answering Sylvia : [phone]        BECCA [id number]      HPI:  74 y/o female with a PMH of MM, COPD, colitis comes to [facility] after being sent over from Heme/Onc's office because her platelet count came back at less than 10k. Pt was recently placed on revlimid with a platelet count of 29k and Revlimid was held until count was to increase. Pt also has brusing and echymosis on her lower extremities b/l that patient states began appearing recently.  Has been given 1 dose of solumedrol and IV IgG in ED     Med rec brought in by patient from Heme/Onc's office. (06 Mar 2019 01:33)      PAST MEDICAL & SURGICAL HISTORY:  COPD (chronic obstructive pulmonary disease)  Multiple myeloma  Anxiety  Hearing loss, unspecified hearing loss type, unspecified laterality  Kidney stones  MI (myocardial infarction)  S/P cholecystectomy      ANTIBIOTICS      Allergies    penicillin (Unknown)    Intolerances        SOCIAL HISTORY:    FAMILY HISTORY:  Family history of renal failure      Vital Signs Last 24 Hrs  T(C): 36.4 (06 Mar 2019 07:30), Max: 37.1 (06 Mar 2019 02:59)  T(F): 97.6 (06 Mar 2019 07:30), Max: 98.7 (06 Mar 2019 02:59)  HR: 81 (06 Mar 2019 08:39) (81 - 100)  BP: 120/71 (06 Mar 2019 07:30) (97/65 - 120/71)  BP(mean): --  RR: 18 (06 Mar 2019 07:30) (16 - 20)  SpO2: 93% (06 Mar 2019 08:39) (93% - 98%)  Drug Dosing Weight  Height (cm): 157.48 (05 Mar 2019 18:52)  Weight (kg): 43.2 (06 Mar 2019 02:59)  BMI (kg/m2): 17.4 (06 Mar 2019 02:59)  BSA (m2): 1.39 (06 Mar 2019 02:59)      REVIEW OF SYSTEMS:    CONSTITUTIONAL:  As per HPI.    HEENT:  Eyes:  No diplopia or blurred vision. ENT:  No earache, sore throat or runny nose.    CARDIOVASCULAR:  No pressure, squeezing, strangling, tightness, heaviness or aching about the chest, neck, axilla or epigastrium.    RESPIRATORY:  No cough, shortness of breath, PND or orthopnea.    GASTROINTESTINAL:  No nausea, vomiting or diarrhea.    GENITOURINARY:  No dysuria, frequency or urgency.    MUSCULOSKELETAL:  As per HPI.    SKIN:  No change in skin, hair or nails.    NEUROLOGIC:  No paresthesias, fasciculations, seizures or weakness.    PSYCHIATRIC:  No disorder of thought or mood.    ENDOCRINE:  No heat or cold intolerance, polyuria or polydipsia.    HEMATOLOGICAL:  No easy bruising or bleeding.           PHYSICAL EXAMINATION:    GENERAL: The patient is a well-developed, well-nourished _____in no apparent distress. ___ is alert and oriented x3.    VITAL SIGNS:     HEENT: Head is normocephalic and atraumatic. Extraocular muscles are intact. Pupils are equal, round, and reactive to light and accommodation. Nares appeared normal. Mouth is well hydrated and without lesions. Mucous membranes are moist. Posterior pharynx clear of any exudate or lesions.    NECK: Supple. No carotid bruits.  No lymphadenopathy or thyromegaly.    LUNGS: Clear to auscultation.    HEART: Regular rate and rhythm without murmur.    ABDOMEN: Soft, nontender, and nondistended.  Positive bowel sounds.  No hepatosplenomegaly was noted.    EXTREMITIES: Without any cyanosis, clubbing, rash, lesions or edema.    NEUROLOGIC: Cranial nerves II through XII are grossly intact.    PSYCHIATRIC: Flat affect, but denies suicidal or homicidal ideations.  SKIN: No ulceration or induration present.    MICROBIOLOGY:      LABS:                        10.5   4.7   )-----------( 10       ( 05 Mar 2019 21:48 )             29.4     03-05    142  |  107  |  16.0  ----------------------------<  91  3.6   |  23.0  |  1.12    Ca    9.3      05 Mar 2019 21:48    TPro  6.8  /  Alb  4.1  /  TBili  <0.2<L>  /  DBili  x   /  AST  8   /  ALT  5   /  AlkPhos  59  03-05    PT/INR - ( 05 Mar 2019 21:48 )   PT: 10.0 sec;   INR: 0.87 ratio         PTT - ( 05 Mar 2019 21:48 )  PTT:29.7 sec      RADIOLOGY & ADDITIONAL STUDIES:      ASSESSMENT:  73 yr old female with hx of Multiple Myeloma treated with VRD regimen followed by maintenance with Revlinid   Admitted with plt count of 10,000 noted on routine f/u visit  Thrombocytopenia  Likely  Drug induced,  ? Disease progression,? ITP      PLAN:    Monitor cbc  Trial of steroids and IV IgG pending results of plt counts    TONE HECK MD

## 2019-03-06 NOTE — H&P ADULT - ATTENDING COMMENTS
I was physically present for the key portions of the evaluation and management service provided, and agree with the history, physical exam and plan which I have reviewed and edited where appropriate.

## 2019-03-07 ENCOUNTER — TRANSCRIPTION ENCOUNTER (OUTPATIENT)
Age: 74
End: 2019-03-07

## 2019-03-07 LAB
EOSINOPHIL # BLD AUTO: 0 K/UL — SIGNIFICANT CHANGE UP (ref 0–0.5)
EOSINOPHIL NFR BLD AUTO: 0 % — SIGNIFICANT CHANGE UP (ref 0–5)
HCT VFR BLD CALC: 23.9 % — LOW (ref 37–47)
HCT VFR BLD CALC: 26.4 % — LOW (ref 37–47)
HGB BLD-MCNC: 8.6 G/DL — LOW (ref 12–16)
HGB BLD-MCNC: 9.3 G/DL — LOW (ref 12–16)
LYMPHOCYTES # BLD AUTO: 0.9 K/UL — LOW (ref 1–4.8)
LYMPHOCYTES # BLD AUTO: 27.7 % — SIGNIFICANT CHANGE UP (ref 20–55)
MCHC RBC-ENTMCNC: 35.2 G/DL — SIGNIFICANT CHANGE UP (ref 32–36)
MCHC RBC-ENTMCNC: 36 G/DL — SIGNIFICANT CHANGE UP (ref 32–36)
MCHC RBC-ENTMCNC: 38.4 PG — HIGH (ref 27–31)
MCHC RBC-ENTMCNC: 40 PG — HIGH (ref 27–31)
MCV RBC AUTO: 109.1 FL — HIGH (ref 81–99)
MCV RBC AUTO: 111.2 FL — HIGH (ref 81–99)
MONOCYTES # BLD AUTO: 0.2 K/UL — SIGNIFICANT CHANGE UP (ref 0–0.8)
MONOCYTES NFR BLD AUTO: 7 % — SIGNIFICANT CHANGE UP (ref 3–10)
NEUTROPHILS # BLD AUTO: 2 K/UL — SIGNIFICANT CHANGE UP (ref 1.8–8)
NEUTROPHILS NFR BLD AUTO: 65 % — SIGNIFICANT CHANGE UP (ref 37–73)
PLATELET # BLD AUTO: 13 K/UL — CRITICAL LOW (ref 150–400)
PLATELET # BLD AUTO: 19 K/UL — CRITICAL LOW (ref 150–400)
RBC # BLD: 2.15 M/UL — LOW (ref 4.4–5.2)
RBC # BLD: 2.42 M/UL — LOW (ref 4.4–5.2)
RBC # FLD: 14.2 % — SIGNIFICANT CHANGE UP (ref 11–15.6)
RBC # FLD: 14.6 % — SIGNIFICANT CHANGE UP (ref 11–15.6)
WBC # BLD: 3.1 K/UL — LOW (ref 4.8–10.8)
WBC # BLD: 3.7 K/UL — LOW (ref 4.8–10.8)
WBC # FLD AUTO: 3.1 K/UL — LOW (ref 4.8–10.8)
WBC # FLD AUTO: 3.7 K/UL — LOW (ref 4.8–10.8)

## 2019-03-07 PROCEDURE — 99233 SBSQ HOSP IP/OBS HIGH 50: CPT

## 2019-03-07 RX ORDER — VALACYCLOVIR 500 MG/1
500 TABLET, FILM COATED ORAL DAILY
Qty: 0 | Refills: 0 | Status: DISCONTINUED | OUTPATIENT
Start: 2019-03-07 | End: 2019-03-08

## 2019-03-07 RX ORDER — NICOTINE POLACRILEX 2 MG
1 GUM BUCCAL
Qty: 14 | Refills: 0
Start: 2019-03-07 | End: 2019-03-20

## 2019-03-07 RX ORDER — DIPHENOXYLATE HCL/ATROPINE 2.5-.025MG
1 TABLET ORAL
Qty: 0 | Refills: 0 | DISCHARGE
Start: 2019-03-07

## 2019-03-07 RX ORDER — METOPROLOL TARTRATE 50 MG
12.5 TABLET ORAL DAILY
Qty: 0 | Refills: 0 | Status: DISCONTINUED | OUTPATIENT
Start: 2019-03-07 | End: 2019-03-08

## 2019-03-07 RX ORDER — DULOXETINE HYDROCHLORIDE 30 MG/1
1 CAPSULE, DELAYED RELEASE ORAL
Qty: 0 | Refills: 0 | COMMUNITY

## 2019-03-07 RX ORDER — VALACYCLOVIR 500 MG/1
1 TABLET, FILM COATED ORAL
Qty: 0 | Refills: 0 | DISCHARGE
Start: 2019-03-07

## 2019-03-07 RX ORDER — ASPIRIN/CALCIUM CARB/MAGNESIUM 324 MG
1 TABLET ORAL
Qty: 0 | Refills: 0 | COMMUNITY

## 2019-03-07 RX ORDER — PANTOPRAZOLE SODIUM 20 MG/1
40 TABLET, DELAYED RELEASE ORAL
Qty: 0 | Refills: 0 | Status: DISCONTINUED | OUTPATIENT
Start: 2019-03-07 | End: 2019-03-08

## 2019-03-07 RX ORDER — ALPRAZOLAM 0.25 MG
1 TABLET ORAL
Qty: 0 | Refills: 0 | DISCHARGE
Start: 2019-03-07

## 2019-03-07 RX ORDER — IMMUNE GLOBULIN (HUMAN) 10 G/100ML
20 INJECTION INTRAVENOUS; SUBCUTANEOUS ONCE
Qty: 0 | Refills: 0 | Status: COMPLETED | OUTPATIENT
Start: 2019-03-07 | End: 2019-03-07

## 2019-03-07 RX ORDER — METOPROLOL TARTRATE 50 MG
12.5 TABLET ORAL
Qty: 0 | Refills: 0 | DISCHARGE
Start: 2019-03-07

## 2019-03-07 RX ORDER — DULOXETINE HYDROCHLORIDE 30 MG/1
1 CAPSULE, DELAYED RELEASE ORAL
Qty: 0 | Refills: 0 | DISCHARGE
Start: 2019-03-07

## 2019-03-07 RX ADMIN — Medication 60 MILLIGRAM(S): at 02:56

## 2019-03-07 RX ADMIN — Medication 1 PATCH: at 12:05

## 2019-03-07 RX ADMIN — IMMUNE GLOBULIN (HUMAN) 50 GRAM(S): 10 INJECTION INTRAVENOUS; SUBCUTANEOUS at 19:05

## 2019-03-07 RX ADMIN — TIOTROPIUM BROMIDE 1 CAPSULE(S): 18 CAPSULE ORAL; RESPIRATORY (INHALATION) at 09:17

## 2019-03-07 RX ADMIN — Medication 1 PATCH: at 12:00

## 2019-03-07 RX ADMIN — Medication 60 MILLIGRAM(S): at 14:17

## 2019-03-07 RX ADMIN — Medication 60 MILLIGRAM(S): at 21:44

## 2019-03-07 RX ADMIN — Medication 60 MILLIGRAM(S): at 08:46

## 2019-03-07 RX ADMIN — VALACYCLOVIR 500 MILLIGRAM(S): 500 TABLET, FILM COATED ORAL at 17:36

## 2019-03-07 RX ADMIN — Medication 1 MILLIGRAM(S): at 12:05

## 2019-03-07 RX ADMIN — Medication 1 PATCH: at 19:00

## 2019-03-07 RX ADMIN — BUDESONIDE AND FORMOTEROL FUMARATE DIHYDRATE 2 PUFF(S): 160; 4.5 AEROSOL RESPIRATORY (INHALATION) at 09:17

## 2019-03-07 RX ADMIN — Medication 0.5 MILLIGRAM(S): at 23:43

## 2019-03-07 RX ADMIN — PANTOPRAZOLE SODIUM 40 MILLIGRAM(S): 20 TABLET, DELAYED RELEASE ORAL at 17:37

## 2019-03-07 RX ADMIN — Medication 12.5 MILLIGRAM(S): at 17:37

## 2019-03-07 RX ADMIN — Medication 0.5 MILLIGRAM(S): at 12:05

## 2019-03-07 RX ADMIN — DULOXETINE HYDROCHLORIDE 30 MILLIGRAM(S): 30 CAPSULE, DELAYED RELEASE ORAL at 12:05

## 2019-03-07 RX ADMIN — BUDESONIDE AND FORMOTEROL FUMARATE DIHYDRATE 2 PUFF(S): 160; 4.5 AEROSOL RESPIRATORY (INHALATION) at 20:38

## 2019-03-07 NOTE — PROGRESS NOTE ADULT - ASSESSMENT
72 y/o female with a PMH of MM, COPD, colitis comes to [facility] after being sent over from Heme/Onc's office because her platelet count came back at less than 10k, admitted for thrombocytopenia    Severe Immune thrombocytopenia  - improving, uncomplicated   - likely related to recent   - on iv steroids + ivig per heme onc, jose daniel appreciated  - PLT transfusion x1 ordered for today  - repeat in am, if stable or improved, dc home w/ outpt heme onc fu  - will need bone marrow bx as outpt    Multiple Myeloma  - stable  - Pt confirmed dx 1 year ago via BM biopsy  - Pt was taking Revlimind but held since previous platelets @ 29k  - Pt has close follow up with Dr. Huffman who will cont fu as outpt    COPD not on home oxygen  - w/o acute exac, chronic, stable  - Spiriva  - Advair not in pharmacy, symbicort inpt  - Pt already placed on Solumedrol  - prn Duonebs    Chronic HFrEF (last echo from 3/18 showed EF of 30-35%)  - w/o acute exac  - cont current meds, resume home dose bb    HTN  -controlled  -cont current meds    Depression/Anxiety  - chronic, stable, uncomplicated  - Cymbalta  - ativan PRN for anxiety    Tobacco abuse  - chronic  - 50 year hx of smoking 2PPD  -counseled pt on risks of smoking  -Pt uninterested in quitting right now  -nicotine patch while in house    Chronic colitis  - stable, uncomplicated  -c/w with home lomotil as needed    DVT Prophylaxis:   - Contraindicated as pt has a severe low platelet count with ecchymosis and bruising.     DISPO: dc tomorrow if PLT stable

## 2019-03-07 NOTE — DISCHARGE NOTE ADULT - PATIENT PORTAL LINK FT
You can access the [employer] Patient Portal, offered by [facility], by registering with the following website: [email or web address]

## 2019-03-07 NOTE — DISCHARGE NOTE ADULT - HOSPITAL COURSE
74 y/o female with a PMH of MM, COPD, colitis comes to [facility] after being sent over from Heme/Onc's office because her platelet count came back at less than 10k, admitted for thrombocytopenia    Severe Immune thrombocytopenia  - improving, uncomplicated   - likely related to recent revlimid vs development of hematogenous malignancy from as complication to revlimid therapy, poss leukemia  - on iv steroids + ivig per heme onc inpatient, eval appreciated  - PLT transfusion x1 given, well tolerated, uncomplicated  - dc home w/ outpt heme onc fu  - will need bone marrow bx as outpt    Multiple Myeloma  - stable  - Pt confirmed dx 1 year ago via BM biopsy  - Pt was taking Revlimind but held since previous platelets @ 29k outpt  - Pt has close follow up with Dr. Huffman who will cont fu as outpt    COPD not on home oxygen  - w/o acute exac, chronic, stable  - Spiriva  - Advair not in pharmacy, symbicort inpt  - prn Duonebs inpt, resume home regimen on dc    Chronic HFrEF (last echo from 3/18 showed EF of 30-35%)  - w/o acute exac  - cont current meds, resumed home dose bb    HTN  -controlled  -cont current meds    Depression/Anxiety  - chronic, stable, uncomplicated  - Cymbalta  - ativan PRN for anxiety    Tobacco abuse  - chronic  - 50 year hx of smoking 2PPD  -counseled pt on risks of smoking  -Pt uninterested in quitting right now  -nicotine patch while in house, script given on dc    Chronic colitis  - stable, uncomplicated  -c/w with home lomotil as needed    Vital Signs Last 24 Hrs  T(C): 36.7 (08 Mar 2019 00:35), Max: 37 (07 Mar 2019 16:32)  T(F): 98.1 (08 Mar 2019 00:35), Max: 98.6 (07 Mar 2019 16:32)  HR: 73 (08 Mar 2019 05:15) (73 - 89)  BP: 111/66 (08 Mar 2019 05:15) (111/66 - 138/73)  BP(mean): --  RR: 18 (08 Mar 2019 05:15) (18 - 18)  SpO2: 96% (08 Mar 2019 05:15) (95% - 97%)    PHYSICAL EXAM.    GEN - appears age appropriate. well nourished. pleasant. no distress.   HEENT - NCAT, EOMI, DAK[place]  RESP - CTA BL, no wheeze/stridor/rhonchi/crackles. not on supplemental O2. able to speak in full sentences without distress.   CARDIO - NS1S2, RRR. No murmurs/rubs/gallops.  ABD - Soft/Non tender/Non distended. Normal BS x4 quadrants. no guarding/rebound tenderness.  Ext - No RICCO.  MSK - BL 5/5 strength on upper and lower extremities.   Neuro - AAOx3. cn 2-12 grossly intact  Psych - normal affect  Skin - ecchymosis over BL LE    All electrolyte abnormalities were monitored carefully and repleted as necessary during this hospitalization. At the time of discharge patient was hemodynamically stable and amenable to all terms of discharge. The patient has received verbal instructions from myself regarding discharge plans.     Length of Discharge: 45MIN    PMD contacted and made aware of hospital discharge. Follow up appointment advised within 7 days of discharge.

## 2019-03-07 NOTE — PROGRESS NOTE ADULT - NSHPATTENDINGPLANDISCUSS_GEN_ALL_CORE
the patient.  All imaging and results of lab/other studies reviewed by me. All questions answered to their satisfaction. At this time they agree with the current plan of therapy.

## 2019-03-07 NOTE — DISCHARGE NOTE ADULT - PROVIDER TOKENS
PROVIDER:[TOKEN:[[id number]],FOLLOWUP:[1 week]],PROVIDER:[TOKEN:[[id number]],FOLLOWUP:[1 week]]

## 2019-03-07 NOTE — DISCHARGE NOTE ADULT - CARE PROVIDER_API CALL
Jesus Ruiz)  Internal Medicine  [address] [place] [id number]  Phone: [phone]  Fax: [phone]  Follow Up Time: 1 week    Yung Huffman)  Hematology; Medical Oncology  [address] [place] [address]  Phone: [phone]  Fax: [phone]  Follow Up Time: 1 week

## 2019-03-07 NOTE — DISCHARGE NOTE ADULT - MEDICATION SUMMARY - MEDICATIONS TO TAKE
I will START or STAY ON the medications listed below when I get home from the hospital:    tamsulosin 0.4 mg oral capsule  -- 1 cap(s) by mouth once a day (at bedtime)  -- Indication: For urinary health    Cymbalta 30 mg oral delayed release capsule  -- 1 cap(s) by mouth once a day  -- Indication: For Mood    Lomotil 2.5 mg-0.025 mg oral tablet  -- 1 tab(s) by mouth 4 times a day, As needed, Diarrhea  -- Indication: For gastrointestinal health    valACYclovir 500 mg oral tablet  -- 1 tab(s) by mouth once a day  -- Indication: For prophylaxis    Xanax 0.5 mg oral tablet  -- 1 tab(s) by mouth 2 times a day, As needed, anxiety or agitation  -- Indication: For Mood    metoprolol  -- 12.5 milligram(s) by mouth once a day  -- Indication: For heart failure    Advair Diskus 250 mcg-50 mcg inhalation powder  -- 1 puff(s) inhaled 2 times a day  -- Indication: For COPD (chronic obstructive pulmonary disease)    Spiriva 18 mcg inhalation capsule  -- 1 cap(s) inhaled once a day  -- Indication: For COPD (chronic obstructive pulmonary disease)    pantoprazole 40 mg oral delayed release tablet  -- 1 tab(s) by mouth once a day   -- It is very important that you take or use this exactly as directed.  Do not skip doses or discontinue unless directed by your doctor.  Obtain medical advice before taking any non-prescription drugs as some may affect the action of this medication.  Swallow whole.  Do not crush.    -- Indication: For reflux    Nicoderm C-Q 21 mg/24 hr transdermal film, extended release  -- 1 patch by transdermal patch once a day   -- Indication: For smoking    folic acid 1 mg oral tablet  -- 1 tab(s) by mouth once a day   -- Indication: For Anemia

## 2019-03-07 NOTE — DISCHARGE NOTE ADULT - OTHER SIGNIFICANT FINDINGS
03-05    142  |  107  |  16.0  ----------------------------<  91  3.6   |  23.0  |  1.12    Ca    9.3      05 Mar 2019 21:48    TPro  6.8  /  Alb  4.1  /  TBili  <0.2<L>  /  DBili  x   /  AST  8   /  ALT  5   /  AlkPhos  59  03-05                          8.6    3.7   )-----------( 19       ( 07 Mar 2019 13:03 )             23.9     LIVER FUNCTIONS - ( 05 Mar 2019 21:48 )  Alb: 4.1 g/dL / Pro: 6.8 g/dL / ALK PHOS: 59 U/L / ALT: 5 U/L / AST: 8 U/L / GGT: x           PT/INR - ( 05 Mar 2019 21:48 )   PT: 10.0 sec;   INR: 0.87 ratio         PTT - ( 05 Mar 2019 21:48 )  PTT:29.7 sec 03-05    142  |  107  |  16.0  ----------------------------<  91  3.6   |  23.0  |  1.12    Ca    9.3      05 Mar 2019 21:48    TPro  6.8  /  Alb  4.1  /  TBili  <0.2<L>  /  DBili  x   /  AST  8   /  ALT  5   /  AlkPhos  59  03-05                          8.6    3.7   )-----------( 19       ( 07 Mar 2019 13:03 )             23.9     LIVER FUNCTIONS - ( 05 Mar 2019 21:48 )  Alb: 4.1 g/dL / Pro: 6.8 g/dL / ALK PHOS: 59 U/L / ALT: 5 U/L / AST: 8 U/L / GGT: x           PT/INR - ( 05 Mar 2019 21:48 )   PT: 10.0 sec;   INR: 0.87 ratio         PTT - ( 05 Mar 2019 21:48 )  PTT:29.7 sec    Complete Blood Count in AM (03.08.19 @ 06:37)    WBC Count: 3.3 K/uL    RBC Count: 2.43 M/uL    Hemoglobin: 9.3 g/dL    Hematocrit: 27.3 %    Mean Cell Volume: 112.3 fl    Mean Cell Hemoglobin: 38.3 pg    Mean Cell Hemoglobin Conc: 34.1 g/dL    Red Cell Distrib Width: 13.8 %    Platelet Count - Automated: 71 K/uL

## 2019-03-07 NOTE — DISCHARGE NOTE ADULT - PLAN OF CARE
resolution, be sure to follow up with hematology You were noticed to have blood work with some abnormalities. At this time, you are safe to leave the hospital, we do not suspect that anything immediately will come of these findings, but it is something that should be followed to see if it is getting better, staying the same, or getting worse. Be sure to discuss this at your follow up visit with your Primary Care Doctor to have these tests repeated and to see what work up, if any, is necessary to continue managing it. follow up with hematology Be sure to take all medications as prescribed, even if you do not feel short of breath. The only medication to be taken on an as needed basis is a rescue inhaler. If you have been prescribed a taper of steroids, be sure to take it as prescribed. If you experience worsening shortness of breath unimproved by nebulizers or inhalers, wheezing, breathlessness, be sure to come to the ER for evaluation. blood pressure < 150/90 Be sure to follow a low salt diet. If you have been prescribed antihypertensive medications to control your blood pressure, be sure to take them every day as prescribed and do not miss any doses, the medications do not work if they are not taken consistently. Follow up with your Primary Care Doctor and have your Blood Pressure checked. You have a known diagnosis or Depression and/ or Anxiety. If you are on medication for this condition, it is very important that you continue to take the medication, EVEN if you feel well! These medications are a very delicate balance and should be taken EXACTLY as prescribed, never more or less than what has been advised to you. If you ever feel overwhelmed like your symptoms are becoming too much to handle, or if you feel like hurting yourself or others, PLEASE contact your health care provider IMMEDIATELY or come directly to the ER. If you accidentally overdose on your medications, come immediately to the ER. Do not take your medications with alcohol or mix with illegal or narcotic medications. as above It is very important for your health that you stop smoking! There are many different ways to do so, nicotine patches, gum, and much more! Please discuss your various options with your Primary care doctor. There are also many online resources and hotlines that you can call. Feel free to request more information.

## 2019-03-07 NOTE — DISCHARGE NOTE ADULT - CARE PLAN
Principal Discharge DX:	Thrombocytopenia  Goal:	resolution, be sure to follow up with hematology  Assessment and plan of treatment:	You were noticed to have blood work with some abnormalities. At this time, you are safe to leave the hospital, we do not suspect that anything immediately will come of these findings, but it is something that should be followed to see if it is getting better, staying the same, or getting worse. Be sure to discuss this at your follow up visit with your Primary Care Doctor to have these tests repeated and to see what work up, if any, is necessary to continue managing it.  Secondary Diagnosis:	Multiple myeloma, remission status unspecified  Goal:	follow up with hematology  Secondary Diagnosis:	Chronic obstructive pulmonary disease, unspecified COPD type  Assessment and plan of treatment:	Be sure to take all medications as prescribed, even if you do not feel short of breath. The only medication to be taken on an as needed basis is a rescue inhaler. If you have been prescribed a taper of steroids, be sure to take it as prescribed. If you experience worsening shortness of breath unimproved by nebulizers or inhalers, wheezing, breathlessness, be sure to come to the ER for evaluation.  Secondary Diagnosis:	Essential hypertension  Goal:	blood pressure < 150/90  Assessment and plan of treatment:	Be sure to follow a low salt diet. If you have been prescribed antihypertensive medications to control your blood pressure, be sure to take them every day as prescribed and do not miss any doses, the medications do not work if they are not taken consistently. Follow up with your Primary Care Doctor and have your Blood Pressure checked.  Secondary Diagnosis:	Depression, unspecified depression type  Assessment and plan of treatment:	You have a known diagnosis or Depression and/ or Anxiety. If you are on medication for this condition, it is very important that you continue to take the medication, EVEN if you feel well! These medications are a very delicate balance and should be taken EXACTLY as prescribed, never more or less than what has been advised to you. If you ever feel overwhelmed like your symptoms are becoming too much to handle, or if you feel like hurting yourself or others, PLEASE contact your health care provider IMMEDIATELY or come directly to the ER. If you accidentally overdose on your medications, come immediately to the ER. Do not take your medications with alcohol or mix with illegal or narcotic medications.  Secondary Diagnosis:	Anxiety  Assessment and plan of treatment:	as above  Secondary Diagnosis:	Tobacco abuse  Assessment and plan of treatment:	It is very important for your health that you stop smoking! There are many different ways to do so, nicotine patches, gum, and much more! Please discuss your various options with your Primary care doctor. There are also many online resources and hotlines that you can call. Feel free to request more information.

## 2019-03-07 NOTE — DISCHARGE NOTE ADULT - SECONDARY DIAGNOSIS.
Multiple myeloma, remission status unspecified Chronic obstructive pulmonary disease, unspecified COPD type Essential hypertension Depression, unspecified depression type Anxiety Tobacco abuse

## 2019-03-08 VITALS
DIASTOLIC BLOOD PRESSURE: 67 MMHG | OXYGEN SATURATION: 98 % | TEMPERATURE: 98 F | HEART RATE: 73 BPM | RESPIRATION RATE: 18 BRPM | SYSTOLIC BLOOD PRESSURE: 110 MMHG

## 2019-03-08 LAB
HCT VFR BLD CALC: 27.3 % — LOW (ref 37–47)
HGB BLD-MCNC: 9.3 G/DL — LOW (ref 12–16)
MCHC RBC-ENTMCNC: 34.1 G/DL — SIGNIFICANT CHANGE UP (ref 32–36)
MCHC RBC-ENTMCNC: 38.3 PG — HIGH (ref 27–31)
MCV RBC AUTO: 112.3 FL — HIGH (ref 81–99)
PLATELET # BLD AUTO: 71 K/UL — LOW (ref 150–400)
RBC # BLD: 2.43 M/UL — LOW (ref 4.4–5.2)
RBC # FLD: 13.8 % — SIGNIFICANT CHANGE UP (ref 11–15.6)
WBC # BLD: 3.3 K/UL — LOW (ref 4.8–10.8)
WBC # FLD AUTO: 3.3 K/UL — LOW (ref 4.8–10.8)

## 2019-03-08 PROCEDURE — 86850 RBC ANTIBODY SCREEN: CPT

## 2019-03-08 PROCEDURE — 96374 THER/PROPH/DIAG INJ IV PUSH: CPT

## 2019-03-08 PROCEDURE — 86900 BLOOD TYPING SEROLOGIC ABO: CPT

## 2019-03-08 PROCEDURE — 85027 COMPLETE CBC AUTOMATED: CPT

## 2019-03-08 PROCEDURE — 80053 COMPREHEN METABOLIC PANEL: CPT

## 2019-03-08 PROCEDURE — 94640 AIRWAY INHALATION TREATMENT: CPT

## 2019-03-08 PROCEDURE — 85610 PROTHROMBIN TIME: CPT

## 2019-03-08 PROCEDURE — 99285 EMERGENCY DEPT VISIT HI MDM: CPT | Mod: 25

## 2019-03-08 PROCEDURE — 86901 BLOOD TYPING SEROLOGIC RH(D): CPT

## 2019-03-08 PROCEDURE — 85730 THROMBOPLASTIN TIME PARTIAL: CPT

## 2019-03-08 PROCEDURE — 99239 HOSP IP/OBS DSCHRG MGMT >30: CPT

## 2019-03-08 PROCEDURE — P9037: CPT

## 2019-03-08 PROCEDURE — 36430 TRANSFUSION BLD/BLD COMPNT: CPT

## 2019-03-08 PROCEDURE — 36415 COLL VENOUS BLD VENIPUNCTURE: CPT

## 2019-03-08 RX ORDER — LENALIDOMIDE 5 MG/1
0 CAPSULE ORAL
Qty: 0 | Refills: 0 | COMMUNITY

## 2019-03-08 RX ADMIN — Medication 60 MILLIGRAM(S): at 02:08

## 2019-03-08 RX ADMIN — Medication 60 MILLIGRAM(S): at 08:23

## 2019-03-08 RX ADMIN — PANTOPRAZOLE SODIUM 40 MILLIGRAM(S): 20 TABLET, DELAYED RELEASE ORAL at 05:18

## 2019-03-08 RX ADMIN — Medication 12.5 MILLIGRAM(S): at 05:17

## 2019-03-08 NOTE — PROGRESS NOTE ADULT - SUBJECTIVE AND OBJECTIVE BOX
[facility]  MD Concha Bobo MD  [phone]  Answering Sylvia : [phone]    [id number]    INTERVAL HPI/OVERNIGHT EVENTS:  Doing well, platelets improved    Vital Signs Last 24 Hrs  T(C): 36.3 (08 Mar 2019 08:39), Max: 37 (07 Mar 2019 16:32)  T(F): 97.3 (08 Mar 2019 08:39), Max: 98.6 (07 Mar 2019 16:32)  HR: 82 (08 Mar 2019 08:39) (73 - 89)  BP: 132/78 (08 Mar 2019 08:39) (111/66 - 138/73)  BP(mean): --  RR: 18 (08 Mar 2019 08:39) (18 - 18)  SpO2: 91% (08 Mar 2019 08:39) (91% - 97%)  ANTIBIOTICS  valACYclovir 500 milliGRAM(s) Oral daily      Allergies    penicillin (Unknown)    Intolerances        REVIEW OF SYSTEMS:    CONSTITUTIONAL:  As per HPI.    HEENT:  Eyes:  No diplopia or blurred vision. ENT:  No earache, sore throat or runny nose.    CARDIOVASCULAR:  No pressure, squeezing, strangling, tightness, heaviness or aching about the chest, neck, axilla or epigastrium.    RESPIRATORY:  No cough, shortness of breath, PND or orthopnea.    GASTROINTESTINAL:  No nausea, vomiting or diarrhea.    GENITOURINARY:  No dysuria, frequency or urgency.    MUSCULOSKELETAL:  As per HPI.    SKIN:  No change in skin, hair or nails.    NEUROLOGIC:  CNI,MOTOR WNL, SENSORY WNL.    ENDOCRINE:  No heat or cold intolerance, polyuria or polydipsia.    HEMATOLOGICAL:  No easy bruising or bleeding.           PHYSICAL EXAMINATION:    GENERAL: The patient is a well-developed, well-nourished _____in no apparent distress. ___ is alert and oriented x3.    VITAL SIGNS:     HEENT: Head is normocephalic and atraumatic. Extraocular muscles are intact. Pupils are equal, round, and reactive to light and accommodation. Nares appeared normal. Mouth is well hydrated and without lesions. Mucous membranes are moist. Posterior pharynx clear of any exudate or lesions.    NECK: Supple. No carotid bruits.  No lymphadenopathy or thyromegaly.    LUNGS: Clear to auscultation.    HEART: Regular rate and rhythm without murmur.    ABDOMEN: Soft, nontender, and nondistended.  Positive bowel sounds.  No hepatosplenomegaly was noted.    EXTREMITIES: Without any cyanosis, clubbing, rash, lesions or edema.    NEUROLOGIC: Cranial nerves II through XII are grossly intact.    PSYCHIATRIC: Flat affect, but denies suicidal or homicidal ideations.  SKIN: No ulceration or induration present. residual bruising      LABS:                        9.3    3.3   )-----------( 71       ( 08 Mar 2019 06:37 )             27.3       ASSESSMENT:  Thrombocytopenia  Multiple myeloma      PLAN:  may d/c on prednisone 1 mg/kg daily  D/C on PPI.  Will follow up on monday as outpatient      Yung Huffman M.D.
[facility]  MD Cocnha Bobo MD  [phone]  Answering Sevteresa : [phone]    BECCA JARRELLMACHELLE-[id number]    INTERVAL HPI/OVERNIGHT EVENTS:  No further new ecchymosis. No gross bleed no headache. platelts not increasing as anticipated. informs me of a death in the family and she may have to go home.    Vital Signs Last 24 Hrs  T(C): 36.4 (07 Mar 2019 00:26), Max: 36.8 (06 Mar 2019 21:33)  T(F): 97.6 (07 Mar 2019 00:26), Max: 98.2 (06 Mar 2019 21:33)  HR: 93 (07 Mar 2019 00:26) (86 - 96)  BP: 109/72 (07 Mar 2019 00:26) (109/72 - 132/79)  BP(mean): --  RR: 18 (07 Mar 2019 00:26) (18 - 18)  SpO2: 96% (06 Mar 2019 21:43) (96% - 100%)  ANTIBIOTICS  immune   globulin 10% (GAMMAGARD) IVPB 20 Gram(s) IV Intermittent once      Allergies    penicillin (Unknown)    Intolerances        REVIEW OF SYSTEMS:    CONSTITUTIONAL:  As per HPI.    HEENT:  Eyes:  No diplopia or blurred vision. ENT:  No earache, sore throat or runny nose.    CARDIOVASCULAR:  No pressure, squeezing, strangling, tightness, heaviness or aching about the chest, neck, axilla or epigastrium.    RESPIRATORY:  No cough, shortness of breath, PND or orthopnea.    GASTROINTESTINAL:  No nausea, vomiting or diarrhea.    GENITOURINARY:  No dysuria, frequency or urgency.    MUSCULOSKELETAL:  As per HPI.    SKIN:  No change in skin, hair or nails.    NEUROLOGIC:  CNI,MOTOR WNL, SENSORY WNL.    ENDOCRINE:  No heat or cold intolerance, polyuria or polydipsia.    HEMATOLOGICAL:  No easy bruising or bleeding.           PHYSICAL EXAMINATION:    GENERAL: The patient is a well-developed, well-nourished _____in no apparent distress. ___ is alert and oriented x3.    VITAL SIGNS:     HEENT: Head is normocephalic and atraumatic. Extraocular muscles are intact. Pupils are equal, round, and reactive to light and accommodation. Nares appeared normal. Mouth is well hydrated and without lesions. Mucous membranes are moist. Posterior pharynx clear of any exudate or lesions.    NECK: Supple. No carotid bruits.  No lymphadenopathy or thyromegaly.    LUNGS: Clear to auscultation.    HEART: Regular rate and rhythm without murmur.    ABDOMEN: Soft, nontender, and nondistended.  Positive bowel sounds.  No hepatosplenomegaly was noted.    EXTREMITIES: Without any cyanosis, clubbing, rash, lesions or edema.    NEUROLOGIC: Cranial nerves II through XII are grossly intact.    PSYCHIATRIC: Flat affect, but denies suicidal or homicidal ideations.  SKIN: stable ecchymosis/bruising and petechiae      LABS:                        9.3    3.1   )-----------( 13       ( 07 Mar 2019 06:49 )             26.4     03-05    142  |  107  |  16.0  ----------------------------<  91  3.6   |  23.0  |  1.12    Ca    9.3      05 Mar 2019 21:48    TPro  6.8  /  Alb  4.1  /  TBili  <0.2<L>  /  DBili  x   /  AST  8   /  ALT  5   /  AlkPhos  59  03-05    PT/INR - ( 05 Mar 2019 21:48 )   PT: 10.0 sec;   INR: 0.87 ratio         PTT - ( 05 Mar 2019 21:48 )  PTT:29.7 sec    ASSESSMENT:  Severe new onset thrombocytpenia  multiple myeloma, stable state      PLAN:  Poor incremental response to ongoing treatment  Plan another days of IVIG  considering the recent death in the family, will order another IVIG today and give platelet transfusion as well  If she achieves a good incremental rise after transfusion will consider d/c tomorrow and for further work-up as outpatient.        Yung Huffman M.D.
PMD: Dr Ruiz    CC: low PLT    Patient seen and examined at the bedside. No acute overnight events. sp IVIG yesterday. Complaining of nothing this AM. Denies fever/chills, headache, lightheadedness, dizziness, chest pain, palpitations, shortness of breath, cough, abd pain, nausea/vomiting/diarrhea, muscle pain abn bleeding      =========================================================================================  T(C): 36.4 (03-07-19 @ 00:26), Max: 36.8 (03-06-19 @ 21:33)  HR: 81 (03-07-19 @ 09:17) (81 - 96)  BP: 109/72 (03-07-19 @ 00:26) (109/72 - 132/79)  RR: 18 (03-07-19 @ 00:26) (18 - 18)  SpO2: 96% (03-07-19 @ 09:17) (96% - 100%)    PHYSICAL EXAM.    GEN - appears age appropriate. well nourished. pleasant. no distress.   HEENT - NCAT, EOMI, DAKOTA  RESP - CTA BL, no wheeze/stridor/rhonchi/crackles. not on supplemental O2. able to speak in full sentences without distress.   CARDIO - NS1S2, RRR. No murmurs/rubs/gallops.  ABD - Soft/Non tender/Non distended. Normal BS x4 quadrants. no guarding/rebound tenderness.  Ext - No RICCO.  MSK - BL 5/5 strength on upper and lower extremities.   Neuro - AAOx3. cn 2-12 grossly intact  Psych - normal affect  Skin - ecchymosis over BL LE      I&O's Summary    Daily     Daily     =========================================================================================  LABS.    03-05    142  |  107  |  16.0  ----------------------------<  91  3.6   |  23.0  |  1.12    Ca    9.3      05 Mar 2019 21:48    TPro  6.8  /  Alb  4.1  /  TBili  <0.2<L>  /  DBili  x   /  AST  8   /  ALT  5   /  AlkPhos  59  03-05                          8.6    3.7   )-----------( 19       ( 07 Mar 2019 13:03 )             23.9     LIVER FUNCTIONS - ( 05 Mar 2019 21:48 )  Alb: 4.1 g/dL / Pro: 6.8 g/dL / ALK PHOS: 59 U/L / ALT: 5 U/L / AST: 8 U/L / GGT: x           PT/INR - ( 05 Mar 2019 21:48 )   PT: 10.0 sec;   INR: 0.87 ratio         PTT - ( 05 Mar 2019 21:48 )  PTT:29.7 sec            =========================================================================================  IMAGING.     =========================================================================================    HOME MEDS.    Home Medications:  ALPRAZolam 0.5 mg oral tablet: 1 tab(s) orally 2 times a day, As Needed (01 Mar 2018 23:53)  aspirin 81 mg oral tablet: 1 tab(s) orally once a day (01 Mar 2018 23:53)  Cymbalta 30 mg oral delayed release capsule: 1 cap(s) orally once a day (02 Mar 2018 00:13)  Revlimid:  (01 Mar 2018 23:53)  Spiriva 18 mcg inhalation capsule: 1 cap(s) inhaled once a day (01 Mar 2018 23:53)      =========================================================================================    HOSPITAL MEDS.    MEDICATIONS  (STANDING):  buDESOnide 160 MICROgram(s)/formoterol 4.5 MICROgram(s) Inhaler 2 Puff(s) Inhalation two times a day  DULoxetine 30 milliGRAM(s) Oral daily  folic acid 1 milliGRAM(s) Oral daily  immune   globulin 10% (GAMMAGARD) IVPB 20 Gram(s) IV Intermittent once  methylPREDNISolone sodium succinate Injectable 60 milliGRAM(s) IV Push every 6 hours  nicotine - 21 mG/24Hr(s) Patch 1 patch Transdermal daily  tiotropium 18 MICROgram(s) Capsule 1 Capsule(s) Inhalation daily    MEDICATIONS  (PRN):  ALBUTerol/ipratropium for Nebulization 3 milliLiter(s) Nebulizer every 6 hours PRN Shortness of Breath and/or Wheezing  ALPRAZolam 0.5 milliGRAM(s) Oral two times a day PRN anxiety or agitation  diphenoxylate/atropine 1 Tablet(s) Oral four times a day PRN Diarrhea

## 2019-03-08 NOTE — CHART NOTE - NSCHARTNOTEFT_GEN_A_CORE
Patient seen and examined @ the bedside today. Patient clinically stable at this time. No longer requires acute in hospital level of care. Can be continually followed/managed as an outpatient. Please see discharge summary for further information including today's vitals and physical exam.

## 2019-04-05 PROBLEM — J44.9 CHRONIC OBSTRUCTIVE PULMONARY DISEASE, UNSPECIFIED: Chronic | Status: ACTIVE | Noted: 2019-03-05

## 2019-04-06 ENCOUNTER — APPOINTMENT (OUTPATIENT)
Dept: ULTRASOUND IMAGING | Facility: CLINIC | Age: 74
End: 2019-04-06
Payer: MEDICARE

## 2019-04-06 ENCOUNTER — OUTPATIENT (OUTPATIENT)
Dept: OUTPATIENT SERVICES | Facility: HOSPITAL | Age: 74
LOS: 1 days | End: 2019-04-06
Payer: MEDICARE

## 2019-04-06 DIAGNOSIS — Z90.49 ACQUIRED ABSENCE OF OTHER SPECIFIED PARTS OF DIGESTIVE TRACT: Chronic | ICD-10-CM

## 2019-04-06 DIAGNOSIS — Z00.00 ENCOUNTER FOR GENERAL ADULT MEDICAL EXAMINATION WITHOUT ABNORMAL FINDINGS: ICD-10-CM

## 2019-04-06 PROCEDURE — 76700 US EXAM ABDOM COMPLETE: CPT

## 2019-04-06 PROCEDURE — 76700 US EXAM ABDOM COMPLETE: CPT | Mod: 26

## 2019-05-16 ENCOUNTER — OUTPATIENT (OUTPATIENT)
Dept: OUTPATIENT SERVICES | Facility: HOSPITAL | Age: 74
LOS: 1 days | End: 2019-05-16
Payer: MEDICARE

## 2019-05-16 ENCOUNTER — APPOINTMENT (OUTPATIENT)
Dept: RADIOLOGY | Facility: CLINIC | Age: 74
End: 2019-05-16
Payer: MEDICARE

## 2019-05-16 DIAGNOSIS — Z00.8 ENCOUNTER FOR OTHER GENERAL EXAMINATION: ICD-10-CM

## 2019-05-16 DIAGNOSIS — Z90.49 ACQUIRED ABSENCE OF OTHER SPECIFIED PARTS OF DIGESTIVE TRACT: Chronic | ICD-10-CM

## 2019-05-16 PROCEDURE — 71100 X-RAY EXAM RIBS UNI 2 VIEWS: CPT

## 2019-05-16 PROCEDURE — 71100 X-RAY EXAM RIBS UNI 2 VIEWS: CPT | Mod: 26,LT

## 2019-06-03 NOTE — DISCHARGE NOTE ADULT - ADDITIONAL INSTRUCTIONS
-Follow up with Primary Care Doctor within 1 week  -Follow up with Hematology Oncology (blood and cancer specialists) in 1 week alert and awake

## 2019-07-01 ENCOUNTER — OUTPATIENT (OUTPATIENT)
Dept: OUTPATIENT SERVICES | Facility: HOSPITAL | Age: 74
LOS: 1 days | End: 2019-07-01

## 2019-07-01 ENCOUNTER — APPOINTMENT (OUTPATIENT)
Dept: ULTRASOUND IMAGING | Facility: CLINIC | Age: 74
End: 2019-07-01

## 2019-07-01 DIAGNOSIS — M25.551 PAIN IN RIGHT HIP: ICD-10-CM

## 2019-07-01 DIAGNOSIS — Z90.49 ACQUIRED ABSENCE OF OTHER SPECIFIED PARTS OF DIGESTIVE TRACT: Chronic | ICD-10-CM

## 2019-07-09 NOTE — ED PROVIDER NOTE - RESPIRATORY, MLM
Patient Education   Personalized Prevention Plan  You are due for the preventive services outlined below.  Your care team is available to assist you in scheduling these services.  If you have already completed any of these items, please share that information with your care team to update in your medical record.  Health Maintenance Due   Topic Date Due     Hepatitis C Screening  1949     Annual Wellness Visit  10/22/2013     FALL RISK ASSESSMENT  10/22/2013     Zoster (Shingles) Vaccine (2 of 3) 07/17/2014     Discuss Advance Directive Planning  03/29/2017     PHQ-2  01/01/2019         At [facility], we strive to deliver an exceptional experience to you, every time we see you.  If you receive a survey in the mail, please send us back your thoughts. We really do value your feedback.    Based on your medical history, these are the current health maintenance/preventive care services that you are due for (some may have been done at this visit.)  Health Maintenance Due   Topic Date Due     HEPATITIS C SCREENING  1949     MEDICARE ANNUAL WELLNESS VISIT  10/22/2013     FALL RISK ASSESSMENT  10/22/2013     ZOSTER IMMUNIZATION (2 of 3) 07/17/2014     ADVANCE CARE PLANNING  03/29/2017     PHQ-2  01/01/2019         Suggested websites for health information:  Www.[employer].Playviews : Up to date and easily searchable information on multiple topics.  Www.medlineplus.gov : medication info, interactive tutorials, watch real surgeries online  Www.familydoctor.org : good info from the Academy of Family Physicians  Www.cdc.gov : public health info, travel advisories, epidemics (H1N1)  Www.aap.org : children's health info, normal development, vaccinations  Www.health.state.[place].us : [place] dept of health, public health issues in [place], N1N1    Your care team:                            Family Medicine Internal Medicine   MD Jonathan Albarran MD Shantel Branch-Fleming, MD Katya Georgiev PA-C Nam  MD [facility]   AUGUSTINE Donaldson, JESSE Hardy APRMD Sabrina Childs CNP, MD Deborah Mielke, MD Kim Thein, APRN CNP      Clinic hours: Monday - Thursday 7 am-7 pm; Fridays 7 am-5 pm.   Urgent care: Monday - Friday 11 am-9 pm; Saturday and Sunday 9 am-5 pm.  Pharmacy : Monday -Thursday 8 am-8 pm; Friday 8 am-6 pm; Saturday and Sunday 9 am-5 pm.     Clinic: [phone]   Pharmacy: [phone]     Breath sounds clear and equal bilaterally.

## 2019-07-11 NOTE — ED ADULT NURSE NOTE - IS THE PATIENT ABLE TO BE SCREENED?
Pt has HMO insurance. [employer] is out of network. Spoke with Debi at [facility] and informed her of that. She stated she will call the lung transplant specialist at [place] to see if there is a reason why he recommended [employer]. She will call me back   Yes

## 2020-03-11 ENCOUNTER — APPOINTMENT (OUTPATIENT)
Dept: DERMATOLOGY | Facility: CLINIC | Age: 75
End: 2020-03-11
Payer: MEDICARE

## 2020-03-11 PROCEDURE — 99203 OFFICE O/P NEW LOW 30 MIN: CPT

## 2020-05-22 NOTE — ED ADULT NURSE NOTE - NS TRANSFER PATIENT BELONGINGS
Please notify pt her xray shows significant degenerative changes with levoscoliosis (a type of curvature to the spine). There could be a disc issue as well. How is she feeling with the steroids? Any better? Clothing

## 2020-06-26 NOTE — ED PROVIDER NOTE - ATTENDING CONTRIBUTION TO CARE
Leukocytosis Leukocytosis Leukocytosis Leukocytosis Leukocytosis hx of copd/MM on treatment presents with cough on levaquin for "bronchitis" not getting better increased SOB + smoker - lungs with diffuse wheeze/rhonchi hypoxia - w/u r/o PE PNA admit for copd exacerbation and supportive tx

## 2020-08-19 NOTE — ED PROVIDER NOTE - NS_ATTENDINGSCRIBE_ED_ALL_ED
Instructed to call immediately if any new distortion, blurring, decreased vision or eye pain. I personally performed the service described in the documentation recorded by the scribe in my presence, and it accurately and completely records my words and actions.

## 2020-12-12 ENCOUNTER — TRANSCRIPTION ENCOUNTER (OUTPATIENT)
Age: 75
End: 2020-12-12

## 2022-02-02 NOTE — DISCHARGE NOTE ADULT - FUNCTIONAL STATUS DATE
Health Maintenance Due   Topic Date Due   • Influenza Vaccine (1) 09/01/2021       Patient is due for topics as listed above but is not proceeding with Immunization(s) Influenza at this time.    07-Mar-2019 08-Mar-2019

## 2023-01-10 NOTE — DIETITIAN INITIAL EVALUATION ADULT. - PROBLEM SELECTOR PROBLEM 6
[Appropriately responsive] : appropriately responsive [Alert] : alert [No Acute Distress] : no acute distress [No Lymphadenopathy] : no lymphadenopathy Coronary artery disease involving native coronary artery of native heart without angina pectoris [Regular Rate Rhythm] : regular rate rhythm [Soft] : soft [Non-tender] : non-tender [Non-distended] : non-distended [No Mass] : no mass [Oriented x3] : oriented x3 [Examination Of The Breasts] : a normal appearance [No Discharge] : no discharge [No Masses] : no breast masses were palpable [Vulvar Atrophy] : vulvar atrophy [No Lesions] : no lesions  [Labia Majora] : normal [Labia Minora] : normal [Normal] : normal [Atrophy] : atrophy [No Bleeding] : There was no active vaginal bleeding [Nabothian Cyst ___ cm] : [unfilled] ~Ucm nabothian cyst [Polyp ___ cm] : [unfilled] ~[facility] polyp [Tenderness] : nontender [Enlarged ___ wks] : not enlarged [Mass ___ cm] : no uterine mass was palpated [Uterine Adnexae] : normal [FreeTextEntry5] : Pap smear done, nabothian cysts noted, 0.5-1 cm polyp noted from the cervical os, patient counseled and gave informed consent and the polyp was removed without complication, hemostasis with silver nitrate

## 2023-06-16 ENCOUNTER — RX ONLY (RX ONLY)
Age: 78
End: 2023-06-16

## 2023-06-16 ENCOUNTER — OFFICE (OUTPATIENT)
Dept: URBAN - METROPOLITAN AREA CLINIC 115 | Facility: CLINIC | Age: 78
Setting detail: OPHTHALMOLOGY
End: 2023-06-16
Payer: MEDICARE

## 2023-06-16 DIAGNOSIS — B30.9: ICD-10-CM

## 2023-06-16 DIAGNOSIS — H25.13: ICD-10-CM

## 2023-06-16 PROCEDURE — 99203 OFFICE O/P NEW LOW 30 MIN: CPT | Performed by: OPTOMETRIST

## 2023-06-16 ASSESSMENT — CONFRONTATIONAL VISUAL FIELD TEST (CVF)
OS_FINDINGS: FULL
OD_FINDINGS: FULL

## 2023-06-16 ASSESSMENT — VISUAL ACUITY
OS_BCVA: 20/60-
OD_BCVA: 20/60-

## 2023-07-10 NOTE — DISCHARGE NOTE ADULT - MEDICATION SUMMARY - MEDICATIONS TO STOP TAKING
Spoke to patient about MRI results. Patient will hold off on pain management for right now. She is seeing rhemo tomorrow and is working on a plan with him. Will call us if anything changes.    I will STOP taking the medications listed below when I get home from the hospital:    aspirin 81 mg oral tablet  -- 1 tab(s) by mouth once a day    Revlimid    predniSONE 10 mg oral tablet  -- 6 tab(s) by mouth once a day x1 day  4 tabs by mouth once a day x2 days  2 tabs by mouth once a day x2 days  1 tab by mouth once a day x3 days  -- It is very important that you take or use this exactly as directed.  Do not skip doses or discontinue unless directed by your doctor.  Obtain medical advice before taking any non-prescription drugs as some may affect the action of this medication.  Take with food or milk.

## 2023-07-21 ENCOUNTER — OFFICE (OUTPATIENT)
Dept: URBAN - METROPOLITAN AREA CLINIC 115 | Facility: CLINIC | Age: 78
Setting detail: OPHTHALMOLOGY
End: 2023-07-21
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
## 2023-07-21 PROCEDURE — NO SHOW FE NO SHOW FEE: Performed by: OPTOMETRIST

## 2023-10-07 ENCOUNTER — INPATIENT (INPATIENT)
Facility: HOSPITAL | Age: 78
LOS: 3 days | Discharge: HOME | DRG: 193 | End: 2023-10-11
Attending: FAMILY MEDICINE | Admitting: HOSPITALIST
Payer: MEDICARE

## 2023-10-07 VITALS
RESPIRATION RATE: 24 BRPM | HEART RATE: 119 BPM | DIASTOLIC BLOOD PRESSURE: 70 MMHG | OXYGEN SATURATION: 82 % | TEMPERATURE: 98 F | WEIGHT: 114.64 LBS | SYSTOLIC BLOOD PRESSURE: 105 MMHG

## 2023-10-07 DIAGNOSIS — Z90.49 ACQUIRED ABSENCE OF OTHER SPECIFIED PARTS OF DIGESTIVE TRACT: Chronic | ICD-10-CM

## 2023-10-07 LAB
ALBUMIN SERPL ELPH-MCNC: 3.6 G/DL — SIGNIFICANT CHANGE UP (ref 3.3–5.2)
ALP SERPL-CCNC: 147 U/L — HIGH (ref 40–120)
ALT FLD-CCNC: 11 U/L — SIGNIFICANT CHANGE UP
ANION GAP SERPL CALC-SCNC: 14 MMOL/L — SIGNIFICANT CHANGE UP (ref 5–17)
AST SERPL-CCNC: 13 U/L — SIGNIFICANT CHANGE UP
BASOPHILS # BLD AUTO: 0.08 K/UL — SIGNIFICANT CHANGE UP (ref 0–0.2)
BASOPHILS NFR BLD AUTO: 0.8 % — SIGNIFICANT CHANGE UP (ref 0–2)
BILIRUB SERPL-MCNC: 0.2 MG/DL — LOW (ref 0.4–2)
BUN SERPL-MCNC: 13.9 MG/DL — SIGNIFICANT CHANGE UP (ref 8–20)
CALCIUM SERPL-MCNC: 9.2 MG/DL — SIGNIFICANT CHANGE UP (ref 8.4–10.5)
CHLORIDE SERPL-SCNC: 99 MMOL/L — SIGNIFICANT CHANGE UP (ref 96–108)
CO2 SERPL-SCNC: 26 MMOL/L — SIGNIFICANT CHANGE UP (ref 22–29)
CREAT SERPL-MCNC: 1.06 MG/DL — SIGNIFICANT CHANGE UP (ref 0.5–1.3)
EGFR: 53 ML/MIN/1.73M2 — LOW
EOSINOPHIL # BLD AUTO: 0.7 K/UL — HIGH (ref 0–0.5)
EOSINOPHIL NFR BLD AUTO: 7.4 % — HIGH (ref 0–6)
GLUCOSE SERPL-MCNC: 112 MG/DL — HIGH (ref 70–99)
HCT VFR BLD CALC: 39.9 % — SIGNIFICANT CHANGE UP (ref 34.5–45)
HGB BLD-MCNC: 12.1 G/DL — SIGNIFICANT CHANGE UP (ref 11.5–15.5)
IMM GRANULOCYTES NFR BLD AUTO: 0.7 % — SIGNIFICANT CHANGE UP (ref 0–0.9)
LYMPHOCYTES # BLD AUTO: 1.54 K/UL — SIGNIFICANT CHANGE UP (ref 1–3.3)
LYMPHOCYTES # BLD AUTO: 16.3 % — SIGNIFICANT CHANGE UP (ref 13–44)
MAGNESIUM SERPL-MCNC: 2.1 MG/DL — SIGNIFICANT CHANGE UP (ref 1.6–2.6)
MCHC RBC-ENTMCNC: 26.4 PG — LOW (ref 27–34)
MCHC RBC-ENTMCNC: 30.3 GM/DL — LOW (ref 32–36)
MCV RBC AUTO: 87.1 FL — SIGNIFICANT CHANGE UP (ref 80–100)
MONOCYTES # BLD AUTO: 0.79 K/UL — SIGNIFICANT CHANGE UP (ref 0–0.9)
MONOCYTES NFR BLD AUTO: 8.3 % — SIGNIFICANT CHANGE UP (ref 2–14)
NEUTROPHILS # BLD AUTO: 6.29 K/UL — SIGNIFICANT CHANGE UP (ref 1.8–7.4)
NEUTROPHILS NFR BLD AUTO: 66.5 % — SIGNIFICANT CHANGE UP (ref 43–77)
NT-PROBNP SERPL-SCNC: 5955 PG/ML — HIGH (ref 0–300)
PLATELET # BLD AUTO: 302 K/UL — SIGNIFICANT CHANGE UP (ref 150–400)
POTASSIUM SERPL-MCNC: 3.9 MMOL/L — SIGNIFICANT CHANGE UP (ref 3.5–5.3)
POTASSIUM SERPL-SCNC: 3.9 MMOL/L — SIGNIFICANT CHANGE UP (ref 3.5–5.3)
PROT SERPL-MCNC: 6.9 G/DL — SIGNIFICANT CHANGE UP (ref 6.6–8.7)
RBC # BLD: 4.58 M/UL — SIGNIFICANT CHANGE UP (ref 3.8–5.2)
RBC # FLD: 18.8 % — HIGH (ref 10.3–14.5)
SODIUM SERPL-SCNC: 139 MMOL/L — SIGNIFICANT CHANGE UP (ref 135–145)
TROPONIN T SERPL-MCNC: <0.01 NG/ML — SIGNIFICANT CHANGE UP (ref 0–0.06)
WBC # BLD: 9.47 K/UL — SIGNIFICANT CHANGE UP (ref 3.8–10.5)
WBC # FLD AUTO: 9.47 K/UL — SIGNIFICANT CHANGE UP (ref 3.8–10.5)

## 2023-10-07 PROCEDURE — 99285 EMERGENCY DEPT VISIT HI MDM: CPT | Mod: GC

## 2023-10-07 PROCEDURE — 71045 X-RAY EXAM CHEST 1 VIEW: CPT | Mod: 26

## 2023-10-07 RX ORDER — MAGNESIUM SULFATE 500 MG/ML
2 VIAL (ML) INJECTION ONCE
Refills: 0 | Status: COMPLETED | OUTPATIENT
Start: 2023-10-07 | End: 2023-10-07

## 2023-10-07 RX ORDER — IPRATROPIUM/ALBUTEROL SULFATE 18-103MCG
3 AEROSOL WITH ADAPTER (GRAM) INHALATION
Refills: 0 | Status: COMPLETED | OUTPATIENT
Start: 2023-10-07 | End: 2023-10-07

## 2023-10-07 RX ADMIN — Medication 3 MILLILITER(S): at 22:39

## 2023-10-07 RX ADMIN — Medication 150 GRAM(S): at 22:39

## 2023-10-07 RX ADMIN — Medication 125 MILLIGRAM(S): at 22:00

## 2023-10-07 RX ADMIN — Medication 3 MILLILITER(S): at 22:00

## 2023-10-07 RX ADMIN — Medication 3 MILLILITER(S): at 22:21

## 2023-10-07 NOTE — ED PROVIDER NOTE - PHYSICAL EXAMINATION
Gen: (+) in moderate respiratory distress  Head: normocephalic, atraumatic  EENT: EOMI, dry mucous membranes  Lung: (+) increased work  of breathing. (+) diffuse expiratory wheezing throughout, (+) coarse breath sounds bilaterally  CV: regular rate, regular rhythm  Abd: soft, non-tender, non-distended  MSK: No edema, no visible deformities, full range of motion in all 4 extremities  Neuro: Awake, alert, no focal neurologic deficits

## 2023-10-07 NOTE — ED PROVIDER NOTE - OBJECTIVE STATEMENT
78 year old female with PMHx MM, COPD, colitis, presenting for evaluation of difficulty breathing over last few days. 78 year old female with PMHx MM, COPD not on home O2, colitis, presenting for evaluation of difficulty breathing over last few days. States normally is independent, ambulatory, however the last few days, unable to walk more than a few steps without getting short of breath. Denies increased sputum. Denies any chest pain, fever, chills, abdominal pain, nausea, vomiting. Found to be hypoxic by EMS, 82% on RA on arrival. No known sick contacts. Lives alone. 78 year old female with PMHx MM, COPD not on home O2, colitis, presenting for evaluation of difficulty breathing over last few days. States normally is independent, ambulatory, however the last few days, unable to walk more than a few steps without getting short of breath. Denies increased sputum. Denies any chest pain, fever, chills, abdominal pain, nausea, vomiting. Found to be hypoxic by EMS, 82% on RA on arrival. No known sick contacts. Lives alone. Active smoker.

## 2023-10-07 NOTE — ED PROVIDER NOTE - NS ED ROS FT
Gen: No fever, no change in activity level  ENT: No congestion, no rhinorrhea  Resp: (+)cough, (+)trouble breathing  Cardiovascular: No chest pain, no palpitation  Gastrointestinal: No nausea, no vomiting, no diarrhea  MS: No joint or muscle pain  Neuro: No headache; no abnormal movements  Remainder negative, except as per the HPI

## 2023-10-07 NOTE — ED PROVIDER NOTE - NSICDXPASTMEDICALHX_GEN_ALL_CORE_FT
PAST MEDICAL HISTORY:  Anxiety     COPD (chronic obstructive pulmonary disease)     Hearing loss, unspecified hearing loss type, unspecified laterality     Kidney stones     MI (myocardial infarction)     Multiple myeloma

## 2023-10-07 NOTE — ED ADULT TRIAGE NOTE - DIRECT TO ROOM CARE INITIATED:
07-Oct-2023 21:30 nontender/no distention/bowel sounds normal soft/nontender/no distention/bowel sounds normal/no rebound tenderness/no guarding

## 2023-10-07 NOTE — ED ADULT NURSE NOTE - NSFALLHARMRISKINTERV_ED_ALL_ED

## 2023-10-07 NOTE — ED PROVIDER NOTE - CLINICAL SUMMARY MEDICAL DECISION MAKING FREE TEXT BOX
78 year old female with PMHx COPD not on home O2 presenting with increased difficulty breathing, worse over last few days,  found to be hypoxic by EMS. (+) b/l expiratory wheezing, (+) coarse breath sounds. Hypoxic to 82% here on RA. Duonebs x3, magnesium, solumedrol given. Labs, EKG, chest x-ray. Reassess.

## 2023-10-08 DIAGNOSIS — J18.9 PNEUMONIA, UNSPECIFIED ORGANISM: ICD-10-CM

## 2023-10-08 LAB
RAPID RVP RESULT: SIGNIFICANT CHANGE UP
SARS-COV-2 RNA SPEC QL NAA+PROBE: SIGNIFICANT CHANGE UP

## 2023-10-08 PROCEDURE — 71275 CT ANGIOGRAPHY CHEST: CPT | Mod: 26,MA

## 2023-10-08 PROCEDURE — 99232 SBSQ HOSP IP/OBS MODERATE 35: CPT

## 2023-10-08 RX ORDER — IPRATROPIUM/ALBUTEROL SULFATE 18-103MCG
3 AEROSOL WITH ADAPTER (GRAM) INHALATION EVERY 6 HOURS
Refills: 0 | Status: DISCONTINUED | OUTPATIENT
Start: 2023-10-08 | End: 2023-10-11

## 2023-10-08 RX ORDER — METOPROLOL TARTRATE 50 MG
25 TABLET ORAL DAILY
Refills: 0 | Status: DISCONTINUED | OUTPATIENT
Start: 2023-10-08 | End: 2023-10-11

## 2023-10-08 RX ORDER — VALACYCLOVIR 500 MG/1
500 TABLET, FILM COATED ORAL DAILY
Refills: 0 | Status: DISCONTINUED | OUTPATIENT
Start: 2023-10-08 | End: 2023-10-11

## 2023-10-08 RX ORDER — CEFTRIAXONE 500 MG/1
1000 INJECTION, POWDER, FOR SOLUTION INTRAMUSCULAR; INTRAVENOUS EVERY 24 HOURS
Refills: 0 | Status: DISCONTINUED | OUTPATIENT
Start: 2023-10-08 | End: 2023-10-11

## 2023-10-08 RX ORDER — PANTOPRAZOLE SODIUM 20 MG/1
40 TABLET, DELAYED RELEASE ORAL
Refills: 0 | Status: DISCONTINUED | OUTPATIENT
Start: 2023-10-08 | End: 2023-10-11

## 2023-10-08 RX ORDER — DULOXETINE HYDROCHLORIDE 30 MG/1
30 CAPSULE, DELAYED RELEASE ORAL DAILY
Refills: 0 | Status: DISCONTINUED | OUTPATIENT
Start: 2023-10-08 | End: 2023-10-11

## 2023-10-08 RX ORDER — AZITHROMYCIN 500 MG/1
500 TABLET, FILM COATED ORAL DAILY
Refills: 0 | Status: DISCONTINUED | OUTPATIENT
Start: 2023-10-08 | End: 2023-10-11

## 2023-10-08 RX ORDER — CEFTRIAXONE 500 MG/1
1000 INJECTION, POWDER, FOR SOLUTION INTRAMUSCULAR; INTRAVENOUS ONCE
Refills: 0 | Status: COMPLETED | OUTPATIENT
Start: 2023-10-08 | End: 2023-10-08

## 2023-10-08 RX ORDER — FUROSEMIDE 40 MG
40 TABLET ORAL
Refills: 0 | Status: DISCONTINUED | OUTPATIENT
Start: 2023-10-08 | End: 2023-10-09

## 2023-10-08 RX ORDER — HYDROCORTISONE 20 MG
60 TABLET ORAL EVERY 8 HOURS
Refills: 0 | Status: DISCONTINUED | OUTPATIENT
Start: 2023-10-08 | End: 2023-10-09

## 2023-10-08 RX ORDER — TAMSULOSIN HYDROCHLORIDE 0.4 MG/1
0.4 CAPSULE ORAL AT BEDTIME
Refills: 0 | Status: DISCONTINUED | OUTPATIENT
Start: 2023-10-08 | End: 2023-10-11

## 2023-10-08 RX ORDER — ALPRAZOLAM 0.25 MG
0.25 TABLET ORAL ONCE
Refills: 0 | Status: DISCONTINUED | OUTPATIENT
Start: 2023-10-08 | End: 2023-10-08

## 2023-10-08 RX ORDER — ENOXAPARIN SODIUM 100 MG/ML
40 INJECTION SUBCUTANEOUS EVERY 24 HOURS
Refills: 0 | Status: DISCONTINUED | OUTPATIENT
Start: 2023-10-08 | End: 2023-10-11

## 2023-10-08 RX ORDER — INFLUENZA VIRUS VACCINE 15; 15; 15; 15 UG/.5ML; UG/.5ML; UG/.5ML; UG/.5ML
0.7 SUSPENSION INTRAMUSCULAR ONCE
Refills: 0 | Status: DISCONTINUED | OUTPATIENT
Start: 2023-10-08 | End: 2023-10-11

## 2023-10-08 RX ORDER — AZITHROMYCIN 500 MG/1
500 TABLET, FILM COATED ORAL ONCE
Refills: 0 | Status: COMPLETED | OUTPATIENT
Start: 2023-10-08 | End: 2023-10-08

## 2023-10-08 RX ORDER — ALPRAZOLAM 0.25 MG
0.25 TABLET ORAL THREE TIMES A DAY
Refills: 0 | Status: DISCONTINUED | OUTPATIENT
Start: 2023-10-08 | End: 2023-10-11

## 2023-10-08 RX ORDER — CEFTRIAXONE 500 MG/1
1000 INJECTION, POWDER, FOR SOLUTION INTRAMUSCULAR; INTRAVENOUS ONCE
Refills: 0 | Status: DISCONTINUED | OUTPATIENT
Start: 2023-10-08 | End: 2023-10-08

## 2023-10-08 RX ADMIN — Medication 3 MILLILITER(S): at 16:02

## 2023-10-08 RX ADMIN — TAMSULOSIN HYDROCHLORIDE 0.4 MILLIGRAM(S): 0.4 CAPSULE ORAL at 22:25

## 2023-10-08 RX ADMIN — AZITHROMYCIN 500 MILLIGRAM(S): 500 TABLET, FILM COATED ORAL at 11:50

## 2023-10-08 RX ADMIN — Medication 3 MILLILITER(S): at 20:57

## 2023-10-08 RX ADMIN — Medication 40 MILLIGRAM(S): at 15:10

## 2023-10-08 RX ADMIN — CEFTRIAXONE 1000 MILLIGRAM(S): 500 INJECTION, POWDER, FOR SOLUTION INTRAMUSCULAR; INTRAVENOUS at 06:48

## 2023-10-08 RX ADMIN — Medication 60 MILLIGRAM(S): at 22:25

## 2023-10-08 RX ADMIN — Medication 60 MILLIGRAM(S): at 15:11

## 2023-10-08 RX ADMIN — PANTOPRAZOLE SODIUM 40 MILLIGRAM(S): 20 TABLET, DELAYED RELEASE ORAL at 11:50

## 2023-10-08 RX ADMIN — CEFTRIAXONE 1000 MILLIGRAM(S): 500 INJECTION, POWDER, FOR SOLUTION INTRAMUSCULAR; INTRAVENOUS at 11:49

## 2023-10-08 RX ADMIN — Medication 0.25 MILLIGRAM(S): at 18:27

## 2023-10-08 RX ADMIN — AZITHROMYCIN 255 MILLIGRAM(S): 500 TABLET, FILM COATED ORAL at 06:48

## 2023-10-08 RX ADMIN — ENOXAPARIN SODIUM 40 MILLIGRAM(S): 100 INJECTION SUBCUTANEOUS at 11:49

## 2023-10-08 NOTE — PATIENT PROFILE ADULT - FALL HARM RISK - HARM RISK INTERVENTIONS

## 2023-10-08 NOTE — ED ADULT NURSE REASSESSMENT NOTE - NS ED NURSE REASSESS COMMENT FT1
pt a&o x4, RR even and unlabored, skin warm and dry. pt no complaints at this time. pt HR 88 in NSR on CM and O2 sat 91 on 4L NC. pt hx of COPD and states she is not on O2 at home. pt bedding changed.

## 2023-10-08 NOTE — H&P ADULT - ASSESSMENT
78 year old female with known Htn, mood disorders, seizure disorder urinary retension HLD, GERD, COPD - presents with possible COPD excerbation  IN Ed found to have very high BNP and hypoxic     Acute Hypoxic respiratory failure -bronchitis COPD Exacerbation  Solumedrol  Duoneb  Rocephin Zithromax   PUmonary consult    High BNP  POssible VCHF  check ECHo  LAsic    Mood disorder cont home meds    GERD-PPI    DVT PPX         78 year old female with known Htn, mood disorders, seizure disorder urinary retention, Multiple myeloma  HLD, GERD, COPD - presents with possible COPD exacerbation  IN Ed found to have very high BNP and hypoxic     Acute Hypoxic respiratory failure -bronchitis COPD Exacerbation  Solumedrol  Duoneb  Rocephin Zithromax   Pulmonary consult    High BNP-POssible CHF?  check ECHo  LAsix    Mood disorder cont home meds    GERD-PPI    DVT PPX

## 2023-10-08 NOTE — H&P ADULT - NSHPPHYSICALEXAM_GEN_ALL_CORE
GENERAL: NAD, well-groomed, well-developed  HEAD:  Atraumatic, Normocephalic  EYES: EOMI, PERRL conjunctiva and sclera clear  ENMT: No tonsillar erythema, exudates, or enlargement; Moist mucous membranes,  NECK: Supple, No JVD, Normal thyroid  NERVOUS SYSTEM:  Alert & Oriented X3, Good concentration; Motor Strength 5/5 B/L upper and lower extremities;   CHEST/LUNG: bilaterally, wheezing,+  HEART: Regular rate and rhythm; No murmurs, rubs, or gallops  ABDOMEN: Soft, Nontender, Nondistended; Bowel sounds present  EXTREMITIES:  2+ Peripheral Pulses, No clubbing, cyanosis, or edema  LYMPH: No lymphadenopathy noted  SKIN: No rashes or lesions

## 2023-10-08 NOTE — H&P ADULT - HISTORY OF PRESENT ILLNESS
78 year old female with known Htn, mood disorders, seizure disorder urinary retension HLD, GERD, COPD - presents with possible COPD excerbation  IN Ed found to have very high BNP and hypoxic    78 year old female with known medical hx of Multiple myeloma, Htn, mood disorders, seizure disorder urinary retention HLD, GERD, COPD - presents with possible COPD exacerbation  states has been well till she developed SOB   IN Ed found to have very high BNP and hypoxic - normally states she sees Dr Lora at [facility] cardio

## 2023-10-08 NOTE — PATIENT PROFILE ADULT - NSTOBACCOCESSATIONEDU4_GEN_A_NUR
detailed exam
Smoking even a single puff increases the likelihood of a full relapse, withdrawal symptoms peak within 1-2 weeks, but can persist for months

## 2023-10-09 LAB
ALBUMIN SERPL ELPH-MCNC: 3.7 G/DL — SIGNIFICANT CHANGE UP (ref 3.3–5.2)
ALP SERPL-CCNC: 136 U/L — HIGH (ref 40–120)
ALT FLD-CCNC: 9 U/L — SIGNIFICANT CHANGE UP
ANION GAP SERPL CALC-SCNC: 15 MMOL/L — SIGNIFICANT CHANGE UP (ref 5–17)
AST SERPL-CCNC: 9 U/L — SIGNIFICANT CHANGE UP
BILIRUB SERPL-MCNC: <0.2 MG/DL — LOW (ref 0.4–2)
BUN SERPL-MCNC: 19.3 MG/DL — SIGNIFICANT CHANGE UP (ref 8–20)
CALCIUM SERPL-MCNC: 9.4 MG/DL — SIGNIFICANT CHANGE UP (ref 8.4–10.5)
CHLORIDE SERPL-SCNC: 94 MMOL/L — LOW (ref 96–108)
CO2 SERPL-SCNC: 29 MMOL/L — SIGNIFICANT CHANGE UP (ref 22–29)
CREAT SERPL-MCNC: 1.16 MG/DL — SIGNIFICANT CHANGE UP (ref 0.5–1.3)
EGFR: 48 ML/MIN/1.73M2 — LOW
GLUCOSE SERPL-MCNC: 106 MG/DL — HIGH (ref 70–99)
HCT VFR BLD CALC: 37.9 % — SIGNIFICANT CHANGE UP (ref 34.5–45)
HGB BLD-MCNC: 11.5 G/DL — SIGNIFICANT CHANGE UP (ref 11.5–15.5)
MCHC RBC-ENTMCNC: 26.6 PG — LOW (ref 27–34)
MCHC RBC-ENTMCNC: 30.3 GM/DL — LOW (ref 32–36)
MCV RBC AUTO: 87.5 FL — SIGNIFICANT CHANGE UP (ref 80–100)
PLATELET # BLD AUTO: 282 K/UL — SIGNIFICANT CHANGE UP (ref 150–400)
POTASSIUM SERPL-MCNC: 3.9 MMOL/L — SIGNIFICANT CHANGE UP (ref 3.5–5.3)
POTASSIUM SERPL-SCNC: 3.9 MMOL/L — SIGNIFICANT CHANGE UP (ref 3.5–5.3)
PROT SERPL-MCNC: 7 G/DL — SIGNIFICANT CHANGE UP (ref 6.6–8.7)
RBC # BLD: 4.33 M/UL — SIGNIFICANT CHANGE UP (ref 3.8–5.2)
RBC # FLD: 18.6 % — HIGH (ref 10.3–14.5)
SODIUM SERPL-SCNC: 137 MMOL/L — SIGNIFICANT CHANGE UP (ref 135–145)
WBC # BLD: 9.46 K/UL — SIGNIFICANT CHANGE UP (ref 3.8–10.5)
WBC # FLD AUTO: 9.46 K/UL — SIGNIFICANT CHANGE UP (ref 3.8–10.5)

## 2023-10-09 PROCEDURE — 99233 SBSQ HOSP IP/OBS HIGH 50: CPT

## 2023-10-09 PROCEDURE — 93306 TTE W/DOPPLER COMPLETE: CPT | Mod: 26

## 2023-10-09 RX ORDER — NICOTINE POLACRILEX 2 MG
1 GUM BUCCAL ONCE
Refills: 0 | Status: COMPLETED | OUTPATIENT
Start: 2023-10-09 | End: 2023-10-09

## 2023-10-09 RX ORDER — FUROSEMIDE 40 MG
40 TABLET ORAL DAILY
Refills: 0 | Status: DISCONTINUED | OUTPATIENT
Start: 2023-10-10 | End: 2023-10-11

## 2023-10-09 RX ORDER — HYDROCORTISONE 20 MG
40 TABLET ORAL EVERY 8 HOURS
Refills: 0 | Status: DISCONTINUED | OUTPATIENT
Start: 2023-10-09 | End: 2023-10-11

## 2023-10-09 RX ADMIN — Medication 0.25 MILLIGRAM(S): at 02:57

## 2023-10-09 RX ADMIN — Medication 3 MILLILITER(S): at 14:57

## 2023-10-09 RX ADMIN — PANTOPRAZOLE SODIUM 40 MILLIGRAM(S): 20 TABLET, DELAYED RELEASE ORAL at 07:03

## 2023-10-09 RX ADMIN — DULOXETINE HYDROCHLORIDE 30 MILLIGRAM(S): 30 CAPSULE, DELAYED RELEASE ORAL at 09:45

## 2023-10-09 RX ADMIN — ENOXAPARIN SODIUM 40 MILLIGRAM(S): 100 INJECTION SUBCUTANEOUS at 09:48

## 2023-10-09 RX ADMIN — CEFTRIAXONE 1000 MILLIGRAM(S): 500 INJECTION, POWDER, FOR SOLUTION INTRAMUSCULAR; INTRAVENOUS at 11:56

## 2023-10-09 RX ADMIN — Medication 40 MILLIGRAM(S): at 06:55

## 2023-10-09 RX ADMIN — TAMSULOSIN HYDROCHLORIDE 0.4 MILLIGRAM(S): 0.4 CAPSULE ORAL at 22:14

## 2023-10-09 RX ADMIN — Medication 60 MILLIGRAM(S): at 06:57

## 2023-10-09 RX ADMIN — VALACYCLOVIR 500 MILLIGRAM(S): 500 TABLET, FILM COATED ORAL at 09:46

## 2023-10-09 RX ADMIN — Medication 25 MILLIGRAM(S): at 07:02

## 2023-10-09 RX ADMIN — Medication 1 PATCH: at 01:00

## 2023-10-09 RX ADMIN — AZITHROMYCIN 500 MILLIGRAM(S): 500 TABLET, FILM COATED ORAL at 09:47

## 2023-10-09 RX ADMIN — Medication 40 MILLIGRAM(S): at 13:19

## 2023-10-09 RX ADMIN — Medication 3 MILLILITER(S): at 20:32

## 2023-10-09 RX ADMIN — Medication 40 MILLIGRAM(S): at 22:13

## 2023-10-09 RX ADMIN — Medication 3 MILLILITER(S): at 08:18

## 2023-10-09 RX ADMIN — Medication 0.25 MILLIGRAM(S): at 13:23

## 2023-10-09 RX ADMIN — Medication 1 PATCH: at 06:50

## 2023-10-09 NOTE — CONSULT NOTE ADULT - SUBJECTIVE AND OBJECTIVE BOX
[facility] - [facility], THE HEART CENTER                                   [address] [place] [address]                                                      PHONE: [phone]                                                         FAX: [phone]  [email or web address]  ---------------------------------------------------------------------------------------------------------------------------------    Reason for Consult:    HPI:  BECCA JARRELL is an 78y Female smoker with COPD, NICM with recovery in LVEF (65% in Sept 2022), IgA multiple myeloma in remission, prior ITP presented with worsening SOB over the past few days.     PAST MEDICAL & SURGICAL HISTORY:  MI (myocardial infarction)      Kidney stones      Hearing loss, unspecified hearing loss type, unspecified laterality      Anxiety      Multiple myeloma      COPD (chronic obstructive pulmonary disease)      S/P cholecystectomy          penicillin (Unknown)      MEDICATIONS  (STANDING):  albuterol/ipratropium for Nebulization 3 milliLiter(s) Nebulizer every 6 hours  azithromycin   Tablet 500 milliGRAM(s) Oral daily  cefTRIAXone Injectable. 1000 milliGRAM(s) IV Push every 24 hours  DULoxetine 30 milliGRAM(s) Oral daily  enoxaparin Injectable 40 milliGRAM(s) SubCutaneous every 24 hours  hydrocortisone sodium succinate Injectable 40 milliGRAM(s) IV Push every 8 hours  influenza  Vaccine (HIGH DOSE) 0.7 milliLiter(s) IntraMuscular once  metoprolol succinate ER 25 milliGRAM(s) Oral daily  pantoprazole    Tablet 40 milliGRAM(s) Oral before breakfast  tamsulosin 0.4 milliGRAM(s) Oral at bedtime  valACYclovir 500 milliGRAM(s) Oral daily    MEDICATIONS  (PRN):  ALPRAZolam 0.25 milliGRAM(s) Oral three times a day PRN anxiety      Social History:  Cigarettes: active    Family History:  denies CAD, MI, CVA, or sudden death.    ROS: Negative other than as mentioned in HPI.    Vital Signs Last 24 Hrs  T(C): 36.5 (09 Oct 2023 07:57), Max: 36.7 (08 Oct 2023 23:42)  T(F): 97.7 (09 Oct 2023 07:57), Max: 98.1 (08 Oct 2023 23:42)  HR: 80 (09 Oct 2023 08:20) (76 - 102)  BP: 97/58 (09 Oct 2023 07:57) (97/58 - 123/73)  BP(mean): 89 (08 Oct 2023 16:23) (89 - 89)  RR: 19 (09 Oct 2023 07:57) (18 - 20)  SpO2: 94% (09 Oct 2023 08:20) (93% - 97%)    Parameters below as of 09 Oct 2023 08:20  Patient On (Oxygen Delivery Method): nasal cannula, 4l      ICU Vital Signs Last 24 Hrs  BECCA JARRELL  I&O's Detail    I&O's Summary    Drug Dosing Weight  BECCA WESLY      PHYSICAL EXAM:  General: NAD  HEENT: Head; normocephalic, atraumatic.  Eyes: Pupils reactive, cornea wnl.  Neck: Supple, no nodes adenopathy, no NVD or carotid bruit or thyromegaly.  CARDIOVASCULAR: Normal S1 and S2, No murmur, rub, gallop or lift.   LUNGS: reduced air movement b/l   ABDOMEN: Soft, nontender without mass or organomegaly. bowel sounds normoactive.  EXTREMITIES: No clubbing, cyanosis or edema. Distal pulses wnl.   SKIN: warm and dry with normal turgor.  NEURO: Alert/oriented x 3  PSYCH: normal affect.        LABS:                        11.5   9.46  )-----------( 282      ( 09 Oct 2023 07:15 )             37.9     10-09    137  |  94<L>  |  19.3  ----------------------------<  106<H>  3.9   |  29.0  |  1.16    Ca    9.4      09 Oct 2023 07:15  Mg     2.1     10-07    TPro  7.0  /  Alb  3.7  /  TBili  <0.2<L>  /  DBili  x   /  AST  9   /  ALT  9   /  AlkPhos  136<H>  10-09    BECCA JARRELL  CARDIAC MARKERS ( 07 Oct 2023 21:35 )  x     / <0.01 ng/mL / x     / x     / x            Urinalysis Basic - ( 09 Oct 2023 07:15 )    Color: x / Appearance: x / SG: x / pH: x  Gluc: 106 mg/dL / Ketone: x  / Bili: x / Urobili: x   Blood: x / Protein: x / Nitrite: x   Leuk Esterase: x / RBC: x / WBC x   Sq Epi: x / Non Sq Epi: x / Bacteria: x        ECG: sinus rhythm 121 bpm, left axis, nonspecific ST abnormaity     ECHO Sept 2022  LVEF 60-65%.  No significant valvular disease.         Assessment and Plan:  In summary, BECCA JARRELL is an 78y Female with past medical history significant for smoker with COPD, NICM with recovery in LVEF (65% in Sept 2022), IgA multiple myeloma in remission, prior ITP presented with worsening SOB over the past few days.     - BNP is elevated but there is no clinical evidence of over heart failure. CT chest shows now pulmonary edema or pleural effusions.  - f/u TTE to reevaluate LVEF  - supportive care for underlying COPD  - oxygen supplementation for hypoxia    Will follow closely with you.                        [facility] - [facility], [facility]                                   [address] [place] [address]                                                      PHONE: [phone]                                                         FAX: [phone]  [email or web address]  ---------------------------------------------------------------------------------------------------------------------------------    Reason for Consult:    HPI:  BECCA JARRELL is an 78y Female smoker with COPD, NICM with recovery in LVEF (65% in Sept 2022), IgA multiple myeloma in remission, prior ITP presented with worsening SOB over the past few days. She denies fevers or chills or recent illness.     PAST MEDICAL & SURGICAL HISTORY:  MI (myocardial infarction)      Kidney stones      Hearing loss, unspecified hearing loss type, unspecified laterality      Anxiety      Multiple myeloma      COPD (chronic obstructive pulmonary disease)      S/P cholecystectomy          penicillin (Unknown)      MEDICATIONS  (STANDING):  albuterol/ipratropium for Nebulization 3 milliLiter(s) Nebulizer every 6 hours  azithromycin   Tablet 500 milliGRAM(s) Oral daily  cefTRIAXone Injectable. 1000 milliGRAM(s) IV Push every 24 hours  DULoxetine 30 milliGRAM(s) Oral daily  enoxaparin Injectable 40 milliGRAM(s) SubCutaneous every 24 hours  hydrocortisone sodium succinate Injectable 40 milliGRAM(s) IV Push every 8 hours  influenza  Vaccine (HIGH DOSE) 0.7 milliLiter(s) IntraMuscular once  metoprolol succinate ER 25 milliGRAM(s) Oral daily  pantoprazole    Tablet 40 milliGRAM(s) Oral before breakfast  tamsulosin 0.4 milliGRAM(s) Oral at bedtime  valACYclovir 500 milliGRAM(s) Oral daily    MEDICATIONS  (PRN):  ALPRAZolam 0.25 milliGRAM(s) Oral three times a day PRN anxiety      Social History:  Cigarettes: active    Family History:  denies CAD, MI, CVA, or sudden death.    ROS: Negative other than as mentioned in HPI.    Vital Signs Last 24 Hrs  T(C): 36.5 (09 Oct 2023 07:57), Max: 36.7 (08 Oct 2023 23:42)  T(F): 97.7 (09 Oct 2023 07:57), Max: 98.1 (08 Oct 2023 23:42)  HR: 80 (09 Oct 2023 08:20) (76 - 102)  BP: 97/58 (09 Oct 2023 07:57) (97/58 - 123/73)  BP(mean): 89 (08 Oct 2023 16:23) (89 - 89)  RR: 19 (09 Oct 2023 07:57) (18 - 20)  SpO2: 94% (09 Oct 2023 08:20) (93% - 97%)    Parameters below as of 09 Oct 2023 08:20  Patient On (Oxygen Delivery Method): nasal cannula, 4l      ICU Vital Signs Last 24 Hrs  BECCA JARRELL  I&O's Detail    I&O's Summary    Drug Dosing Weight  BECCA JARRELL      PHYSICAL EXAM:  General: NAD  HEENT: Head; normocephalic, atraumatic.  Eyes: Pupils reactive, cornea wnl.  Neck: Supple, no nodes adenopathy, no NVD or carotid bruit or thyromegaly.  CARDIOVASCULAR: Normal S1 and S2, No murmur, rub, gallop or lift.   LUNGS: reduced air movement b/l   ABDOMEN: Soft, nontender without mass or organomegaly. bowel sounds normoactive.  EXTREMITIES: No clubbing, cyanosis or edema. Distal pulses wnl.   SKIN: warm and dry with normal turgor.  NEURO: Alert/oriented x 3  PSYCH: normal affect.        LABS:                        11.5   9.46  )-----------( 282      ( 09 Oct 2023 07:15 )             37.9     10-09    137  |  94<L>  |  19.3  ----------------------------<  106<H>  3.9   |  29.0  |  1.16    Ca    9.4      09 Oct 2023 07:15  Mg     2.1     10-07    TPro  7.0  /  Alb  3.7  /  TBili  <0.2<L>  /  DBili  x   /  AST  9   /  ALT  9   /  AlkPhos  136<H>  10-09    BECCA JARRELL  CARDIAC MARKERS ( 07 Oct 2023 21:35 )  x     / <0.01 ng/mL / x     / x     / x            Urinalysis Basic - ( 09 Oct 2023 07:15 )    Color: x / Appearance: x / SG: x / pH: x  Gluc: 106 mg/dL / Ketone: x  / Bili: x / Urobili: x   Blood: x / Protein: x / Nitrite: x   Leuk Esterase: x / RBC: x / WBC x   Sq Epi: x / Non Sq Epi: x / Bacteria: x        ECG: sinus rhythm 121 bpm, left axis, nonspecific ST abnormality     ECHO Sept 2022  LVEF 60-65%.  No significant valvular disease.         Assessment and Plan:  In summary, BECCA JARRELL is an 78y Female with past medical history significant for smoker with COPD, NICM with recovery in LVEF (65% in Sept 2022), IgA multiple myeloma in remission, prior ITP presented with worsening SOB over the past few days.     - BNP is elevated but there is no clinical evidence of over heart failure. CT chest shows now pulmonary edema or pleural effusions. She feels much improved today on current treatment.   - continue Lasix 40 mg PO daily   - f/u TTE to reevaluate LVEF  - supportive care for underlying COPD  - oxygen supplementation for hypoxia    Will follow closely with you.

## 2023-10-10 LAB
ANION GAP SERPL CALC-SCNC: 12 MMOL/L — SIGNIFICANT CHANGE UP (ref 5–17)
BUN SERPL-MCNC: 24.2 MG/DL — HIGH (ref 8–20)
CALCIUM SERPL-MCNC: 9.1 MG/DL — SIGNIFICANT CHANGE UP (ref 8.4–10.5)
CHLORIDE SERPL-SCNC: 100 MMOL/L — SIGNIFICANT CHANGE UP (ref 96–108)
CO2 SERPL-SCNC: 32 MMOL/L — HIGH (ref 22–29)
CREAT SERPL-MCNC: 1.08 MG/DL — SIGNIFICANT CHANGE UP (ref 0.5–1.3)
EGFR: 52 ML/MIN/1.73M2 — LOW
GLUCOSE SERPL-MCNC: 112 MG/DL — HIGH (ref 70–99)
HCT VFR BLD CALC: 38.3 % — SIGNIFICANT CHANGE UP (ref 34.5–45)
HGB BLD-MCNC: 11.6 G/DL — SIGNIFICANT CHANGE UP (ref 11.5–15.5)
MCHC RBC-ENTMCNC: 26.5 PG — LOW (ref 27–34)
MCHC RBC-ENTMCNC: 30.3 GM/DL — LOW (ref 32–36)
MCV RBC AUTO: 87.4 FL — SIGNIFICANT CHANGE UP (ref 80–100)
PLATELET # BLD AUTO: 286 K/UL — SIGNIFICANT CHANGE UP (ref 150–400)
POTASSIUM SERPL-MCNC: 3.2 MMOL/L — LOW (ref 3.5–5.3)
POTASSIUM SERPL-SCNC: 3.2 MMOL/L — LOW (ref 3.5–5.3)
RBC # BLD: 4.38 M/UL — SIGNIFICANT CHANGE UP (ref 3.8–5.2)
RBC # FLD: 18.6 % — HIGH (ref 10.3–14.5)
SODIUM SERPL-SCNC: 144 MMOL/L — SIGNIFICANT CHANGE UP (ref 135–145)
WBC # BLD: 8.08 K/UL — SIGNIFICANT CHANGE UP (ref 3.8–10.5)
WBC # FLD AUTO: 8.08 K/UL — SIGNIFICANT CHANGE UP (ref 3.8–10.5)

## 2023-10-10 PROCEDURE — 99232 SBSQ HOSP IP/OBS MODERATE 35: CPT

## 2023-10-10 RX ORDER — POTASSIUM CHLORIDE 20 MEQ
40 PACKET (EA) ORAL ONCE
Refills: 0 | Status: COMPLETED | OUTPATIENT
Start: 2023-10-10 | End: 2023-10-10

## 2023-10-10 RX ADMIN — ENOXAPARIN SODIUM 40 MILLIGRAM(S): 100 INJECTION SUBCUTANEOUS at 11:48

## 2023-10-10 RX ADMIN — Medication 40 MILLIGRAM(S): at 17:46

## 2023-10-10 RX ADMIN — VALACYCLOVIR 500 MILLIGRAM(S): 500 TABLET, FILM COATED ORAL at 17:46

## 2023-10-10 RX ADMIN — Medication 3 MILLILITER(S): at 02:27

## 2023-10-10 RX ADMIN — Medication 40 MILLIGRAM(S): at 06:28

## 2023-10-10 RX ADMIN — Medication 3 MILLILITER(S): at 16:18

## 2023-10-10 RX ADMIN — Medication 0.25 MILLIGRAM(S): at 06:38

## 2023-10-10 RX ADMIN — AZITHROMYCIN 500 MILLIGRAM(S): 500 TABLET, FILM COATED ORAL at 11:48

## 2023-10-10 RX ADMIN — Medication 3 MILLILITER(S): at 09:11

## 2023-10-10 RX ADMIN — Medication 600 MILLIGRAM(S): at 23:17

## 2023-10-10 RX ADMIN — Medication 3 MILLILITER(S): at 20:14

## 2023-10-10 RX ADMIN — Medication 1 PATCH: at 01:07

## 2023-10-10 RX ADMIN — Medication 40 MILLIEQUIVALENT(S): at 08:42

## 2023-10-10 RX ADMIN — Medication 25 MILLIGRAM(S): at 06:29

## 2023-10-10 RX ADMIN — PANTOPRAZOLE SODIUM 40 MILLIGRAM(S): 20 TABLET, DELAYED RELEASE ORAL at 06:29

## 2023-10-10 RX ADMIN — DULOXETINE HYDROCHLORIDE 30 MILLIGRAM(S): 30 CAPSULE, DELAYED RELEASE ORAL at 11:48

## 2023-10-10 RX ADMIN — CEFTRIAXONE 1000 MILLIGRAM(S): 500 INJECTION, POWDER, FOR SOLUTION INTRAMUSCULAR; INTRAVENOUS at 11:48

## 2023-10-10 NOTE — PROGRESS NOTE ADULT - ASSESSMENT
78 year old female with known  hfref, thrombocytopenia, active smoker, Htn, mood disorders, seizure disorder, urinary retention, Multiple myeloma  HLD, GERD, COPD - presents with sob,cough admitted for respiratory failure suspected PNA,COPD exacerbation.  IN Ed found to have very high BNP and hypoxic     Acute Hypoxic respiratory failure sec bronchopneumonia, COPD Exacerbation  -oxygen supplement, not on home o2   - iv solumedrol   - Duoneb  -iv Rocephin, Zithromax   -Mucinex   - Pulmonary consult          Multiple Myeloma  - stable  - f/u nephro/hem/onc out pt    hx  thrombocytopenia  - stable  -on promecta. family will bring the meds.will resume      Chronic HFrEF (last echo from 3/18 showed EF of 30-35%)  - high bnp  - cont current meds lasix, bb  -i/o  -TTE  -c/s cardiology to adjust meds  -Spoke with [place] team    HTN  -controlled  -cont current meds    Depression/Anxiety  - chronic, stable,  - Cymbalta      Tobacco abuse  -nicotine patch  -counselled to quit smoking    Urinary retention  -flomax        GERD-PPI    DVT PPX lovenox    plan of care dw pt  dw rn        
78 year old female with known  hfref, thrombocytopenia, active smoker, Htn, mood disorders, seizure disorder, urinary retention, Multiple myeloma  HLD, GERD, COPD - presents with sob,cough admitted for respiratory failure suspected PNA,COPD exacerbation.  IN Ed found to have very high BNP and hypoxic     Acute Hypoxic respiratory failure sec bronchopneumonia, COPD Exacerbation  -oxygen supplement, wean off as tolerated. 2 l nc now ,so2 85% RA. not on home o2   - iv solumedrol   - Duoneb  -iv Rocephin, Zithromax   -Mucinex   - Pulmonary consult          Multiple Myeloma  - stable  - f/u nephro/hem/onc out pt    hx  thrombocytopenia  - stable  -on promecta. family will bring the meds.will resume      hypokalemia  -replaced    Chronic Diastolic HF   HX HFrEF (last echo from 3/18 showed EF of 30-35%), ef IMPROVED 60-65%  - high bnp  - cont current meds lasix, bb  -i/o  -TTE 60-65%  -F/U  cardiology  Out pt      HTN  -controlled  -cont current meds    Depression/Anxiety  - chronic, stable,  - Cymbalta      Tobacco abuse  -nicotine patch  -counselled to quit smoking    Urinary retention  -flomax        GERD-PPI    DVT PPX lovenox    plan of care dw pt  nichol rn

## 2023-10-11 ENCOUNTER — TRANSCRIPTION ENCOUNTER (OUTPATIENT)
Age: 78
End: 2023-10-11

## 2023-10-11 VITALS
TEMPERATURE: 98 F | SYSTOLIC BLOOD PRESSURE: 112 MMHG | HEART RATE: 86 BPM | DIASTOLIC BLOOD PRESSURE: 74 MMHG | RESPIRATION RATE: 18 BRPM | OXYGEN SATURATION: 92 %

## 2023-10-11 LAB
ANION GAP SERPL CALC-SCNC: 12 MMOL/L — SIGNIFICANT CHANGE UP (ref 5–17)
ANION GAP SERPL CALC-SCNC: 14 MMOL/L — SIGNIFICANT CHANGE UP (ref 5–17)
BUN SERPL-MCNC: 24.7 MG/DL — HIGH (ref 8–20)
BUN SERPL-MCNC: 28 MG/DL — HIGH (ref 8–20)
CALCIUM SERPL-MCNC: 8.6 MG/DL — SIGNIFICANT CHANGE UP (ref 8.4–10.5)
CALCIUM SERPL-MCNC: 8.7 MG/DL — SIGNIFICANT CHANGE UP (ref 8.4–10.5)
CHLORIDE SERPL-SCNC: 98 MMOL/L — SIGNIFICANT CHANGE UP (ref 96–108)
CHLORIDE SERPL-SCNC: 99 MMOL/L — SIGNIFICANT CHANGE UP (ref 96–108)
CO2 SERPL-SCNC: 31 MMOL/L — HIGH (ref 22–29)
CO2 SERPL-SCNC: 32 MMOL/L — HIGH (ref 22–29)
CREAT SERPL-MCNC: 1.17 MG/DL — SIGNIFICANT CHANGE UP (ref 0.5–1.3)
CREAT SERPL-MCNC: 1.26 MG/DL — SIGNIFICANT CHANGE UP (ref 0.5–1.3)
EGFR: 43 ML/MIN/1.73M2 — LOW
EGFR: 47 ML/MIN/1.73M2 — LOW
GLUCOSE SERPL-MCNC: 163 MG/DL — HIGH (ref 70–99)
GLUCOSE SERPL-MCNC: 170 MG/DL — HIGH (ref 70–99)
POTASSIUM SERPL-MCNC: 2.9 MMOL/L — CRITICAL LOW (ref 3.5–5.3)
POTASSIUM SERPL-MCNC: 3.3 MMOL/L — LOW (ref 3.5–5.3)
POTASSIUM SERPL-SCNC: 2.9 MMOL/L — CRITICAL LOW (ref 3.5–5.3)
POTASSIUM SERPL-SCNC: 3.3 MMOL/L — LOW (ref 3.5–5.3)
SODIUM SERPL-SCNC: 142 MMOL/L — SIGNIFICANT CHANGE UP (ref 135–145)
SODIUM SERPL-SCNC: 144 MMOL/L — SIGNIFICANT CHANGE UP (ref 135–145)

## 2023-10-11 PROCEDURE — 71275 CT ANGIOGRAPHY CHEST: CPT | Mod: MA

## 2023-10-11 PROCEDURE — 93306 TTE W/DOPPLER COMPLETE: CPT

## 2023-10-11 PROCEDURE — 85025 COMPLETE CBC W/AUTO DIFF WBC: CPT

## 2023-10-11 PROCEDURE — 83735 ASSAY OF MAGNESIUM: CPT

## 2023-10-11 PROCEDURE — 99285 EMERGENCY DEPT VISIT HI MDM: CPT

## 2023-10-11 PROCEDURE — 0225U NFCT DS DNA&RNA 21 SARSCOV2: CPT

## 2023-10-11 PROCEDURE — 36415 COLL VENOUS BLD VENIPUNCTURE: CPT

## 2023-10-11 PROCEDURE — 96375 TX/PRO/DX INJ NEW DRUG ADDON: CPT

## 2023-10-11 PROCEDURE — 94640 AIRWAY INHALATION TREATMENT: CPT

## 2023-10-11 PROCEDURE — 71045 X-RAY EXAM CHEST 1 VIEW: CPT

## 2023-10-11 PROCEDURE — 93005 ELECTROCARDIOGRAM TRACING: CPT

## 2023-10-11 PROCEDURE — 85027 COMPLETE CBC AUTOMATED: CPT

## 2023-10-11 PROCEDURE — 80053 COMPREHEN METABOLIC PANEL: CPT

## 2023-10-11 PROCEDURE — 87040 BLOOD CULTURE FOR BACTERIA: CPT

## 2023-10-11 PROCEDURE — 99239 HOSP IP/OBS DSCHRG MGMT >30: CPT

## 2023-10-11 PROCEDURE — 84484 ASSAY OF TROPONIN QUANT: CPT

## 2023-10-11 PROCEDURE — 83880 ASSAY OF NATRIURETIC PEPTIDE: CPT

## 2023-10-11 PROCEDURE — 96374 THER/PROPH/DIAG INJ IV PUSH: CPT

## 2023-10-11 PROCEDURE — 80048 BASIC METABOLIC PNL TOTAL CA: CPT

## 2023-10-11 RX ORDER — METOPROLOL TARTRATE 50 MG
12.5 TABLET ORAL DAILY
Refills: 0 | Status: DISCONTINUED | OUTPATIENT
Start: 2023-10-11 | End: 2023-10-11

## 2023-10-11 RX ORDER — POTASSIUM CHLORIDE 20 MEQ
40 PACKET (EA) ORAL EVERY 4 HOURS
Refills: 0 | Status: COMPLETED | OUTPATIENT
Start: 2023-10-11 | End: 2023-10-11

## 2023-10-11 RX ORDER — POTASSIUM CHLORIDE 20 MEQ
40 PACKET (EA) ORAL ONCE
Refills: 0 | Status: COMPLETED | OUTPATIENT
Start: 2023-10-11 | End: 2023-10-11

## 2023-10-11 RX ORDER — HYDROCORTISONE 20 MG
40 TABLET ORAL EVERY 12 HOURS
Refills: 0 | Status: DISCONTINUED | OUTPATIENT
Start: 2023-10-11 | End: 2023-10-11

## 2023-10-11 RX ORDER — CEPHALEXIN 500 MG
1 CAPSULE ORAL
Qty: 15 | Refills: 0
Start: 2023-10-11 | End: 2023-10-15

## 2023-10-11 RX ORDER — AZITHROMYCIN 500 MG/1
1 TABLET, FILM COATED ORAL
Qty: 3 | Refills: 0
Start: 2023-10-11 | End: 2023-10-13

## 2023-10-11 RX ORDER — POTASSIUM CHLORIDE 20 MEQ
10 PACKET (EA) ORAL
Refills: 0 | Status: COMPLETED | OUTPATIENT
Start: 2023-10-11 | End: 2023-10-11

## 2023-10-11 RX ADMIN — AZITHROMYCIN 500 MILLIGRAM(S): 500 TABLET, FILM COATED ORAL at 11:43

## 2023-10-11 RX ADMIN — DULOXETINE HYDROCHLORIDE 30 MILLIGRAM(S): 30 CAPSULE, DELAYED RELEASE ORAL at 11:43

## 2023-10-11 RX ADMIN — Medication 100 MILLIEQUIVALENT(S): at 05:07

## 2023-10-11 RX ADMIN — Medication 40 MILLIEQUIVALENT(S): at 09:00

## 2023-10-11 RX ADMIN — ENOXAPARIN SODIUM 40 MILLIGRAM(S): 100 INJECTION SUBCUTANEOUS at 11:44

## 2023-10-11 RX ADMIN — Medication 3 MILLILITER(S): at 04:01

## 2023-10-11 RX ADMIN — Medication 100 MILLIEQUIVALENT(S): at 08:48

## 2023-10-11 RX ADMIN — Medication 12.5 MILLIGRAM(S): at 08:49

## 2023-10-11 RX ADMIN — PANTOPRAZOLE SODIUM 40 MILLIGRAM(S): 20 TABLET, DELAYED RELEASE ORAL at 05:07

## 2023-10-11 RX ADMIN — Medication 3 MILLILITER(S): at 10:03

## 2023-10-11 RX ADMIN — Medication 100 MILLIEQUIVALENT(S): at 10:15

## 2023-10-11 RX ADMIN — Medication 40 MILLIEQUIVALENT(S): at 12:55

## 2023-10-11 RX ADMIN — Medication 40 MILLIGRAM(S): at 05:07

## 2023-10-11 RX ADMIN — CEFTRIAXONE 1000 MILLIGRAM(S): 500 INJECTION, POWDER, FOR SOLUTION INTRAMUSCULAR; INTRAVENOUS at 11:44

## 2023-10-11 RX ADMIN — VALACYCLOVIR 500 MILLIGRAM(S): 500 TABLET, FILM COATED ORAL at 11:43

## 2023-10-11 RX ADMIN — Medication 40 MILLIEQUIVALENT(S): at 05:07

## 2023-10-11 NOTE — DISCHARGE NOTE NURSING/CASE MANAGEMENT/SOCIAL WORK - NSDCPEWEB_GEN_ALL_CORE
[facility] for Tobacco Control website --- [email or web address] --- [email or web address]

## 2023-10-11 NOTE — DISCHARGE NOTE PROVIDER - NSDCCPCAREPLAN_GEN_ALL_CORE_FT
PRINCIPAL DISCHARGE DIAGNOSIS  Diagnosis: Acute hypoxic respiratory failure  Assessment and Plan of Treatment:       SECONDARY DISCHARGE DIAGNOSES  Diagnosis: Acute pneumonia  Assessment and Plan of Treatment:     Diagnosis: COPD exacerbation  Assessment and Plan of Treatment:     Diagnosis: Chronic diastolic congestive heart failure  Assessment and Plan of Treatment:

## 2023-10-11 NOTE — PROGRESS NOTE ADULT - SUBJECTIVE AND OBJECTIVE BOX
[facility], THE [facility]ART [facility]ENTER                              [address] [place] [address]                                                 PHONE: [phone]                                                 FAX: [phone]  -----------------------------------------------------------------------------------------------  Pt seen and examined. FU for  shortness of breath      Overnight events/Complaints: Pt reports improvement in her breathing. Still smokes.    Vital Signs Last 24 Hrs  T(C): 36.6 (10 Oct 2023 07:47), Max: 36.7 (09 Oct 2023 23:32)  T(F): 97.9 (10 Oct 2023 07:47), Max: 98.1 (09 Oct 2023 23:32)  HR: 71 (10 Oct 2023 07:47) (71 - 107)  BP: 110/58 (10 Oct 2023 07:47) (93/60 - 129/64)  BP(mean): --  RR: 18 (10 Oct 2023 07:47) (18 - 18)  SpO2: 91% (10 Oct 2023 07:47) (91% - 99%)    Parameters below as of 10 Oct 2023 07:47  Patient On (Oxygen Delivery Method): nasal cannula      I&O's Summary      RELEVENT PHYSICAL EXAM:  Neck: No obvious JVD  Cardiovascular: regular S1, S2  Respiratory: Lungs clear to auscultation; no crepitations, + exp wheeze  Musculoskeletal: No edema        LABS:                        11.6   8.08  )-----------( 286      ( 10 Oct 2023 04:52 )             38.3     10-10    144  |  100  |  24.2<H>  ----------------------------<  112<H>  3.2<L>   |  32.0<H>  |  1.08    Ca    9.1      10 Oct 2023 04:52    TPro  7.0  /  Alb  3.7  /  TBili  <0.2<L>  /  DBili  x   /  AST  9   /  ALT  9   /  AlkPhos  136<H>  10-09      RADIOLOGY & ADDITIONAL STUDIES: (reviewed)  CT was independently visualized/reviewed  and demonstrated:   No pulmonary embolism.    Increased bronchial wall thickening and mucoid impacted distal airways   can be seen in the setting of bronchopneumonia. Patchy nodular opacities   in the right upper lobe, the largest one increased in size, which could   be a mucoid impacted airway. Follow-up after the appropriate clinical   treatment is recommended with repeat chest CT in one month.  New loculated small right pleural effusion.      CARDIOLOGY TESTING:(reviewed)     12 lead EKG independently visualized/reviewed  and demonstrated    ECHOCARDIOGRAM independently visualized/reviewed and demonstrated :    1. Endocardial visualization was enhanced with intravenous echo contrast.   2. Normal left ventricular internal cavity size.   3. Normal global left ventricular systolic function.   4. Left ventricular ejection fraction, by visual estimation, is 60 to   65%.   5. Spectral Doppler shows impaired relaxation pattern of left   ventricular myocardial filling (Grade I diastolic dysfunction).   6. Elevated mean left atrial pressure.   7. Normal right ventricular size and function.   8. The left atrium is normal in size.   9. The right atrium is normal in size.  10. Sclerotic aortic valve with normal opening.  11. Mild thickening and calcification of the anterior and posterior   mitral valve leaflets.  12. Moderate mitral annular calcification.  13. Mild mitral valve regurgitation.  14. Mild tricuspid regurgitation.    TELEMETRY independently visualized/reviewed and demonstrated : NSR    MEDICATIONS:(reviewed)  MEDICATIONS  (STANDING):  albuterol/ipratropium for Nebulization 3 milliLiter(s) Nebulizer every 6 hours  azithromycin   Tablet 500 milliGRAM(s) Oral daily  cefTRIAXone Injectable. 1000 milliGRAM(s) IV Push every 24 hours  DULoxetine 30 milliGRAM(s) Oral daily  enoxaparin Injectable 40 milliGRAM(s) SubCutaneous every 24 hours  furosemide    Tablet 40 milliGRAM(s) Oral daily  hydrocortisone sodium succinate Injectable 40 milliGRAM(s) IV Push every 8 hours  influenza  Vaccine (HIGH DOSE) 0.7 milliLiter(s) IntraMuscular once  metoprolol succinate ER 25 milliGRAM(s) Oral daily  pantoprazole    Tablet 40 milliGRAM(s) Oral before breakfast  tamsulosin 0.4 milliGRAM(s) Oral at bedtime  valACYclovir 500 milliGRAM(s) Oral daily    ASSESSMENT AND PLAN:    78y Female with past medical history significant for smoker with COPD, NICM with recovery in LVEF (65% in Sept 2022), IgA multiple myeloma in remission, prior ITP presented with worsening SOB over the past few days.     - She feels much improved today on current treatment.   - continue Lasix 40 mg PO daily   - Echo with normal LV function  - supportive care for underlying COPD  - oxygen supplementation for hypoxia        
Patient is a 78y old  Female who presents with a chief complaint of pneumonia- hypoxic resp failure possible CHF (10 Oct 2023 09:34)      c/o cough,sob stable with 2l nc. desat 85% off oxygen. denies cp,n/v/,fever,chill  REVIEW OF SYSTEMS: All systems are reviewed and found to be negative except above    MEDICATIONS  (STANDING):  albuterol/ipratropium for Nebulization 3 milliLiter(s) Nebulizer every 6 hours  azithromycin   Tablet 500 milliGRAM(s) Oral daily  cefTRIAXone Injectable. 1000 milliGRAM(s) IV Push every 24 hours  DULoxetine 30 milliGRAM(s) Oral daily  enoxaparin Injectable 40 milliGRAM(s) SubCutaneous every 24 hours  furosemide    Tablet 40 milliGRAM(s) Oral daily  hydrocortisone sodium succinate Injectable 40 milliGRAM(s) IV Push every 8 hours  influenza  Vaccine (HIGH DOSE) 0.7 milliLiter(s) IntraMuscular once  metoprolol succinate ER 25 milliGRAM(s) Oral daily  pantoprazole    Tablet 40 milliGRAM(s) Oral before breakfast  tamsulosin 0.4 milliGRAM(s) Oral at bedtime  valACYclovir 500 milliGRAM(s) Oral daily    MEDICATIONS  (PRN):  ALPRAZolam 0.25 milliGRAM(s) Oral three times a day PRN anxiety      CAPILLARY BLOOD GLUCOSE        I&O's Summary      PHYSICAL EXAM:  Vital Signs Last 24 Hrs  T(C): 36.6 (10 Oct 2023 11:52), Max: 36.7 (09 Oct 2023 23:32)  T(F): 97.8 (10 Oct 2023 11:52), Max: 98.1 (09 Oct 2023 23:32)  HR: 77 (10 Oct 2023 11:52) (71 - 107)  BP: 107/66 (10 Oct 2023 11:52) (93/60 - 116/70)  BP(mean): --  RR: 18 (10 Oct 2023 11:52) (18 - 18)  SpO2: 93% (10 Oct 2023 11:52) (91% - 99%)    Parameters below as of 10 Oct 2023 11:52  Patient On (Oxygen Delivery Method): nasal cannula        CONSTITUTIONAL: NAD,  EYES: PERRLA; conjunctiva and sclera clear  ENMT: Moist oral mucosa,   RESPIRATORY: Normal respiratory effort;.crackles to auscultation bilaterally  CARDIOVASCULAR: Regular rate and rhythm, normal S1 and S2, no murmur   EXTS: No lower extremity edema; Peripheral pulses are 2+ bilaterally  ABDOMEN: Nontender to palpation, normoactive bowel sounds, no rebound/guarding;   MUSCLOSKELETAL:  no joint swelling or tenderness to palpation  PSYCH: affect appropriate  NEUROLOGY: A+O to person, place, and time; CN 2-12 are intact and symmetric; no gross sensory/motor deficits;       LABS:                        11.6   8.08  )-----------( 286      ( 10 Oct 2023 04:52 )             38.3     10-10    144  |  100  |  24.2<H>  ----------------------------<  112<H>  3.2<L>   |  32.0<H>  |  1.08    Ca    9.1      10 Oct 2023 04:52    TPro  7.0  /  Alb  3.7  /  TBili  <0.2<L>  /  DBili  x   /  AST  9   /  ALT  9   /  AlkPhos  136<H>  10-09          Urinalysis Basic - ( 10 Oct 2023 04:52 )    Color: x / Appearance: x / SG: x / pH: x  Gluc: 112 mg/dL / Ketone: x  / Bili: x / Urobili: x   Blood: x / Protein: x / Nitrite: x   Leuk Esterase: x / RBC: x / WBC x   Sq Epi: x / Non Sq Epi: x / Bacteria: x        Culture - Blood (collected 08 Oct 2023 11:46)  Source: .Blood Blood  Preliminary Report (09 Oct 2023 17:01):    No growth at 24 hours        RADIOLOGY & ADDITIONAL TESTS:  Results Reviewed:   < from: TTE Echo Complete w/o Contrast w/ Doppler (10.09.23 @ 11:08) >  Summary:   1. Endocardial visualization was enhanced with intravenous echo contrast.   2. Normal left ventricular internal cavity size.   3. Normal global left ventricular systolic function.   4. Left ventricular ejection fraction, by visual estimation, is 60 to   65%.   5. Spectral Doppler shows impaired relaxation pattern of left   ventricular myocardial filling (Grade I diastolic dysfunction).   6. Elevated mean left atrial pressure.   7. Normal right ventricular size and function.   8. The left atrium is normal in size.   9. The right atrium is normal in size.  10. Sclerotic aortic valve with normal opening.  11. Mild thickening and calcification of the anterior and posterior   mitral valve leaflets.  12. Moderate mitral annular calcification.  13. Mild mitral valve regurgitation.  14. Mild tricuspid regurgitation.    < end of copied text >  
                                           [facility], [facility]                              [address] [address]                                                 PHONE: [phone]                                                 FAX: [phone]  -----------------------------------------------------------------------------------------------  Pt seen and examined. FU for  shortness of breath      Overnight events/Complaints: Pt reports improvement in her breathing. Still smokes.    Vital Signs Last 24 Hrs  T(C): 36.8 (11 Oct 2023 07:50), Max: 37 (10 Oct 2023 16:18)  T(F): 98.2 (11 Oct 2023 07:50), Max: 98.6 (10 Oct 2023 16:18)  HR: 72 (11 Oct 2023 10:03) (69 - 102)  BP: 114/73 (11 Oct 2023 07:50) (93/51 - 114/73)  BP(mean): --  RR: 18 (11 Oct 2023 07:50) (18 - 18)  SpO2: 94% (11 Oct 2023 07:50) (91% - 95%)    Parameters below as of 11 Oct 2023 10:03  Patient On (Oxygen Delivery Method): nasal cannula, 2L      I&O's Summary    RELEVENT PHYSICAL EXAM:  Neck: No obvious JVD  Cardiovascular: regular S1, S2  Respiratory: Lungs clear to auscultation; no crepitations, + exp wheeze  Musculoskeletal: No edema        LABS:                        11.6   8.08  )-----------( 286      ( 10 Oct 2023 04:52 )             38.3     10-10    144  |  100  |  24.2<H>  ----------------------------<  112<H>  3.2<L>   |  32.0<H>  |  1.08    Ca    9.1      10 Oct 2023 04:52    TPro  7.0  /  Alb  3.7  /  TBili  <0.2<L>  /  DBili  x   /  AST  9   /  ALT  9   /  AlkPhos  136<H>  10-09      RADIOLOGY & ADDITIONAL STUDIES: (reviewed)  CT was independently visualized/reviewed  and demonstrated:   No pulmonary embolism.    Increased bronchial wall thickening and mucoid impacted distal airways   can be seen in the setting of bronchopneumonia. Patchy nodular opacities   in the right upper lobe, the largest one increased in size, which could   be a mucoid impacted airway. Follow-up after the appropriate clinical   treatment is recommended with repeat chest CT in one month.  New loculated small right pleural effusion.      CARDIOLOGY TESTING:(reviewed)     12 lead EKG independently visualized/reviewed  and demonstrated    ECHOCARDIOGRAM independently visualized/reviewed and demonstrated :    1. Endocardial visualization was enhanced with intravenous echo contrast.   2. Normal left ventricular internal cavity size.   3. Normal global left ventricular systolic function.   4. Left ventricular ejection fraction, by visual estimation, is 60 to   65%.   5. Spectral Doppler shows impaired relaxation pattern of left   ventricular myocardial filling (Grade I diastolic dysfunction).   6. Elevated mean left atrial pressure.   7. Normal right ventricular size and function.   8. The left atrium is normal in size.   9. The right atrium is normal in size.  10. Sclerotic aortic valve with normal opening.  11. Mild thickening and calcification of the anterior and posterior   mitral valve leaflets.  12. Moderate mitral annular calcification.  13. Mild mitral valve regurgitation.  14. Mild tricuspid regurgitation.    TELEMETRY independently visualized/reviewed and demonstrated : NSR    MEDICATIONS:(reviewed)  MEDICATIONS  (STANDING):  albuterol/ipratropium for Nebulization 3 milliLiter(s) Nebulizer every 6 hours  azithromycin   Tablet 500 milliGRAM(s) Oral daily  cefTRIAXone Injectable. 1000 milliGRAM(s) IV Push every 24 hours  DULoxetine 30 milliGRAM(s) Oral daily  enoxaparin Injectable 40 milliGRAM(s) SubCutaneous every 24 hours  furosemide    Tablet 40 milliGRAM(s) Oral daily  hydrocortisone sodium succinate Injectable 40 milliGRAM(s) IV Push every 8 hours  influenza  Vaccine (HIGH DOSE) 0.7 milliLiter(s) IntraMuscular once  metoprolol succinate ER 25 milliGRAM(s) Oral daily  pantoprazole    Tablet 40 milliGRAM(s) Oral before breakfast  tamsulosin 0.4 milliGRAM(s) Oral at bedtime  valACYclovir 500 milliGRAM(s) Oral daily    ASSESSMENT AND PLAN:    78y Female with past medical history significant for smoker with COPD, NICM with recovery in LVEF (65% in Sept 2022), IgA multiple myeloma in remission, prior ITP presented with worsening SOB over the past few days.     - She feels much improved today on current treatment.   - continue Lasix 40 mg PO daily   - Echo with normal LV function  - supportive care for underlying COPD  - oxygen supplementation for hypoxia        
Patient is a 78y old  Female who presents with a chief complaint of pneumonia- hypoxic resp failure possible CHF (08 Oct 2023 09:07)    c/o sob,dry cough,fatigue.Denies fever,chill,n/v/d/dysuria,cp.  REVIEW OF SYSTEMS: All systems are reviewed and found to be negative except above    MEDICATIONS  (STANDING):  albuterol/ipratropium for Nebulization 3 milliLiter(s) Nebulizer every 6 hours  azithromycin   Tablet 500 milliGRAM(s) Oral daily  cefTRIAXone Injectable. 1000 milliGRAM(s) IV Push every 24 hours  DULoxetine 30 milliGRAM(s) Oral daily  enoxaparin Injectable 40 milliGRAM(s) SubCutaneous every 24 hours  furosemide   Injectable 40 milliGRAM(s) IV Push two times a day  hydrocortisone sodium succinate Injectable 60 milliGRAM(s) IV Push every 8 hours  influenza  Vaccine (HIGH DOSE) 0.7 milliLiter(s) IntraMuscular once  metoprolol succinate ER 25 milliGRAM(s) Oral daily  pantoprazole    Tablet 40 milliGRAM(s) Oral before breakfast  tamsulosin 0.4 milliGRAM(s) Oral at bedtime  valACYclovir 500 milliGRAM(s) Oral daily    MEDICATIONS  (PRN):  ALPRAZolam 0.25 milliGRAM(s) Oral three times a day PRN anxiety      CAPILLARY BLOOD GLUCOSE        I&O's Summary      PHYSICAL EXAM:  Vital Signs Last 24 Hrs  T(C): 36.5 (09 Oct 2023 07:57), Max: 36.7 (08 Oct 2023 23:42)  T(F): 97.7 (09 Oct 2023 07:57), Max: 98.1 (08 Oct 2023 23:42)  HR: 80 (09 Oct 2023 08:20) (76 - 102)  BP: 97/58 (09 Oct 2023 07:57) (97/58 - 123/73)  BP(mean): 89 (08 Oct 2023 16:23) (89 - 89)  RR: 19 (09 Oct 2023 07:57) (18 - 20)  SpO2: 94% (09 Oct 2023 08:20) (93% - 97%)    Parameters below as of 09 Oct 2023 08:20  Patient On (Oxygen Delivery Method): nasal cannula, 4l        CONSTITUTIONAL: NAD,  EYES: PERRLA; conjunctiva and sclera clear  ENMT: Moist oral mucosa,   RESPIRATORY: Normal respiratory effort; crackles to auscultation bilaterally. no wheeze  CARDIOVASCULAR: Regular rate and rhythm, normal S1 and S2, no murmur   EXTS: No lower extremity edema; Peripheral pulses are 2+ bilaterally  ABDOMEN: Nontender to palpation, normoactive bowel sounds, no rebound/guarding;   MUSCLOSKELETAL:   no joint swelling or tenderness to palpation  PSYCH: affect appropriate  NEUROLOGY: A+O to person, place, and time; CN 2-12 are intact and symmetric; no gross sensory deficits; hard hearing      LABS:                        11.5   9.46  )-----------( 282      ( 09 Oct 2023 07:15 )             37.9     10-09    137  |  94<L>  |  19.3  ----------------------------<  106<H>  3.9   |  29.0  |  1.16    Ca    9.4      09 Oct 2023 07:15  Mg     2.1     10-07    TPro  7.0  /  Alb  3.7  /  TBili  <0.2<L>  /  DBili  x   /  AST  9   /  ALT  9   /  AlkPhos  136<H>  10-09      CARDIAC MARKERS ( 07 Oct 2023 21:35 )  x     / <0.01 ng/mL / x     / x     / x          Urinalysis Basic - ( 09 Oct 2023 07:15 )    Color: x / Appearance: x / SG: x / pH: x  Gluc: 106 mg/dL / Ketone: x  / Bili: x / Urobili: x   Blood: x / Protein: x / Nitrite: x   Leuk Esterase: x / RBC: x / WBC x   Sq Epi: x / Non Sq Epi: x / Bacteria: x          RADIOLOGY & ADDITIONAL TESTS:  Results Reviewed:   < from: CT Angio Chest PE Protocol w/ IV Cont (10.08.23 @ 01:02) >    IMPRESSION:  No pulmonary embolism.    Increased bronchial wall thickening and mucoid impacted distal airways   can be seen in the setting of bronchopneumonia. Patchy nodular opacities   in the right upper lobe, the largest one increased in size, which could   be a mucoid impacted airway. Follow-up after the appropriate clinical   treatment is recommended with repeat chest CT in one month.  New loculated small right pleural effusion.    < end of copied text >

## 2023-10-11 NOTE — DISCHARGE NOTE NURSING/CASE MANAGEMENT/SOCIAL WORK - NSDCPEEMAIL_GEN_ALL_CORE
[facility] email [email or web address]

## 2023-10-11 NOTE — PROGRESS NOTE ADULT - REASON FOR ADMISSION
pneumonia- hypoxic resp failure possible CHF

## 2023-10-11 NOTE — DISCHARGE NOTE PROVIDER - CARE PROVIDER_API CALL
Jelani Troncoso [facility]  Cardiology  [address] [place] [address]  Phone: [phone]  Fax: [phone]  Follow Up Time: 2 weeks    Luis Birmingham  Pulmonary Disease  [address] [facility] [facility], [address]  [place] [address]  Phone: [phone]  Fax: [phone]  Follow Up Time: 1 week

## 2023-10-11 NOTE — DISCHARGE NOTE NURSING/CASE MANAGEMENT/SOCIAL WORK - NSDCPEFALRISK_GEN_ALL_CORE
Received fax that  medication econazole nitrate 1% cream 30GM is not covered under insurance plan     preferred medication is clotrimazole    For information on Fall & Injury Prevention, visit: [email or web address] OR  [email or web address] OR  https://www.cdc.gov/steadi/patient.html

## 2023-10-11 NOTE — DISCHARGE NOTE PROVIDER - CARE PROVIDERS DIRECT ADDRESSES
,[email or web address]

## 2023-10-11 NOTE — DISCHARGE NOTE PROVIDER - ATTENDING DISCHARGE PHYSICAL EXAMINATION:
Pt denies chest pain,sob,cough,fever,chill,ambulatory with walker.Lives alone. dw sw for safe discharge with home care.home per PT    T(C): 36.8 (10-11-23 @ 07:50), Max: 37 (10-10-23 @ 16:18)  HR: 72 (10-11-23 @ 10:03) (69 - 102)  BP: 114/73 (10-11-23 @ 07:50) (93/51 - 114/73)  RR: 18 (10-11-23 @ 07:50) (18 - 18)  SpO2: 94% (10-11-23 @ 07:50) (91% - 95%)    GEN - NAD  HEENT - NCAT, EOMI, [place],  RESP - CTA BL, no wheeze/rhonchi/crackles.  CARDIO - NS1S2, RRR. No murmurs  ABD - Soft/Non tender/Non distended. Normal BS x4 quadrants.   Ext - No RICCO.   MSK - full ROM of BL upper and lower extremities without pain or restriction. BL 5/5 strength on upper and lower extremities.   Neuro - cn 2-12 grossly intact. no tremor. gait not observed. .    Psych- AAOx3. attentive. normal affect.

## 2023-10-11 NOTE — DISCHARGE NOTE PROVIDER - NSDCMRMEDTOKEN_GEN_ALL_CORE_FT
Advair Diskus 250 mcg-50 mcg inhalation powder: 1 puff(s) inhaled 2 times a day  cephalexin 500 mg oral tablet: 1 tab(s) orally every 8 hours  Cymbalta 30 mg oral delayed release capsule: 1 cap(s) orally once a day  folic acid 1 mg oral tablet: 1 tab(s) orally once a day   Lomotil 2.5 mg-0.025 mg oral tablet: 1 tab(s) orally 4 times a day, As needed, Diarrhea  metoprolol: 12.5 milligram(s) orally once a day  Nicoderm C-Q 21 mg/24 hr transdermal film, extended release: 1 patch transdermal once a day   pantoprazole 40 mg oral delayed release tablet: 1 tab(s) orally once a day   predniSONE 10 mg oral tablet: 1 tab(s) orally once a day 4 tabs for 2 days then 3 [id number],then 2tabx2 day,1tab x2 day  Spiriva 18 mcg inhalation capsule: 1 cap(s) inhaled once a day  tamsulosin 0.4 mg oral capsule: 1 cap(s) orally once a day (at bedtime)  valACYclovir 500 mg oral tablet: 1 tab(s) orally once a day  Xanax 0.5 mg oral tablet: 1 tab(s) orally 2 times a day, As needed, anxiety or agitation  Zithromax 500 mg oral tablet: 1 tab(s) orally once a day

## 2023-10-11 NOTE — DISCHARGE NOTE PROVIDER - PROVIDER TOKENS
PROVIDER:[TOKEN:[[id number]],FOLLOWUP:[2 weeks]],PROVIDER:[TOKEN:[[id number]],FOLLOWUP:[1 week]]

## 2023-10-11 NOTE — DISCHARGE NOTE NURSING/CASE MANAGEMENT/SOCIAL WORK - PATIENT PORTAL LINK FT
You can access the FollowMyHealth Patient Portal offered by [facility] by registering at the following website: [email or web address]. By joining [facility]?’s FollowMyHealth portal, you will also be able to view your health information using other applications (apps) compatible with our system.

## 2023-10-13 LAB
CULTURE RESULTS: SIGNIFICANT CHANGE UP
SPECIMEN SOURCE: SIGNIFICANT CHANGE UP

## 2023-10-21 ENCOUNTER — INPATIENT (INPATIENT)
Facility: HOSPITAL | Age: 78
LOS: 4 days | Discharge: HOME HEALTH | DRG: 177 | End: 2023-10-26
Attending: STUDENT IN AN ORGANIZED HEALTH CARE EDUCATION/TRAINING PROGRAM | Admitting: INTERNAL MEDICINE
Payer: MEDICARE

## 2023-10-21 VITALS
SYSTOLIC BLOOD PRESSURE: 100 MMHG | HEART RATE: 109 BPM | DIASTOLIC BLOOD PRESSURE: 67 MMHG | OXYGEN SATURATION: 88 % | WEIGHT: 160.06 LBS | RESPIRATION RATE: 22 BRPM | TEMPERATURE: 99 F

## 2023-10-21 DIAGNOSIS — J44.1 CHRONIC OBSTRUCTIVE PULMONARY DISEASE WITH (ACUTE) EXACERBATION: ICD-10-CM

## 2023-10-21 DIAGNOSIS — Z90.49 ACQUIRED ABSENCE OF OTHER SPECIFIED PARTS OF DIGESTIVE TRACT: Chronic | ICD-10-CM

## 2023-10-21 LAB
ALBUMIN SERPL ELPH-MCNC: 3 G/DL — LOW (ref 3.3–5.2)
ALBUMIN SERPL ELPH-MCNC: 3 G/DL — LOW (ref 3.3–5.2)
ALBUMIN SERPL ELPH-MCNC: 3.9 G/DL — SIGNIFICANT CHANGE UP (ref 3.3–5.2)
ALBUMIN SERPL ELPH-MCNC: 3.9 G/DL — SIGNIFICANT CHANGE UP (ref 3.3–5.2)
ALP SERPL-CCNC: 117 U/L — SIGNIFICANT CHANGE UP (ref 40–120)
ALP SERPL-CCNC: 117 U/L — SIGNIFICANT CHANGE UP (ref 40–120)
ALP SERPL-CCNC: 92 U/L — SIGNIFICANT CHANGE UP (ref 40–120)
ALP SERPL-CCNC: 92 U/L — SIGNIFICANT CHANGE UP (ref 40–120)
ALT FLD-CCNC: 17 U/L — SIGNIFICANT CHANGE UP
ALT FLD-CCNC: 17 U/L — SIGNIFICANT CHANGE UP
ALT FLD-CCNC: 19 U/L — SIGNIFICANT CHANGE UP
ALT FLD-CCNC: 19 U/L — SIGNIFICANT CHANGE UP
ANION GAP SERPL CALC-SCNC: 15 MMOL/L — SIGNIFICANT CHANGE UP (ref 5–17)
ANION GAP SERPL CALC-SCNC: 15 MMOL/L — SIGNIFICANT CHANGE UP (ref 5–17)
APTT BLD: 27 SEC — SIGNIFICANT CHANGE UP (ref 24.5–35.6)
APTT BLD: 27 SEC — SIGNIFICANT CHANGE UP (ref 24.5–35.6)
AST SERPL-CCNC: 17 U/L — SIGNIFICANT CHANGE UP
AST SERPL-CCNC: 17 U/L — SIGNIFICANT CHANGE UP
AST SERPL-CCNC: 19 U/L — SIGNIFICANT CHANGE UP
AST SERPL-CCNC: 19 U/L — SIGNIFICANT CHANGE UP
BASE EXCESS BLDA CALC-SCNC: 4.7 MMOL/L — HIGH (ref -2–3)
BASE EXCESS BLDA CALC-SCNC: 4.7 MMOL/L — HIGH (ref -2–3)
BASE EXCESS BLDV CALC-SCNC: 7.3 MMOL/L — HIGH (ref -2–3)
BASE EXCESS BLDV CALC-SCNC: 7.3 MMOL/L — HIGH (ref -2–3)
BASOPHILS # BLD AUTO: 0.01 K/UL — SIGNIFICANT CHANGE UP (ref 0–0.2)
BASOPHILS # BLD AUTO: 0.01 K/UL — SIGNIFICANT CHANGE UP (ref 0–0.2)
BASOPHILS NFR BLD AUTO: 0.1 % — SIGNIFICANT CHANGE UP (ref 0–2)
BASOPHILS NFR BLD AUTO: 0.1 % — SIGNIFICANT CHANGE UP (ref 0–2)
BILIRUB DIRECT SERPL-MCNC: 0.1 MG/DL — SIGNIFICANT CHANGE UP (ref 0–0.3)
BILIRUB DIRECT SERPL-MCNC: 0.1 MG/DL — SIGNIFICANT CHANGE UP (ref 0–0.3)
BILIRUB INDIRECT FLD-MCNC: SIGNIFICANT CHANGE UP MG/DL (ref 0.2–1)
BILIRUB INDIRECT FLD-MCNC: SIGNIFICANT CHANGE UP MG/DL (ref 0.2–1)
BILIRUB SERPL-MCNC: 0.3 MG/DL — LOW (ref 0.4–2)
BILIRUB SERPL-MCNC: 0.3 MG/DL — LOW (ref 0.4–2)
BILIRUB SERPL-MCNC: <0.2 MG/DL — LOW (ref 0.4–2)
BILIRUB SERPL-MCNC: <0.2 MG/DL — LOW (ref 0.4–2)
BLOOD GAS COMMENTS ARTERIAL: SIGNIFICANT CHANGE UP
BLOOD GAS COMMENTS ARTERIAL: SIGNIFICANT CHANGE UP
BUN SERPL-MCNC: 13.7 MG/DL — SIGNIFICANT CHANGE UP (ref 8–20)
BUN SERPL-MCNC: 13.7 MG/DL — SIGNIFICANT CHANGE UP (ref 8–20)
CA-I SERPL-SCNC: 1.14 MMOL/L — LOW (ref 1.15–1.33)
CA-I SERPL-SCNC: 1.14 MMOL/L — LOW (ref 1.15–1.33)
CALCIUM SERPL-MCNC: 8.9 MG/DL — SIGNIFICANT CHANGE UP (ref 8.4–10.5)
CALCIUM SERPL-MCNC: 8.9 MG/DL — SIGNIFICANT CHANGE UP (ref 8.4–10.5)
CHLORIDE BLDV-SCNC: 97 MMOL/L — SIGNIFICANT CHANGE UP (ref 96–108)
CHLORIDE BLDV-SCNC: 97 MMOL/L — SIGNIFICANT CHANGE UP (ref 96–108)
CHLORIDE SERPL-SCNC: 91 MMOL/L — LOW (ref 96–108)
CHLORIDE SERPL-SCNC: 91 MMOL/L — LOW (ref 96–108)
CO2 SERPL-SCNC: 27 MMOL/L — SIGNIFICANT CHANGE UP (ref 22–29)
CO2 SERPL-SCNC: 27 MMOL/L — SIGNIFICANT CHANGE UP (ref 22–29)
CREAT SERPL-MCNC: 0.91 MG/DL — SIGNIFICANT CHANGE UP (ref 0.5–1.3)
CREAT SERPL-MCNC: 0.91 MG/DL — SIGNIFICANT CHANGE UP (ref 0.5–1.3)
CREAT SERPL-MCNC: 1.08 MG/DL — SIGNIFICANT CHANGE UP (ref 0.5–1.3)
CREAT SERPL-MCNC: 1.08 MG/DL — SIGNIFICANT CHANGE UP (ref 0.5–1.3)
CRP SERPL-MCNC: 68 MG/L — HIGH
CRP SERPL-MCNC: 68 MG/L — HIGH
D DIMER BLD IA.RAPID-MCNC: 302 NG/ML DDU — HIGH
D DIMER BLD IA.RAPID-MCNC: 302 NG/ML DDU — HIGH
EGFR: 52 ML/MIN/1.73M2 — LOW
EGFR: 52 ML/MIN/1.73M2 — LOW
EGFR: 64 ML/MIN/1.73M2 — SIGNIFICANT CHANGE UP
EGFR: 64 ML/MIN/1.73M2 — SIGNIFICANT CHANGE UP
EOSINOPHIL # BLD AUTO: 0.05 K/UL — SIGNIFICANT CHANGE UP (ref 0–0.5)
EOSINOPHIL # BLD AUTO: 0.05 K/UL — SIGNIFICANT CHANGE UP (ref 0–0.5)
EOSINOPHIL NFR BLD AUTO: 0.5 % — SIGNIFICANT CHANGE UP (ref 0–6)
EOSINOPHIL NFR BLD AUTO: 0.5 % — SIGNIFICANT CHANGE UP (ref 0–6)
ERYTHROCYTE [SEDIMENTATION RATE] IN BLOOD: 17 MM/HR — SIGNIFICANT CHANGE UP (ref 0–20)
ERYTHROCYTE [SEDIMENTATION RATE] IN BLOOD: 17 MM/HR — SIGNIFICANT CHANGE UP (ref 0–20)
GAS PNL BLDA: SIGNIFICANT CHANGE UP
GAS PNL BLDV: 132 MMOL/L — LOW (ref 136–145)
GAS PNL BLDV: 132 MMOL/L — LOW (ref 136–145)
GAS PNL BLDV: SIGNIFICANT CHANGE UP
GLUCOSE BLDC GLUCOMTR-MCNC: 128 MG/DL — HIGH (ref 70–99)
GLUCOSE BLDC GLUCOMTR-MCNC: 128 MG/DL — HIGH (ref 70–99)
GLUCOSE BLDV-MCNC: 93 MG/DL — SIGNIFICANT CHANGE UP (ref 70–99)
GLUCOSE BLDV-MCNC: 93 MG/DL — SIGNIFICANT CHANGE UP (ref 70–99)
GLUCOSE SERPL-MCNC: 93 MG/DL — SIGNIFICANT CHANGE UP (ref 70–99)
GLUCOSE SERPL-MCNC: 93 MG/DL — SIGNIFICANT CHANGE UP (ref 70–99)
HCO3 BLDA-SCNC: 31 MMOL/L — HIGH (ref 21–28)
HCO3 BLDA-SCNC: 31 MMOL/L — HIGH (ref 21–28)
HCO3 BLDV-SCNC: 33 MMOL/L — HIGH (ref 22–29)
HCO3 BLDV-SCNC: 33 MMOL/L — HIGH (ref 22–29)
HCT VFR BLD CALC: 40 % — SIGNIFICANT CHANGE UP (ref 34.5–45)
HCT VFR BLD CALC: 40 % — SIGNIFICANT CHANGE UP (ref 34.5–45)
HCT VFR BLDA CALC: 39 % — SIGNIFICANT CHANGE UP
HCT VFR BLDA CALC: 39 % — SIGNIFICANT CHANGE UP
HGB BLD CALC-MCNC: 13 G/DL — SIGNIFICANT CHANGE UP (ref 11.7–16.1)
HGB BLD CALC-MCNC: 13 G/DL — SIGNIFICANT CHANGE UP (ref 11.7–16.1)
HGB BLD-MCNC: 12 G/DL — SIGNIFICANT CHANGE UP (ref 11.5–15.5)
HGB BLD-MCNC: 12 G/DL — SIGNIFICANT CHANGE UP (ref 11.5–15.5)
HOROWITZ INDEX BLDA+IHG-RTO: 40 — SIGNIFICANT CHANGE UP
HOROWITZ INDEX BLDA+IHG-RTO: 40 — SIGNIFICANT CHANGE UP
IMM GRANULOCYTES NFR BLD AUTO: 1 % — HIGH (ref 0–0.9)
IMM GRANULOCYTES NFR BLD AUTO: 1 % — HIGH (ref 0–0.9)
INR BLD: 0.94 RATIO — SIGNIFICANT CHANGE UP (ref 0.85–1.18)
LACTATE BLDV-MCNC: 1.3 MMOL/L — SIGNIFICANT CHANGE UP (ref 0.5–2)
LACTATE BLDV-MCNC: 1.3 MMOL/L — SIGNIFICANT CHANGE UP (ref 0.5–2)
LYMPHOCYTES # BLD AUTO: 0.8 K/UL — LOW (ref 1–3.3)
LYMPHOCYTES # BLD AUTO: 0.8 K/UL — LOW (ref 1–3.3)
LYMPHOCYTES # BLD AUTO: 8 % — LOW (ref 13–44)
LYMPHOCYTES # BLD AUTO: 8 % — LOW (ref 13–44)
MAGNESIUM SERPL-MCNC: 2 MG/DL — SIGNIFICANT CHANGE UP (ref 1.6–2.6)
MAGNESIUM SERPL-MCNC: 2 MG/DL — SIGNIFICANT CHANGE UP (ref 1.6–2.6)
MCHC RBC-ENTMCNC: 26.2 PG — LOW (ref 27–34)
MCHC RBC-ENTMCNC: 26.2 PG — LOW (ref 27–34)
MCHC RBC-ENTMCNC: 30 GM/DL — LOW (ref 32–36)
MCHC RBC-ENTMCNC: 30 GM/DL — LOW (ref 32–36)
MCV RBC AUTO: 87.3 FL — SIGNIFICANT CHANGE UP (ref 80–100)
MCV RBC AUTO: 87.3 FL — SIGNIFICANT CHANGE UP (ref 80–100)
MONOCYTES # BLD AUTO: 1.08 K/UL — HIGH (ref 0–0.9)
MONOCYTES # BLD AUTO: 1.08 K/UL — HIGH (ref 0–0.9)
MONOCYTES NFR BLD AUTO: 10.8 % — SIGNIFICANT CHANGE UP (ref 2–14)
MONOCYTES NFR BLD AUTO: 10.8 % — SIGNIFICANT CHANGE UP (ref 2–14)
NEUTROPHILS # BLD AUTO: 7.93 K/UL — HIGH (ref 1.8–7.4)
NEUTROPHILS # BLD AUTO: 7.93 K/UL — HIGH (ref 1.8–7.4)
NEUTROPHILS NFR BLD AUTO: 79.6 % — HIGH (ref 43–77)
NEUTROPHILS NFR BLD AUTO: 79.6 % — HIGH (ref 43–77)
NT-PROBNP SERPL-SCNC: 2895 PG/ML — HIGH (ref 0–300)
NT-PROBNP SERPL-SCNC: 2895 PG/ML — HIGH (ref 0–300)
PCO2 BLDA: 65 MMHG — HIGH (ref 32–45)
PCO2 BLDA: 65 MMHG — HIGH (ref 32–45)
PCO2 BLDV: 63 MMHG — HIGH (ref 39–42)
PCO2 BLDV: 63 MMHG — HIGH (ref 39–42)
PH BLDA: 7.29 — LOW (ref 7.35–7.45)
PH BLDA: 7.29 — LOW (ref 7.35–7.45)
PH BLDV: 7.33 — SIGNIFICANT CHANGE UP (ref 7.32–7.43)
PH BLDV: 7.33 — SIGNIFICANT CHANGE UP (ref 7.32–7.43)
PLATELET # BLD AUTO: 227 K/UL — SIGNIFICANT CHANGE UP (ref 150–400)
PLATELET # BLD AUTO: 227 K/UL — SIGNIFICANT CHANGE UP (ref 150–400)
PO2 BLDA: 100 MMHG — SIGNIFICANT CHANGE UP (ref 83–108)
PO2 BLDA: 100 MMHG — SIGNIFICANT CHANGE UP (ref 83–108)
PO2 BLDV: 44 MMHG — SIGNIFICANT CHANGE UP (ref 25–45)
PO2 BLDV: 44 MMHG — SIGNIFICANT CHANGE UP (ref 25–45)
POTASSIUM BLDV-SCNC: 3.7 MMOL/L — SIGNIFICANT CHANGE UP (ref 3.5–5.1)
POTASSIUM BLDV-SCNC: 3.7 MMOL/L — SIGNIFICANT CHANGE UP (ref 3.5–5.1)
POTASSIUM SERPL-MCNC: 3.7 MMOL/L — SIGNIFICANT CHANGE UP (ref 3.5–5.3)
POTASSIUM SERPL-MCNC: 3.7 MMOL/L — SIGNIFICANT CHANGE UP (ref 3.5–5.3)
POTASSIUM SERPL-SCNC: 3.7 MMOL/L — SIGNIFICANT CHANGE UP (ref 3.5–5.3)
POTASSIUM SERPL-SCNC: 3.7 MMOL/L — SIGNIFICANT CHANGE UP (ref 3.5–5.3)
PROCALCITONIN SERPL-MCNC: 0.23 NG/ML — HIGH (ref 0.02–0.1)
PROCALCITONIN SERPL-MCNC: 0.23 NG/ML — HIGH (ref 0.02–0.1)
PROT SERPL-MCNC: 5.7 G/DL — LOW (ref 6.6–8.7)
PROT SERPL-MCNC: 5.7 G/DL — LOW (ref 6.6–8.7)
PROT SERPL-MCNC: 7 G/DL — SIGNIFICANT CHANGE UP (ref 6.6–8.7)
PROT SERPL-MCNC: 7 G/DL — SIGNIFICANT CHANGE UP (ref 6.6–8.7)
PROTHROM AB SERPL-ACNC: 10.4 SEC — SIGNIFICANT CHANGE UP (ref 9.5–13)
RAPID RVP RESULT: DETECTED
RAPID RVP RESULT: DETECTED
RBC # BLD: 4.58 M/UL — SIGNIFICANT CHANGE UP (ref 3.8–5.2)
RBC # BLD: 4.58 M/UL — SIGNIFICANT CHANGE UP (ref 3.8–5.2)
RBC # FLD: 18.9 % — HIGH (ref 10.3–14.5)
RBC # FLD: 18.9 % — HIGH (ref 10.3–14.5)
SAO2 % BLDA: 98.5 % — HIGH (ref 94–98)
SAO2 % BLDA: 98.5 % — HIGH (ref 94–98)
SAO2 % BLDV: 81.3 % — SIGNIFICANT CHANGE UP
SAO2 % BLDV: 81.3 % — SIGNIFICANT CHANGE UP
SARS-COV-2 RNA SPEC QL NAA+PROBE: DETECTED
SARS-COV-2 RNA SPEC QL NAA+PROBE: DETECTED
SODIUM SERPL-SCNC: 133 MMOL/L — LOW (ref 135–145)
SODIUM SERPL-SCNC: 133 MMOL/L — LOW (ref 135–145)
TROPONIN T SERPL-MCNC: <0.01 NG/ML — SIGNIFICANT CHANGE UP (ref 0–0.06)
TROPONIN T SERPL-MCNC: <0.01 NG/ML — SIGNIFICANT CHANGE UP (ref 0–0.06)
WBC # BLD: 9.97 K/UL — SIGNIFICANT CHANGE UP (ref 3.8–10.5)
WBC # BLD: 9.97 K/UL — SIGNIFICANT CHANGE UP (ref 3.8–10.5)
WBC # FLD AUTO: 9.97 K/UL — SIGNIFICANT CHANGE UP (ref 3.8–10.5)
WBC # FLD AUTO: 9.97 K/UL — SIGNIFICANT CHANGE UP (ref 3.8–10.5)

## 2023-10-21 PROCEDURE — 71250 CT THORAX DX C-: CPT | Mod: 26

## 2023-10-21 PROCEDURE — 99291 CRITICAL CARE FIRST HOUR: CPT

## 2023-10-21 PROCEDURE — 99223 1ST HOSP IP/OBS HIGH 75: CPT

## 2023-10-21 PROCEDURE — 99222 1ST HOSP IP/OBS MODERATE 55: CPT

## 2023-10-21 PROCEDURE — 70450 CT HEAD/BRAIN W/O DYE: CPT | Mod: 26

## 2023-10-21 PROCEDURE — 93010 ELECTROCARDIOGRAM REPORT: CPT | Mod: 76

## 2023-10-21 PROCEDURE — 71045 X-RAY EXAM CHEST 1 VIEW: CPT | Mod: 26

## 2023-10-21 RX ORDER — DULOXETINE HYDROCHLORIDE 30 MG/1
30 CAPSULE, DELAYED RELEASE ORAL DAILY
Refills: 0 | Status: DISCONTINUED | OUTPATIENT
Start: 2023-10-21 | End: 2023-10-26

## 2023-10-21 RX ORDER — IPRATROPIUM/ALBUTEROL SULFATE 18-103MCG
3 AEROSOL WITH ADAPTER (GRAM) INHALATION EVERY 6 HOURS
Refills: 0 | Status: DISCONTINUED | OUTPATIENT
Start: 2023-10-21 | End: 2023-10-21

## 2023-10-21 RX ORDER — CEFEPIME 1 G/1
1000 INJECTION, POWDER, FOR SOLUTION INTRAMUSCULAR; INTRAVENOUS EVERY 8 HOURS
Refills: 0 | Status: DISCONTINUED | OUTPATIENT
Start: 2023-10-21 | End: 2023-10-21

## 2023-10-21 RX ORDER — REMDESIVIR 5 MG/ML
100 INJECTION INTRAVENOUS EVERY 24 HOURS
Refills: 0 | Status: COMPLETED | OUTPATIENT
Start: 2023-10-22 | End: 2023-10-25

## 2023-10-21 RX ORDER — REMDESIVIR 5 MG/ML
INJECTION INTRAVENOUS
Refills: 0 | Status: COMPLETED | OUTPATIENT
Start: 2023-10-21 | End: 2023-10-25

## 2023-10-21 RX ORDER — ENOXAPARIN SODIUM 100 MG/ML
40 INJECTION SUBCUTANEOUS EVERY 12 HOURS
Refills: 0 | Status: DISCONTINUED | OUTPATIENT
Start: 2023-10-21 | End: 2023-10-21

## 2023-10-21 RX ORDER — BUDESONIDE AND FORMOTEROL FUMARATE DIHYDRATE 160; 4.5 UG/1; UG/1
2 AEROSOL RESPIRATORY (INHALATION)
Refills: 0 | Status: DISCONTINUED | OUTPATIENT
Start: 2023-10-21 | End: 2023-10-21

## 2023-10-21 RX ORDER — NICOTINE POLACRILEX 2 MG
1 GUM BUCCAL DAILY
Refills: 0 | Status: DISCONTINUED | OUTPATIENT
Start: 2023-10-21 | End: 2023-10-26

## 2023-10-21 RX ORDER — BUDESONIDE AND FORMOTEROL FUMARATE DIHYDRATE 160; 4.5 UG/1; UG/1
2 AEROSOL RESPIRATORY (INHALATION)
Refills: 0 | Status: DISCONTINUED | OUTPATIENT
Start: 2023-10-21 | End: 2023-10-22

## 2023-10-21 RX ORDER — HEPARIN SODIUM 5000 [USP'U]/ML
5000 INJECTION INTRAVENOUS; SUBCUTANEOUS EVERY 8 HOURS
Refills: 0 | Status: DISCONTINUED | OUTPATIENT
Start: 2023-10-21 | End: 2023-10-21

## 2023-10-21 RX ORDER — TAMSULOSIN HYDROCHLORIDE 0.4 MG/1
0.4 CAPSULE ORAL AT BEDTIME
Refills: 0 | Status: DISCONTINUED | OUTPATIENT
Start: 2023-10-21 | End: 2023-10-26

## 2023-10-21 RX ORDER — ALBUTEROL 90 UG/1
2 AEROSOL, METERED ORAL EVERY 4 HOURS
Refills: 0 | Status: DISCONTINUED | OUTPATIENT
Start: 2023-10-21 | End: 2023-10-21

## 2023-10-21 RX ORDER — TIOTROPIUM BROMIDE 18 UG/1
2 CAPSULE ORAL; RESPIRATORY (INHALATION) DAILY
Refills: 0 | Status: DISCONTINUED | OUTPATIENT
Start: 2023-10-21 | End: 2023-10-21

## 2023-10-21 RX ORDER — VANCOMYCIN HCL 1 G
1000 VIAL (EA) INTRAVENOUS ONCE
Refills: 0 | Status: COMPLETED | OUTPATIENT
Start: 2023-10-21 | End: 2023-10-21

## 2023-10-21 RX ORDER — SODIUM CHLORIDE 9 MG/ML
2300 INJECTION, SOLUTION INTRAVENOUS ONCE
Refills: 0 | Status: COMPLETED | OUTPATIENT
Start: 2023-10-21 | End: 2023-10-21

## 2023-10-21 RX ORDER — DEXAMETHASONE 0.5 MG/5ML
4 ELIXIR ORAL DAILY
Refills: 0 | Status: DISCONTINUED | OUTPATIENT
Start: 2023-10-21 | End: 2023-10-26

## 2023-10-21 RX ORDER — ACETAMINOPHEN 500 MG
1000 TABLET ORAL ONCE
Refills: 0 | Status: COMPLETED | OUTPATIENT
Start: 2023-10-21 | End: 2023-10-21

## 2023-10-21 RX ORDER — IPRATROPIUM/ALBUTEROL SULFATE 18-103MCG
3 AEROSOL WITH ADAPTER (GRAM) INHALATION
Refills: 0 | Status: COMPLETED | OUTPATIENT
Start: 2023-10-21 | End: 2023-10-21

## 2023-10-21 RX ORDER — ALBUTEROL 90 UG/1
2 AEROSOL, METERED ORAL EVERY 4 HOURS
Refills: 0 | Status: DISCONTINUED | OUTPATIENT
Start: 2023-10-21 | End: 2023-10-22

## 2023-10-21 RX ORDER — SODIUM CHLORIDE 9 MG/ML
1000 INJECTION INTRAMUSCULAR; INTRAVENOUS; SUBCUTANEOUS
Refills: 0 | Status: DISCONTINUED | OUTPATIENT
Start: 2023-10-21 | End: 2023-10-22

## 2023-10-21 RX ORDER — REMDESIVIR 5 MG/ML
200 INJECTION INTRAVENOUS EVERY 24 HOURS
Refills: 0 | Status: COMPLETED | OUTPATIENT
Start: 2023-10-21 | End: 2023-10-21

## 2023-10-21 RX ORDER — CEFEPIME 1 G/1
2000 INJECTION, POWDER, FOR SOLUTION INTRAMUSCULAR; INTRAVENOUS ONCE
Refills: 0 | Status: DISCONTINUED | OUTPATIENT
Start: 2023-10-21 | End: 2023-10-21

## 2023-10-21 RX ORDER — VANCOMYCIN HCL 1 G
1000 VIAL (EA) INTRAVENOUS EVERY 12 HOURS
Refills: 0 | Status: DISCONTINUED | OUTPATIENT
Start: 2023-10-21 | End: 2023-10-21

## 2023-10-21 RX ORDER — VANCOMYCIN HCL 1 G
1000 VIAL (EA) INTRAVENOUS EVERY 12 HOURS
Refills: 0 | Status: DISCONTINUED | OUTPATIENT
Start: 2023-10-21 | End: 2023-10-22

## 2023-10-21 RX ORDER — ENOXAPARIN SODIUM 100 MG/ML
30 INJECTION SUBCUTANEOUS EVERY 12 HOURS
Refills: 0 | Status: DISCONTINUED | OUTPATIENT
Start: 2023-10-21 | End: 2023-10-26

## 2023-10-21 RX ORDER — ALPRAZOLAM 0.25 MG
0.5 TABLET ORAL
Refills: 0 | Status: DISCONTINUED | OUTPATIENT
Start: 2023-10-21 | End: 2023-10-23

## 2023-10-21 RX ORDER — BUDESONIDE, MICRONIZED 100 %
0.5 POWDER (GRAM) MISCELLANEOUS EVERY 12 HOURS
Refills: 0 | Status: DISCONTINUED | OUTPATIENT
Start: 2023-10-21 | End: 2023-10-21

## 2023-10-21 RX ORDER — METOPROLOL TARTRATE 50 MG
12.5 TABLET ORAL DAILY
Refills: 0 | Status: DISCONTINUED | OUTPATIENT
Start: 2023-10-21 | End: 2023-10-26

## 2023-10-21 RX ORDER — CEFEPIME 1 G/1
1000 INJECTION, POWDER, FOR SOLUTION INTRAMUSCULAR; INTRAVENOUS EVERY 8 HOURS
Refills: 0 | Status: DISCONTINUED | OUTPATIENT
Start: 2023-10-21 | End: 2023-10-23

## 2023-10-21 RX ORDER — VALACYCLOVIR 500 MG/1
500 TABLET, FILM COATED ORAL DAILY
Refills: 0 | Status: DISCONTINUED | OUTPATIENT
Start: 2023-10-21 | End: 2023-10-26

## 2023-10-21 RX ORDER — CEFEPIME 1 G/1
2000 INJECTION, POWDER, FOR SOLUTION INTRAMUSCULAR; INTRAVENOUS ONCE
Refills: 0 | Status: COMPLETED | OUTPATIENT
Start: 2023-10-21 | End: 2023-10-21

## 2023-10-21 RX ADMIN — HEPARIN SODIUM 5000 UNIT(S): 5000 INJECTION INTRAVENOUS; SUBCUTANEOUS at 15:58

## 2023-10-21 RX ADMIN — SODIUM CHLORIDE 75 MILLILITER(S): 9 INJECTION INTRAMUSCULAR; INTRAVENOUS; SUBCUTANEOUS at 15:48

## 2023-10-21 RX ADMIN — SODIUM CHLORIDE 75 MILLILITER(S): 9 INJECTION INTRAMUSCULAR; INTRAVENOUS; SUBCUTANEOUS at 18:17

## 2023-10-21 RX ADMIN — Medication 1 PATCH: at 20:00

## 2023-10-21 RX ADMIN — Medication 125 MILLIGRAM(S): at 07:57

## 2023-10-21 RX ADMIN — REMDESIVIR 200 MILLIGRAM(S): 5 INJECTION INTRAVENOUS at 18:23

## 2023-10-21 RX ADMIN — Medication 3 MILLILITER(S): at 08:05

## 2023-10-21 RX ADMIN — CEFEPIME 1000 MILLIGRAM(S): 1 INJECTION, POWDER, FOR SOLUTION INTRAMUSCULAR; INTRAVENOUS at 22:01

## 2023-10-21 RX ADMIN — Medication 1000 MILLIGRAM(S): at 09:43

## 2023-10-21 RX ADMIN — Medication 1 PATCH: at 17:26

## 2023-10-21 RX ADMIN — Medication 400 MILLIGRAM(S): at 08:30

## 2023-10-21 RX ADMIN — ENOXAPARIN SODIUM 30 MILLIGRAM(S): 100 INJECTION SUBCUTANEOUS at 17:27

## 2023-10-21 RX ADMIN — Medication 3 MILLILITER(S): at 07:56

## 2023-10-21 RX ADMIN — SODIUM CHLORIDE 2300 MILLILITER(S): 9 INJECTION, SOLUTION INTRAVENOUS at 07:57

## 2023-10-21 RX ADMIN — Medication 250 MILLIGRAM(S): at 18:17

## 2023-10-21 RX ADMIN — Medication 1000 MILLIGRAM(S): at 08:45

## 2023-10-21 RX ADMIN — CEFEPIME 2000 MILLIGRAM(S): 1 INJECTION, POWDER, FOR SOLUTION INTRAMUSCULAR; INTRAVENOUS at 08:28

## 2023-10-21 RX ADMIN — Medication 250 MILLIGRAM(S): at 08:43

## 2023-10-21 RX ADMIN — Medication 3 MILLILITER(S): at 08:00

## 2023-10-21 NOTE — ED PROVIDER NOTE - CLINICAL SUMMARY MEDICAL DECISION MAKING FREE TEXT BOX
79 y/o F with COPD newly started on home O2 presents for hypoxia to 68% on 3L NC, requiring bipap - was recently here for COPD/PNA. Will treat with solu-medrol, nebs and evaluate for infectious etiology as well. Patient will require admission due to extreme hypoxia. 79 y/o F with COPD newly started on home O2 presents for hypoxia to 68% on 3L NC, requiring bipap - was recently here for COPD/PNA. Will treat with solu-medrol, nebs and evaluate for infectious etiology as well. Patient will require admission due to extreme hypoxia.    Progress: Patient improved with solu-medrol, nebs and BiPAP. Not clinically fluid overloaded. Patient able to be weaned off BiPAP in the ED and satting with permissive hypoxia on 5L NC. No evidence of PNA on CXR, however patient was treated prophylactically with Vanco/Cefepime given recent hospitalization for possible HCAP.

## 2023-10-21 NOTE — ED PROVIDER NOTE - CRITICAL CARE ATTENDING CONTRIBUTION TO CARE
Steffany MENCHACA DO, have personally provided 45 minutes of critical care time exclusive of time spent on separately billable procedures. Time includes review of laboratory data, radiology results, discussion with consultants, and monitoring for potential decompensation. Interventions were performed as documented above.

## 2023-10-21 NOTE — H&P ADULT - NSHPPHYSICALEXAM_GEN_ALL_CORE
Vital Signs Last 24 Hrs  T(C): 38.1 (21 Oct 2023 08:09), Max: 38.1 (21 Oct 2023 08:09)  T(F): 100.6 (21 Oct 2023 08:09), Max: 100.6 (21 Oct 2023 08:09)  HR: 99 (21 Oct 2023 09:24) (99 - 109)  BP: 112/61 (21 Oct 2023 10:29) (100/67 - 112/61)  BP(mean): --  RR: 34 (21 Oct 2023 09:24) (22 - 34)  SpO2: 94% (21 Oct 2023 10:29) (68% - 100%)    Parameters below as of 21 Oct 2023 10:29  Patient On (Oxygen Delivery Method): nasal cannula    PHYSICAL EXAM:   Physical Examination:   Const: lethargic but awake , alert x 2-3 , follows simple commands   HEENT: NC/AT. Dry mucous membranes  Cardiac: Regular rate and regular rhythm. +S1/S2. No murmurs. Peripheral pulses 2+ and symmetric. No LE edema.  Resp: mildly tachypneic , Diffuse rhonchi in all lung fields.  Abd: Soft, non-tender, non-distended. Normal bowel sounds in all 4 quadrants. No guarding or rebound.  Skin: No rashes, abrasions or lacerations.  Neuro: lethargic but awake, alert x 2-3 , follows simple commands ,  Moves all extremities symmetrically.    O2 Flow (L/min): 5 Vital Signs Last 24 Hrs  T(C): 38.1 (21 Oct 2023 08:09), Max: 38.1 (21 Oct 2023 08:09)  T(F): 100.6 (21 Oct 2023 08:09), Max: 100.6 (21 Oct 2023 08:09)  HR: 99 (21 Oct 2023 09:24) (99 - 109)  BP: 112/61 (21 Oct 2023 10:29) (100/67 - 112/61)  BP(mean): --  RR: 34 (21 Oct 2023 09:24) (22 - 34)  SpO2: 94% (21 Oct 2023 10:29) (68% - 100%)    Parameters below as of 21 Oct 2023 10:29  Patient On (Oxygen Delivery Method): nasal cannula    PHYSICAL EXAM:   Physical Examination:   Const: lethargic but awake , alert x 1-2  , follows simple commands   HEENT: NC/AT. Dry mucous membranes  Cardiac: Regular rate and regular rhythm. +S1/S2. No murmurs. Peripheral pulses 2+ and symmetric. No LE edema.  Resp: mildly tachypneic , Diffuse rhonchi in all lung fields.  Abd: Soft, non-tender, non-distended. Normal bowel sounds in all 4 quadrants. No guarding or rebound.  Skin: No rashes, abrasions or lacerations.  Neuro: lethargic but awake, alert x 1-2  , follows simple commands ,  Moves all extremities symmetrically.    O2 Flow (L/min): 5

## 2023-10-21 NOTE — H&P ADULT - ASSESSMENT
77 y/o F with PMH COPD, MI,  hfref, mm/ thrombocytopenia/ on promecta, active smoker, Htn, mood disorders, seizure disorder, urinary retention,   HLD, GERD, recent admission for sob/ chf , was diuresed and treated for pna/ codp , was dcd on home o2 3 L NC , po abx and prednisone, today  patient presents for hypoxia and shortness of breath. Patient is a poor historian. EMS found patient to be hypoxic to the 60's on room air, placed on NC with  minimal improvement/ was briefly placed on bipap. now back on nc 5 L. patient lethargic in er. admitted to medicine     > sob / acute on chronic hypoxic / hypercapnic resp failure / likely due ot copd exacerbation  - patient continues to smoke   -recent admission for similar sxs   - cxr no acute changes   - check ct chest non con   - received vanc / cefepime in ed , wbcs, lactate nl   - x 1 episode of low grade fever in ed   - hold off abxs for now , montior for fever /   - rvp/ bcxs pending   - gentle ivf for now as looks dry   - solumedrol iv q 8 hrs , nebs ATC , o2 , spiriva , budesonide   - pulm consulted     > ams / likely metabolic encephalopathy due to resp failure   - check cth   - monitor mentation     > chronic diastolic chf   - clinically not in acute failure   - gentle ivf as looks on dry side   - monitor volume status     >mm/ thrombocytopenia   - no active bleed   - on promecta at home   - c/w acyclovir for ppx dose     >cad/ htn   - c/w bb     >dvt ppx   - sq heparin    79 y/o F with PMH COPD, MI,  hfref, mm/ thrombocytopenia/ on promecta, active smoker, Htn, mood disorders, seizure disorder, urinary retention,   HLD, GERD, recent admission for sob/ chf , was diuresed and treated for pna/ codp , was dcd on home o2 3 L NC , po abx and prednisone, today  patient presents for hypoxia and shortness of breath. Patient is a poor historian. EMS found patient to be hypoxic to the 60's on room air, placed on NC with  minimal improvement/ was briefly placed on bipap. now back on nc 5 L. patient lethargic in er. admitted to medicine     > sob / acute on chronic hypoxic / hypercapnic resp failure / likely due ot copd exacerbation  - patient continues to smoke   -recent admission for similar sxs   - cxr no acute changes   - check ct chest non con   - received vanc / cefepime in ed , wbcs, lactate nl   - x 1 episode of low grade fever in ed   - hold off abxs for now , montior for fever /   - rvp/ bcxs pending   - gentle ivf for now as looks dry   - solumedrol iv q 8 hrs , nebs ATC , o2 , spiriva , budesonide   - pulm consulted     > ams / likely metabolic encephalopathy due to resp failure   - check cth   - monitor mentation     > chronic diastolic chf   - clinically not in acute failure   - gentle ivf as looks on dry side   - monitor volume status     >mm/ thrombocytopenia   - no active bleed   - on promecta at home   - c/w acyclovir for ppx dose     >cad/ htn   - c/w bb     >dvt ppx   - sq heparin     > tried to call daughter Hafsa on number listed , no answer , unable to leave message / voice mailbox full    77 y/o F with PMH COPD, MI,  hfref, mm/ thrombocytopenia/ on promecta, active smoker, Htn, mood disorders, seizure disorder, urinary retention,   HLD, GERD, recent admission for sob/ chf , was diuresed and treated for pna/ codp , was dcd on home o2 3 L NC , po abx and prednisone, today  patient presents for hypoxia and shortness of breath. Patient is a poor historian. EMS found patient to be hypoxic to the 60's on room air, placed on NC with  minimal improvement/ was briefly placed on bipap. now back on nc 5 L. patient lethargic in er. rvp positive for covid, admitted to medicine     > sob / acute on chronic hypoxic / hypercapnic resp failure / likely due ot copd exacerbation/ covid infection / with possible superimposed bacterial pna   - patient continues to smoke   -recent admission for similar sxs   - cxr no acute changes   - check ct chest non con   - received vanc / cefepime in ed , wbcs, lactate nl  bu tx 1 episode of low grade fever in ed   - c/w  abxs for now , montior for fever   - bcxs pending   - gentle ivf for now as looks dry   - s/p  solumedrol iv in ed , start on decadron given covid  nebs ATC , spiriva , budesonide   - bipap trial , o2 to keep sats > 88   - pulm consulted   - monitor on stepdown     > ams / likely metabolic encephalopathy due to resp failure   - check cth   - monitor mentation     > chronic diastolic chf   - clinically not in acute failure   - gentle ivf as looks on dry side   - monitor volume status     >mm/ thrombocytopenia   - no active bleed   - on promecta at home   - c/w acyclovir for ppx dose     >cad/ htn   - c/w bb     >dvt ppx   - sq heparin     >goc : full code     > tried to call daughter Hafsa on number listed , no answer , unable to leave message / voice mailbox full    77 y/o F with PMH COPD, MI,  hfref, mm/ thrombocytopenia/ on promecta, active smoker, Htn, mood disorders, seizure disorder, urinary retention,   HLD, GERD, recent admission for sob/ chf , was diuresed and treated for pna/ codp , was dcd on home o2 3 L NC , po abx and prednisone, today  patient presents for hypoxia and shortness of breath. Patient is a poor historian. EMS found patient to be hypoxic to the 60's on room air, placed on NC with  minimal improvement/ was briefly placed on bipap. now back on nc 5 L. patient lethargic in er. rvp positive for covid, admitted to medicine     > sob / acute on chronic hypoxic / hypercapnic resp failure / likely due ot copd exacerbation/ covid infection / with possible superimposed bacterial pna   - patient continues to smoke   -recent admission for similar sxs   - cxr no acute changes   - check ct chest non con   - received vanc / cefepime in ed , wbcs, lactate nl  bu tx 1 episode of low grade fever in ed   - c/w  abxs for now , montior for fever   - bcxs pending   - gentle ivf for now as looks dry   - s/p  solumedrol iv in ed , start on decadron given covid  nebs ATC , spiriva , budesonide   - bipap trial , o2 to keep sats > 88   - pulm consulted   - monitor on stepdown   micu consulted     > ams / likely metabolic encephalopathy due to resp failure   - check cth   - monitor mentation   - repeat abgs     > chronic diastolic chf   - clinically not in acute failure   - gentle ivf as looks on dry side   - monitor volume status     >mm/ thrombocytopenia   - no active bleed   - on promecta at home   - c/w acyclovir for ppx dose     >cad/ htn   - c/w bb     >dvt ppx   - sq heparin     >goc : full code     > tried to call daughter Hafsa on number listed , no answer , unable to leave message / voice mailbox full    77 y/o F with PMH COPD, MI,  hfref, mm/ thrombocytopenia/ on promecta, active smoker, Htn, mood disorders, seizure disorder, urinary retention,   HLD, GERD, recent admission for sob/ chf , was diuresed and treated for pna/ codp , was dcd on home o2 3 L NC , po abx and prednisone, today  patient presents for hypoxia and shortness of breath. Patient is a poor historian. EMS found patient to be hypoxic to the 60's on room air, placed on NC with  minimal improvement/ was briefly placed on bipap. now back on nc 5 L. patient lethargic in er. rvp positive for covid, admitted to medicine     > sob / acute on chronic hypoxic / hypercapnic resp failure / likely due ot copd exacerbation/ covid infection / with possible superimposed bacterial pna   - patient continues to smoke   -recent admission for similar sxs   - cxr no acute changes   - check ct chest non con   - received vanc / cefepime in ed , wbcs, lactate nl  bu tx 1 episode of low grade fever in ed   - c/w  abxs for now , montior for fever   - bcxs pending   - gentle ivf for now as looks dry   - s/p  solumedrol iv in ed , start on decadron given covid  nebs ATC , spiriva , budesonide   - bipap trial , o2 to keep sats > 88   - pulm consulted   - monitor on stepdown   micu consulted     > ams / likely metabolic encephalopathy due to resp failure   - check cth   - monitor mentation   - repeat abgs     > chronic diastolic chf   - clinically not in acute failure   - gentle ivf as looks on dry side   - monitor volume status     >mm/ thrombocytopenia   - no active bleed   - on promecta at home   - c/w acyclovir for ppx dose     >cad/ htn   - c/w bb     >dvt ppx   - sq lovenox bid , covid dosing     >goc : full code     > tried to call daughter Hafsa on number listed , no answer , unable to leave message / voice mailbox full    79 y/o F with PMH COPD, MI,  hfref, mm/ thrombocytopenia/ on promecta, active smoker, Htn, mood disorders, seizure disorder, urinary retention,   HLD, GERD, recent admission for sob/ chf , was diuresed and treated for pna/ codp , was dcd on home o2 3 L NC , po abx and prednisone, today  patient presents for hypoxia and shortness of breath. Patient is a poor historian. EMS found patient to be hypoxic to the 60's on room air, placed on NC with  minimal improvement/ was briefly placed on bipap. now back on nc 5 L. patient lethargic in er. rvp positive for covid, admitted to medicine     > sob / acute on chronic hypoxic / hypercapnic resp failure / likely due ot copd exacerbation/ covid infection / with possible superimposed bacterial pna   - patient continues to smoke   -recent admission for similar sxs   - cxr no acute changes   - check ct chest non con   - received vanc / cefepime in ed , wbcs, lactate nl  bu tx 1 episode of low grade fever in ed   - c/w  abxs for now , will start on remdesivir , montior for fever   - bcxs pending   - gentle ivf for now as looks dry   - s/p  solumedrol iv in ed , start on decadron given covid  nebs ATC , spiriva , budesonide   - bipap trial , o2 to keep sats > 88   - pulm consulted   - monitor on stepdown   micu consulted     > ams / likely metabolic encephalopathy due to resp failure   - check cth   - monitor mentation   - repeat abgs     > chronic diastolic chf   - clinically not in acute failure   - gentle ivf as looks on dry side   - monitor volume status     >mm/ thrombocytopenia   - no active bleed   - on promecta at home   - c/w acyclovir for ppx dose     >cad/ htn   - c/w bb     >dvt ppx   - sq lovenox bid , covid dosing     >goc : full code     > tried to call daughter Hafsa on number listed , no answer , unable to leave message / voice mailbox full

## 2023-10-21 NOTE — ED PROVIDER NOTE - CARE PLAN
1 Principal Discharge DX:	COPD exacerbation  Secondary Diagnosis:	Acute hypoxic respiratory failure

## 2023-10-21 NOTE — ED ADULT NURSE REASSESSMENT NOTE - NS ED NURSE REASSESS COMMENT FT1
Assumed care of pt in ISO 3 from TRENTON Dai . Pt currently on BIPAP @ 94%. and cardiac monitor in sinus daniel 58 HR. Plan, abnormal labs, history of present illness, pending labs/tests reviewed.

## 2023-10-21 NOTE — ED PROVIDER NOTE - PHYSICAL EXAMINATION
Const: Awake, alert and oriented. In no acute distress. Well appearing.  HEENT: NC/AT. Dry mucous membranes.  Eyes: No scleral icterus. EOMI.  Neck:. Soft and supple. Full ROM without pain.  Cardiac: Regular rate and regular rhythm. +S1/S2. No murmurs. Peripheral pulses 2+ and symmetric. No LE edema.  Resp: Speaking in full sentences, but hypoxic to 68% on 3L NC. No evidence of respiratory distress. Diffuse rhonchi in all lung fields.  Abd: Soft, non-tender, non-distended. Normal bowel sounds in all 4 quadrants. No guarding or rebound.  Back: Spine midline and non-tender. No CVAT.  Skin: No rashes, abrasions or lacerations.  Neuro: Awake, alert & oriented x 3. Moves all extremities symmetrically.

## 2023-10-21 NOTE — CONSULT NOTE ADULT - SUBJECTIVE AND OBJECTIVE BOX
Patient is a 78y old  Female who presents with a chief complaint of sob (21 Oct 2023 11:46)    HPI:  78F active smoker with hx of COPD recently started on home O2, NICM w/ recovery, HFpEF, thrombocytopenia, multiple myeloma, HTN, seizure disorder, HLD brought to the ED due to hypoxia with associated alerted mental status. Pt was recently hospitalized 11/8-11/11 in the setting of COPD exacerbation/PNA and possible HFpEF exacerbation and discharged with keflex, azithromycin and prednisone. Pt was placed briefly on BIPAP due to hypoxia and work of breathing and transitioned to nasal cannula. Found to be covid positive.       PAST MEDICAL & SURGICAL HISTORY:  MI (myocardial infarction)  Kidney stones  Hearing loss, unspecified hearing loss type, unspecified laterality  Anxiety  Multiple myeloma  COPD (chronic obstructive pulmonary disease)  S/P cholecystectomy    Medications:  valACYclovir 500 milliGRAM(s) Oral daily    metoprolol tartrate 12.5 milliGRAM(s) Oral daily    albuterol    90 MICROgram(s) HFA Inhaler 2 Puff(s) Inhalation every 4 hours PRN  albuterol/ipratropium for Nebulization 3 milliLiter(s) Nebulizer every 6 hours  buDESOnide    Inhalation Suspension 0.5 milliGRAM(s) Inhalation every 12 hours    ALPRAZolam 0.5 milliGRAM(s) Oral two times a day PRN  DULoxetine 30 milliGRAM(s) Oral daily      heparin   Injectable 5000 Unit(s) SubCutaneous every 8 hours      tamsulosin 0.4 milliGRAM(s) Oral at bedtime    methylPREDNISolone sodium succinate Injectable 40 milliGRAM(s) IV Push every 8 hours    sodium chloride 0.9%. 1000 milliLiter(s) IV Continuous <Continuous>        nicotine - 21 mG/24Hr(s) Patch 1 Patch Transdermal daily    Allergies: penicillin     Social: active smoker, unable to obtain further information     FH: unable to obtain     ICU Vital Signs Last 24 Hrs  T(C): 36.4 (21 Oct 2023 13:06), Max: 38.1 (21 Oct 2023 08:09)  T(F): 97.6 (21 Oct 2023 13:06), Max: 100.6 (21 Oct 2023 08:09)  HR: 73 (21 Oct 2023 13:06) (73 - 109)  BP: 104/55 (21 Oct 2023 13:06) (100/67 - 112/61)  BP(mean): --  ABP: --  ABP(mean): --  RR: 18 (21 Oct 2023 13:06) (18 - 34)  SpO2: 96% (21 Oct 2023 13:06) (68% - 100%)    O2 Parameters below as of 21 Oct 2023 13:06  Patient On (Oxygen Delivery Method): nasal cannula  O2 Flow (L/min): 3    ABG - ( 21 Oct 2023 08:00 )  pH, Arterial: 7.370 pH, Blood: x     /  pCO2: 54    /  pO2: 159   / HCO3: 31    / Base Excess: 5.9   /  SaO2: 99.3     LABS:                        12.0   9.97  )-----------( 227      ( 21 Oct 2023 07:41 )             40.0     10-21    133<L>  |  91<L>  |  13.7  ----------------------------<  93  3.7   |  27.0  |  1.08    Ca    8.9      21 Oct 2023 07:41  Mg     2.0     10-21    TPro  7.0  /  Alb  3.9  /  TBili  0.3<L>  /  DBili  x   /  AST  19  /  ALT  19  /  AlkPhos  117  10-21      CARDIAC MARKERS ( 21 Oct 2023 07:41 )  x     / <0.01 ng/mL / x     / x     / x          CAPILLARY BLOOD GLUCOSE        PT/INR - ( 21 Oct 2023 07:41 )   PT: 10.4 sec;   INR: 0.94 ratio         PTT - ( 21 Oct 2023 07:41 )  PTT:27.0 sec  Urinalysis Basic - ( 21 Oct 2023 07:41 )    Color: x / Appearance: x / SG: x / pH: x  Gluc: 93 mg/dL / Ketone: x  / Bili: x / Urobili: x   Blood: x / Protein: x / Nitrite: x   Leuk Esterase: x / RBC: x / WBC x   Sq Epi: x / Non Sq Epi: x / Bacteria: x      CULTURES:  Rapid RVP Result: Detected (10-21 @ 07:45)      Physical Examination:    General: lethargic, opens eyes intermittently but falls quickly back to sleep     HEENT: NC/AT, anicteric, MMM    PULM: diffuse wheezing bilaterally, no accessory muscle use     NECK: Supple, trachea midline    CVS: S1S2, RRR    ABD: Soft, nondistended, nontender, normoactive bowel sounds    EXT: No edema, nontender    SKIN: Warm and well perfused, no rashes noted    NEURO: lethargic, moves all extremities     ROS: negative except per HPI     RADIOLOGY:   < from: Xray Chest 1 View-PORTABLE IMMEDIATE (10.21.23 @ 08:38) >  INTERPRETATION:  INDICATIONS: 78-year-old female with sepsis.    Prior examination for comparison: 10/7/2023    Technique: AP view of chest    Findings:    Patient is rotated. The lungs are clear. There are no pleural effusions.   The cardiomediastinal silhouette is normal. Bones are grossly normal.    IMPRESSION: No evidence of acute cardiopulmonary disease.    --- Endof Report ---            < end of copied text >    < from: TTE Echo Complete w/o Contrast w/ Doppler (10.09.23 @ 11:08) >  Summary:   1. Endocardial visualization was enhanced with intravenous echo contrast.   2. Normal left ventricular internal cavity size.   3. Normal global left ventricular systolic function.   4. Left ventricular ejection fraction, by visual estimation, is 60 to   65%.   5. Spectral Doppler shows impaired relaxation pattern of left   ventricular myocardial filling (Grade I diastolic dysfunction).   6. Elevated mean left atrial pressure.   7. Normal right ventricular size and function.   8. The left atrium is normal in size.   9. The right atrium is normal in size.  10. Sclerotic aortic valve with normal opening.  11. Mild thickening and calcification of the anterior and posterior   mitral valve leaflets.  12. Moderate mitral annular calcification.  13. Mild mitral valve regurgitation.  14. Mild tricuspid regurgitation.    < end of copied text >     Patient is a 78y old  Female who presents with a chief complaint of sob (21 Oct 2023 11:46)    HPI:  78F active smoker with hx of COPD recently started on home O2, NICM w/ recovery, HFpEF, thrombocytopenia, multiple myeloma, HTN, seizure disorder, HLD brought to the ED due to hypoxia with associated alerted mental status. Pt was recently hospitalized 11/8-11/11 in the setting of COPD exacerbation/PNA and possible HFpEF exacerbation and discharged with keflex, azithromycin and prednisone. Pt was placed briefly on BIPAP due to hypoxia and work of breathing and transitioned to nasal cannula. Found to be covid positive.       PAST MEDICAL & SURGICAL HISTORY:  MI (myocardial infarction)  Kidney stones  Hearing loss, unspecified hearing loss type, unspecified laterality  Anxiety  Multiple myeloma  COPD (chronic obstructive pulmonary disease)  S/P cholecystectomy    Medications:  valACYclovir 500 milliGRAM(s) Oral daily    metoprolol tartrate 12.5 milliGRAM(s) Oral daily    albuterol    90 MICROgram(s) HFA Inhaler 2 Puff(s) Inhalation every 4 hours PRN  albuterol/ipratropium for Nebulization 3 milliLiter(s) Nebulizer every 6 hours  buDESOnide    Inhalation Suspension 0.5 milliGRAM(s) Inhalation every 12 hours    ALPRAZolam 0.5 milliGRAM(s) Oral two times a day PRN  DULoxetine 30 milliGRAM(s) Oral daily      heparin   Injectable 5000 Unit(s) SubCutaneous every 8 hours      tamsulosin 0.4 milliGRAM(s) Oral at bedtime    methylPREDNISolone sodium succinate Injectable 40 milliGRAM(s) IV Push every 8 hours    sodium chloride 0.9%. 1000 milliLiter(s) IV Continuous <Continuous>        nicotine - 21 mG/24Hr(s) Patch 1 Patch Transdermal daily    Allergies: penicillin     Social: active smoker, unable to obtain further information     FH: unable to obtain     ICU Vital Signs Last 24 Hrs  T(C): 36.4 (21 Oct 2023 13:06), Max: 38.1 (21 Oct 2023 08:09)  T(F): 97.6 (21 Oct 2023 13:06), Max: 100.6 (21 Oct 2023 08:09)  HR: 73 (21 Oct 2023 13:06) (73 - 109)  BP: 104/55 (21 Oct 2023 13:06) (100/67 - 112/61)  BP(mean): --  ABP: --  ABP(mean): --  RR: 18 (21 Oct 2023 13:06) (18 - 34)  SpO2: 96% (21 Oct 2023 13:06) (68% - 100%)    O2 Parameters below as of 21 Oct 2023 13:06  Patient On (Oxygen Delivery Method): nasal cannula  O2 Flow (L/min): 3    ABG - ( 21 Oct 2023 08:00 )  pH, Arterial: 7.370 pH, Blood: x     /  pCO2: 54    /  pO2: 159   / HCO3: 31    / Base Excess: 5.9   /  SaO2: 99.3     LABS:                        12.0   9.97  )-----------( 227      ( 21 Oct 2023 07:41 )             40.0     10-21    133<L>  |  91<L>  |  13.7  ----------------------------<  93  3.7   |  27.0  |  1.08    Ca    8.9      21 Oct 2023 07:41  Mg     2.0     10-21    TPro  7.0  /  Alb  3.9  /  TBili  0.3<L>  /  DBili  x   /  AST  19  /  ALT  19  /  AlkPhos  117  10-21      CARDIAC MARKERS ( 21 Oct 2023 07:41 )  x     / <0.01 ng/mL / x     / x     / x          CAPILLARY BLOOD GLUCOSE        PT/INR - ( 21 Oct 2023 07:41 )   PT: 10.4 sec;   INR: 0.94 ratio         PTT - ( 21 Oct 2023 07:41 )  PTT:27.0 sec  Urinalysis Basic - ( 21 Oct 2023 07:41 )    Color: x / Appearance: x / SG: x / pH: x  Gluc: 93 mg/dL / Ketone: x  / Bili: x / Urobili: x   Blood: x / Protein: x / Nitrite: x   Leuk Esterase: x / RBC: x / WBC x   Sq Epi: x / Non Sq Epi: x / Bacteria: x      CULTURES:  Rapid RVP Result: Detected (10-21 @ 07:45)      Physical Examination:    General: lethargic, opens eyes intermittently but falls quickly back to sleep     HEENT: NC/AT, anicteric, MMM    PULM: diffuse wheezing bilaterally, no accessory muscle use     NECK: Supple, trachea midline    CVS: S1S2, RRR    ABD: Soft, nondistended, nontender, normoactive bowel sounds    EXT: No edema, nontender    SKIN: Warm and well perfused, no rashes noted    NEURO: lethargic, moves all extremities, resists attempts to open eyes      ROS: negative except per HPI     RADIOLOGY:   < from: Xray Chest 1 View-PORTABLE IMMEDIATE (10.21.23 @ 08:38) >  INTERPRETATION:  INDICATIONS: 78-year-old female with sepsis.    Prior examination for comparison: 10/7/2023    Technique: AP view of chest    Findings:    Patient is rotated. The lungs are clear. There are no pleural effusions.   The cardiomediastinal silhouette is normal. Bones are grossly normal.    IMPRESSION: No evidence of acute cardiopulmonary disease.    --- Endof Report ---            < end of copied text >    < from: TTE Echo Complete w/o Contrast w/ Doppler (10.09.23 @ 11:08) >  Summary:   1. Endocardial visualization was enhanced with intravenous echo contrast.   2. Normal left ventricular internal cavity size.   3. Normal global left ventricular systolic function.   4. Left ventricular ejection fraction, by visual estimation, is 60 to   65%.   5. Spectral Doppler shows impaired relaxation pattern of left   ventricular myocardial filling (Grade I diastolic dysfunction).   6. Elevated mean left atrial pressure.   7. Normal right ventricular size and function.   8. The left atrium is normal in size.   9. The right atrium is normal in size.  10. Sclerotic aortic valve with normal opening.  11. Mild thickening and calcification of the anterior and posterior   mitral valve leaflets.  12. Moderate mitral annular calcification.  13. Mild mitral valve regurgitation.  14. Mild tricuspid regurgitation.    < end of copied text >

## 2023-10-21 NOTE — ED ADULT TRIAGE NOTE - CHIEF COMPLAINT QUOTE
BIBEMS from home for hypoxia with intermittent episodes of confusion. As per EMS, pt noted to be hypoxic into the 60s on room air and altered, which improved with a nebulizer treatment. Pt is confused in triage, found to have an SpO2 of 88% on 4L NC. EMS reports that the patient lives at home alone. PMHx COPD on "PRN oxygen" at home.

## 2023-10-21 NOTE — CONSULT NOTE ADULT - SUBJECTIVE AND OBJECTIVE BOX
Patient is a 78y old  Female who presents with a chief complaint of sob (21 Oct 2023 13:31)    BRIEF HOSPITAL COURSE:   79yo F with PMH: active smoker, COPD recently started on home O2, MI with NICM w/ recovery, HFpEF- last echo 10/9/2023 60-65%, grade 1 diastolic dysfunction, normal RV, hx thrombocytopenia, multiple myeloma, HTN, seizure disorder, HLD, hx urinary retention, GERD, Anxiety, renal stones, [place], s/p cholecystectomy  Was discharged a week ago with COPD exacerbation and pneumonia, started on home oxygen at that time, sent home with   discharged with keflex, azithromycin and prednisone    Presented today 10/21 to ED with hypoxia and altered mental status. Found to have new onset COVID+ but xray unchanged from last week, Chest CT unchanged from last week, CXR unchanged from Last week.  Patient placed on 5L NC then Bipap, back to NC a few hours but then more lethargic and returned to BIPAP.   ICU consulted for hypercarbic respiratory failure with ABG 7.29/65.   On initial exam, the patient was lethargic, so we adjusted the Bipap to AVAPS and on repeat examination the patient is much more alert, speaking in full sentences, moving all exremities easily and states she feels much improved.     PAST MEDICAL & SURGICAL HISTORY:  MI (myocardial infarction)  Kidney stones  Hearing loss, unspecified hearing loss type, unspecified laterality  Anxiety  Multiple myeloma  COPD (chronic obstructive pulmonary disease)  S/P cholecystectomy    Allergies  penicillin (Unknown)    FAMILY HISTORY:  Family history of renal failure    Review of Systems:  CONSTITUTIONAL: No fever, chills, or fatigue  EYES: No eye pain, visual disturbances, or discharge  ENMT:  No difficulty hearing, tinnitus, vertigo; No sinus or throat pain  NECK: No pain or stiffness  RESPIRATORY: + cough, but denies wheezing, chills or hemoptysis; + shortness of breath  CARDIOVASCULAR: No chest pain, palpitations, dizziness, or leg swelling  GASTROINTESTINAL: No abdominal or epigastric pain. No nausea, vomiting, or hematemesis; No diarrhea or constipation. No melena or hematochezia.  GENITOURINARY: No dysuria, frequency, hematuria, or incontinence  NEUROLOGICAL: No headaches, memory loss, loss of strength, numbness, or tremors  SKIN: No itching, burning, rashes, or lesions   MUSCULOSKELETAL: No joint pain or swelling; No muscle, back, or extremity pain  PSYCHIATRIC: No depression, anxiety, mood swings, or difficulty sleeping      Medications:  cefepime  Injectable. 1000 milliGRAM(s) IV Push every 8 hours  remdesivir  IVPB   IV Intermittent   remdesivir  IVPB 200 milliGRAM(s) IV Intermittent every 24 hours  valACYclovir 500 milliGRAM(s) Oral daily  vancomycin  IVPB 1000 milliGRAM(s) IV Intermittent every 12 hours    metoprolol tartrate 12.5 milliGRAM(s) Oral daily    albuterol    90 MICROgram(s) HFA Inhaler 2 Puff(s) Inhalation every 4 hours  budesonide  80 MICROgram(s)/formoterol 4.5 MICROgram(s) Inhaler 2 Puff(s) Inhalation two times a day    ALPRAZolam 0.5 milliGRAM(s) Oral two times a day PRN  DULoxetine 30 milliGRAM(s) Oral daily    enoxaparin Injectable 30 milliGRAM(s) SubCutaneous every 12 hours    tamsulosin 0.4 milliGRAM(s) Oral at bedtime    dexAMETHasone  Injectable 4 milliGRAM(s) IV Push daily    sodium chloride 0.9%. 1000 milliLiter(s) IV Continuous <Continuous>    nicotine - 21 mG/24Hr(s) Patch 1 Patch Transdermal daily      ICU Vital Signs Last 24 Hrs  T(C): 36.4 (21 Oct 2023 13:06), Max: 38.1 (21 Oct 2023 08:09)  T(F): 97.6 (21 Oct 2023 13:06), Max: 100.6 (21 Oct 2023 08:09)  HR: 61 (21 Oct 2023 16:36) (55 - 109)  BP: 94/55 (21 Oct 2023 16:36) (89/53 - 118/67)  RR: 18 (21 Oct 2023 16:36) (18 - 34)  SpO2: 95% (21 Oct 2023 16:36) (68% - 100%)    O2 Parameters below as of 21 Oct 2023 16:36  Patient On (Oxygen Delivery Method): BiPAP/CPAP      ABG - ( 21 Oct 2023 14:00 )  pH, Arterial: 7.290 pH, Blood: x     /  pCO2: 65    /  pO2: 100   / HCO3: 31    / Base Excess: 4.7   /  SaO2: 98.5      LABS:                        12.0   9.97  )-----------( 227      ( 21 Oct 2023 07:41 )             40.0     10-21    x   |  x   |  x   ----------------------------<  x   x    |  x   |  0.91    Ca    8.9      21 Oct 2023 07:41  Mg     2.0     10-21    TPro  5.7<L>  /  Alb  3.0<L>  /  TBili  <0.2<L>  /  DBili  0.1  /  AST  17  /  ALT  17  /  AlkPhos  92  10-21      CARDIAC MARKERS ( 21 Oct 2023 07:41 )  x     / <0.01 ng/mL / x     / x     / x          PT/INR - ( 21 Oct 2023 14:34 )   PT: 10.4 sec;   INR: 0.94 ratio    PTT - ( 21 Oct 2023 07:41 )  PTT:27.0 sec    Urinalysis Basic - ( 21 Oct 2023 07:41 )  Color: x / Appearance: x / SG: x / pH: x  Gluc: 93 mg/dL / Ketone: x  / Bili: x / Urobili: x   Blood: x / Protein: x / Nitrite: x   Leuk Esterase: x / RBC: x / WBC x   Sq Epi: x / Non Sq Epi: x / Bacteria: x    CULTURES: pending    Physical Examination:  On initial examination, she was lethargic, opens eyes but limited responses  On repeat examination:  General: Pleasant, cooperative, speaking in full sentence with AVAPS in place, states she feels much better   No acute distress.  Alert, oriented, interactive, nonfocal  HEENT: Pupils equal, reactive to light.  Symmetric.  PULM: Clear to auscultation bilaterally- decreased at bilat bases, no significant sputum production  CVS: Regular rate and rhythm, no murmurs, rubs, or gallops  ABD: Soft, nondistended, nontender, normoactive bowel sounds, no masses  EXT: No edema, nontender  SKIN: Warm and well perfused, no rashes noted.    RADIOLOGY:   10/21 CT Chest:   1- Mildly decreased small loculated right pleural effusion since 10/8/2023.  2- Otherwise similar findings in this patient with emphysema and small airways disease compared to 10/8/2023.      CRITICAL CARE TIME SPENT: ***   Patient is a 78y old  Female who presents with a chief complaint of sob (21 Oct 2023 13:31)    BRIEF HOSPITAL COURSE:   79yo F with PMH: active smoker, COPD recently started on home O2, MI with NICM w/ recovery, HFpEF- last echo 10/9/2023 60-65%, grade 1 diastolic dysfunction, normal RV, hx thrombocytopenia, multiple myeloma on Prometa, HTN, seizure disorder, HLD, hx urinary retention, GERD, Anxiety, renal stones, [place], s/p cholecystectomy  Was discharged a week ago with COPD exacerbation and pneumonia, started on home oxygen at that time, sent home with   discharged with keflex, azithromycin and prednisone    Presented today 10/21 to ED with hypoxia and altered mental status. Per ED EMS information they found her hopoxic to 60s on room air, Found to have new onset COVID+ but xray unchanged from last week, Chest CT unchanged from last week, CXR unchanged from Last week.  Patient placed on 5L NC then Bipap, back to NC a few hours but then more lethargic and returned to BIPAP.   ICU consulted for hypercarbic respiratory failure with ABG 7.29/65.   On initial exam, the patient was lethargic, so we adjusted the Bipap to AVAPS and on repeat examination the patient is much more alert, speaking in full sentences, moving all exremities easily and states she feels much improved.     PAST MEDICAL & SURGICAL HISTORY:  MI (myocardial infarction)  Kidney stones  Hearing loss, unspecified hearing loss type, unspecified laterality  Anxiety  Multiple myeloma  COPD (chronic obstructive pulmonary disease)  S/P cholecystectomy    Allergies  penicillin (Unknown)    FAMILY HISTORY:  Family history of renal failure    Review of Systems:  CONSTITUTIONAL: No fever, chills, or fatigue  EYES: No eye pain, visual disturbances, or discharge  ENMT:  No difficulty hearing, tinnitus, vertigo; No sinus or throat pain  NECK: No pain or stiffness  RESPIRATORY: + cough, but denies wheezing, chills or hemoptysis; + shortness of breath  CARDIOVASCULAR: No chest pain, palpitations, dizziness, or leg swelling  GASTROINTESTINAL: No abdominal or epigastric pain. No nausea, vomiting, or hematemesis; No diarrhea or constipation. No melena or hematochezia.  GENITOURINARY: No dysuria, frequency, hematuria, or incontinence  NEUROLOGICAL: No headaches, memory loss, loss of strength, numbness, or tremors  SKIN: No itching, burning, rashes, or lesions   MUSCULOSKELETAL: No joint pain or swelling; No muscle, back, or extremity pain  PSYCHIATRIC: No depression, anxiety, mood swings, or difficulty sleeping      Medications:  cefepime  Injectable. 1000 milliGRAM(s) IV Push every 8 hours  remdesivir  IVPB   IV Intermittent   remdesivir  IVPB 200 milliGRAM(s) IV Intermittent every 24 hours  valACYclovir 500 milliGRAM(s) Oral daily  vancomycin  IVPB 1000 milliGRAM(s) IV Intermittent every 12 hours    metoprolol tartrate 12.5 milliGRAM(s) Oral daily    albuterol    90 MICROgram(s) HFA Inhaler 2 Puff(s) Inhalation every 4 hours  budesonide  80 MICROgram(s)/formoterol 4.5 MICROgram(s) Inhaler 2 Puff(s) Inhalation two times a day    ALPRAZolam 0.5 milliGRAM(s) Oral two times a day PRN  DULoxetine 30 milliGRAM(s) Oral daily    enoxaparin Injectable 30 milliGRAM(s) SubCutaneous every 12 hours    tamsulosin 0.4 milliGRAM(s) Oral at bedtime    dexAMETHasone  Injectable 4 milliGRAM(s) IV Push daily    sodium chloride 0.9%. 1000 milliLiter(s) IV Continuous <Continuous>    nicotine - 21 mG/24Hr(s) Patch 1 Patch Transdermal daily      ICU Vital Signs Last 24 Hrs  T(C): 36.4 (21 Oct 2023 13:06), Max: 38.1 (21 Oct 2023 08:09)  T(F): 97.6 (21 Oct 2023 13:06), Max: 100.6 (21 Oct 2023 08:09)  HR: 61 (21 Oct 2023 16:36) (55 - 109)  BP: 94/55 (21 Oct 2023 16:36) (89/53 - 118/67)  RR: 18 (21 Oct 2023 16:36) (18 - 34)  SpO2: 95% (21 Oct 2023 16:36) (68% - 100%)    O2 Parameters below as of 21 Oct 2023 16:36  Patient On (Oxygen Delivery Method): BiPAP/CPAP      ABG - ( 21 Oct 2023 14:00 )  pH, Arterial: 7.290 pH, Blood: x     /  pCO2: 65    /  pO2: 100   / HCO3: 31    / Base Excess: 4.7   /  SaO2: 98.5      LABS:                        12.0   9.97  )-----------( 227      ( 21 Oct 2023 07:41 )             40.0     10-21    x   |  x   |  x   ----------------------------<  x   x    |  x   |  0.91    Ca    8.9      21 Oct 2023 07:41  Mg     2.0     10-21    TPro  5.7<L>  /  Alb  3.0<L>  /  TBili  <0.2<L>  /  DBili  0.1  /  AST  17  /  ALT  17  /  AlkPhos  92  10-21      CARDIAC MARKERS ( 21 Oct 2023 07:41 )  x     / <0.01 ng/mL / x     / x     / x          PT/INR - ( 21 Oct 2023 14:34 )   PT: 10.4 sec;   INR: 0.94 ratio    PTT - ( 21 Oct 2023 07:41 )  PTT:27.0 sec    Urinalysis Basic - ( 21 Oct 2023 07:41 )  Color: x / Appearance: x / SG: x / pH: x  Gluc: 93 mg/dL / Ketone: x  / Bili: x / Urobili: x   Blood: x / Protein: x / Nitrite: x   Leuk Esterase: x / RBC: x / WBC x   Sq Epi: x / Non Sq Epi: x / Bacteria: x    CULTURES: pending    Physical Examination:  On initial examination, she was lethargic, opens eyes but limited responses  On repeat examination:  General: Pleasant, cooperative, speaking in full sentence with AVAPS in place, states she feels much better   No acute distress.  Alert, oriented, interactive, nonfocal  HEENT: Pupils equal, reactive to light.  Symmetric.  PULM: Clear to auscultation bilaterally- decreased at bilat bases, no significant sputum production  CVS: Regular rate and rhythm, no murmurs, rubs, or gallops  ABD: Soft, nondistended, nontender, normoactive bowel sounds, no masses  EXT: No edema, nontender  SKIN: Warm and well perfused, no rashes noted.    RADIOLOGY:   10/21 CT Chest:   1- Mildly decreased small loculated right pleural effusion since 10/8/2023.  2- Otherwise similar findings in this patient with emphysema and small airways disease compared to 10/8/2023.    10/21 CXR:  No evidence of acute cardiopulmonary disease    10/21 Head CT scan:  1- No acute intracranial hemorrhage, mass effect, or shift of the midline structures.  2- Similar-appearing mild chronic white matter microvascular type changes.      Previous admit Echo done 10/9/2023:   1. Endocardial visualization was enhanced with intravenous echo contrast.   2. Normal left ventricular internal cavity size.   3. Normal global left ventricular systolic function.   4. Left ventricular ejection fraction, by visual estimation, is 60 to 65%.   5. Spectral Doppler shows impaired relaxation pattern of left ventricular myocardial filling (Grade I diastolic dysfunction).   6. Elevated mean left atrial pressure.   7. Normal right ventricular size and function.   8. The left atrium is normal in size.   9. The right atrium is normal in size.  10. Sclerotic aortic valve with normal opening.  11. Mild thickening and calcification of the anterior and posterior mitral valve leaflets.  12. Moderate mitral annular calcification.  13. Mild mitral valve regurgitation.  14. Mild tricuspid regurgitation.    CRITICAL CARE TIME SPENT: 75 minutes   Patient is a 78y old  Female who presents with a chief complaint of sob (21 Oct 2023 13:31)    BRIEF HOSPITAL COURSE:   79yo F with PMH: active smoker, COPD recently started on home O2, MI with NICM w/ recovery, HFpEF- last echo 10/9/2023 60-65%, grade 1 diastolic dysfunction, normal RV, hx thrombocytopenia, multiple myeloma on Prometa, HTN, seizure disorder, HLD, hx urinary retention, GERD, Anxiety, renal stones, [place], s/p cholecystectomy  Was discharged a week ago with COPD exacerbation and pneumonia, started on home oxygen at that time, sent home with   discharged with keflex, azithromycin and prednisone    Presented today 10/21 to ED with hypoxia and altered mental status. Per ED EMS information they found her hopoxic to 60s on room air, Found to have new onset COVID+ but xray unchanged from last week, Chest CT unchanged from last week, CXR unchanged from Last week.  Patient placed on 5L NC then Bipap, back to NC a few hours but then more lethargic and returned to BIPAP.   ICU consulted for hypercarbic respiratory failure with ABG 7.29/65.   On initial exam, the patient was lethargic, so we adjusted the Bipap to AVAPS and on repeat examination the patient is much more alert, speaking in full sentences, moving all exremities easily and states she feels much improved.     PAST MEDICAL & SURGICAL HISTORY:  MI (myocardial infarction)  Kidney stones  Hearing loss, unspecified hearing loss type, unspecified laterality  Anxiety  Multiple myeloma  COPD (chronic obstructive pulmonary disease)  S/P cholecystectomy    Allergies  penicillin (Unknown)    FAMILY HISTORY:  Family history of renal failure    Review of Systems:  CONSTITUTIONAL: No fever, chills, or fatigue  EYES: No eye pain, visual disturbances, or discharge  ENMT:  No difficulty hearing, tinnitus, vertigo; No sinus or throat pain  NECK: No pain or stiffness  RESPIRATORY: + cough, but denies wheezing, chills or hemoptysis; + shortness of breath  CARDIOVASCULAR: No chest pain, palpitations, dizziness, or leg swelling  GASTROINTESTINAL: No abdominal or epigastric pain. No nausea, vomiting, or hematemesis; No diarrhea or constipation. No melena or hematochezia.  GENITOURINARY: No dysuria, frequency, hematuria, or incontinence  NEUROLOGICAL: No headaches, memory loss, loss of strength, numbness, or tremors  SKIN: No itching, burning, rashes, or lesions   MUSCULOSKELETAL: No joint pain or swelling; No muscle, back, or extremity pain  PSYCHIATRIC: No depression, anxiety, mood swings, or difficulty sleeping      Medications:  cefepime  Injectable. 1000 milliGRAM(s) IV Push every 8 hours  remdesivir  IVPB   IV Intermittent   remdesivir  IVPB 200 milliGRAM(s) IV Intermittent every 24 hours  valACYclovir 500 milliGRAM(s) Oral daily  vancomycin  IVPB 1000 milliGRAM(s) IV Intermittent every 12 hours    metoprolol tartrate 12.5 milliGRAM(s) Oral daily    albuterol    90 MICROgram(s) HFA Inhaler 2 Puff(s) Inhalation every 4 hours  budesonide  80 MICROgram(s)/formoterol 4.5 MICROgram(s) Inhaler 2 Puff(s) Inhalation two times a day    ALPRAZolam 0.5 milliGRAM(s) Oral two times a day PRN  DULoxetine 30 milliGRAM(s) Oral daily    enoxaparin Injectable 30 milliGRAM(s) SubCutaneous every 12 hours    tamsulosin 0.4 milliGRAM(s) Oral at bedtime    dexAMETHasone  Injectable 4 milliGRAM(s) IV Push daily    sodium chloride 0.9%. 1000 milliLiter(s) IV Continuous <Continuous>    nicotine - 21 mG/24Hr(s) Patch 1 Patch Transdermal daily      ICU Vital Signs Last 24 Hrs  T(C): 36.4 (21 Oct 2023 13:06), Max: 38.1 (21 Oct 2023 08:09)  T(F): 97.6 (21 Oct 2023 13:06), Max: 100.6 (21 Oct 2023 08:09)  HR: 61 (21 Oct 2023 16:36) (55 - 109)  BP: 94/55 (21 Oct 2023 16:36) (89/53 - 118/67)  RR: 18 (21 Oct 2023 16:36) (18 - 34)  SpO2: 95% (21 Oct 2023 16:36) (68% - 100%)    O2 Parameters below as of 21 Oct 2023 16:36  Patient On (Oxygen Delivery Method): BiPAP/CPAP      ABG - ( 21 Oct 2023 14:00 )  pH, Arterial: 7.290 pH, Blood: x     /  pCO2: 65    /  pO2: 100   / HCO3: 31    / Base Excess: 4.7   /  SaO2: 98.5      LABS:                        12.0   9.97  )-----------( 227      ( 21 Oct 2023 07:41 )             40.0     10-21    x   |  x   |  x   ----------------------------<  x   x    |  x   |  0.91    Ca    8.9      21 Oct 2023 07:41  Mg     2.0     10-21    TPro  5.7<L>  /  Alb  3.0<L>  /  TBili  <0.2<L>  /  DBili  0.1  /  AST  17  /  ALT  17  /  AlkPhos  92  10-21      CARDIAC MARKERS ( 21 Oct 2023 07:41 )  x     / <0.01 ng/mL / x     / x     / x          PT/INR - ( 21 Oct 2023 14:34 )   PT: 10.4 sec;   INR: 0.94 ratio    PTT - ( 21 Oct 2023 07:41 )  PTT:27.0 sec    Urinalysis Basic - ( 21 Oct 2023 07:41 )  Color: x / Appearance: x / SG: x / pH: x  Gluc: 93 mg/dL / Ketone: x  / Bili: x / Urobili: x   Blood: x / Protein: x / Nitrite: x   Leuk Esterase: x / RBC: x / WBC x   Sq Epi: x / Non Sq Epi: x / Bacteria: x    CULTURES: pending    Physical Examination:  On initial examination, she was lethargic, opens eyes but limited responses  On repeat examination:  General: Pleasant, cooperative, speaking in full sentence with AVAPS in place, states she feels much better   No acute distress.  Alert, oriented, interactive, nonfocal  HEENT: Pupils equal, reactive to light.  Symmetric.  PULM: Clear to auscultation bilaterally- decreased at bilat bases, no significant sputum production  CVS: Regular rate and rhythm, no murmurs, rubs, or gallops  ABD: Soft, nondistended, nontender, normoactive bowel sounds, no masses  EXT: No edema, nontender  SKIN: Warm and well perfused, no rashes noted.    RADIOLOGY:   10/21 CT Chest:   1- Mildly decreased small loculated right pleural effusion since 10/8/2023.  2- Otherwise similar findings in this patient with emphysema and small airways disease compared to 10/8/2023.    10/21 CXR:  No evidence of acute cardiopulmonary disease    10/21 Head CT scan:  1- No acute intracranial hemorrhage, mass effect, or shift of the midline structures.  2- Similar-appearing mild chronic white matter microvascular type changes.      Previous admit Echo done 10/9/2023:   1. Endocardial visualization was enhanced with intravenous echo contrast.   2. Normal left ventricular internal cavity size.   3. Normal global left ventricular systolic function.   4. Left ventricular ejection fraction, by visual estimation, is 60 to 65%.   5. Spectral Doppler shows impaired relaxation pattern of left ventricular myocardial filling (Grade I diastolic dysfunction).   6. Elevated mean left atrial pressure.   7. Normal right ventricular size and function.   8. The left atrium is normal in size.   9. The right atrium is normal in size.  10. Sclerotic aortic valve with normal opening.  11. Mild thickening and calcification of the anterior and posterior mitral valve leaflets.  12. Moderate mitral annular calcification.  13. Mild mitral valve regurgitation.  14. Mild tricuspid regurgitation.    CRITICAL CARE TIME SPENT: 75 minutes  Time spent evaluating/treating patient with medical issues that pose a high risk for life threatening deterioration and/or end-organ damage, reviewing data/labs/imaging, discussing case with multidisciplinary team, discussing plan/goals of care with patient/family. Non-inclusive of procedure time

## 2023-10-21 NOTE — CONSULT NOTE ADULT - ASSESSMENT
ICU ASSESSMENT AND PLAN:  79yo F with PMH: active smoker, COPD recently started on home O2, MI with NICM w/ recovery, HFpEF- last echo 10/9/2023 60-65%, grade 1 diastolic dysfunction, normal RV, hx thrombocytopenia, multiple myeloma on Prometa, HTN, seizure disorder, HLD, hx urinary retention, GERD, Anxiety, renal stones, [place], s/p cholecystectomy  Was discharged a week ago with COPD exacerbation and pneumonia, started on home oxygen at that time, sent home with   discharged with keflex, azithromycin and prednisone    Presented today 10/21 to ED with hypoxia and altered mental status. Per ED EMS information they found her hopoxic to 60s on room air, Found to have new onset COVID+ but xray unchanged from last week, Chest CT unchanged from last week, CXR unchanged from Last week.  Patient placed on 5L NC then Bipap, back to NC a few hours but then more lethargic and returned to BIPAP.   ICU consulted for hypercarbic respiratory failure with ABG 7.29/65.   On initial exam, the patient was lethargic, so we adjusted the Bipap to AVAPS and on repeat examination the patient is much more alert, speaking in full sentences, moving all exremities easily and states she feels much improved.     Patient does not meet criteria for ICU Level admission, patient can go to stepdown as originally planned    1- New COVID+  2- likely COPD exacerbation made worse by new COVID  3- Mild CHF with elevated BNP but CXR unchanged  4- GERD  5- MM, Thrombocytopenia currently stable on Prometa    Recommendations:  Continue AVAPS overnight  Can trial High Flow oxygen tomorrow and continue to wean as able  Continue Decadron/Remdesivir solumedrol  Continue home inhalers, medications  Diuresis  NPO while on AVAPS  Restart diet when transitioned to High Flow  Supplement Potassium  Maintain electrolytes, goals of K>4.0, Phos>3.0, Mg>2.0  Continue Cefepime/Vanco  Consider ID consult  Followup on cultures  Given one dose of solumedrol    Case discussed with Dr. Nickolas Tran, ICU Attending

## 2023-10-21 NOTE — ED PROVIDER NOTE - PROGRESS NOTE DETAILS
Ravin: patient reassessed. Mentating much better, satting 99% on bipap. Patient states she feels much better. [place]: Patient off biPAP for about 45 minutes now, on 5L NC, satting 90-94%, which, given her COPD, is likely her baseline. Patient to be admitted.

## 2023-10-21 NOTE — H&P ADULT - NSHPLABSRESULTS_GEN_ALL_CORE
12.0   9.97  )-----------( 227      ( 21 Oct 2023 07:41 )             40.0   10-21    133<L>  |  91<L>  |  13.7  ----------------------------<  93  3.7   |  27.0  |  1.08    Ca    8.9      21 Oct 2023 07:41  Mg     2.0     10-21    TPro  7.0  /  Alb  3.9  /  TBili  0.3<L>  /  DBili  x   /  AST  19  /  ALT  19  /  AlkPhos  117  10-21

## 2023-10-21 NOTE — ED PROVIDER NOTE - OBJECTIVE STATEMENT
79 y/o F with PMH COPD (recently on 3L NC as of 1 week ago), MI, MM presents for hypoxia and shortness of breath. Patient is a poor historian. EMS found patient to be hypoxic to the 60's on room air, placed on NC with  minimal improvement. Patient states she was told she had PNA when she was here last week. She cannot elaborate further. Review of chart shows that patient was discharged 1 week ago on azithromycin and NC as well as prednisone.

## 2023-10-21 NOTE — ED ADULT NURSE REASSESSMENT NOTE - NS ED NURSE REASSESS COMMENT FT1
Report given to 3 TWR RN. Pt moved to 2526. Patient awake and alert, respirations even and unlabored, in no apparent distress.  Plan, abnormal labs, history of present illness, pending labs/tests explained, opportunity to answer questions provided.

## 2023-10-21 NOTE — PATIENT PROFILE ADULT - FALL HARM RISK - FALLEN IN PAST
Patient needing a refill on his Pravastatin 80 MG-please send to [facility] with 2 refills    No

## 2023-10-21 NOTE — ED PROVIDER NOTE - NS ED ROS FT
Const: Denies fever, chills  HEENT: Denies blurry vision, sore throat  Neck: Denies neck pain/stiffness  Resp: + SOB. Denies SOB  Cardiovascular: Denies CP, palpitations, LE edema  GI: Denies nausea, vomiting, abdominal pain, diarrhea, constipation, blood in stool  : Denies urinary frequency/urgency/dysuria, hematuria  MSK: Denies back pain  Neuro: Denies HA, dizziness, numbness, weakness  Skin: Denies rashes.

## 2023-10-21 NOTE — ED ADULT NURSE REASSESSMENT NOTE - NS ED NURSE REASSESS COMMENT FT1
RT at bedside for abg draw. Report given to Aristeo for continuation of care.  Pt covid + and placed in isolation RT at bedside for abg draw. Report given to Aristeo for continuation of care.  Pt covid + and placed in isolation.  Pt sleepy to verbal and painful stimuli but arousable in stretcher.  VSS.  NSR on CM.  Respirations even and unlabored on 3L nasal cannula

## 2023-10-21 NOTE — H&P ADULT - HISTORY OF PRESENT ILLNESS
79 y/o F with PMH COPD, MI,  hfref, mm/ thrombocytopenia/ on promecta, active smoker, Htn, mood disorders, seizure disorder, urinary retention,   HLD, GERD, recent admission for sob/ chf , was diuresed and treated for pna/ codp , was dcd on home o2 3 L NC , po abx and prednisone, today  patient presents for hypoxia and shortness of breath. Patient is a poor historian. EMS found patient to be hypoxic to the 60's on room air, placed on NC with  minimal improvement/ was briefly placed on bipap. now back on nc 5 L. patient lethargic in er but wakes up to name , alert x 2-3 , follows simple commands      77 y/o F with PMH COPD, MI,  hfref, mm/ thrombocytopenia/ on promecta, active smoker, Htn, mood disorders, seizure disorder, urinary retention,   HLD, GERD, recent admission for sob/ chf , was diuresed and treated for pna/ codp , was dcd on home o2 3 L NC , po abx and prednisone, today  patient presents for hypoxia and shortness of breath. Patient is a poor historian. EMS found patient to be hypoxic to the 60's on room air, placed on NC with  minimal improvement/ was briefly placed on bipap. now back on nc 5 L. patient lethargic in er but wakes up to name , alert x 2-3 , follows simple commands . rvp noted for covid pos     77 y/o F with PMH COPD, MI,  hfref, mm/ thrombocytopenia/ on promecta, active smoker, Htn, mood disorders, seizure disorder, urinary retention,   HLD, GERD, recent admission for sob/ chf , was diuresed and treated for pna/ codp , was dcd on home o2 3 L NC , po abx and prednisone, today  patient presents for hypoxia and shortness of breath. Patient is a poor historian. EMS found patient to be hypoxic to the 60's on room air, placed on NC with  minimal improvement/ was briefly placed on bipap. now back on nc 5 L. patient lethargic in er but wakes up to name , alert x 1-2  , follows simple commands . rvp noted for covid pos

## 2023-10-21 NOTE — PATIENT PROFILE ADULT - FALL HARM RISK - HARM RISK INTERVENTIONS

## 2023-10-21 NOTE — ED ADULT NURSE NOTE - OBJECTIVE STATEMENT
assumed pt care at 0730.  pt received from EMS from home, c/o difficulty breathing.  pt's oxygen in the 60s upon arrival, improved with NRB, placed on bipap by RT.  pt initially confused, with improved mental status after o2 administration.  pt states she was recently admitted to [facility] for pneumonia and started on oxygen.  respirations tachypneic, even and unlabored.  skin warm and dry.  denies chest pain.  moving all extremities well and with purpose.

## 2023-10-21 NOTE — CONSULT NOTE ADULT - ASSESSMENT
78F active smoker with hx of COPD recently started on home O2, NICM w/ recovery, HFpEF, thrombocytopenia, multiple myeloma, HTN, seizure disorder, HLD, recent admission for hypoxic respiratory failure in the setting of COPD exacerbation and possible PNA now returns with worsening hypoxia found to be covid positive      Acute on chronic hypoxic respiratory failure secondary to covid PNA w/ possible superimposed bacterial PNA  noted lethargic when seen; will obtain   c/w DuoNeb q6h   budesonide q12h   solumedrol 40 q6h   78F active smoker with hx of COPD recently started on home O2, NICM w/ recovery, HFpEF, thrombocytopenia, multiple myeloma, HTN, seizure disorder, HLD, recent admission for hypoxic respiratory failure in the setting of COPD exacerbation and possible PNA now returns with worsening hypoxia found to be covid positive      Acute on chronic hypoxic respiratory failure secondary to covid PNA w/ possible superimposed bacterial PNA  recommend resuming BIPAP given current lethargy and repeating ABG   c/w DuoNeb q6h   budesonide q12h   solumedrol 40 q6h and can transition to dexamethasone to complete course when improving   remdesivir   continue with empiric abx pending cultures   panculture   check procal   check ESR/CRP/D dimer  if elevated D dimer, check LE duplex and may need CT chest angio    check urine toxicology   CT head negative for acute pathology   if remains lethargic despite correction of hypercarbia, may need further workup with EEG, MRI, possibly LP   wean supplemental 02 as tolerated for goal >88-92%     78F active smoker with hx of COPD recently started on home O2, NICM w/ recovery, HFpEF, thrombocytopenia, multiple myeloma, HTN, seizure disorder, HLD, recent admission for hypoxic respiratory failure in the setting of COPD exacerbation and possible PNA now returns with worsening hypoxia found to be covid positive      Acute on chronic hypoxic respiratory failure secondary to covid PNA w/ possible superimposed bacterial PNA  recommend resuming BIPAP given current lethargy and repeating ABG   c/w DuoNeb q6h   budesonide q12h   solumedrol 40 q6h and can transition to dexamethasone to complete course when improving   remdesivir   continue with empiric abx pending cultures   panculture   check procal   check ESR/CRP/D dimer  if elevated D dimer, check LE duplex and may need CT chest angio    check urine toxicology   CT head negative for acute pathology   if remains lethargic despite correction of hypercarbia, may need further workup with EEG, MRI, possibly LP   wean supplemental 02 as tolerated for goal >88-92%  upgrade to SDU for continuous BIPAP with low threshold for ICU consult if remains lethargic or worsening ABG

## 2023-10-21 NOTE — CONSULT NOTE ADULT - NS PANP COMMENT GEN_ALL_CORE FT
seen and examined, patient much more awake after switching NIV settings, hemodynamically stable.  would continue AVAPS overnight (can give a break to eat). reconsult if condition worsens.

## 2023-10-21 NOTE — ED ADULT NURSE NOTE - NSFALLRISKINTERV_ED_ALL_ED
Assistance with ambulation/Communicate fall risk and risk factors to all staff, patient, and family/Provide visual cue: yellow wristband, yellow gown, etc/Reinforce activity limits and safety measures with patient and family/Call bell, personal items and telephone in reach/Instruct patient to call for assistance before getting out of bed/chair/stretcher/Non-slip footwear applied when patient is off stretcher/[place] to call system/Physically safe environment - no spills, clutter or unnecessary equipment/Purposeful Proactive Rounding/Room/bathroom lighting operational, light cord in reach

## 2023-10-22 LAB
ALBUMIN SERPL ELPH-MCNC: 3 G/DL — LOW (ref 3.3–5.2)
ALBUMIN SERPL ELPH-MCNC: 3 G/DL — LOW (ref 3.3–5.2)
ALBUMIN SERPL ELPH-MCNC: 3.1 G/DL — LOW (ref 3.3–5.2)
ALBUMIN SERPL ELPH-MCNC: 3.1 G/DL — LOW (ref 3.3–5.2)
ALP SERPL-CCNC: 83 U/L — SIGNIFICANT CHANGE UP (ref 40–120)
ALP SERPL-CCNC: 83 U/L — SIGNIFICANT CHANGE UP (ref 40–120)
ALP SERPL-CCNC: 85 U/L — SIGNIFICANT CHANGE UP (ref 40–120)
ALP SERPL-CCNC: 85 U/L — SIGNIFICANT CHANGE UP (ref 40–120)
ALT FLD-CCNC: 15 U/L — SIGNIFICANT CHANGE UP
AMPHET UR-MCNC: NEGATIVE — SIGNIFICANT CHANGE UP
AMPHET UR-MCNC: NEGATIVE — SIGNIFICANT CHANGE UP
ANION GAP SERPL CALC-SCNC: 11 MMOL/L — SIGNIFICANT CHANGE UP (ref 5–17)
ANION GAP SERPL CALC-SCNC: 11 MMOL/L — SIGNIFICANT CHANGE UP (ref 5–17)
APPEARANCE UR: CLEAR — SIGNIFICANT CHANGE UP
APPEARANCE UR: CLEAR — SIGNIFICANT CHANGE UP
AST SERPL-CCNC: 12 U/L — SIGNIFICANT CHANGE UP
BARBITURATES UR SCN-MCNC: NEGATIVE — SIGNIFICANT CHANGE UP
BARBITURATES UR SCN-MCNC: NEGATIVE — SIGNIFICANT CHANGE UP
BASE EXCESS BLDA CALC-SCNC: 4 MMOL/L — HIGH (ref -2–3)
BASE EXCESS BLDA CALC-SCNC: 4 MMOL/L — HIGH (ref -2–3)
BENZODIAZ UR-MCNC: POSITIVE
BENZODIAZ UR-MCNC: POSITIVE
BILIRUB DIRECT SERPL-MCNC: 0.1 MG/DL — SIGNIFICANT CHANGE UP (ref 0–0.3)
BILIRUB DIRECT SERPL-MCNC: 0.1 MG/DL — SIGNIFICANT CHANGE UP (ref 0–0.3)
BILIRUB INDIRECT FLD-MCNC: SIGNIFICANT CHANGE UP MG/DL (ref 0.2–1)
BILIRUB INDIRECT FLD-MCNC: SIGNIFICANT CHANGE UP MG/DL (ref 0.2–1)
BILIRUB SERPL-MCNC: <0.2 MG/DL — LOW (ref 0.4–2)
BILIRUB UR-MCNC: NEGATIVE — SIGNIFICANT CHANGE UP
BILIRUB UR-MCNC: NEGATIVE — SIGNIFICANT CHANGE UP
BLOOD GAS COMMENTS ARTERIAL: SIGNIFICANT CHANGE UP
BLOOD GAS COMMENTS ARTERIAL: SIGNIFICANT CHANGE UP
BUN SERPL-MCNC: 14 MG/DL — SIGNIFICANT CHANGE UP (ref 8–20)
BUN SERPL-MCNC: 14 MG/DL — SIGNIFICANT CHANGE UP (ref 8–20)
CALCIUM SERPL-MCNC: 8.4 MG/DL — SIGNIFICANT CHANGE UP (ref 8.4–10.5)
CALCIUM SERPL-MCNC: 8.4 MG/DL — SIGNIFICANT CHANGE UP (ref 8.4–10.5)
CHLORIDE SERPL-SCNC: 103 MMOL/L — SIGNIFICANT CHANGE UP (ref 96–108)
CHLORIDE SERPL-SCNC: 103 MMOL/L — SIGNIFICANT CHANGE UP (ref 96–108)
CO2 SERPL-SCNC: 26 MMOL/L — SIGNIFICANT CHANGE UP (ref 22–29)
CO2 SERPL-SCNC: 26 MMOL/L — SIGNIFICANT CHANGE UP (ref 22–29)
COCAINE METAB.OTHER UR-MCNC: NEGATIVE — SIGNIFICANT CHANGE UP
COCAINE METAB.OTHER UR-MCNC: NEGATIVE — SIGNIFICANT CHANGE UP
COLOR SPEC: YELLOW — SIGNIFICANT CHANGE UP
COLOR SPEC: YELLOW — SIGNIFICANT CHANGE UP
COMMENT - URINE: SIGNIFICANT CHANGE UP
COMMENT - URINE: SIGNIFICANT CHANGE UP
CREAT SERPL-MCNC: 0.85 MG/DL — SIGNIFICANT CHANGE UP (ref 0.5–1.3)
CREAT SERPL-MCNC: 0.85 MG/DL — SIGNIFICANT CHANGE UP (ref 0.5–1.3)
CREAT SERPL-MCNC: 0.87 MG/DL — SIGNIFICANT CHANGE UP (ref 0.5–1.3)
CREAT SERPL-MCNC: 0.87 MG/DL — SIGNIFICANT CHANGE UP (ref 0.5–1.3)
DIFF PNL FLD: NEGATIVE — SIGNIFICANT CHANGE UP
DIFF PNL FLD: NEGATIVE — SIGNIFICANT CHANGE UP
EGFR: 68 ML/MIN/1.73M2 — SIGNIFICANT CHANGE UP
EGFR: 68 ML/MIN/1.73M2 — SIGNIFICANT CHANGE UP
EGFR: 70 ML/MIN/1.73M2 — SIGNIFICANT CHANGE UP
EGFR: 70 ML/MIN/1.73M2 — SIGNIFICANT CHANGE UP
EPI CELLS # UR: SIGNIFICANT CHANGE UP
EPI CELLS # UR: SIGNIFICANT CHANGE UP
GAS PNL BLDA: SIGNIFICANT CHANGE UP
GAS PNL BLDA: SIGNIFICANT CHANGE UP
GLUCOSE BLDC GLUCOMTR-MCNC: 97 MG/DL — SIGNIFICANT CHANGE UP (ref 70–99)
GLUCOSE BLDC GLUCOMTR-MCNC: 97 MG/DL — SIGNIFICANT CHANGE UP (ref 70–99)
GLUCOSE BLDC GLUCOMTR-MCNC: 98 MG/DL — SIGNIFICANT CHANGE UP (ref 70–99)
GLUCOSE BLDC GLUCOMTR-MCNC: 98 MG/DL — SIGNIFICANT CHANGE UP (ref 70–99)
GLUCOSE SERPL-MCNC: 96 MG/DL — SIGNIFICANT CHANGE UP (ref 70–99)
GLUCOSE SERPL-MCNC: 96 MG/DL — SIGNIFICANT CHANGE UP (ref 70–99)
GLUCOSE UR QL: NEGATIVE MG/DL — SIGNIFICANT CHANGE UP
GLUCOSE UR QL: NEGATIVE MG/DL — SIGNIFICANT CHANGE UP
HCO3 BLDA-SCNC: 29 MMOL/L — HIGH (ref 21–28)
HCO3 BLDA-SCNC: 29 MMOL/L — HIGH (ref 21–28)
HCT VFR BLD CALC: 34.1 % — LOW (ref 34.5–45)
HCT VFR BLD CALC: 34.1 % — LOW (ref 34.5–45)
HGB BLD-MCNC: 10.1 G/DL — LOW (ref 11.5–15.5)
HGB BLD-MCNC: 10.1 G/DL — LOW (ref 11.5–15.5)
HOROWITZ INDEX BLDA+IHG-RTO: 0.4 — SIGNIFICANT CHANGE UP
HOROWITZ INDEX BLDA+IHG-RTO: 0.4 — SIGNIFICANT CHANGE UP
INR BLD: 0.96 RATIO — SIGNIFICANT CHANGE UP (ref 0.85–1.18)
INR BLD: 0.96 RATIO — SIGNIFICANT CHANGE UP (ref 0.85–1.18)
KETONES UR-MCNC: ABNORMAL
KETONES UR-MCNC: ABNORMAL
LEUKOCYTE ESTERASE UR-ACNC: NEGATIVE — SIGNIFICANT CHANGE UP
LEUKOCYTE ESTERASE UR-ACNC: NEGATIVE — SIGNIFICANT CHANGE UP
MAGNESIUM SERPL-MCNC: 2 MG/DL — SIGNIFICANT CHANGE UP (ref 1.6–2.6)
MAGNESIUM SERPL-MCNC: 2 MG/DL — SIGNIFICANT CHANGE UP (ref 1.6–2.6)
MCHC RBC-ENTMCNC: 26.2 PG — LOW (ref 27–34)
MCHC RBC-ENTMCNC: 26.2 PG — LOW (ref 27–34)
MCHC RBC-ENTMCNC: 29.6 GM/DL — LOW (ref 32–36)
MCHC RBC-ENTMCNC: 29.6 GM/DL — LOW (ref 32–36)
MCV RBC AUTO: 88.6 FL — SIGNIFICANT CHANGE UP (ref 80–100)
MCV RBC AUTO: 88.6 FL — SIGNIFICANT CHANGE UP (ref 80–100)
METHADONE UR-MCNC: NEGATIVE — SIGNIFICANT CHANGE UP
METHADONE UR-MCNC: NEGATIVE — SIGNIFICANT CHANGE UP
MRSA PCR RESULT.: SIGNIFICANT CHANGE UP
MRSA PCR RESULT.: SIGNIFICANT CHANGE UP
NITRITE UR-MCNC: NEGATIVE — SIGNIFICANT CHANGE UP
NITRITE UR-MCNC: NEGATIVE — SIGNIFICANT CHANGE UP
OPIATES UR-MCNC: NEGATIVE — SIGNIFICANT CHANGE UP
OPIATES UR-MCNC: NEGATIVE — SIGNIFICANT CHANGE UP
PCO2 BLDA: 52 MMHG — HIGH (ref 32–45)
PCO2 BLDA: 52 MMHG — HIGH (ref 32–45)
PCP SPEC-MCNC: SIGNIFICANT CHANGE UP
PCP SPEC-MCNC: SIGNIFICANT CHANGE UP
PCP UR-MCNC: NEGATIVE — SIGNIFICANT CHANGE UP
PCP UR-MCNC: NEGATIVE — SIGNIFICANT CHANGE UP
PH BLDA: 7.36 — SIGNIFICANT CHANGE UP (ref 7.35–7.45)
PH BLDA: 7.36 — SIGNIFICANT CHANGE UP (ref 7.35–7.45)
PH UR: 6.5 — SIGNIFICANT CHANGE UP (ref 5–8)
PH UR: 6.5 — SIGNIFICANT CHANGE UP (ref 5–8)
PHOSPHATE SERPL-MCNC: 2.9 MG/DL — SIGNIFICANT CHANGE UP (ref 2.4–4.7)
PHOSPHATE SERPL-MCNC: 2.9 MG/DL — SIGNIFICANT CHANGE UP (ref 2.4–4.7)
PLATELET # BLD AUTO: 150 K/UL — SIGNIFICANT CHANGE UP (ref 150–400)
PLATELET # BLD AUTO: 150 K/UL — SIGNIFICANT CHANGE UP (ref 150–400)
PO2 BLDA: 120 MMHG — HIGH (ref 83–108)
PO2 BLDA: 120 MMHG — HIGH (ref 83–108)
POTASSIUM SERPL-MCNC: 4.6 MMOL/L — SIGNIFICANT CHANGE UP (ref 3.5–5.3)
POTASSIUM SERPL-MCNC: 4.6 MMOL/L — SIGNIFICANT CHANGE UP (ref 3.5–5.3)
POTASSIUM SERPL-SCNC: 4.6 MMOL/L — SIGNIFICANT CHANGE UP (ref 3.5–5.3)
POTASSIUM SERPL-SCNC: 4.6 MMOL/L — SIGNIFICANT CHANGE UP (ref 3.5–5.3)
PROT SERPL-MCNC: 5.5 G/DL — LOW (ref 6.6–8.7)
PROT SERPL-MCNC: 5.5 G/DL — LOW (ref 6.6–8.7)
PROT SERPL-MCNC: 5.6 G/DL — LOW (ref 6.6–8.7)
PROT SERPL-MCNC: 5.6 G/DL — LOW (ref 6.6–8.7)
PROT UR-MCNC: 30 MG/DL
PROT UR-MCNC: 30 MG/DL
PROTHROM AB SERPL-ACNC: 10.7 SEC — SIGNIFICANT CHANGE UP (ref 9.5–13)
PROTHROM AB SERPL-ACNC: 10.7 SEC — SIGNIFICANT CHANGE UP (ref 9.5–13)
RBC # BLD: 3.85 M/UL — SIGNIFICANT CHANGE UP (ref 3.8–5.2)
RBC # BLD: 3.85 M/UL — SIGNIFICANT CHANGE UP (ref 3.8–5.2)
RBC # FLD: 19.4 % — HIGH (ref 10.3–14.5)
RBC # FLD: 19.4 % — HIGH (ref 10.3–14.5)
RBC CASTS # UR COMP ASSIST: SIGNIFICANT CHANGE UP /HPF (ref 0–4)
RBC CASTS # UR COMP ASSIST: SIGNIFICANT CHANGE UP /HPF (ref 0–4)
S AUREUS DNA NOSE QL NAA+PROBE: SIGNIFICANT CHANGE UP
S AUREUS DNA NOSE QL NAA+PROBE: SIGNIFICANT CHANGE UP
SAO2 % BLDA: 98.9 % — HIGH (ref 94–98)
SAO2 % BLDA: 98.9 % — HIGH (ref 94–98)
SODIUM SERPL-SCNC: 140 MMOL/L — SIGNIFICANT CHANGE UP (ref 135–145)
SODIUM SERPL-SCNC: 140 MMOL/L — SIGNIFICANT CHANGE UP (ref 135–145)
SP GR SPEC: 1.01 — SIGNIFICANT CHANGE UP (ref 1.01–1.02)
SP GR SPEC: 1.01 — SIGNIFICANT CHANGE UP (ref 1.01–1.02)
THC UR QL: NEGATIVE — SIGNIFICANT CHANGE UP
THC UR QL: NEGATIVE — SIGNIFICANT CHANGE UP
UROBILINOGEN FLD QL: NEGATIVE MG/DL — SIGNIFICANT CHANGE UP
UROBILINOGEN FLD QL: NEGATIVE MG/DL — SIGNIFICANT CHANGE UP
WBC # BLD: 4.97 K/UL — SIGNIFICANT CHANGE UP (ref 3.8–10.5)
WBC # BLD: 4.97 K/UL — SIGNIFICANT CHANGE UP (ref 3.8–10.5)
WBC # FLD AUTO: 4.97 K/UL — SIGNIFICANT CHANGE UP (ref 3.8–10.5)
WBC # FLD AUTO: 4.97 K/UL — SIGNIFICANT CHANGE UP (ref 3.8–10.5)
WBC UR QL: ABNORMAL /HPF (ref 0–5)
WBC UR QL: ABNORMAL /HPF (ref 0–5)

## 2023-10-22 PROCEDURE — 99232 SBSQ HOSP IP/OBS MODERATE 35: CPT

## 2023-10-22 PROCEDURE — 99233 SBSQ HOSP IP/OBS HIGH 50: CPT

## 2023-10-22 RX ORDER — ALBUTEROL 90 UG/1
2 AEROSOL, METERED ORAL EVERY 4 HOURS
Refills: 0 | Status: DISCONTINUED | OUTPATIENT
Start: 2023-10-22 | End: 2023-10-26

## 2023-10-22 RX ORDER — ACETAMINOPHEN 500 MG
650 TABLET ORAL EVERY 6 HOURS
Refills: 0 | Status: DISCONTINUED | OUTPATIENT
Start: 2023-10-22 | End: 2023-10-26

## 2023-10-22 RX ORDER — ALBUTEROL 90 UG/1
2 AEROSOL, METERED ORAL EVERY 4 HOURS
Refills: 0 | Status: COMPLETED | OUTPATIENT
Start: 2023-10-22 | End: 2024-09-19

## 2023-10-22 RX ORDER — BUDESONIDE AND FORMOTEROL FUMARATE DIHYDRATE 160; 4.5 UG/1; UG/1
2 AEROSOL RESPIRATORY (INHALATION)
Refills: 0 | Status: DISCONTINUED | OUTPATIENT
Start: 2023-10-22 | End: 2023-10-26

## 2023-10-22 RX ORDER — INFLUENZA VIRUS VACCINE 15; 15; 15; 15 UG/.5ML; UG/.5ML; UG/.5ML; UG/.5ML
0.7 SUSPENSION INTRAMUSCULAR ONCE
Refills: 0 | Status: DISCONTINUED | OUTPATIENT
Start: 2023-10-22 | End: 2023-10-26

## 2023-10-22 RX ORDER — ALBUTEROL 90 UG/1
2 AEROSOL, METERED ORAL EVERY 4 HOURS
Refills: 0 | Status: DISCONTINUED | OUTPATIENT
Start: 2023-10-22 | End: 2023-10-22

## 2023-10-22 RX ORDER — BUDESONIDE AND FORMOTEROL FUMARATE DIHYDRATE 160; 4.5 UG/1; UG/1
2 AEROSOL RESPIRATORY (INHALATION)
Refills: 0 | Status: DISCONTINUED | OUTPATIENT
Start: 2023-10-22 | End: 2023-10-22

## 2023-10-22 RX ADMIN — Medication 0.25 MILLIGRAM(S): at 22:26

## 2023-10-22 RX ADMIN — BUDESONIDE AND FORMOTEROL FUMARATE DIHYDRATE 2 PUFF(S): 160; 4.5 AEROSOL RESPIRATORY (INHALATION) at 21:08

## 2023-10-22 RX ADMIN — ENOXAPARIN SODIUM 30 MILLIGRAM(S): 100 INJECTION SUBCUTANEOUS at 17:27

## 2023-10-22 RX ADMIN — Medication 0.25 MILLIGRAM(S): at 08:21

## 2023-10-22 RX ADMIN — Medication 1 PATCH: at 12:48

## 2023-10-22 RX ADMIN — Medication 250 MILLIGRAM(S): at 05:16

## 2023-10-22 RX ADMIN — CEFEPIME 1000 MILLIGRAM(S): 1 INJECTION, POWDER, FOR SOLUTION INTRAMUSCULAR; INTRAVENOUS at 21:05

## 2023-10-22 RX ADMIN — SODIUM CHLORIDE 75 MILLILITER(S): 9 INJECTION INTRAMUSCULAR; INTRAVENOUS; SUBCUTANEOUS at 05:17

## 2023-10-22 RX ADMIN — TAMSULOSIN HYDROCHLORIDE 0.4 MILLIGRAM(S): 0.4 CAPSULE ORAL at 21:05

## 2023-10-22 RX ADMIN — Medication 1 PATCH: at 19:49

## 2023-10-22 RX ADMIN — Medication 0.5 MILLIGRAM(S): at 21:05

## 2023-10-22 RX ADMIN — Medication 650 MILLIGRAM(S): at 13:13

## 2023-10-22 RX ADMIN — CEFEPIME 1000 MILLIGRAM(S): 1 INJECTION, POWDER, FOR SOLUTION INTRAMUSCULAR; INTRAVENOUS at 14:54

## 2023-10-22 RX ADMIN — ENOXAPARIN SODIUM 30 MILLIGRAM(S): 100 INJECTION SUBCUTANEOUS at 05:17

## 2023-10-22 RX ADMIN — REMDESIVIR 200 MILLIGRAM(S): 5 INJECTION INTRAVENOUS at 17:27

## 2023-10-22 RX ADMIN — CEFEPIME 1000 MILLIGRAM(S): 1 INJECTION, POWDER, FOR SOLUTION INTRAMUSCULAR; INTRAVENOUS at 05:17

## 2023-10-22 RX ADMIN — Medication 1 PATCH: at 08:06

## 2023-10-22 RX ADMIN — Medication 4 MILLIGRAM(S): at 05:17

## 2023-10-22 RX ADMIN — Medication 0.5 MILLIGRAM(S): at 12:47

## 2023-10-22 RX ADMIN — DULOXETINE HYDROCHLORIDE 30 MILLIGRAM(S): 30 CAPSULE, DELAYED RELEASE ORAL at 12:48

## 2023-10-22 RX ADMIN — VALACYCLOVIR 500 MILLIGRAM(S): 500 TABLET, FILM COATED ORAL at 12:47

## 2023-10-22 RX ADMIN — Medication 650 MILLIGRAM(S): at 14:13

## 2023-10-22 NOTE — PROGRESS NOTE ADULT - ASSESSMENT
79 y/o F with PMH COPD, MI,  hfref, mm/ thrombocytopenia/ on promecta, active smoker, Htn, mood disorders, seizure disorder, urinary retention,   HLD, GERD, recent admission for sob/ chf , was diuresed and treated for pna/ codp , was dcd on home o2 3 L NC , po abx and prednisone, today  patient presents for hypoxia and shortness of breath. Patient is a poor historian. EMS found patient to be hypoxic to the 60's on room air, placed on NC with  minimal improvement/ was briefly placed on bipap. now back on nc 5 L. patient lethargic in er. rvp positive for covid, admitted to medicine     #acute on chronic respiratory failure: hypoxic and hypercapnic   #COPD exacerbation  #COVID PNA  #?Superimposed bacterial PNA  - patient continues to smoke   - cxr no acute changes   - CT chest: Small right loculated pleural effusion has slightly decreased since 10/8/2023. Paraseptal and centrilobular emphysema is present. Mild biapical scarring. A 9 mm right apical nodule has not significantly changed since 2018. Small tree-in-bud nodules are present predominantly in the right upper lobe, similar to the prior study. No new consolidation.  - received vanc / cefepime in ed  - c/w vanc, cefepime for now; MRSA pcr pending, d/c vanc if negative; will likely d/c all abx   - c/w remdesivir, dex  - BCx pending   - d/c IVF if pt able to drink after   - c/w spiriva, budesonide; duonebs?   - pulm consulted, recs appreciated   - monitor of BIPAP, plan for noctural BiPAP    #ams   - likely metabolic encephalopathy due to resp failure   - CTH no acute findings  - likely 2/2 co2 retention  - much improved A&Ox3, observe off BIPAP    #chronic diastolic chf   - clinically does not appear overloaded   - d/c IVF  - monitor volume status     #mm/ thrombocytopenia   - no active bleed   - on promecta at home   - c/w acyclovir for ppx dose     #cad/ htn   - c/w bb     #dvt ppx   - sq lovenox bid, covid dosing

## 2023-10-22 NOTE — PROGRESS NOTE ADULT - ASSESSMENT
78F active smoker with hx of COPD recently started on home O2, NICM w/ recovery, HFpEF, thrombocytopenia, multiple myeloma, HTN, seizure disorder, HLD, recent admission for hypoxic respiratory failure in the setting of COPD exacerbation and possible PNA now returns with worsening hypoxia found to be covid positive      Acute on chronic hypoxic respiratory failure secondary to covid PNA w/ possible superimposed bacterial PNA  can trial transition to nasal cannula   will need nocturnal BIPAP and prn   DuoNeb q6h   c/w Symbicort   complete course of remdesivir/dexamethasone   continue with empiric abx pending cultures   f/u procal  f/u MRS pcr  no significant changes noted from prior CT chest  D dimer neg when age adjusted and clinically improving with current regimen  on covid dosing DVT ppx   AMS appeared to be due to hypercapnia now back at baseline   wean supplemental 02 as tolerated for goal >88-92%  maintain euvolemic

## 2023-10-22 NOTE — PHARMACOTHERAPY INTERVENTION NOTE - COMMENTS
As per policy, discontinued the vancomycin trough level ordered for 10/22 @16:00 in the setting of vancomycin being discontinued.    Ely GillilandD  Clinical Pharmacy Specialist, Infectious Diseases  Tele-Antimicrobial Stewardship Program (Tele-ASP)  Tele-ASP Phone: [phone] 
Recommended discontinuing vancomycin, ordered empirically for suspected pneumonia, in the setting of a negative MRSA nasal PCR.    Сергей Singh PharmD  Clinical Pharmacy Specialist, Infectious Diseases  Tele-Antimicrobial Stewardship Program (Tele-ASP)  Tele-ASP Phone: [phone] 
Recommended obtaining an MRSA nasal PCR in the setting of vancomycin ordered empirically for a suspected pneumonia.    Сергей Singh, [place]  Clinical Pharmacy Specialist, Infectious Diseases  Tele-Antimicrobial Stewardship Program (Tele-ASP)  Tele-ASP Phone: [phone]

## 2023-10-22 NOTE — CHART NOTE - NSCHARTNOTEFT_GEN_A_CORE
Provider called by TRENTON Ojeda at 07:48 requesting an order to trial the patient off continuous BiPAP and place the patient on 5L NC, so that a bedside dysphagia screen can be performed and PO medications administered.  Provider had a lengthy discussion with RN that both Critical Care and Pulm were consulted late yesterday afternoon for this patient who was appreciated to be both lethargic and hypoxic. Additionally of note the patient failed weaning from BiPAP to 5L NC twice while in the ED. Patient admitted less than 24hours ago for COPD exacerbation likely secondary to covid infection super-imposed with a history of HFeEF requiring continuous BiPAP for hypoxia and lethargy likely secondary to CO2 retention, for which patient is a chronic retainer. Per RN patient is in NAD, VSS, AOx4, requesting her PO xanax.       Provider advised RN that the patient will need to be further assessed by respiratory therapist to determine if the patient can try safely weaning possibly later on, however, in general patient's requiring continuous BiPAP are weaned to NRB and/or Venti +/- Hiflo depending on patient's oxygen requirements. Furthermore per protocol, patient's should wait at least 1 hour after weaning off continuous BiPAP before a bedside dysphagia is performed and before anything PO is administered, given risk of aspiration.      Dr. Silva called by Medicine PA while RN present on the phone to further discuss case.  Per Dr. Silva patient is to remain on continuous BiPAP at this time, will consider attempting to wean patient off BiPAP later in the day around 11:00 / 12:00.  Hospitalist in agreement for RT to also please assess the patient for BiPAP weaning in a few hours for supplemental oxygen requirements, given that the patient has failed weaning off continuous BiPAP twice within 24hours. Ativan 0.25mg IVP x 1 ordered STAT for anxiety.      -Recommend repeat ABG if patient is able to wean from BiPAP   -RN to continue to monitor  -RN to call provider with any acute changes / questions /concerns      The above patient and plan was discussed at length with Dr. Silva and TRENTON PIERCE

## 2023-10-22 NOTE — PROGRESS NOTE ADULT - SUBJECTIVE AND OBJECTIVE BOX
Patient is a 78y old  Female who presents with a chief complaint of sob (21 Oct 2023 11:46)    HPI:  78F active smoker with hx of COPD recently started on home O2, NICM w/ recovery, HFpEF, thrombocytopenia, multiple myeloma, HTN, seizure disorder, HLD brought to the ED due to hypoxia with associated alerted mental status. Pt was recently hospitalized 11/8-11/11 in the setting of COPD exacerbation/PNA and possible HFpEF exacerbation and discharged with keflex, azithromycin and prednisone. Pt was placed briefly on BIPAP due to hypoxia and work of breathing and transitioned to nasal cannula. Found to be covid positive.    Interval:  Transitioned to AVAPs yesterday by ICU team and remained on this through this AM and much more awake and interactive. Reports she feels much better. Reports much less shortness of breath. Reports not much cough. Denies chest pain . ABG this AM 7.36/52/120 on 40%.       PAST MEDICAL & SURGICAL HISTORY:  MI (myocardial infarction)  Kidney stones  Hearing loss, unspecified hearing loss type, unspecified laterality  Anxiety  Multiple myeloma  COPD (chronic obstructive pulmonary disease)  S/P cholecystectomy    MEDICATIONS  (STANDING):  budesonide 160 MICROgram(s)/formoterol 4.5 MICROgram(s) Inhaler 2 Puff(s) Inhalation two times a day  cefepime  Injectable. 1000 milliGRAM(s) IV Push every 8 hours  dexAMETHasone  Injectable 4 milliGRAM(s) IV Push daily  DULoxetine 30 milliGRAM(s) Oral daily  enoxaparin Injectable 30 milliGRAM(s) SubCutaneous every 12 hours  influenza  Vaccine (HIGH DOSE) 0.7 milliLiter(s) IntraMuscular once  metoprolol tartrate 12.5 milliGRAM(s) Oral daily  nicotine - 21 mG/24Hr(s) Patch 1 Patch Transdermal daily  remdesivir  IVPB   IV Intermittent   remdesivir  IVPB 100 milliGRAM(s) IV Intermittent every 24 hours  tamsulosin 0.4 milliGRAM(s) Oral at bedtime  valACYclovir 500 milliGRAM(s) Oral daily  vancomycin  IVPB 1000 milliGRAM(s) IV Intermittent every 12 hours    MEDICATIONS  (PRN):  albuterol    90 MICROgram(s) HFA Inhaler 2 Puff(s) Inhalation every 4 hours PRN Shortness of Breath and/or Wheezing  ALPRAZolam 0.5 milliGRAM(s) Oral two times a day PRN anxiety or agitation      Allergies: penicillin     Social: active smoker, unable to obtain further information     FH: unable to obtain     ICU Vital Signs Last 24 Hrs  T(C): 36.8 (22 Oct 2023 10:00), Max: 37.2 (22 Oct 2023 04:00)  T(F): 98.2 (22 Oct 2023 10:00), Max: 99 (22 Oct 2023 04:00)  HR: 68 (22 Oct 2023 09:31) (51 - 77)  BP: 127/88 (22 Oct 2023 07:58) (89/53 - 130/57)  BP(mean): 77 (22 Oct 2023 06:00) (55 - 77)  ABP: --  ABP(mean): --  RR: 20 (22 Oct 2023 08:00) (18 - 20)  SpO2: 100% (22 Oct 2023 09:31) (94% - 100%)    O2 Parameters below as of 22 Oct 2023 08:00  Patient On (Oxygen Delivery Method): BiPAP/CPAP            ABG - ( 21 Oct 2023 08:00 )  pH, Arterial: 7.370 pH, Blood: x     /  pCO2: 54    /  pO2: 159   / HCO3: 31    / Base Excess: 5.9   /  SaO2: 99.3     LABS:               10.1   4.97  )-----------( 150      ( 22 Oct 2023 07:43 )             34.1    10-22    140  |  103  |  14.0  ----------------------------<  96  4.6   |  26.0  |  0.85    Ca    8.4      22 Oct 2023 07:43  Phos  2.9     10-22  Mg     2.0     10-22    TPro  5.5<L>  /  Alb  3.1<L>  /  TBili  <0.2<L>  /  DBili  0.1  /  AST  12  /  ALT  15  /  AlkPhos  85  10-22                  Physical Examination:    General: awake, alert, interactive, in NAD     HEENT: NC/AT, anicteric, MMM    PULM: + expiratory wheeze with prolonged expiration improved from prior, no accessory muscle use     NECK: Supple, trachea midline    CVS: S1S2, RRR           ABD: Soft, nondistended, nontender, normoactive bowel sounds    EXT: No edema, nontender    SKIN: Warm and well perfused, no rashes noted    NEURO: awake, no facial asymmetry, no gross focal deficits      ROS: negative except per HPI     RADIOLOGY:   < from: CT Chest No Cont (10.21.23 @ 12:39) >  FINDINGS:    Lungs/Airways/Pleura: Small right loculated pleural effusion has slightly   decreased since 10/8/2023. Paraseptal and centrilobular emphysema is   present. Mild biapical scarring. A 9 mm right apical nodule on series 3,   image 32 has not significantly changed since 2018. Small tree-in-bud   nodules are present predominantly in the right upper lobe, similar to the   prior study. No new consolidation.    Mediastinum/Lymph nodes: No thoracic adenopathy. Heterogeneous thyroid.    Heart and Vessels: The great vessels are normal in size. The heart is   normal in size. No pericardial effusion. Qualitatively mild coronary   artery calcification. Mitral annular calcification.    Upper Abdomen: Cholecystectomy. Left nephrolithiasis.    Osseous structures and Soft Tissues: No aggressive bone lesions. Old left   posterolateral rib fractures. Mild L2 compression deformity is unchanged.    IMPRESSION:  Mildly decreased small loculated right pleural effusion since 10/8/2023.    Otherwise similar findings in this patient with emphysema and small   airways disease compared to 10/8/2023.    < end of copied text >      < from: Xray Chest 1 View-PORTABLE IMMEDIATE (10.21.23 @ 08:38) >  INTERPRETATION:  INDICATIONS: 78-year-old female with sepsis.    Prior examination for comparison: 10/7/2023    Technique: AP view of chest    Findings:    Patient is rotated. The lungs are clear. There are no pleural effusions.   The cardiomediastinal silhouette is normal. Bones are grossly normal.    IMPRESSION: No evidence of acute cardiopulmonary disease.    --- Endof Report ---            < end of copied text >    < from: TTE Echo Complete w/o Contrast w/ Doppler (10.09.23 @ 11:08) >  Summary:   1. Endocardial visualization was enhanced with intravenous echo contrast.   2. Normal left ventricular internal cavity size.   3. Normal global left ventricular systolic function.   4. Left ventricular ejection fraction, by visual estimation, is 60 to   65%.   5. Spectral Doppler shows impaired relaxation pattern of left   ventricular myocardial filling (Grade I diastolic dysfunction).   6. Elevated mean left atrial pressure.   7. Normal right ventricular size and function.   8. The left atrium is normal in size.   9. The right atrium is normal in size.  10. Sclerotic aortic valve with normal opening.  11. Mild thickening and calcification of the anterior and posterior   mitral valve leaflets.  12. Moderate mitral annular calcification.  13. Mild mitral valve regurgitation.  14. Mild tricuspid regurgitation.    < end of copied text >

## 2023-10-22 NOTE — PROGRESS NOTE ADULT - SUBJECTIVE AND OBJECTIVE BOX
[facility] Division of Hospital Medicine    Chief Complaint:      INTERVAL HISTORY:  Overnight, no acute events.     Patient seen and examined at bedside this morning with bipap on. Reports feeling well. Mentating well, A&Ox4. Patient denies chest pain, abd pain, N/V, fever, chills, dysuria or any other complaints. Would really like to have BIPAP off.     Spoke with nurse to have RT take off bipap to NC, will monitor. Will need noctural bipap.     MEDICATIONS  (STANDING):  albuterol    90 MICROgram(s) HFA Inhaler 2 Puff(s) Inhalation every 4 hours  budesonide 160 MICROgram(s)/formoterol 4.5 MICROgram(s) Inhaler 2 Puff(s) Inhalation two times a day  cefepime  Injectable. 1000 milliGRAM(s) IV Push every 8 hours  dexAMETHasone  Injectable 4 milliGRAM(s) IV Push daily  DULoxetine 30 milliGRAM(s) Oral daily  enoxaparin Injectable 30 milliGRAM(s) SubCutaneous every 12 hours  influenza  Vaccine (HIGH DOSE) 0.7 milliLiter(s) IntraMuscular once  metoprolol tartrate 12.5 milliGRAM(s) Oral daily  nicotine - 21 mG/24Hr(s) Patch 1 Patch Transdermal daily  remdesivir  IVPB   IV Intermittent   remdesivir  IVPB 100 milliGRAM(s) IV Intermittent every 24 hours  sodium chloride 0.9%. 1000 milliLiter(s) (75 mL/Hr) IV Continuous <Continuous>  tamsulosin 0.4 milliGRAM(s) Oral at bedtime  valACYclovir 500 milliGRAM(s) Oral daily  vancomycin  IVPB 1000 milliGRAM(s) IV Intermittent every 12 hours    MEDICATIONS  (PRN):  ALPRAZolam 0.5 milliGRAM(s) Oral two times a day PRN anxiety or agitation        I&O's Summary    21 Oct 2023 07:01  -  22 Oct 2023 07:00  --------------------------------------------------------  IN: 900 mL / OUT: 550 mL / NET: 350 mL        PHYSICAL EXAM:  Vital Signs Last 24 Hrs  T(C): 36.8 (22 Oct 2023 10:00), Max: 37.2 (22 Oct 2023 04:00)  T(F): 98.2 (22 Oct 2023 10:00), Max: 99 (22 Oct 2023 04:00)  HR: 68 (22 Oct 2023 09:31) (51 - 97)  BP: 127/88 (22 Oct 2023 07:58) (89/53 - 130/57)  BP(mean): 77 (22 Oct 2023 06:00) (55 - 77)  RR: 20 (22 Oct 2023 08:00) (18 - 20)  SpO2: 100% (22 Oct 2023 09:31) (94% - 100%)    Parameters below as of 22 Oct 2023 08:00  Patient On (Oxygen Delivery Method): BiPAP/CPAP          CONSTITUTIONAL: No apparent distress  HEENT: Normocephalic, Atraumatic. Trachea midline.  RESPIRATORY:  expiratory wheezings b/l, decreased breath sounds b/b  CARDIOVASCULAR: Regular rate and rhythm, no lower extremity edema; Peripheral pulses are 2+ bilaterally  ABDOMEN: Soft, non-distended, nontender to palpation, +BS  MUSCLOSKELETAL: moving all 4 extremities spontaneously  PSYCH: thoughts linear, affect appropriate  NEUROLOGY: Alert, Oriented x4, CN 2-12 grossly intact  SKIN: No rashes    LABS:                        10.1   4.97  )-----------( 150      ( 22 Oct 2023 07:43 )             34.1     10-22    140  |  103  |  14.0  ----------------------------<  96  4.6   |  26.0  |  0.85    Ca    8.4      22 Oct 2023 07:43  Phos  2.9     10-22  Mg     2.0     10-22    TPro  5.5<L>  /  Alb  3.1<L>  /  TBili  <0.2<L>  /  DBili  0.1  /  AST  12  /  ALT  15  /  AlkPhos  85  10-22    PT/INR - ( 22 Oct 2023 07:43 )   PT: 10.7 sec;   INR: 0.96 ratio         PTT - ( 21 Oct 2023 07:41 )  PTT:27.0 sec  CARDIAC MARKERS ( 21 Oct 2023 07:41 )  x     / <0.01 ng/mL / x     / x     / x          Urinalysis Basic - ( 22 Oct 2023 07:43 )    Color: x / Appearance: x / SG: x / pH: x  Gluc: 96 mg/dL / Ketone: x  / Bili: x / Urobili: x   Blood: x / Protein: x / Nitrite: x   Leuk Esterase: x / RBC: x / WBC x   Sq Epi: x / Non Sq Epi: x / Bacteria: x        CAPILLARY BLOOD GLUCOSE      POCT Blood Glucose.: 97 mg/dL (22 Oct 2023 05:19)  POCT Blood Glucose.: 128 mg/dL (21 Oct 2023 22:01)        RADIOLOGY & ADDITIONAL TESTS:  Results Reviewed:   Imaging Personally Reviewed:  Electrocardiogram Personally Reviewed:

## 2023-10-23 LAB
ALBUMIN SERPL ELPH-MCNC: 3 G/DL — LOW (ref 3.3–5.2)
ALBUMIN SERPL ELPH-MCNC: 3 G/DL — LOW (ref 3.3–5.2)
ALP SERPL-CCNC: 83 U/L — SIGNIFICANT CHANGE UP (ref 40–120)
ALP SERPL-CCNC: 83 U/L — SIGNIFICANT CHANGE UP (ref 40–120)
ALT FLD-CCNC: 12 U/L — SIGNIFICANT CHANGE UP
ALT FLD-CCNC: 12 U/L — SIGNIFICANT CHANGE UP
ANION GAP SERPL CALC-SCNC: 11 MMOL/L — SIGNIFICANT CHANGE UP (ref 5–17)
ANION GAP SERPL CALC-SCNC: 11 MMOL/L — SIGNIFICANT CHANGE UP (ref 5–17)
AST SERPL-CCNC: 10 U/L — SIGNIFICANT CHANGE UP
AST SERPL-CCNC: 10 U/L — SIGNIFICANT CHANGE UP
BASE EXCESS BLDA CALC-SCNC: 5.1 MMOL/L — HIGH (ref -2–3)
BASE EXCESS BLDA CALC-SCNC: 5.1 MMOL/L — HIGH (ref -2–3)
BILIRUB DIRECT SERPL-MCNC: 0.1 MG/DL — SIGNIFICANT CHANGE UP (ref 0–0.3)
BILIRUB DIRECT SERPL-MCNC: 0.1 MG/DL — SIGNIFICANT CHANGE UP (ref 0–0.3)
BILIRUB INDIRECT FLD-MCNC: SIGNIFICANT CHANGE UP MG/DL (ref 0.2–1)
BILIRUB INDIRECT FLD-MCNC: SIGNIFICANT CHANGE UP MG/DL (ref 0.2–1)
BILIRUB SERPL-MCNC: <0.2 MG/DL — LOW (ref 0.4–2)
BILIRUB SERPL-MCNC: <0.2 MG/DL — LOW (ref 0.4–2)
BLOOD GAS COMMENTS ARTERIAL: SIGNIFICANT CHANGE UP
BLOOD GAS COMMENTS ARTERIAL: SIGNIFICANT CHANGE UP
BUN SERPL-MCNC: 19.9 MG/DL — SIGNIFICANT CHANGE UP (ref 8–20)
BUN SERPL-MCNC: 19.9 MG/DL — SIGNIFICANT CHANGE UP (ref 8–20)
CALCIUM SERPL-MCNC: 8.4 MG/DL — SIGNIFICANT CHANGE UP (ref 8.4–10.5)
CALCIUM SERPL-MCNC: 8.4 MG/DL — SIGNIFICANT CHANGE UP (ref 8.4–10.5)
CHLORIDE SERPL-SCNC: 106 MMOL/L — SIGNIFICANT CHANGE UP (ref 96–108)
CHLORIDE SERPL-SCNC: 106 MMOL/L — SIGNIFICANT CHANGE UP (ref 96–108)
CO2 SERPL-SCNC: 27 MMOL/L — SIGNIFICANT CHANGE UP (ref 22–29)
CO2 SERPL-SCNC: 27 MMOL/L — SIGNIFICANT CHANGE UP (ref 22–29)
CREAT SERPL-MCNC: 0.89 MG/DL — SIGNIFICANT CHANGE UP (ref 0.5–1.3)
CREAT SERPL-MCNC: 0.89 MG/DL — SIGNIFICANT CHANGE UP (ref 0.5–1.3)
CREAT SERPL-MCNC: 0.91 MG/DL — SIGNIFICANT CHANGE UP (ref 0.5–1.3)
CREAT SERPL-MCNC: 0.91 MG/DL — SIGNIFICANT CHANGE UP (ref 0.5–1.3)
CRP SERPL-MCNC: 44 MG/L — HIGH
CRP SERPL-MCNC: 44 MG/L — HIGH
EGFR: 64 ML/MIN/1.73M2 — SIGNIFICANT CHANGE UP
EGFR: 64 ML/MIN/1.73M2 — SIGNIFICANT CHANGE UP
EGFR: 66 ML/MIN/1.73M2 — SIGNIFICANT CHANGE UP
EGFR: 66 ML/MIN/1.73M2 — SIGNIFICANT CHANGE UP
ERYTHROCYTE [SEDIMENTATION RATE] IN BLOOD: 10 MM/HR — SIGNIFICANT CHANGE UP (ref 0–20)
ERYTHROCYTE [SEDIMENTATION RATE] IN BLOOD: 10 MM/HR — SIGNIFICANT CHANGE UP (ref 0–20)
GLUCOSE SERPL-MCNC: 91 MG/DL — SIGNIFICANT CHANGE UP (ref 70–99)
GLUCOSE SERPL-MCNC: 91 MG/DL — SIGNIFICANT CHANGE UP (ref 70–99)
HCO3 BLDA-SCNC: 30 MMOL/L — HIGH (ref 21–28)
HCO3 BLDA-SCNC: 30 MMOL/L — HIGH (ref 21–28)
HCT VFR BLD CALC: 34.2 % — LOW (ref 34.5–45)
HCT VFR BLD CALC: 34.2 % — LOW (ref 34.5–45)
HGB BLD-MCNC: 10.5 G/DL — LOW (ref 11.5–15.5)
HGB BLD-MCNC: 10.5 G/DL — LOW (ref 11.5–15.5)
HOROWITZ INDEX BLDA+IHG-RTO: SIGNIFICANT CHANGE UP
HOROWITZ INDEX BLDA+IHG-RTO: SIGNIFICANT CHANGE UP
INR BLD: 0.98 RATIO — SIGNIFICANT CHANGE UP (ref 0.85–1.18)
INR BLD: 0.98 RATIO — SIGNIFICANT CHANGE UP (ref 0.85–1.18)
MCHC RBC-ENTMCNC: 26.5 PG — LOW (ref 27–34)
MCHC RBC-ENTMCNC: 26.5 PG — LOW (ref 27–34)
MCHC RBC-ENTMCNC: 30.7 GM/DL — LOW (ref 32–36)
MCHC RBC-ENTMCNC: 30.7 GM/DL — LOW (ref 32–36)
MCV RBC AUTO: 86.4 FL — SIGNIFICANT CHANGE UP (ref 80–100)
MCV RBC AUTO: 86.4 FL — SIGNIFICANT CHANGE UP (ref 80–100)
PCO2 BLDA: 51 MMHG — HIGH (ref 32–45)
PCO2 BLDA: 51 MMHG — HIGH (ref 32–45)
PH BLDA: 7.38 — SIGNIFICANT CHANGE UP (ref 7.35–7.45)
PH BLDA: 7.38 — SIGNIFICANT CHANGE UP (ref 7.35–7.45)
PLATELET # BLD AUTO: 201 K/UL — SIGNIFICANT CHANGE UP (ref 150–400)
PLATELET # BLD AUTO: 201 K/UL — SIGNIFICANT CHANGE UP (ref 150–400)
PO2 BLDA: 137 MMHG — HIGH (ref 83–108)
PO2 BLDA: 137 MMHG — HIGH (ref 83–108)
POTASSIUM SERPL-MCNC: 3.7 MMOL/L — SIGNIFICANT CHANGE UP (ref 3.5–5.3)
POTASSIUM SERPL-MCNC: 3.7 MMOL/L — SIGNIFICANT CHANGE UP (ref 3.5–5.3)
POTASSIUM SERPL-SCNC: 3.7 MMOL/L — SIGNIFICANT CHANGE UP (ref 3.5–5.3)
POTASSIUM SERPL-SCNC: 3.7 MMOL/L — SIGNIFICANT CHANGE UP (ref 3.5–5.3)
PROCALCITONIN SERPL-MCNC: 0.14 NG/ML — HIGH (ref 0.02–0.1)
PROCALCITONIN SERPL-MCNC: 0.14 NG/ML — HIGH (ref 0.02–0.1)
PROT SERPL-MCNC: 5.6 G/DL — LOW (ref 6.6–8.7)
PROT SERPL-MCNC: 5.6 G/DL — LOW (ref 6.6–8.7)
PROTHROM AB SERPL-ACNC: 10.9 SEC — SIGNIFICANT CHANGE UP (ref 9.5–13)
PROTHROM AB SERPL-ACNC: 10.9 SEC — SIGNIFICANT CHANGE UP (ref 9.5–13)
RBC # BLD: 3.96 M/UL — SIGNIFICANT CHANGE UP (ref 3.8–5.2)
RBC # BLD: 3.96 M/UL — SIGNIFICANT CHANGE UP (ref 3.8–5.2)
RBC # FLD: 19.3 % — HIGH (ref 10.3–14.5)
RBC # FLD: 19.3 % — HIGH (ref 10.3–14.5)
SAO2 % BLDA: 96.7 % — SIGNIFICANT CHANGE UP (ref 94–98)
SAO2 % BLDA: 96.7 % — SIGNIFICANT CHANGE UP (ref 94–98)
SODIUM SERPL-SCNC: 144 MMOL/L — SIGNIFICANT CHANGE UP (ref 135–145)
SODIUM SERPL-SCNC: 144 MMOL/L — SIGNIFICANT CHANGE UP (ref 135–145)
WBC # BLD: 5.69 K/UL — SIGNIFICANT CHANGE UP (ref 3.8–10.5)
WBC # BLD: 5.69 K/UL — SIGNIFICANT CHANGE UP (ref 3.8–10.5)
WBC # FLD AUTO: 5.69 K/UL — SIGNIFICANT CHANGE UP (ref 3.8–10.5)
WBC # FLD AUTO: 5.69 K/UL — SIGNIFICANT CHANGE UP (ref 3.8–10.5)

## 2023-10-23 PROCEDURE — 99233 SBSQ HOSP IP/OBS HIGH 50: CPT

## 2023-10-23 RX ORDER — PANTOPRAZOLE SODIUM 20 MG/1
40 TABLET, DELAYED RELEASE ORAL
Refills: 0 | Status: DISCONTINUED | OUTPATIENT
Start: 2023-10-23 | End: 2023-10-26

## 2023-10-23 RX ORDER — AZITHROMYCIN 500 MG/1
500 TABLET, FILM COATED ORAL ONCE
Refills: 0 | Status: COMPLETED | OUTPATIENT
Start: 2023-10-23 | End: 2023-10-23

## 2023-10-23 RX ORDER — AZITHROMYCIN 500 MG/1
500 TABLET, FILM COATED ORAL EVERY 24 HOURS
Refills: 0 | Status: DISCONTINUED | OUTPATIENT
Start: 2023-10-24 | End: 2023-10-26

## 2023-10-23 RX ORDER — CEFTRIAXONE 500 MG/1
1000 INJECTION, POWDER, FOR SOLUTION INTRAMUSCULAR; INTRAVENOUS EVERY 24 HOURS
Refills: 0 | Status: DISCONTINUED | OUTPATIENT
Start: 2023-10-23 | End: 2023-10-26

## 2023-10-23 RX ORDER — ALPRAZOLAM 0.25 MG
0.5 TABLET ORAL THREE TIMES A DAY
Refills: 0 | Status: DISCONTINUED | OUTPATIENT
Start: 2023-10-23 | End: 2023-10-26

## 2023-10-23 RX ORDER — AZITHROMYCIN 500 MG/1
TABLET, FILM COATED ORAL
Refills: 0 | Status: DISCONTINUED | OUTPATIENT
Start: 2023-10-23 | End: 2023-10-26

## 2023-10-23 RX ORDER — CEFTRIAXONE 500 MG/1
1000 INJECTION, POWDER, FOR SOLUTION INTRAMUSCULAR; INTRAVENOUS EVERY 24 HOURS
Refills: 0 | Status: DISCONTINUED | OUTPATIENT
Start: 2023-10-23 | End: 2023-10-23

## 2023-10-23 RX ORDER — PANTOPRAZOLE SODIUM 20 MG/1
40 TABLET, DELAYED RELEASE ORAL
Refills: 0 | Status: DISCONTINUED | OUTPATIENT
Start: 2023-10-23 | End: 2023-10-23

## 2023-10-23 RX ADMIN — BUDESONIDE AND FORMOTEROL FUMARATE DIHYDRATE 2 PUFF(S): 160; 4.5 AEROSOL RESPIRATORY (INHALATION) at 21:50

## 2023-10-23 RX ADMIN — DULOXETINE HYDROCHLORIDE 30 MILLIGRAM(S): 30 CAPSULE, DELAYED RELEASE ORAL at 11:47

## 2023-10-23 RX ADMIN — Medication 4 MILLIGRAM(S): at 05:05

## 2023-10-23 RX ADMIN — Medication 1 PATCH: at 11:47

## 2023-10-23 RX ADMIN — CEFTRIAXONE 1000 MILLIGRAM(S): 500 INJECTION, POWDER, FOR SOLUTION INTRAMUSCULAR; INTRAVENOUS at 17:31

## 2023-10-23 RX ADMIN — CEFEPIME 1000 MILLIGRAM(S): 1 INJECTION, POWDER, FOR SOLUTION INTRAMUSCULAR; INTRAVENOUS at 05:06

## 2023-10-23 RX ADMIN — TAMSULOSIN HYDROCHLORIDE 0.4 MILLIGRAM(S): 0.4 CAPSULE ORAL at 21:50

## 2023-10-23 RX ADMIN — PANTOPRAZOLE SODIUM 40 MILLIGRAM(S): 20 TABLET, DELAYED RELEASE ORAL at 11:46

## 2023-10-23 RX ADMIN — VALACYCLOVIR 500 MILLIGRAM(S): 500 TABLET, FILM COATED ORAL at 11:47

## 2023-10-23 RX ADMIN — ALBUTEROL 2 PUFF(S): 90 AEROSOL, METERED ORAL at 21:50

## 2023-10-23 RX ADMIN — Medication 1 PATCH: at 09:30

## 2023-10-23 RX ADMIN — Medication 0.5 MILLIGRAM(S): at 17:31

## 2023-10-23 RX ADMIN — BUDESONIDE AND FORMOTEROL FUMARATE DIHYDRATE 2 PUFF(S): 160; 4.5 AEROSOL RESPIRATORY (INHALATION) at 09:22

## 2023-10-23 RX ADMIN — ENOXAPARIN SODIUM 30 MILLIGRAM(S): 100 INJECTION SUBCUTANEOUS at 17:32

## 2023-10-23 RX ADMIN — Medication 0.5 MILLIGRAM(S): at 21:56

## 2023-10-23 RX ADMIN — Medication 0.5 MILLIGRAM(S): at 10:12

## 2023-10-23 RX ADMIN — ALBUTEROL 2 PUFF(S): 90 AEROSOL, METERED ORAL at 09:23

## 2023-10-23 RX ADMIN — REMDESIVIR 200 MILLIGRAM(S): 5 INJECTION INTRAVENOUS at 18:56

## 2023-10-23 RX ADMIN — Medication 12.5 MILLIGRAM(S): at 05:06

## 2023-10-23 RX ADMIN — ALBUTEROL 2 PUFF(S): 90 AEROSOL, METERED ORAL at 00:23

## 2023-10-23 RX ADMIN — ENOXAPARIN SODIUM 30 MILLIGRAM(S): 100 INJECTION SUBCUTANEOUS at 05:06

## 2023-10-23 RX ADMIN — AZITHROMYCIN 255 MILLIGRAM(S): 500 TABLET, FILM COATED ORAL at 17:31

## 2023-10-23 RX ADMIN — ALBUTEROL 2 PUFF(S): 90 AEROSOL, METERED ORAL at 15:17

## 2023-10-23 RX ADMIN — Medication 600 MILLIGRAM(S): at 10:12

## 2023-10-23 NOTE — PHYSICAL THERAPY INITIAL EVALUATION ADULT - GENERAL OBSERVATIONS, REHAB EVAL
Pt received seated in bedside recliner, CBWR, telemonitor intact, on wall O2 3.5 LPM via nasal cannula.

## 2023-10-23 NOTE — PHYSICAL THERAPY INITIAL EVALUATION ADULT - PERTINENT HX OF CURRENT PROBLEM, REHAB EVAL
PMH: COPD, MI, hfref, mm/thrombocytopenia, mood disorder, seizure, urinary retention, HLD, GERD.  Dx: acute on chronic respiratory failure, COVID+, bacterial pneumonia, and metabolic encephalopathy

## 2023-10-23 NOTE — PROGRESS NOTE ADULT - SUBJECTIVE AND OBJECTIVE BOX
[facility] Division of Hospital Medicine    INTERVAL HISTORY:  Overnight, on BIPAP only for 3-4 hours.     Patient seen and examined at bedside this afternoon. On NC. Patient denies chest pain, SOB, abd pain, N/V, fever, chills, dysuria or any other complaints. Cough?      MEDICATIONS  (STANDING):  albuterol    90 MICROgram(s) HFA Inhaler 2 Puff(s) Inhalation every 4 hours  budesonide 160 MICROgram(s)/formoterol 4.5 MICROgram(s) Inhaler 2 Puff(s) Inhalation two times a day  dexAMETHasone  Injectable 4 milliGRAM(s) IV Push daily  DULoxetine 30 milliGRAM(s) Oral daily  enoxaparin Injectable 30 milliGRAM(s) SubCutaneous every 12 hours  guaiFENesin  milliGRAM(s) Oral every 12 hours  influenza  Vaccine (HIGH DOSE) 0.7 milliLiter(s) IntraMuscular once  metoprolol tartrate 12.5 milliGRAM(s) Oral daily  nicotine - 21 mG/24Hr(s) Patch 1 Patch Transdermal daily  pantoprazole    Tablet 40 milliGRAM(s) Oral before breakfast  remdesivir  IVPB   IV Intermittent   remdesivir  IVPB 100 milliGRAM(s) IV Intermittent every 24 hours  tamsulosin 0.4 milliGRAM(s) Oral at bedtime  valACYclovir 500 milliGRAM(s) Oral daily    MEDICATIONS  (PRN):  acetaminophen     Tablet .. 650 milliGRAM(s) Oral every 6 hours PRN Temp greater or equal to 38C (100.4F), Mild Pain (1 - 3)  ALPRAZolam 0.5 milliGRAM(s) Oral three times a day PRN anxiety  aluminum hydroxide/magnesium hydroxide/simethicone Suspension 30 milliLiter(s) Oral every 6 hours PRN Dyspepsia        I&O's Summary    22 Oct 2023 07:01  -  23 Oct 2023 07:00  --------------------------------------------------------  IN: 600 mL / OUT: 0 mL / NET: 600 mL        PHYSICAL EXAM:  Vital Signs Last 24 Hrs  T(C): 37.5 (23 Oct 2023 05:03), Max: 37.7 (23 Oct 2023 00:00)  T(F): 99.5 (23 Oct 2023 05:03), Max: 99.9 (23 Oct 2023 00:00)  HR: 83 (23 Oct 2023 09:27) (76 - 120)  BP: 135/74 (23 Oct 2023 08:00) (127/69 - 143/94)  BP(mean): 89 (23 Oct 2023 08:00) (82 - 106)  RR: 22 (23 Oct 2023 08:00) (18 - 22)  SpO2: 97% (23 Oct 2023 09:27) (91% - 98%)    Parameters below as of 23 Oct 2023 09:27  Patient On (Oxygen Delivery Method): nasal cannula        CONSTITUTIONAL: No apparent distress  HEENT: Normocephalic, Atraumatic. Trachea midline.  RESPIRATORY:  expiratory wheezings b/l, decreased breath sounds b/b  CARDIOVASCULAR: Regular rate and rhythm, no lower extremity edema; Peripheral pulses are 2+ bilaterally  ABDOMEN: Soft, non-distended, nontender to palpation, +BS  MUSCLOSKELETAL: moving all 4 extremities spontaneously  PSYCH: thoughts linear, affect appropriate  NEUROLOGY: Alert, Oriented x4, CN 2-12 grossly intact  SKIN: No rashes    LABS:                        10.5   5.69  )-----------( 201      ( 23 Oct 2023 05:45 )             34.2     10-23    144  |  106  |  19.9  ----------------------------<  91  3.7   |  27.0  |  0.89    Ca    8.4      23 Oct 2023 05:45  Phos  2.9     10-22  Mg     2.0     10-22    TPro  5.6<L>  /  Alb  3.0<L>  /  TBili  <0.2<L>  /  DBili  0.1  /  AST  10  /  ALT  12  /  AlkPhos  83  10-23    PT/INR - ( 23 Oct 2023 05:45 )   PT: 10.9 sec;   INR: 0.98 ratio               Urinalysis Basic - ( 23 Oct 2023 05:45 )    Color: x / Appearance: x / SG: x / pH: x  Gluc: 91 mg/dL / Ketone: x  / Bili: x / Urobili: x   Blood: x / Protein: x / Nitrite: x   Leuk Esterase: x / RBC: x / WBC x   Sq Epi: x / Non Sq Epi: x / Bacteria: x        Culture - Blood (collected 21 Oct 2023 07:41)  Source: .Blood Blood-Peripheral  Preliminary Report (23 Oct 2023 12:01):    No growth at 48 Hours    Culture - Blood (collected 21 Oct 2023 07:35)  Source: .Blood Blood-Peripheral  Preliminary Report (23 Oct 2023 12:01):    No growth at 48 Hours      CAPILLARY BLOOD GLUCOSE            RADIOLOGY & ADDITIONAL TESTS:  Results Reviewed:   Imaging Personally Reviewed:  Electrocardiogram Personally Reviewed:

## 2023-10-23 NOTE — PHYSICAL THERAPY INITIAL EVALUATION ADULT - ADDITIONAL COMMENTS
Pt reports she lives in a private apartment within a [place] community.  Pt reports she has 0 BARBI and 0 interior steps.  Pt lives alone and prior to hospitalization pt was I in all ADLs and ambulation without assistive devices.

## 2023-10-23 NOTE — PROGRESS NOTE ADULT - ASSESSMENT
79 y/o F with PMH COPD, MI,  hfref, mm/ thrombocytopenia/ on promecta, active smoker, Htn, mood disorders, seizure disorder, urinary retention,   HLD, GERD, recent admission for sob/ chf , was diuresed and treated for pna/ codp , was dcd on home o2 3 L NC , po abx and prednisone, today  patient presents for hypoxia and shortness of breath. Patient is a poor historian. EMS found patient to be hypoxic to the 60's on room air, placed on NC with  minimal improvement/ was briefly placed on bipap. now back on nc 5 L. patient lethargic in er. rvp positive for covid, admitted to medicine     #acute on chronic respiratory failure: hypoxic and hypercapnic   #COPD exacerbation  #COVID PNA  #?Superimposed bacterial PNA  - patient continues to smoke   - cxr no acute changes   - CT chest: Small right loculated pleural effusion has slightly decreased since 10/8/2023. Paraseptal and centrilobular emphysema is present. Mild biapical scarring. A 9 mm right apical nodule has not significantly changed since 2018. Small tree-in-bud nodules are present predominantly in the right upper lobe, similar to the prior study. No new consolidation.  - received vanc / cefepime in ed  - on vanc, cefepime for now; MRSA negative, vanc d/c. vanc if negative; will change abx to CTX and azithro   - c/w remdesivir, dex  - BCx pending   - d/c IVF if pt able to drink after   - c/w spiriva, budesonide; unable to do duonebs given COVID per RT   - pulm consulted, recs appreciated   - monitor of BIPAP, plan for noctural BiPAP; did not tolerate 2/2 anxiety despite IV anxiety meds     #ams   - likely metabolic encephalopathy due to resp failure   - CTH no acute findings  - likely 2/2 co2 retention  - much improved A&Ox3, observe off BIPAP    #chronic diastolic chf   - clinically does not appear overloaded   - d/c IVF  - monitor volume status     #mm/ thrombocytopenia   - no active bleed   - on promecta at home   - c/w acyclovir for ppx dose     #cad/ htn   - c/w bb     #dvt ppx   - sq lovenox bid, covid dosing

## 2023-10-24 DIAGNOSIS — J96.21 ACUTE AND CHRONIC RESPIRATORY FAILURE WITH HYPOXIA: ICD-10-CM

## 2023-10-24 DIAGNOSIS — J44.1 CHRONIC OBSTRUCTIVE PULMONARY DISEASE WITH (ACUTE) EXACERBATION: ICD-10-CM

## 2023-10-24 DIAGNOSIS — U07.1 COVID-19: ICD-10-CM

## 2023-10-24 LAB
ALBUMIN SERPL ELPH-MCNC: 3.4 G/DL — SIGNIFICANT CHANGE UP (ref 3.3–5.2)
ALBUMIN SERPL ELPH-MCNC: 3.4 G/DL — SIGNIFICANT CHANGE UP (ref 3.3–5.2)
ALP SERPL-CCNC: 94 U/L — SIGNIFICANT CHANGE UP (ref 40–120)
ALP SERPL-CCNC: 94 U/L — SIGNIFICANT CHANGE UP (ref 40–120)
ALT FLD-CCNC: 12 U/L — SIGNIFICANT CHANGE UP
ALT FLD-CCNC: 12 U/L — SIGNIFICANT CHANGE UP
ANION GAP SERPL CALC-SCNC: 13 MMOL/L — SIGNIFICANT CHANGE UP (ref 5–17)
ANION GAP SERPL CALC-SCNC: 13 MMOL/L — SIGNIFICANT CHANGE UP (ref 5–17)
AST SERPL-CCNC: 10 U/L — SIGNIFICANT CHANGE UP
AST SERPL-CCNC: 10 U/L — SIGNIFICANT CHANGE UP
BILIRUB DIRECT SERPL-MCNC: 0.1 MG/DL — SIGNIFICANT CHANGE UP (ref 0–0.3)
BILIRUB DIRECT SERPL-MCNC: 0.1 MG/DL — SIGNIFICANT CHANGE UP (ref 0–0.3)
BILIRUB INDIRECT FLD-MCNC: SIGNIFICANT CHANGE UP MG/DL (ref 0.2–1)
BILIRUB INDIRECT FLD-MCNC: SIGNIFICANT CHANGE UP MG/DL (ref 0.2–1)
BILIRUB SERPL-MCNC: <0.2 MG/DL — LOW (ref 0.4–2)
BILIRUB SERPL-MCNC: <0.2 MG/DL — LOW (ref 0.4–2)
BUN SERPL-MCNC: 14.4 MG/DL — SIGNIFICANT CHANGE UP (ref 8–20)
BUN SERPL-MCNC: 14.4 MG/DL — SIGNIFICANT CHANGE UP (ref 8–20)
CALCIUM SERPL-MCNC: 8.8 MG/DL — SIGNIFICANT CHANGE UP (ref 8.4–10.5)
CALCIUM SERPL-MCNC: 8.8 MG/DL — SIGNIFICANT CHANGE UP (ref 8.4–10.5)
CHLORIDE SERPL-SCNC: 101 MMOL/L — SIGNIFICANT CHANGE UP (ref 96–108)
CHLORIDE SERPL-SCNC: 101 MMOL/L — SIGNIFICANT CHANGE UP (ref 96–108)
CO2 SERPL-SCNC: 29 MMOL/L — SIGNIFICANT CHANGE UP (ref 22–29)
CO2 SERPL-SCNC: 29 MMOL/L — SIGNIFICANT CHANGE UP (ref 22–29)
CREAT SERPL-MCNC: 0.82 MG/DL — SIGNIFICANT CHANGE UP (ref 0.5–1.3)
CREAT SERPL-MCNC: 0.82 MG/DL — SIGNIFICANT CHANGE UP (ref 0.5–1.3)
CREAT SERPL-MCNC: 0.86 MG/DL — SIGNIFICANT CHANGE UP (ref 0.5–1.3)
CREAT SERPL-MCNC: 0.86 MG/DL — SIGNIFICANT CHANGE UP (ref 0.5–1.3)
EGFR: 69 ML/MIN/1.73M2 — SIGNIFICANT CHANGE UP
EGFR: 69 ML/MIN/1.73M2 — SIGNIFICANT CHANGE UP
EGFR: 73 ML/MIN/1.73M2 — SIGNIFICANT CHANGE UP
EGFR: 73 ML/MIN/1.73M2 — SIGNIFICANT CHANGE UP
GLUCOSE SERPL-MCNC: 122 MG/DL — HIGH (ref 70–99)
GLUCOSE SERPL-MCNC: 122 MG/DL — HIGH (ref 70–99)
HCT VFR BLD CALC: 37.2 % — SIGNIFICANT CHANGE UP (ref 34.5–45)
HCT VFR BLD CALC: 37.2 % — SIGNIFICANT CHANGE UP (ref 34.5–45)
HGB BLD-MCNC: 11.4 G/DL — LOW (ref 11.5–15.5)
HGB BLD-MCNC: 11.4 G/DL — LOW (ref 11.5–15.5)
INR BLD: 1 RATIO — SIGNIFICANT CHANGE UP (ref 0.85–1.18)
INR BLD: 1 RATIO — SIGNIFICANT CHANGE UP (ref 0.85–1.18)
MCHC RBC-ENTMCNC: 26.6 PG — LOW (ref 27–34)
MCHC RBC-ENTMCNC: 26.6 PG — LOW (ref 27–34)
MCHC RBC-ENTMCNC: 30.6 GM/DL — LOW (ref 32–36)
MCHC RBC-ENTMCNC: 30.6 GM/DL — LOW (ref 32–36)
MCV RBC AUTO: 86.9 FL — SIGNIFICANT CHANGE UP (ref 80–100)
MCV RBC AUTO: 86.9 FL — SIGNIFICANT CHANGE UP (ref 80–100)
PLATELET # BLD AUTO: 221 K/UL — SIGNIFICANT CHANGE UP (ref 150–400)
PLATELET # BLD AUTO: 221 K/UL — SIGNIFICANT CHANGE UP (ref 150–400)
POTASSIUM SERPL-MCNC: 3.3 MMOL/L — LOW (ref 3.5–5.3)
POTASSIUM SERPL-MCNC: 3.3 MMOL/L — LOW (ref 3.5–5.3)
POTASSIUM SERPL-SCNC: 3.3 MMOL/L — LOW (ref 3.5–5.3)
POTASSIUM SERPL-SCNC: 3.3 MMOL/L — LOW (ref 3.5–5.3)
PROT SERPL-MCNC: 6.2 G/DL — LOW (ref 6.6–8.7)
PROT SERPL-MCNC: 6.2 G/DL — LOW (ref 6.6–8.7)
PROTHROM AB SERPL-ACNC: 11.1 SEC — SIGNIFICANT CHANGE UP (ref 9.5–13)
PROTHROM AB SERPL-ACNC: 11.1 SEC — SIGNIFICANT CHANGE UP (ref 9.5–13)
RBC # BLD: 4.28 M/UL — SIGNIFICANT CHANGE UP (ref 3.8–5.2)
RBC # BLD: 4.28 M/UL — SIGNIFICANT CHANGE UP (ref 3.8–5.2)
RBC # FLD: 19.2 % — HIGH (ref 10.3–14.5)
RBC # FLD: 19.2 % — HIGH (ref 10.3–14.5)
SODIUM SERPL-SCNC: 143 MMOL/L — SIGNIFICANT CHANGE UP (ref 135–145)
SODIUM SERPL-SCNC: 143 MMOL/L — SIGNIFICANT CHANGE UP (ref 135–145)
WBC # BLD: 8.31 K/UL — SIGNIFICANT CHANGE UP (ref 3.8–10.5)
WBC # BLD: 8.31 K/UL — SIGNIFICANT CHANGE UP (ref 3.8–10.5)
WBC # FLD AUTO: 8.31 K/UL — SIGNIFICANT CHANGE UP (ref 3.8–10.5)
WBC # FLD AUTO: 8.31 K/UL — SIGNIFICANT CHANGE UP (ref 3.8–10.5)

## 2023-10-24 PROCEDURE — 99233 SBSQ HOSP IP/OBS HIGH 50: CPT

## 2023-10-24 PROCEDURE — 99232 SBSQ HOSP IP/OBS MODERATE 35: CPT

## 2023-10-24 RX ORDER — POTASSIUM CHLORIDE 20 MEQ
40 PACKET (EA) ORAL EVERY 4 HOURS
Refills: 0 | Status: COMPLETED | OUTPATIENT
Start: 2023-10-24 | End: 2023-10-24

## 2023-10-24 RX ADMIN — PANTOPRAZOLE SODIUM 40 MILLIGRAM(S): 20 TABLET, DELAYED RELEASE ORAL at 06:15

## 2023-10-24 RX ADMIN — Medication 0.5 MILLIGRAM(S): at 12:58

## 2023-10-24 RX ADMIN — VALACYCLOVIR 500 MILLIGRAM(S): 500 TABLET, FILM COATED ORAL at 11:25

## 2023-10-24 RX ADMIN — DULOXETINE HYDROCHLORIDE 30 MILLIGRAM(S): 30 CAPSULE, DELAYED RELEASE ORAL at 11:25

## 2023-10-24 RX ADMIN — ALBUTEROL 2 PUFF(S): 90 AEROSOL, METERED ORAL at 21:21

## 2023-10-24 RX ADMIN — Medication 40 MILLIEQUIVALENT(S): at 08:08

## 2023-10-24 RX ADMIN — Medication 0.5 MILLIGRAM(S): at 06:15

## 2023-10-24 RX ADMIN — REMDESIVIR 200 MILLIGRAM(S): 5 INJECTION INTRAVENOUS at 17:05

## 2023-10-24 RX ADMIN — Medication 4 MILLIGRAM(S): at 05:28

## 2023-10-24 RX ADMIN — ENOXAPARIN SODIUM 30 MILLIGRAM(S): 100 INJECTION SUBCUTANEOUS at 05:29

## 2023-10-24 RX ADMIN — ALBUTEROL 2 PUFF(S): 90 AEROSOL, METERED ORAL at 11:27

## 2023-10-24 RX ADMIN — Medication 12.5 MILLIGRAM(S): at 05:28

## 2023-10-24 RX ADMIN — ALBUTEROL 2 PUFF(S): 90 AEROSOL, METERED ORAL at 18:40

## 2023-10-24 RX ADMIN — Medication 600 MILLIGRAM(S): at 05:28

## 2023-10-24 RX ADMIN — Medication 1 PATCH: at 11:58

## 2023-10-24 RX ADMIN — BUDESONIDE AND FORMOTEROL FUMARATE DIHYDRATE 2 PUFF(S): 160; 4.5 AEROSOL RESPIRATORY (INHALATION) at 21:21

## 2023-10-24 RX ADMIN — Medication 40 MILLIEQUIVALENT(S): at 15:28

## 2023-10-24 RX ADMIN — BUDESONIDE AND FORMOTEROL FUMARATE DIHYDRATE 2 PUFF(S): 160; 4.5 AEROSOL RESPIRATORY (INHALATION) at 11:26

## 2023-10-24 RX ADMIN — AZITHROMYCIN 255 MILLIGRAM(S): 500 TABLET, FILM COATED ORAL at 15:29

## 2023-10-24 RX ADMIN — Medication 1 PATCH: at 11:25

## 2023-10-24 RX ADMIN — ALBUTEROL 2 PUFF(S): 90 AEROSOL, METERED ORAL at 05:27

## 2023-10-24 RX ADMIN — TAMSULOSIN HYDROCHLORIDE 0.4 MILLIGRAM(S): 0.4 CAPSULE ORAL at 21:20

## 2023-10-24 RX ADMIN — Medication 600 MILLIGRAM(S): at 17:06

## 2023-10-24 RX ADMIN — ALBUTEROL 2 PUFF(S): 90 AEROSOL, METERED ORAL at 00:27

## 2023-10-24 RX ADMIN — CEFTRIAXONE 1000 MILLIGRAM(S): 500 INJECTION, POWDER, FOR SOLUTION INTRAMUSCULAR; INTRAVENOUS at 17:06

## 2023-10-24 RX ADMIN — ENOXAPARIN SODIUM 30 MILLIGRAM(S): 100 INJECTION SUBCUTANEOUS at 17:06

## 2023-10-24 RX ADMIN — ALBUTEROL 2 PUFF(S): 90 AEROSOL, METERED ORAL at 15:29

## 2023-10-24 RX ADMIN — Medication 0.5 MILLIGRAM(S): at 23:05

## 2023-10-24 NOTE — PROGRESS NOTE ADULT - SUBJECTIVE AND OBJECTIVE BOX
PULMONARY PROGRESS NOTE      BECCA JARRELLMACHELLE-[id number]    Patient is a 78y old  Female who presents with a chief complaint of sob (24 Oct 2023 15:46)      INTERVAL HPI/OVERNIGHT EVENTS:  resting comfortably  no CP or sob  has home 02  tolerating AVAPS at night and benefitting  MEDICATIONS  (STANDING):  albuterol    90 MICROgram(s) HFA Inhaler 2 Puff(s) Inhalation every 4 hours  azithromycin  IVPB      azithromycin  IVPB 500 milliGRAM(s) IV Intermittent every 24 hours  budesonide 160 MICROgram(s)/formoterol 4.5 MICROgram(s) Inhaler 2 Puff(s) Inhalation two times a day  cefTRIAXone Injectable. 1000 milliGRAM(s) IV Push every 24 hours  dexAMETHasone  Injectable 4 milliGRAM(s) IV Push daily  DULoxetine 30 milliGRAM(s) Oral daily  enoxaparin Injectable 30 milliGRAM(s) SubCutaneous every 12 hours  guaiFENesin  milliGRAM(s) Oral every 12 hours  influenza  Vaccine (HIGH DOSE) 0.7 milliLiter(s) IntraMuscular once  metoprolol tartrate 12.5 milliGRAM(s) Oral daily  nicotine - 21 mG/24Hr(s) Patch 1 Patch Transdermal daily  pantoprazole    Tablet 40 milliGRAM(s) Oral before breakfast  remdesivir  IVPB 100 milliGRAM(s) IV Intermittent every 24 hours  remdesivir  IVPB   IV Intermittent   tamsulosin 0.4 milliGRAM(s) Oral at bedtime  valACYclovir 500 milliGRAM(s) Oral daily      MEDICATIONS  (PRN):  acetaminophen     Tablet .. 650 milliGRAM(s) Oral every 6 hours PRN Temp greater or equal to 38C (100.4F), Mild Pain (1 - 3)  ALPRAZolam 0.5 milliGRAM(s) Oral three times a day PRN anxiety  aluminum hydroxide/magnesium hydroxide/simethicone Suspension 30 milliLiter(s) Oral every 6 hours PRN Dyspepsia      Allergies    penicillin (Unknown)    Intolerances        PAST MEDICAL & SURGICAL HISTORY:  MI (myocardial infarction)      Kidney stones      Hearing loss, unspecified hearing loss type, unspecified laterality      Anxiety      Multiple myeloma      COPD (chronic obstructive pulmonary disease)      S/P cholecystectomy          SOCIAL HISTORY  Smoking History:       REVIEW OF SYSTEMS:    CONSTITUTIONAL:  No distress    HEENT:  Eyes:  No diplopia or blurred vision. ENT:  No earache, sore throat or runny nose.    CARDIOVASCULAR:  No pressure, squeezing, tightness, heaviness or aching about the chest; no palpitations.    RESPIRATORY:  see HPI    GASTROINTESTINAL:  No nausea, vomiting or diarrhea.    GENITOURINARY:  No dysuria, frequency or urgency.    NEUROLOGIC:  No paresthesias, fasciculations, seizures or weakness.    PSYCHIATRIC:  No disorder of thought or mood.    Vital Signs Last 24 Hrs  T(C): 36.6 (24 Oct 2023 16:16), Max: 36.8 (24 Oct 2023 07:33)  T(F): 97.8 (24 Oct 2023 16:16), Max: 98.3 (24 Oct 2023 07:33)  HR: 93 (24 Oct 2023 16:16) (70 - 93)  BP: 107/67 (24 Oct 2023 16:16) (107/67 - 133/78)  BP(mean): --  RR: 18 (24 Oct 2023 16:16) (17 - 20)  SpO2: 98% (24 Oct 2023 16:16) (95% - 99%)    Parameters below as of 24 Oct 2023 16:16  Patient On (Oxygen Delivery Method): nasal cannula        PHYSICAL EXAMINATION:    GENERAL: The patient is awake and alert in no apparent distress.     HEENT: Head is normocephalic and atraumatic. Extraocular muscles are intact. Mucous membranes are moist.    NECK: Supple.    LUNGS: mod air entry bilat  without wheezing, rales or rhonchi; respirations unlabored    HEART: Regular rate and rhythm without murmur.    ABDOMEN: Soft, nontender, and nondistended.      EXTREMITIES: Without any cyanosis, clubbing, rash, lesions or edema.    NEUROLOGIC: Grossly intact.    LABS:                        11.4   8.31  )-----------( 221      ( 24 Oct 2023 05:56 )             37.2     10-24    143  |  101  |  14.4  ----------------------------<  122<H>  3.3<L>   |  29.0  |  0.82    Ca    8.8      24 Oct 2023 05:56    TPro  6.2<L>  /  Alb  3.4  /  TBili  <0.2<L>  /  DBili  0.1  /  AST  10  /  ALT  12  /  AlkPhos  94  10-24    PT/INR - ( 24 Oct 2023 05:56 )   PT: 11.1 sec;   INR: 1.00 ratio           Urinalysis Basic - ( 24 Oct 2023 05:56 )    Color: x / Appearance: x / SG: x / pH: x  Gluc: 122 mg/dL / Ketone: x  / Bili: x / Urobili: x   Blood: x / Protein: x / Nitrite: x   Leuk Esterase: x / RBC: x / WBC x   Sq Epi: x / Non Sq Epi: x / Bacteria: x      ABG - ( 23 Oct 2023 03:41 )  pH, Arterial: 7.380 pH, Blood: x     /  pCO2: 51    /  pO2: 137   / HCO3: 30    / Base Excess: 5.1   /  SaO2: 96.7                        Procalcitonin, Serum: 0.14 ng/mL (10-23-23 @ 05:45)      MICROBIOLOGY:    RADIOLOGY & ADDITIONAL STUDIES:  CT scans reviewed, office notes reviewed

## 2023-10-24 NOTE — PROGRESS NOTE ADULT - ASSESSMENT
Acute on chronic hypoxemic, hypercapnic resp failure  Secondary to COPD, Covid positive  Improving, benefitting from AVAps  CT scan reviewed, decreased R loculated eff ? etiology- need to follow, recent echo normal 10.23  has home 02, needs home neb  Needs AVAps to decrease re admissions and improve mortality  short course of abx  check immunoglobulins given MM hx  Decadron, antivirals  smoking cessation  Advair, spiriva as out pt

## 2023-10-24 NOTE — PROGRESS NOTE ADULT - ASSESSMENT
77 y/o F with PMH COPD, MI,  hfref, mm/ thrombocytopenia/ on promecta, active smoker, Htn, mood disorders, seizure disorder, urinary retention,   HLD, GERD, recent admission for sob/ chf , was diuresed and treated for pna/ codp , was dcd on home o2 3 L NC , po abx and prednisone, today  patient presents for hypoxia and shortness of breath. Patient is a poor historian. EMS found patient to be hypoxic to the 60's on room air, placed on NC with  minimal improvement/ was briefly placed on bipap. now back on nc 5 L. patient lethargic in er. rvp positive for covid, admitted to medicine     #acute on chronic respiratory failure: hypoxic and hypercapnic   #COPD exacerbation  #COVID PNA  #?Superimposed bacterial PNA  - patient continues to smoke   - cxr no acute changes   - CT chest: Small right loculated pleural effusion has slightly decreased since 10/8/2023. Paraseptal and centrilobular emphysema is present. Mild biapical scarring. A 9 mm right apical nodule has not significantly changed since 2018. Small tree-in-bud nodules are present predominantly in the right upper lobe, similar to the prior study. No new consolidation.  - received vanc / cefepime in ed  - on vanc, cefepime for now; MRSA negative, vanc d/c. vanc if negative; changed abx to CTX and azithro   - c/w remdesivir, dex  - BCx pending   - d/c IVF pt able to drink  - c/w spiriva, budesonide; unable to do duonebs given COVID per RT   - pulm consulted, recs appreciated   - monitor of BIPAP, plan for noctural BiPAP     #ams   - likely metabolic encephalopathy due to resp failure   - CTH no acute findings  - likely 2/2 co2 retention  - much improved A&Ox3, observe off BIPAP    #chronic diastolic chf   - clinically does not appear overloaded   - d/c IVF  - monitor volume status     #mm/ thrombocytopenia   - no active bleed   - on promecta at home   - c/w acyclovir for ppx dose     #cad/ htn   - c/w bb     #dvt ppx   - sq lovenox bid, covid dosing

## 2023-10-24 NOTE — PROGRESS NOTE ADULT - SUBJECTIVE AND OBJECTIVE BOX
[facility] Division of Hospital Medicine    Chief Complaint:      INTERVAL HISTORY:  Overnight, no acute events.     Patient seen and examined at bedside this morning. Reports improvement in SOB, cough. Patient denies chest pain, abd pain, N/V, fever, chills, dysuria or any other complaints.     MEDICATIONS  (STANDING):  albuterol    90 MICROgram(s) HFA Inhaler 2 Puff(s) Inhalation every 4 hours  azithromycin  IVPB      azithromycin  IVPB 500 milliGRAM(s) IV Intermittent every 24 hours  budesonide 160 MICROgram(s)/formoterol 4.5 MICROgram(s) Inhaler 2 Puff(s) Inhalation two times a day  cefTRIAXone Injectable. 1000 milliGRAM(s) IV Push every 24 hours  dexAMETHasone  Injectable 4 milliGRAM(s) IV Push daily  DULoxetine 30 milliGRAM(s) Oral daily  enoxaparin Injectable 30 milliGRAM(s) SubCutaneous every 12 hours  guaiFENesin  milliGRAM(s) Oral every 12 hours  influenza  Vaccine (HIGH DOSE) 0.7 milliLiter(s) IntraMuscular once  metoprolol tartrate 12.5 milliGRAM(s) Oral daily  nicotine - 21 mG/24Hr(s) Patch 1 Patch Transdermal daily  pantoprazole    Tablet 40 milliGRAM(s) Oral before breakfast  potassium chloride    Tablet ER 40 milliEquivalent(s) Oral every 4 hours  remdesivir  IVPB   IV Intermittent   remdesivir  IVPB 100 milliGRAM(s) IV Intermittent every 24 hours  tamsulosin 0.4 milliGRAM(s) Oral at bedtime  valACYclovir 500 milliGRAM(s) Oral daily    MEDICATIONS  (PRN):  acetaminophen     Tablet .. 650 milliGRAM(s) Oral every 6 hours PRN Temp greater or equal to 38C (100.4F), Mild Pain (1 - 3)  ALPRAZolam 0.5 milliGRAM(s) Oral three times a day PRN anxiety  aluminum hydroxide/magnesium hydroxide/simethicone Suspension 30 milliLiter(s) Oral every 6 hours PRN Dyspepsia        I&O's Summary    23 Oct 2023 07:01  -  24 Oct 2023 07:00  --------------------------------------------------------  IN: 400 mL / OUT: 0 mL / NET: 400 mL        PHYSICAL EXAM:  Vital Signs Last 24 Hrs  T(C): 36.8 (24 Oct 2023 07:33), Max: 36.8 (24 Oct 2023 07:33)  T(F): 98.3 (24 Oct 2023 07:33), Max: 98.3 (24 Oct 2023 07:33)  HR: 74 (24 Oct 2023 07:33) (67 - 81)  BP: 124/71 (24 Oct 2023 07:33) (117/65 - 133/78)  BP(mean): --  RR: 18 (24 Oct 2023 07:33) (17 - 20)  SpO2: 95% (24 Oct 2023 07:33) (95% - 99%)    Parameters below as of 24 Oct 2023 07:33  Patient On (Oxygen Delivery Method): nasal cannula      CONSTITUTIONAL: No apparent distress  HEENT: Normocephalic, Atraumatic. Trachea midline.  RESPIRATORY:  expiratory wheezings b/l, decreased breath sounds b/b  CARDIOVASCULAR: Regular rate and rhythm, no lower extremity edema; Peripheral pulses are 2+ bilaterally  ABDOMEN: Soft, non-distended, nontender to palpation, +BS  MUSCLOSKELETAL: moving all 4 extremities spontaneously  PSYCH: thoughts linear, affect appropriate  NEUROLOGY: Alert, Oriented x4, CN 2-12 grossly intact  SKIN: No rashes    LABS:                        11.4   8.31  )-----------( 221      ( 24 Oct 2023 05:56 )             37.2     10-24    143  |  101  |  14.4  ----------------------------<  122<H>  3.3<L>   |  29.0  |  0.82    Ca    8.8      24 Oct 2023 05:56    TPro  6.2<L>  /  Alb  3.4  /  TBili  <0.2<L>  /  DBili  0.1  /  AST  10  /  ALT  12  /  AlkPhos  94  10-24    PT/INR - ( 24 Oct 2023 05:56 )   PT: 11.1 sec;   INR: 1.00 ratio               Urinalysis Basic - ( 24 Oct 2023 05:56 )    Color: x / Appearance: x / SG: x / pH: x  Gluc: 122 mg/dL / Ketone: x  / Bili: x / Urobili: x   Blood: x / Protein: x / Nitrite: x   Leuk Esterase: x / RBC: x / WBC x   Sq Epi: x / Non Sq Epi: x / Bacteria: x        CAPILLARY BLOOD GLUCOSE            RADIOLOGY & ADDITIONAL TESTS:  Results Reviewed:   Imaging Personally Reviewed:  Electrocardiogram Personally Reviewed:                                           [facility] Division of Hospital Medicine    Chief Complaint:      INTERVAL HISTORY:  Overnight, no acute events.     Patient seen and examined at bedside this morning. Reports improvement in SOB, cough. Patient denies chest pain, abd pain, N/V, fever, chills, dysuria or any other complaints. Reports using bipap overnight only for 2 hours, unable to tolerate much 2/2 anxiety, taking anxiety medication before bipap placed.     MEDICATIONS  (STANDING):  albuterol    90 MICROgram(s) HFA Inhaler 2 Puff(s) Inhalation every 4 hours  azithromycin  IVPB      azithromycin  IVPB 500 milliGRAM(s) IV Intermittent every 24 hours  budesonide 160 MICROgram(s)/formoterol 4.5 MICROgram(s) Inhaler 2 Puff(s) Inhalation two times a day  cefTRIAXone Injectable. 1000 milliGRAM(s) IV Push every 24 hours  dexAMETHasone  Injectable 4 milliGRAM(s) IV Push daily  DULoxetine 30 milliGRAM(s) Oral daily  enoxaparin Injectable 30 milliGRAM(s) SubCutaneous every 12 hours  guaiFENesin  milliGRAM(s) Oral every 12 hours  influenza  Vaccine (HIGH DOSE) 0.7 milliLiter(s) IntraMuscular once  metoprolol tartrate 12.5 milliGRAM(s) Oral daily  nicotine - 21 mG/24Hr(s) Patch 1 Patch Transdermal daily  pantoprazole    Tablet 40 milliGRAM(s) Oral before breakfast  potassium chloride    Tablet ER 40 milliEquivalent(s) Oral every 4 hours  remdesivir  IVPB   IV Intermittent   remdesivir  IVPB 100 milliGRAM(s) IV Intermittent every 24 hours  tamsulosin 0.4 milliGRAM(s) Oral at bedtime  valACYclovir 500 milliGRAM(s) Oral daily    MEDICATIONS  (PRN):  acetaminophen     Tablet .. 650 milliGRAM(s) Oral every 6 hours PRN Temp greater or equal to 38C (100.4F), Mild Pain (1 - 3)  ALPRAZolam 0.5 milliGRAM(s) Oral three times a day PRN anxiety  aluminum hydroxide/magnesium hydroxide/simethicone Suspension 30 milliLiter(s) Oral every 6 hours PRN Dyspepsia        I&O's Summary    23 Oct 2023 07:01  -  24 Oct 2023 07:00  --------------------------------------------------------  IN: 400 mL / OUT: 0 mL / NET: 400 mL        PHYSICAL EXAM:  Vital Signs Last 24 Hrs  T(C): 36.8 (24 Oct 2023 07:33), Max: 36.8 (24 Oct 2023 07:33)  T(F): 98.3 (24 Oct 2023 07:33), Max: 98.3 (24 Oct 2023 07:33)  HR: 74 (24 Oct 2023 07:33) (67 - 81)  BP: 124/71 (24 Oct 2023 07:33) (117/65 - 133/78)  BP(mean): --  RR: 18 (24 Oct 2023 07:33) (17 - 20)  SpO2: 95% (24 Oct 2023 07:33) (95% - 99%)    Parameters below as of 24 Oct 2023 07:33  Patient On (Oxygen Delivery Method): nasal cannula      CONSTITUTIONAL: No apparent distress  HEENT: Normocephalic, Atraumatic. Trachea midline.  RESPIRATORY:  expiratory wheezings b/l, decreased breath sounds b/b  CARDIOVASCULAR: Regular rate and rhythm, no lower extremity edema; Peripheral pulses are 2+ bilaterally  ABDOMEN: Soft, non-distended, nontender to palpation, +BS  MUSCLOSKELETAL: moving all 4 extremities spontaneously  PSYCH: thoughts linear, affect appropriate  NEUROLOGY: Alert, Oriented x4, CN 2-12 grossly intact  SKIN: No rashes    LABS:                        11.4   8.31  )-----------( 221      ( 24 Oct 2023 05:56 )             37.2     10-24    143  |  101  |  14.4  ----------------------------<  122<H>  3.3<L>   |  29.0  |  0.82    Ca    8.8      24 Oct 2023 05:56    TPro  6.2<L>  /  Alb  3.4  /  TBili  <0.2<L>  /  DBili  0.1  /  AST  10  /  ALT  12  /  AlkPhos  94  10-24    PT/INR - ( 24 Oct 2023 05:56 )   PT: 11.1 sec;   INR: 1.00 ratio               Urinalysis Basic - ( 24 Oct 2023 05:56 )    Color: x / Appearance: x / SG: x / pH: x  Gluc: 122 mg/dL / Ketone: x  / Bili: x / Urobili: x   Blood: x / Protein: x / Nitrite: x   Leuk Esterase: x / RBC: x / WBC x   Sq Epi: x / Non Sq Epi: x / Bacteria: x        CAPILLARY BLOOD GLUCOSE            RADIOLOGY & ADDITIONAL TESTS:  Results Reviewed:   Imaging Personally Reviewed:  Electrocardiogram Personally Reviewed:

## 2023-10-25 LAB
ALBUMIN SERPL ELPH-MCNC: 3.4 G/DL — SIGNIFICANT CHANGE UP (ref 3.3–5.2)
ALBUMIN SERPL ELPH-MCNC: 3.4 G/DL — SIGNIFICANT CHANGE UP (ref 3.3–5.2)
ALP SERPL-CCNC: 92 U/L — SIGNIFICANT CHANGE UP (ref 40–120)
ALP SERPL-CCNC: 92 U/L — SIGNIFICANT CHANGE UP (ref 40–120)
ALT FLD-CCNC: 9 U/L — SIGNIFICANT CHANGE UP
ALT FLD-CCNC: 9 U/L — SIGNIFICANT CHANGE UP
ANION GAP SERPL CALC-SCNC: 11 MMOL/L — SIGNIFICANT CHANGE UP (ref 5–17)
ANION GAP SERPL CALC-SCNC: 11 MMOL/L — SIGNIFICANT CHANGE UP (ref 5–17)
AST SERPL-CCNC: 8 U/L — SIGNIFICANT CHANGE UP
AST SERPL-CCNC: 8 U/L — SIGNIFICANT CHANGE UP
BILIRUB DIRECT SERPL-MCNC: 0.1 MG/DL — SIGNIFICANT CHANGE UP (ref 0–0.3)
BILIRUB DIRECT SERPL-MCNC: 0.1 MG/DL — SIGNIFICANT CHANGE UP (ref 0–0.3)
BILIRUB INDIRECT FLD-MCNC: 0.2 MG/DL — SIGNIFICANT CHANGE UP (ref 0.2–1)
BILIRUB INDIRECT FLD-MCNC: 0.2 MG/DL — SIGNIFICANT CHANGE UP (ref 0.2–1)
BILIRUB SERPL-MCNC: 0.2 MG/DL — LOW (ref 0.4–2)
BILIRUB SERPL-MCNC: 0.2 MG/DL — LOW (ref 0.4–2)
BUN SERPL-MCNC: 11.9 MG/DL — SIGNIFICANT CHANGE UP (ref 8–20)
BUN SERPL-MCNC: 11.9 MG/DL — SIGNIFICANT CHANGE UP (ref 8–20)
CALCIUM SERPL-MCNC: 8.9 MG/DL — SIGNIFICANT CHANGE UP (ref 8.4–10.5)
CALCIUM SERPL-MCNC: 8.9 MG/DL — SIGNIFICANT CHANGE UP (ref 8.4–10.5)
CHLORIDE SERPL-SCNC: 101 MMOL/L — SIGNIFICANT CHANGE UP (ref 96–108)
CHLORIDE SERPL-SCNC: 101 MMOL/L — SIGNIFICANT CHANGE UP (ref 96–108)
CO2 SERPL-SCNC: 31 MMOL/L — HIGH (ref 22–29)
CO2 SERPL-SCNC: 31 MMOL/L — HIGH (ref 22–29)
CREAT SERPL-MCNC: 0.7 MG/DL — SIGNIFICANT CHANGE UP (ref 0.5–1.3)
CREAT SERPL-MCNC: 0.7 MG/DL — SIGNIFICANT CHANGE UP (ref 0.5–1.3)
CREAT SERPL-MCNC: 0.76 MG/DL — SIGNIFICANT CHANGE UP (ref 0.5–1.3)
CREAT SERPL-MCNC: 0.76 MG/DL — SIGNIFICANT CHANGE UP (ref 0.5–1.3)
EGFR: 80 ML/MIN/1.73M2 — SIGNIFICANT CHANGE UP
EGFR: 80 ML/MIN/1.73M2 — SIGNIFICANT CHANGE UP
EGFR: 88 ML/MIN/1.73M2 — SIGNIFICANT CHANGE UP
EGFR: 88 ML/MIN/1.73M2 — SIGNIFICANT CHANGE UP
GLUCOSE SERPL-MCNC: 90 MG/DL — SIGNIFICANT CHANGE UP (ref 70–99)
GLUCOSE SERPL-MCNC: 90 MG/DL — SIGNIFICANT CHANGE UP (ref 70–99)
HCT VFR BLD CALC: 34.8 % — SIGNIFICANT CHANGE UP (ref 34.5–45)
HCT VFR BLD CALC: 34.8 % — SIGNIFICANT CHANGE UP (ref 34.5–45)
HGB BLD-MCNC: 10.6 G/DL — LOW (ref 11.5–15.5)
HGB BLD-MCNC: 10.6 G/DL — LOW (ref 11.5–15.5)
IGA FLD-MCNC: 221 MG/DL — SIGNIFICANT CHANGE UP (ref 84–499)
IGA FLD-MCNC: 221 MG/DL — SIGNIFICANT CHANGE UP (ref 84–499)
IGG FLD-MCNC: 728 MG/DL — SIGNIFICANT CHANGE UP (ref 610–1660)
IGG FLD-MCNC: 728 MG/DL — SIGNIFICANT CHANGE UP (ref 610–1660)
IGM SERPL-MCNC: 99 MG/DL — SIGNIFICANT CHANGE UP (ref 35–242)
IGM SERPL-MCNC: 99 MG/DL — SIGNIFICANT CHANGE UP (ref 35–242)
INR BLD: 1.03 RATIO — SIGNIFICANT CHANGE UP (ref 0.85–1.18)
INR BLD: 1.03 RATIO — SIGNIFICANT CHANGE UP (ref 0.85–1.18)
KAPPA LC SER QL IFE: 4.18 MG/DL — HIGH (ref 0.33–1.94)
KAPPA LC SER QL IFE: 4.18 MG/DL — HIGH (ref 0.33–1.94)
KAPPA/LAMBDA FREE LIGHT CHAIN RATIO, SERUM: 2.4 RATIO — HIGH (ref 0.26–1.65)
KAPPA/LAMBDA FREE LIGHT CHAIN RATIO, SERUM: 2.4 RATIO — HIGH (ref 0.26–1.65)
LAMBDA LC SER QL IFE: 1.74 MG/DL — SIGNIFICANT CHANGE UP (ref 0.57–2.63)
LAMBDA LC SER QL IFE: 1.74 MG/DL — SIGNIFICANT CHANGE UP (ref 0.57–2.63)
MCHC RBC-ENTMCNC: 26.1 PG — LOW (ref 27–34)
MCHC RBC-ENTMCNC: 26.1 PG — LOW (ref 27–34)
MCHC RBC-ENTMCNC: 30.5 GM/DL — LOW (ref 32–36)
MCHC RBC-ENTMCNC: 30.5 GM/DL — LOW (ref 32–36)
MCV RBC AUTO: 85.7 FL — SIGNIFICANT CHANGE UP (ref 80–100)
MCV RBC AUTO: 85.7 FL — SIGNIFICANT CHANGE UP (ref 80–100)
PLATELET # BLD AUTO: 208 K/UL — SIGNIFICANT CHANGE UP (ref 150–400)
PLATELET # BLD AUTO: 208 K/UL — SIGNIFICANT CHANGE UP (ref 150–400)
POTASSIUM SERPL-MCNC: 3.5 MMOL/L — SIGNIFICANT CHANGE UP (ref 3.5–5.3)
POTASSIUM SERPL-MCNC: 3.5 MMOL/L — SIGNIFICANT CHANGE UP (ref 3.5–5.3)
POTASSIUM SERPL-SCNC: 3.5 MMOL/L — SIGNIFICANT CHANGE UP (ref 3.5–5.3)
POTASSIUM SERPL-SCNC: 3.5 MMOL/L — SIGNIFICANT CHANGE UP (ref 3.5–5.3)
PROT SERPL-MCNC: 6 G/DL — LOW (ref 6.6–8.7)
PROT SERPL-MCNC: 6 G/DL — LOW (ref 6.6–8.7)
PROTHROM AB SERPL-ACNC: 11.4 SEC — SIGNIFICANT CHANGE UP (ref 9.5–13)
PROTHROM AB SERPL-ACNC: 11.4 SEC — SIGNIFICANT CHANGE UP (ref 9.5–13)
RBC # BLD: 4.06 M/UL — SIGNIFICANT CHANGE UP (ref 3.8–5.2)
RBC # BLD: 4.06 M/UL — SIGNIFICANT CHANGE UP (ref 3.8–5.2)
RBC # FLD: 19.1 % — HIGH (ref 10.3–14.5)
RBC # FLD: 19.1 % — HIGH (ref 10.3–14.5)
SODIUM SERPL-SCNC: 143 MMOL/L — SIGNIFICANT CHANGE UP (ref 135–145)
SODIUM SERPL-SCNC: 143 MMOL/L — SIGNIFICANT CHANGE UP (ref 135–145)
WBC # BLD: 6.25 K/UL — SIGNIFICANT CHANGE UP (ref 3.8–10.5)
WBC # BLD: 6.25 K/UL — SIGNIFICANT CHANGE UP (ref 3.8–10.5)
WBC # FLD AUTO: 6.25 K/UL — SIGNIFICANT CHANGE UP (ref 3.8–10.5)
WBC # FLD AUTO: 6.25 K/UL — SIGNIFICANT CHANGE UP (ref 3.8–10.5)

## 2023-10-25 PROCEDURE — 99233 SBSQ HOSP IP/OBS HIGH 50: CPT

## 2023-10-25 PROCEDURE — 99232 SBSQ HOSP IP/OBS MODERATE 35: CPT

## 2023-10-25 RX ORDER — BACITRACIN ZINC 500 UNIT/G
1 OINTMENT IN PACKET (EA) TOPICAL
Refills: 0 | Status: DISCONTINUED | OUTPATIENT
Start: 2023-10-25 | End: 2023-10-26

## 2023-10-25 RX ADMIN — VALACYCLOVIR 500 MILLIGRAM(S): 500 TABLET, FILM COATED ORAL at 12:00

## 2023-10-25 RX ADMIN — Medication 600 MILLIGRAM(S): at 05:17

## 2023-10-25 RX ADMIN — TAMSULOSIN HYDROCHLORIDE 0.4 MILLIGRAM(S): 0.4 CAPSULE ORAL at 21:36

## 2023-10-25 RX ADMIN — AZITHROMYCIN 255 MILLIGRAM(S): 500 TABLET, FILM COATED ORAL at 16:28

## 2023-10-25 RX ADMIN — ENOXAPARIN SODIUM 30 MILLIGRAM(S): 100 INJECTION SUBCUTANEOUS at 18:13

## 2023-10-25 RX ADMIN — ALBUTEROL 2 PUFF(S): 90 AEROSOL, METERED ORAL at 03:42

## 2023-10-25 RX ADMIN — BUDESONIDE AND FORMOTEROL FUMARATE DIHYDRATE 2 PUFF(S): 160; 4.5 AEROSOL RESPIRATORY (INHALATION) at 19:32

## 2023-10-25 RX ADMIN — PANTOPRAZOLE SODIUM 40 MILLIGRAM(S): 20 TABLET, DELAYED RELEASE ORAL at 06:00

## 2023-10-25 RX ADMIN — Medication 12.5 MILLIGRAM(S): at 05:16

## 2023-10-25 RX ADMIN — Medication 1 PATCH: at 20:00

## 2023-10-25 RX ADMIN — DULOXETINE HYDROCHLORIDE 30 MILLIGRAM(S): 30 CAPSULE, DELAYED RELEASE ORAL at 12:00

## 2023-10-25 RX ADMIN — BUDESONIDE AND FORMOTEROL FUMARATE DIHYDRATE 2 PUFF(S): 160; 4.5 AEROSOL RESPIRATORY (INHALATION) at 10:59

## 2023-10-25 RX ADMIN — Medication 1 PATCH: at 12:00

## 2023-10-25 RX ADMIN — Medication 4 MILLIGRAM(S): at 05:16

## 2023-10-25 RX ADMIN — ALBUTEROL 2 PUFF(S): 90 AEROSOL, METERED ORAL at 00:29

## 2023-10-25 RX ADMIN — Medication 0.5 MILLIGRAM(S): at 10:59

## 2023-10-25 RX ADMIN — Medication 0.5 MILLIGRAM(S): at 21:36

## 2023-10-25 RX ADMIN — Medication 0.5 MILLIGRAM(S): at 05:53

## 2023-10-25 RX ADMIN — ALBUTEROL 2 PUFF(S): 90 AEROSOL, METERED ORAL at 10:58

## 2023-10-25 RX ADMIN — REMDESIVIR 200 MILLIGRAM(S): 5 INJECTION INTRAVENOUS at 18:12

## 2023-10-25 RX ADMIN — ENOXAPARIN SODIUM 30 MILLIGRAM(S): 100 INJECTION SUBCUTANEOUS at 05:17

## 2023-10-25 RX ADMIN — ALBUTEROL 2 PUFF(S): 90 AEROSOL, METERED ORAL at 19:32

## 2023-10-25 RX ADMIN — CEFTRIAXONE 1000 MILLIGRAM(S): 500 INJECTION, POWDER, FOR SOLUTION INTRAMUSCULAR; INTRAVENOUS at 18:12

## 2023-10-25 RX ADMIN — Medication 1 APPLICATION(S): at 18:13

## 2023-10-25 RX ADMIN — Medication 600 MILLIGRAM(S): at 18:14

## 2023-10-25 NOTE — PROGRESS NOTE ADULT - ASSESSMENT
Acute on chronic hypoxemic, hypercapnic resp failure  Secondary to COPD, Covid positive  Improving, benefitting from AVAps  Despite use of BIPAP 12/6, remains hypercapnic, PC02 65 on ABG 10/21  Therefore NIV with guaranteed augmented tidal volume, ( AVAPS AE) which are not availabe on any home care device is needed  to decrease re admissions and improve mortality  CT scan reviewed, decreased R loculated eff ? etiology- need to repeat CT gomez, recent echo normal 10.23  has home 02, needs home neb  short course of abx  check immunoglobulins given MM hx  Decadron, antivirals  smoking cessation  Advair, spiriva as out pt  Needs pulmonary follow up as out pt   Acute on chronic hypoxemic, hypercapnic resp failure  Secondary to COPD, Covid positive  Improving, benefitting from AVAps  Despite use of BIPAP 12/6, remains hypercapnic, PC02 65 on ABG 10/21  Therefore NIV with guaranteed augmented tidal volume, ( AVAPS AE) which are not availabe on any home care device is needed  to decrease re admissions and improve mortality  CT scan reviewed, decreased R loculated eff ? etiology- need to repeat CT gomez, recent echo normal 10.23  Repeat BNP, gentle diuresis  has home 02, needs home neb  short course of abx  check immunoglobulins given MM hx  Decadron, antivirals  smoking cessation  Advair, spiriva as out pt  Needs pulmonary follow up as out pt

## 2023-10-25 NOTE — PROGRESS NOTE ADULT - SUBJECTIVE AND OBJECTIVE BOX
PULMONARY PROGRESS NOTE      BECCA JARRELLMACHELLE-[id number]    Patient is a 78y old  Female who presents with a chief complaint of sob (24 Oct 2023 16:48)      INTERVAL HPI/OVERNIGHT EVENTS:  no overnight issues  no cp or SOB  MEDICATIONS  (STANDING):  albuterol    90 MICROgram(s) HFA Inhaler 2 Puff(s) Inhalation every 4 hours  azithromycin  IVPB      azithromycin  IVPB 500 milliGRAM(s) IV Intermittent every 24 hours  bacitracin   Ointment 1 Application(s) Topical two times a day  budesonide 160 MICROgram(s)/formoterol 4.5 MICROgram(s) Inhaler 2 Puff(s) Inhalation two times a day  cefTRIAXone Injectable. 1000 milliGRAM(s) IV Push every 24 hours  dexAMETHasone  Injectable 4 milliGRAM(s) IV Push daily  DULoxetine 30 milliGRAM(s) Oral daily  enoxaparin Injectable 30 milliGRAM(s) SubCutaneous every 12 hours  guaiFENesin  milliGRAM(s) Oral every 12 hours  influenza  Vaccine (HIGH DOSE) 0.7 milliLiter(s) IntraMuscular once  metoprolol tartrate 12.5 milliGRAM(s) Oral daily  nicotine - 21 mG/24Hr(s) Patch 1 Patch Transdermal daily  pantoprazole    Tablet 40 milliGRAM(s) Oral before breakfast  remdesivir  IVPB   IV Intermittent   remdesivir  IVPB 100 milliGRAM(s) IV Intermittent every 24 hours  tamsulosin 0.4 milliGRAM(s) Oral at bedtime  valACYclovir 500 milliGRAM(s) Oral daily      MEDICATIONS  (PRN):  acetaminophen     Tablet .. 650 milliGRAM(s) Oral every 6 hours PRN Temp greater or equal to 38C (100.4F), Mild Pain (1 - 3)  ALPRAZolam 0.5 milliGRAM(s) Oral three times a day PRN anxiety  aluminum hydroxide/magnesium hydroxide/simethicone Suspension 30 milliLiter(s) Oral every 6 hours PRN Dyspepsia      Allergies    penicillin (Unknown)    Intolerances        PAST MEDICAL & SURGICAL HISTORY:  MI (myocardial infarction)      Kidney stones      Hearing loss, unspecified hearing loss type, unspecified laterality      Anxiety      Multiple myeloma      COPD (chronic obstructive pulmonary disease)      S/P cholecystectomy          SOCIAL HISTORY  Smoking History:       REVIEW OF SYSTEMS:    CONSTITUTIONAL:  No distress    HEENT:  Eyes:  No diplopia or blurred vision. ENT:  No earache, sore throat or runny nose.    CARDIOVASCULAR:  No pressure, squeezing, tightness, heaviness or aching about the chest; no palpitations.    RESPIRATORY:  see HPI  GASTROINTESTINAL:  No nausea, vomiting or diarrhea.    GENITOURINARY:  No dysuria, frequency or urgency.    NEUROLOGIC:  No paresthesias, fasciculations, seizures or weakness.    PSYCHIATRIC:  No disorder of thought or mood.    Vital Signs Last 24 Hrs  T(C): 37 (25 Oct 2023 12:06), Max: 37 (25 Oct 2023 12:06)  T(F): 98.6 (25 Oct 2023 12:06), Max: 98.6 (25 Oct 2023 12:06)  HR: 81 (25 Oct 2023 12:06) (81 - 93)  BP: 113/65 (25 Oct 2023 12:06) (107/67 - 125/74)  BP(mean): --  RR: 20 (25 Oct 2023 12:06) (18 - 20)  SpO2: 93% (25 Oct 2023 12:06) (91% - 98%)    Parameters below as of 25 Oct 2023 12:06  Patient On (Oxygen Delivery Method): nasal cannula  O2 Flow (L/min): 3      PHYSICAL EXAMINATION:    GENERAL: The patient is awake and alert in no apparent distress.     HEENT: Head is normocephalic and atraumatic. Extraocular muscles are intact. Mucous membranes are moist.    NECK: Supple.    LUNGS: moderate air entry with exp rhonchi; respirations unlabored    HEART: Regular rate and rhythm without murmur.    ABDOMEN: Soft, nontender, and nondistended.      EXTREMITIES: Without any cyanosis, clubbing, rash, lesions or edema.    NEUROLOGIC: Grossly intact.    LABS:                        10.6   6.25  )-----------( 208      ( 25 Oct 2023 05:26 )             34.8     10-25    143  |  101  |  11.9  ----------------------------<  90  3.5   |  31.0<H>  |  0.70    Ca    8.9      25 Oct 2023 05:26    TPro  6.0<L>  /  Alb  3.4  /  TBili  0.2<L>  /  DBili  0.1  /  AST  8   /  ALT  9   /  AlkPhos  92  10-25    PT/INR - ( 25 Oct 2023 05:26 )   PT: 11.4 sec;   INR: 1.03 ratio           Urinalysis Basic - ( 25 Oct 2023 05:26 )    Color: x / Appearance: x / SG: x / pH: x  Gluc: 90 mg/dL / Ketone: x  / Bili: x / Urobili: x   Blood: x / Protein: x / Nitrite: x   Leuk Esterase: x / RBC: x / WBC x   Sq Epi: x / Non Sq Epi: x / Bacteria: x                  Procalcitonin, Serum: 0.14 ng/mL (10-23-23 @ 05:45)      MICROBIOLOGY:    RADIOLOGY & ADDITIONAL STUDIES:  CT scans reviewed

## 2023-10-25 NOTE — PROGRESS NOTE ADULT - TIME BILLING
greater than 50% of time spent reviewing labs, notes, orders and radiographs, coordinating care  discussed with pt, med team, social service, home care
greater than 50% of time spent reviewing labs, notes, orders and radiographs, coordinating care  discussed with pt

## 2023-10-25 NOTE — PROGRESS NOTE ADULT - ASSESSMENT
79 y/o F with PMH COPD, MI,  hfref, mm/ thrombocytopenia/ on promecta, active smoker, Htn, mood disorders, seizure disorder, urinary retention,   HLD, GERD, recent admission for sob/ chf , was diuresed and treated for pna/ codp , was dcd on home o2 3 L NC , po abx and prednisone, today  patient presents for hypoxia and shortness of breath. Patient is a poor historian. EMS found patient to be hypoxic to the 60's on room air, placed on NC with  minimal improvement/ was briefly placed on bipap. now back on nc 5 L. patient lethargic in er. rvp positive for covid, admitted to medicine     #acute on chronic respiratory failure: hypoxic and hypercapnic   #COPD exacerbation  #COVID PNA  #?Superimposed bacterial PNA  - patient continues to smoke   - cxr no acute changes   - CT chest: Small right loculated pleural effusion has slightly decreased since 10/8/2023. Paraseptal and centrilobular emphysema is present. Mild biapical scarring. A 9 mm right apical nodule has not significantly changed since 2018. Small tree-in-bud nodules are present predominantly in the right upper lobe, similar to the prior study. No new consolidation. Will repeat CT chest tomorrow   - received vanc / cefepime in ed  - on vanc, cefepime for now; MRSA negative, vanc d/c. vanc if negative; changed abx to CTX and azithro   - c/w remdesivir, dex  - BCx NGTD  - c/w spiriva, budesonide; unable to do duonebs given COVID per RT   - pulm consulted, recs appreciated   - does not tolerate bipap overnight    #ams   - likely metabolic encephalopathy due to resp failure   - CTH no acute findings  - likely 2/2 co2 retention  - much improved A&Ox3, observe off BIPAP    #chronic diastolic chf   - clinically does not appear overloaded   - d/c IVF  - f/u BNP  - monitor volume status     #mm/ thrombocytopenia   - no active bleed   - on promecta at home   - c/w acyclovir for ppx dose     #cad/ htn   - c/w bb     #dvt ppx   - sq lovenox bid, covid dosing

## 2023-10-25 NOTE — PROGRESS NOTE ADULT - SUBJECTIVE AND OBJECTIVE BOX
[facility] Division of Hospital Medicine    Chief Complaint:      INTERVAL HISTORY:  Overnight, no acute events.     Patient seen and examined at bedside this morning. Reports having anxiety. Did not tolerate bipap overnight 2/2 anxiety. Patient denies chest pain, abd pain, N/V, fever, chills, dysuria or any other complaints. Reports improvement in SOB, cough.     MEDICATIONS  (STANDING):  albuterol    90 MICROgram(s) HFA Inhaler 2 Puff(s) Inhalation every 4 hours  azithromycin  IVPB 500 milliGRAM(s) IV Intermittent every 24 hours  azithromycin  IVPB      bacitracin   Ointment 1 Application(s) Topical two times a day  budesonide 160 MICROgram(s)/formoterol 4.5 MICROgram(s) Inhaler 2 Puff(s) Inhalation two times a day  cefTRIAXone Injectable. 1000 milliGRAM(s) IV Push every 24 hours  dexAMETHasone  Injectable 4 milliGRAM(s) IV Push daily  DULoxetine 30 milliGRAM(s) Oral daily  enoxaparin Injectable 30 milliGRAM(s) SubCutaneous every 12 hours  guaiFENesin  milliGRAM(s) Oral every 12 hours  influenza  Vaccine (HIGH DOSE) 0.7 milliLiter(s) IntraMuscular once  metoprolol tartrate 12.5 milliGRAM(s) Oral daily  nicotine - 21 mG/24Hr(s) Patch 1 Patch Transdermal daily  pantoprazole    Tablet 40 milliGRAM(s) Oral before breakfast  remdesivir  IVPB   IV Intermittent   remdesivir  IVPB 100 milliGRAM(s) IV Intermittent every 24 hours  tamsulosin 0.4 milliGRAM(s) Oral at bedtime  valACYclovir 500 milliGRAM(s) Oral daily    MEDICATIONS  (PRN):  acetaminophen     Tablet .. 650 milliGRAM(s) Oral every 6 hours PRN Temp greater or equal to 38C (100.4F), Mild Pain (1 - 3)  ALPRAZolam 0.5 milliGRAM(s) Oral three times a day PRN anxiety  aluminum hydroxide/magnesium hydroxide/simethicone Suspension 30 milliLiter(s) Oral every 6 hours PRN Dyspepsia        I&O's Summary      PHYSICAL EXAM:  Vital Signs Last 24 Hrs  T(C): 37 (25 Oct 2023 12:06), Max: 37 (25 Oct 2023 12:06)  T(F): 98.6 (25 Oct 2023 12:06), Max: 98.6 (25 Oct 2023 12:06)  HR: 81 (25 Oct 2023 12:06) (81 - 93)  BP: 113/65 (25 Oct 2023 12:06) (107/67 - 125/74)  BP(mean): --  RR: 20 (25 Oct 2023 12:06) (18 - 20)  SpO2: 93% (25 Oct 2023 12:06) (91% - 98%)    Parameters below as of 25 Oct 2023 12:06  Patient On (Oxygen Delivery Method): nasal cannula  O2 Flow (L/min): 3        CONSTITUTIONAL: No apparent distress  HEENT: Normocephalic, Atraumatic. Trachea midline.  RESPIRATORY:  expiratory wheezings b/l, decreased breath sounds b/b  CARDIOVASCULAR: Regular rate and rhythm, no lower extremity edema; Peripheral pulses are 2+ bilaterally  ABDOMEN: Soft, non-distended, nontender to palpation, +BS  MUSCLOSKELETAL: moving all 4 extremities spontaneously  PSYCH: thoughts linear, affect appropriate  NEUROLOGY: Alert, Oriented x4, CN 2-12 grossly intact  SKIN: No rashes  LABS:                        10.6   6.25  )-----------( 208      ( 25 Oct 2023 05:26 )             34.8     10-25    143  |  101  |  11.9  ----------------------------<  90  3.5   |  31.0<H>  |  0.70    Ca    8.9      25 Oct 2023 05:26    TPro  6.0<L>  /  Alb  3.4  /  TBili  0.2<L>  /  DBili  0.1  /  AST  8   /  ALT  9   /  AlkPhos  92  10-25    PT/INR - ( 25 Oct 2023 05:26 )   PT: 11.4 sec;   INR: 1.03 ratio               Urinalysis Basic - ( 25 Oct 2023 05:26 )    Color: x / Appearance: x / SG: x / pH: x  Gluc: 90 mg/dL / Ketone: x  / Bili: x / Urobili: x   Blood: x / Protein: x / Nitrite: x   Leuk Esterase: x / RBC: x / WBC x   Sq Epi: x / Non Sq Epi: x / Bacteria: x        CAPILLARY BLOOD GLUCOSE            RADIOLOGY & ADDITIONAL TESTS:  Results Reviewed:   Imaging Personally Reviewed:  Electrocardiogram Personally Reviewed:

## 2023-10-26 ENCOUNTER — TRANSCRIPTION ENCOUNTER (OUTPATIENT)
Age: 78
End: 2023-10-26

## 2023-10-26 VITALS
TEMPERATURE: 98 F | HEART RATE: 100 BPM | SYSTOLIC BLOOD PRESSURE: 118 MMHG | OXYGEN SATURATION: 94 % | RESPIRATION RATE: 18 BRPM | DIASTOLIC BLOOD PRESSURE: 75 MMHG

## 2023-10-26 LAB
ALBUMIN SERPL ELPH-MCNC: 3.4 G/DL — SIGNIFICANT CHANGE UP (ref 3.3–5.2)
ALBUMIN SERPL ELPH-MCNC: 3.4 G/DL — SIGNIFICANT CHANGE UP (ref 3.3–5.2)
ALP SERPL-CCNC: 95 U/L — SIGNIFICANT CHANGE UP (ref 40–120)
ALP SERPL-CCNC: 95 U/L — SIGNIFICANT CHANGE UP (ref 40–120)
ALT FLD-CCNC: 9 U/L — SIGNIFICANT CHANGE UP
ALT FLD-CCNC: 9 U/L — SIGNIFICANT CHANGE UP
ANION GAP SERPL CALC-SCNC: 14 MMOL/L — SIGNIFICANT CHANGE UP (ref 5–17)
ANION GAP SERPL CALC-SCNC: 14 MMOL/L — SIGNIFICANT CHANGE UP (ref 5–17)
AST SERPL-CCNC: 8 U/L — SIGNIFICANT CHANGE UP
AST SERPL-CCNC: 8 U/L — SIGNIFICANT CHANGE UP
BILIRUB DIRECT SERPL-MCNC: 0.1 MG/DL — SIGNIFICANT CHANGE UP (ref 0–0.3)
BILIRUB DIRECT SERPL-MCNC: 0.1 MG/DL — SIGNIFICANT CHANGE UP (ref 0–0.3)
BILIRUB INDIRECT FLD-MCNC: 0.2 MG/DL — SIGNIFICANT CHANGE UP (ref 0.2–1)
BILIRUB INDIRECT FLD-MCNC: 0.2 MG/DL — SIGNIFICANT CHANGE UP (ref 0.2–1)
BILIRUB SERPL-MCNC: 0.3 MG/DL — LOW (ref 0.4–2)
BILIRUB SERPL-MCNC: 0.3 MG/DL — LOW (ref 0.4–2)
BUN SERPL-MCNC: 14.2 MG/DL — SIGNIFICANT CHANGE UP (ref 8–20)
BUN SERPL-MCNC: 14.2 MG/DL — SIGNIFICANT CHANGE UP (ref 8–20)
CALCIUM SERPL-MCNC: 9.1 MG/DL — SIGNIFICANT CHANGE UP (ref 8.4–10.5)
CALCIUM SERPL-MCNC: 9.1 MG/DL — SIGNIFICANT CHANGE UP (ref 8.4–10.5)
CHLORIDE SERPL-SCNC: 101 MMOL/L — SIGNIFICANT CHANGE UP (ref 96–108)
CHLORIDE SERPL-SCNC: 101 MMOL/L — SIGNIFICANT CHANGE UP (ref 96–108)
CO2 SERPL-SCNC: 29 MMOL/L — SIGNIFICANT CHANGE UP (ref 22–29)
CO2 SERPL-SCNC: 29 MMOL/L — SIGNIFICANT CHANGE UP (ref 22–29)
CREAT SERPL-MCNC: 0.77 MG/DL — SIGNIFICANT CHANGE UP (ref 0.5–1.3)
CREAT SERPL-MCNC: 0.77 MG/DL — SIGNIFICANT CHANGE UP (ref 0.5–1.3)
CREAT SERPL-MCNC: 0.8 MG/DL — SIGNIFICANT CHANGE UP (ref 0.5–1.3)
CREAT SERPL-MCNC: 0.8 MG/DL — SIGNIFICANT CHANGE UP (ref 0.5–1.3)
CULTURE RESULTS: SIGNIFICANT CHANGE UP
EGFR: 75 ML/MIN/1.73M2 — SIGNIFICANT CHANGE UP
EGFR: 75 ML/MIN/1.73M2 — SIGNIFICANT CHANGE UP
EGFR: 78 ML/MIN/1.73M2 — SIGNIFICANT CHANGE UP
EGFR: 78 ML/MIN/1.73M2 — SIGNIFICANT CHANGE UP
GLUCOSE SERPL-MCNC: 88 MG/DL — SIGNIFICANT CHANGE UP (ref 70–99)
GLUCOSE SERPL-MCNC: 88 MG/DL — SIGNIFICANT CHANGE UP (ref 70–99)
HCT VFR BLD CALC: 35.2 % — SIGNIFICANT CHANGE UP (ref 34.5–45)
HCT VFR BLD CALC: 35.2 % — SIGNIFICANT CHANGE UP (ref 34.5–45)
HGB BLD-MCNC: 10.8 G/DL — LOW (ref 11.5–15.5)
HGB BLD-MCNC: 10.8 G/DL — LOW (ref 11.5–15.5)
INR BLD: 1.04 RATIO — SIGNIFICANT CHANGE UP (ref 0.85–1.18)
INR BLD: 1.04 RATIO — SIGNIFICANT CHANGE UP (ref 0.85–1.18)
MCHC RBC-ENTMCNC: 26.3 PG — LOW (ref 27–34)
MCHC RBC-ENTMCNC: 26.3 PG — LOW (ref 27–34)
MCHC RBC-ENTMCNC: 30.7 GM/DL — LOW (ref 32–36)
MCHC RBC-ENTMCNC: 30.7 GM/DL — LOW (ref 32–36)
MCV RBC AUTO: 85.6 FL — SIGNIFICANT CHANGE UP (ref 80–100)
MCV RBC AUTO: 85.6 FL — SIGNIFICANT CHANGE UP (ref 80–100)
NT-PROBNP SERPL-SCNC: 2472 PG/ML — HIGH (ref 0–300)
NT-PROBNP SERPL-SCNC: 2472 PG/ML — HIGH (ref 0–300)
PLATELET # BLD AUTO: 224 K/UL — SIGNIFICANT CHANGE UP (ref 150–400)
PLATELET # BLD AUTO: 224 K/UL — SIGNIFICANT CHANGE UP (ref 150–400)
POTASSIUM SERPL-MCNC: 3.4 MMOL/L — LOW (ref 3.5–5.3)
POTASSIUM SERPL-MCNC: 3.4 MMOL/L — LOW (ref 3.5–5.3)
POTASSIUM SERPL-SCNC: 3.4 MMOL/L — LOW (ref 3.5–5.3)
POTASSIUM SERPL-SCNC: 3.4 MMOL/L — LOW (ref 3.5–5.3)
PROT SERPL-MCNC: 6.1 G/DL — LOW (ref 6.6–8.7)
PROT SERPL-MCNC: 6.1 G/DL — LOW (ref 6.6–8.7)
PROTHROM AB SERPL-ACNC: 11.5 SEC — SIGNIFICANT CHANGE UP (ref 9.5–13)
PROTHROM AB SERPL-ACNC: 11.5 SEC — SIGNIFICANT CHANGE UP (ref 9.5–13)
RBC # BLD: 4.11 M/UL — SIGNIFICANT CHANGE UP (ref 3.8–5.2)
RBC # BLD: 4.11 M/UL — SIGNIFICANT CHANGE UP (ref 3.8–5.2)
RBC # FLD: 19.1 % — HIGH (ref 10.3–14.5)
RBC # FLD: 19.1 % — HIGH (ref 10.3–14.5)
SODIUM SERPL-SCNC: 144 MMOL/L — SIGNIFICANT CHANGE UP (ref 135–145)
SODIUM SERPL-SCNC: 144 MMOL/L — SIGNIFICANT CHANGE UP (ref 135–145)
SPECIMEN SOURCE: SIGNIFICANT CHANGE UP
WBC # BLD: 6.52 K/UL — SIGNIFICANT CHANGE UP (ref 3.8–10.5)
WBC # BLD: 6.52 K/UL — SIGNIFICANT CHANGE UP (ref 3.8–10.5)
WBC # FLD AUTO: 6.52 K/UL — SIGNIFICANT CHANGE UP (ref 3.8–10.5)
WBC # FLD AUTO: 6.52 K/UL — SIGNIFICANT CHANGE UP (ref 3.8–10.5)

## 2023-10-26 PROCEDURE — 99239 HOSP IP/OBS DSCHRG MGMT >30: CPT

## 2023-10-26 PROCEDURE — 71250 CT THORAX DX C-: CPT | Mod: 26

## 2023-10-26 RX ORDER — IPRATROPIUM BROMIDE 0.2 MG/ML
2.5 SOLUTION, NON-ORAL INHALATION
Qty: 450 | Refills: 0
Start: 2023-10-26 | End: 2023-11-24

## 2023-10-26 RX ORDER — NICOTINE POLACRILEX 2 MG
1 GUM BUCCAL
Qty: 30 | Refills: 0
Start: 2023-10-26 | End: 2023-11-24

## 2023-10-26 RX ORDER — POTASSIUM CHLORIDE 20 MEQ
40 PACKET (EA) ORAL ONCE
Refills: 0 | Status: COMPLETED | OUTPATIENT
Start: 2023-10-26 | End: 2023-10-26

## 2023-10-26 RX ORDER — FUROSEMIDE 40 MG
20 TABLET ORAL ONCE
Refills: 0 | Status: COMPLETED | OUTPATIENT
Start: 2023-10-26 | End: 2023-10-26

## 2023-10-26 RX ORDER — ALBUTEROL 90 UG/1
0.5 AEROSOL, METERED ORAL
Qty: 5 | Refills: 0
Start: 2023-10-26 | End: 2023-11-24

## 2023-10-26 RX ORDER — ALBUTEROL 90 UG/1
3 AEROSOL, METERED ORAL
Qty: 54 | Refills: 0
Start: 2023-10-26 | End: 2023-11-24

## 2023-10-26 RX ORDER — IPRATROPIUM/ALBUTEROL SULFATE 18-103MCG
3 AEROSOL WITH ADAPTER (GRAM) INHALATION
Qty: 360 | Refills: 0
Start: 2023-10-26 | End: 2023-11-24

## 2023-10-26 RX ADMIN — Medication 1 APPLICATION(S): at 05:46

## 2023-10-26 RX ADMIN — VALACYCLOVIR 500 MILLIGRAM(S): 500 TABLET, FILM COATED ORAL at 11:37

## 2023-10-26 RX ADMIN — AZITHROMYCIN 255 MILLIGRAM(S): 500 TABLET, FILM COATED ORAL at 11:38

## 2023-10-26 RX ADMIN — PANTOPRAZOLE SODIUM 40 MILLIGRAM(S): 20 TABLET, DELAYED RELEASE ORAL at 05:35

## 2023-10-26 RX ADMIN — Medication 1 PATCH: at 07:13

## 2023-10-26 RX ADMIN — Medication 20 MILLIGRAM(S): at 09:07

## 2023-10-26 RX ADMIN — ALBUTEROL 2 PUFF(S): 90 AEROSOL, METERED ORAL at 05:31

## 2023-10-26 RX ADMIN — Medication 40 MILLIEQUIVALENT(S): at 11:38

## 2023-10-26 RX ADMIN — Medication 0.5 MILLIGRAM(S): at 05:32

## 2023-10-26 RX ADMIN — DULOXETINE HYDROCHLORIDE 30 MILLIGRAM(S): 30 CAPSULE, DELAYED RELEASE ORAL at 11:37

## 2023-10-26 RX ADMIN — Medication 4 MILLIGRAM(S): at 05:35

## 2023-10-26 RX ADMIN — ALBUTEROL 2 PUFF(S): 90 AEROSOL, METERED ORAL at 00:00

## 2023-10-26 RX ADMIN — ENOXAPARIN SODIUM 30 MILLIGRAM(S): 100 INJECTION SUBCUTANEOUS at 05:35

## 2023-10-26 RX ADMIN — Medication 1 PATCH: at 11:00

## 2023-10-26 RX ADMIN — Medication 1 PATCH: at 11:38

## 2023-10-26 RX ADMIN — CEFTRIAXONE 1000 MILLIGRAM(S): 500 INJECTION, POWDER, FOR SOLUTION INTRAMUSCULAR; INTRAVENOUS at 11:37

## 2023-10-26 RX ADMIN — Medication 12.5 MILLIGRAM(S): at 05:35

## 2023-10-26 RX ADMIN — Medication 600 MILLIGRAM(S): at 05:35

## 2023-10-26 NOTE — DISCHARGE NOTE PROVIDER - NSDCCPCAREPLAN_GEN_ALL_CORE_FT
PRINCIPAL DISCHARGE DIAGNOSIS  Diagnosis: Acute hypoxic respiratory failure  Assessment and Plan of Treatment: due to COPD exaccerbation, COVID infection  s/p antibtiotics  s/p rem/dex  c/w home inhalers  rx for nebulizer   f/u with pulmonary  refusing avaps at home 2/2 discomfort/anxiety  prescribed nicotine patches for smoking cessation      SECONDARY DISCHARGE DIAGNOSES  Diagnosis: Altered mental status  Assessment and Plan of Treatment: metabolic 2/2 co2 retention  improved to baseline with bipap    Diagnosis: Chronic diastolic congestive heart failure  Assessment and Plan of Treatment: CT showing improved pleural effusion  f/u with cards outpt    Diagnosis: Thrombocytopenia  Assessment and Plan of Treatment: hx of MM  c/w promecta at home  c/w antivirals ppx dose    Diagnosis: CAD (coronary artery disease)  Assessment and Plan of Treatment: c/w BB     PRINCIPAL DISCHARGE DIAGNOSIS  Diagnosis: Acute hypoxic respiratory failure  Assessment and Plan of Treatment: due to COPD exaccerbation, COVID infection  s/p antibtiotics  s/p rem/dex  c/w home inhalers  rx for nebulizer w/ duonebs prn for sob/wheezing  f/u with pulmonary  refusing avaps at home 2/2 discomfort/anxiety  prescribed nicotine patches for smoking cessation      SECONDARY DISCHARGE DIAGNOSES  Diagnosis: Altered mental status  Assessment and Plan of Treatment: metabolic 2/2 co2 retention  improved to baseline with bipap    Diagnosis: Chronic diastolic congestive heart failure  Assessment and Plan of Treatment: CT showing improved pleural effusion  f/u with cards outpt    Diagnosis: Thrombocytopenia  Assessment and Plan of Treatment: hx of MM  c/w promecta at home  c/w antivirals ppx dose    Diagnosis: CAD (coronary artery disease)  Assessment and Plan of Treatment: c/w BB

## 2023-10-26 NOTE — DISCHARGE NOTE NURSING/CASE MANAGEMENT/SOCIAL WORK - NSDCPEEMAIL_GEN_ALL_CORE
[facility] for Tobacco Control email [email or web address]

## 2023-10-26 NOTE — DISCHARGE NOTE PROVIDER - NSFOLLOWUPCLINICS_GEN_ALL_ED_FT
[facility] - Primary Care  Primary Care  [address] [place] [address]  Phone: [phone]  Fax:     Cardiology at [place] ([facility])  Cardiology  [address], [address]  [place] [address]  Phone: [phone]  Fax:

## 2023-10-26 NOTE — DISCHARGE NOTE PROVIDER - HOSPITAL COURSE
78yoF w/ PMHx of COPD (on home 3LNC), MI,  HFrEF, mm/thrombocytopenia/ on promecta, active smoker, HTN, mood disorders, seizure disorder, urinary retention,  HLD, GERD, recent admission for sob/chf was diuresed and treated for pna/ codp, was dcd on home o2 3 L NC, po abx and prednisone presented to ED for SOB. EMS found patient to be hypoxic to the 60's on room air, placed on NC with minimal improvement then placed on BIPAP. Patient lethagic and altered, found to be COVID +. CT does not show signs of PNA or vascular congestion, shows improved pleural effusion from prior admission, admitted to medicine.     Patient's O2, CO2, and mentation improved with BIPAP. Off BIPAP for 2-3 days. Patient received remdesivir and steroids. Also completed short course of antibiotics. On home O2 3L. Patient seen by pulm, recommending lasix with repeat CT chest prior to d/c to eval for effusion.  CT chest with improved effusion???    Patient medically stable for d/c. Nebulizer sent to pharmacy.    78yoF w/ PMHx of COPD (on home 3LNC), MI,  HFrEF, mm/thrombocytopenia/ on promecta, active smoker, HTN, mood disorders, seizure disorder, urinary retention,  HLD, GERD, recent admission for sob/chf was diuresed and treated for pna/ codp, was dcd on home o2 3 L NC, po abx and prednisone presented to ED for SOB. EMS found patient to be hypoxic to the 60's on room air, placed on NC with minimal improvement then placed on BIPAP. Patient lethargic and altered, found to be COVID +. CT does not show signs of PNA or vascular congestion, shows improved pleural effusion from prior admission, admitted to medicine.     Patient's O2, CO2, and mentation improved with BIPAP. Off BIPAP for 2-3 days. Patient received remdesivir and steroids. Also completed short course of antibiotics. On home O2 3L. Patient seen by pulm, recommending lasix with repeat CT chest prior to d/c to eval for effusion.  CT chest with improved effusion.     Patient medically stable for d/c. Nebulizer sent to pharmacy w/ matthew. Patient to c/w prednisone 10mg taper: 4tabs (40mg) x2 days, then 3 tabs (30mg) x2 days, then 2 tabs (20mg) x2 days, then 1 tab (10mg) daily until she sees pulmonary outpt. Patient to f/u w/ pcp and pulmonary.

## 2023-10-26 NOTE — DISCHARGE NOTE PROVIDER - NSDCMRMEDTOKEN_GEN_ALL_CORE_FT
Advair Diskus 250 mcg-50 mcg inhalation powder: 1 puff(s) inhaled 2 times a day  Cymbalta 30 mg oral delayed release capsule: 1 cap(s) orally once a day  folic acid 1 mg oral tablet: 1 tab(s) orally once a day   Lomotil 2.5 mg-0.025 mg oral tablet: 1 tab(s) orally 4 times a day, As needed, Diarrhea  metoprolol: 12.5 milligram(s) orally once a day  Nicoderm C-Q 21 mg/24 hr transdermal film, extended release: 1 patch transdermal once a day   Nicoderm C-Q Clear 21 mg/24 hr transdermal film, extended release: 1 patch transdermal once a day transdermal  pantoprazole 40 mg oral delayed release tablet: 1 tab(s) orally once a day   Spiriva 18 mcg inhalation capsule: 1 cap(s) inhaled once a day  tamsulosin 0.4 mg oral capsule: 1 cap(s) orally once a day (at bedtime)  valACYclovir 500 mg oral tablet: 1 tab(s) orally once a day  Xanax 0.5 mg oral tablet: 1 tab(s) orally 2 times a day, As needed, anxiety or agitation   Advair Diskus 250 mcg-50 mcg inhalation powder: 1 puff(s) inhaled 2 times a day  Cymbalta 30 mg oral delayed release capsule: 1 cap(s) orally once a day  folic acid 1 mg oral tablet: 1 tab(s) orally once a day   ipratropium-albuterol 0.5 mg-2.5 mg/3 mL inhalation solution: 3 milliliter(s) by nebulizer 4 times a day as needed for  shortness of breath and/or wheezing  Lomotil 2.5 mg-0.025 mg oral tablet: 1 tab(s) orally 4 times a day, As needed, Diarrhea  metoprolol: 12.5 milligram(s) orally once a day  Nebulizer: use with matthew prn for SOB/wheezing  Nicoderm C-Q Clear 21 mg/24 hr transdermal film, extended release: 1 patch transdermal once a day transdermal  pantoprazole 40 mg oral delayed release tablet: 1 tab(s) orally once a day   Spiriva 18 mcg inhalation capsule: 1 cap(s) inhaled once a day  tamsulosin 0.4 mg oral capsule: 1 cap(s) orally once a day (at bedtime)  valACYclovir 500 mg oral tablet: 1 tab(s) orally once a day  Xanax 0.5 mg oral tablet: 1 tab(s) orally 2 times a day, As needed, anxiety or agitation

## 2023-10-26 NOTE — DISCHARGE NOTE PROVIDER - ATTENDING DISCHARGE PHYSICAL EXAMINATION:
CONSTITUTIONAL: No apparent distress  HEENT: Normocephalic, Atraumatic. Trachea midline.  RESPIRATORY: scattered expiratory wheezes - improved  CARDIOVASCULAR: Regular rate and rhythm, no lower extremity edema; Peripheral pulses are 2+ bilaterally  ABDOMEN: Soft, non-distended, nontender to palpation, +BS  MUSCLOSKELETAL: moving all 4 extremities spontaneously  PSYCH: thoughts linear, affect appropriate  NEUROLOGY: Alert, Oriented x4, CN 2-12 grossly intact  SKIN: No rashes

## 2023-10-26 NOTE — DISCHARGE NOTE PROVIDER - CARE PROVIDER_API CALL
Efrain Galvan  Pulmonary Disease  [address], [address]  [place] [address]  Phone: [phone]  Fax: [phone]  Established Patient  Follow Up Time:

## 2023-10-26 NOTE — DISCHARGE NOTE NURSING/CASE MANAGEMENT/SOCIAL WORK - PATIENT PORTAL LINK FT
You can access the FollowMyHealth Patient Portal offered by [facility] by registering at the following website: [email or web address]. By joining [employer]’s FollowMyHealth portal, you will also be able to view your health information using other applications (apps) compatible with our system.

## 2023-10-26 NOTE — DISCHARGE NOTE NURSING/CASE MANAGEMENT/SOCIAL WORK - NSDCPEFALRISK_GEN_ALL_CORE
For information on Fall & Injury Prevention, visit: [email or web address] OR  [email or web address] OR  https://www.cdc.gov/steadi/patient.html
None

## 2023-10-26 NOTE — DISCHARGE NOTE NURSING/CASE MANAGEMENT/SOCIAL WORK - NSDCPEWEB_GEN_ALL_CORE
[facility] for Tobacco Control website --- [email or web address] website --- [email or web address]

## 2023-11-09 ENCOUNTER — APPOINTMENT (OUTPATIENT)
Dept: PULMONOLOGY | Facility: CLINIC | Age: 78
End: 2023-11-09
Payer: MEDICARE

## 2023-11-09 VITALS
SYSTOLIC BLOOD PRESSURE: 100 MMHG | OXYGEN SATURATION: 96 % | RESPIRATION RATE: 16 BRPM | HEART RATE: 88 BPM | DIASTOLIC BLOOD PRESSURE: 60 MMHG

## 2023-11-09 VITALS — WEIGHT: 106 LBS | HEIGHT: 61 IN | BODY MASS INDEX: 20.01 KG/M2

## 2023-11-09 PROCEDURE — 99214 OFFICE O/P EST MOD 30 MIN: CPT

## 2023-11-09 RX ORDER — FLUTICASONE PROPIONATE AND SALMETEROL 50; 250 UG/1; UG/1
250-50 POWDER RESPIRATORY (INHALATION)
Qty: 60 | Refills: 0 | Status: DISCONTINUED | COMMUNITY
Start: 2017-08-09 | End: 2023-11-09

## 2023-11-13 PROCEDURE — 82435 ASSAY OF BLOOD CHLORIDE: CPT

## 2023-11-13 PROCEDURE — 82962 GLUCOSE BLOOD TEST: CPT

## 2023-11-13 PROCEDURE — 83605 ASSAY OF LACTIC ACID: CPT

## 2023-11-13 PROCEDURE — 82565 ASSAY OF CREATININE: CPT

## 2023-11-13 PROCEDURE — 87641 MR-STAPH DNA AMP PROBE: CPT

## 2023-11-13 PROCEDURE — 70450 CT HEAD/BRAIN W/O DYE: CPT

## 2023-11-13 PROCEDURE — 36600 WITHDRAWAL OF ARTERIAL BLOOD: CPT

## 2023-11-13 PROCEDURE — 87640 STAPH A DNA AMP PROBE: CPT

## 2023-11-13 PROCEDURE — 99291 CRITICAL CARE FIRST HOUR: CPT | Mod: 25

## 2023-11-13 PROCEDURE — 85018 HEMOGLOBIN: CPT

## 2023-11-13 PROCEDURE — 86140 C-REACTIVE PROTEIN: CPT

## 2023-11-13 PROCEDURE — 84295 ASSAY OF SERUM SODIUM: CPT

## 2023-11-13 PROCEDURE — 85025 COMPLETE CBC W/AUTO DIFF WBC: CPT

## 2023-11-13 PROCEDURE — 83880 ASSAY OF NATRIURETIC PEPTIDE: CPT

## 2023-11-13 PROCEDURE — 97163 PT EVAL HIGH COMPLEX 45 MIN: CPT

## 2023-11-13 PROCEDURE — 81001 URINALYSIS AUTO W/SCOPE: CPT

## 2023-11-13 PROCEDURE — 85379 FIBRIN DEGRADATION QUANT: CPT

## 2023-11-13 PROCEDURE — 0225U NFCT DS DNA&RNA 21 SARSCOV2: CPT

## 2023-11-13 PROCEDURE — 85014 HEMATOCRIT: CPT

## 2023-11-13 PROCEDURE — 71045 X-RAY EXAM CHEST 1 VIEW: CPT

## 2023-11-13 PROCEDURE — 84132 ASSAY OF SERUM POTASSIUM: CPT

## 2023-11-13 PROCEDURE — 36415 COLL VENOUS BLD VENIPUNCTURE: CPT

## 2023-11-13 PROCEDURE — 82330 ASSAY OF CALCIUM: CPT

## 2023-11-13 PROCEDURE — 84484 ASSAY OF TROPONIN QUANT: CPT

## 2023-11-13 PROCEDURE — 85652 RBC SED RATE AUTOMATED: CPT

## 2023-11-13 PROCEDURE — 83735 ASSAY OF MAGNESIUM: CPT

## 2023-11-13 PROCEDURE — 94760 N-INVAS EAR/PLS OXIMETRY 1: CPT

## 2023-11-13 PROCEDURE — 96365 THER/PROPH/DIAG IV INF INIT: CPT

## 2023-11-13 PROCEDURE — 96375 TX/PRO/DX INJ NEW DRUG ADDON: CPT

## 2023-11-13 PROCEDURE — 94640 AIRWAY INHALATION TREATMENT: CPT

## 2023-11-13 PROCEDURE — 80048 BASIC METABOLIC PNL TOTAL CA: CPT

## 2023-11-13 PROCEDURE — 85610 PROTHROMBIN TIME: CPT

## 2023-11-13 PROCEDURE — 93005 ELECTROCARDIOGRAM TRACING: CPT

## 2023-11-13 PROCEDURE — 80053 COMPREHEN METABOLIC PANEL: CPT

## 2023-11-13 PROCEDURE — 80307 DRUG TEST PRSMV CHEM ANLYZR: CPT

## 2023-11-13 PROCEDURE — 71250 CT THORAX DX C-: CPT

## 2023-11-13 PROCEDURE — 94660 CPAP INITIATION&MGMT: CPT

## 2023-11-13 PROCEDURE — 82947 ASSAY GLUCOSE BLOOD QUANT: CPT

## 2023-11-13 PROCEDURE — 84100 ASSAY OF PHOSPHORUS: CPT

## 2023-11-13 PROCEDURE — 85027 COMPLETE CBC AUTOMATED: CPT

## 2023-11-13 PROCEDURE — 85730 THROMBOPLASTIN TIME PARTIAL: CPT

## 2023-11-13 PROCEDURE — 87040 BLOOD CULTURE FOR BACTERIA: CPT

## 2023-11-13 PROCEDURE — 80076 HEPATIC FUNCTION PANEL: CPT

## 2023-11-13 PROCEDURE — 84145 PROCALCITONIN (PCT): CPT

## 2023-11-13 PROCEDURE — 82803 BLOOD GASES ANY COMBINATION: CPT

## 2023-11-13 PROCEDURE — 82784 ASSAY IGA/IGD/IGG/IGM EACH: CPT

## 2023-11-25 ENCOUNTER — NON-APPOINTMENT (OUTPATIENT)
Age: 78
End: 2023-11-25

## 2023-11-26 ENCOUNTER — INPATIENT (INPATIENT)
Facility: HOSPITAL | Age: 78
LOS: 2 days | Discharge: HOME | DRG: 193 | End: 2023-11-29
Attending: HOSPITALIST | Admitting: STUDENT IN AN ORGANIZED HEALTH CARE EDUCATION/TRAINING PROGRAM
Payer: MEDICARE

## 2023-11-26 VITALS
HEIGHT: 62 IN | RESPIRATION RATE: 19 BRPM | HEART RATE: 109 BPM | WEIGHT: 106.92 LBS | SYSTOLIC BLOOD PRESSURE: 100 MMHG | OXYGEN SATURATION: 89 % | TEMPERATURE: 98 F | DIASTOLIC BLOOD PRESSURE: 70 MMHG

## 2023-11-26 DIAGNOSIS — Z90.49 ACQUIRED ABSENCE OF OTHER SPECIFIED PARTS OF DIGESTIVE TRACT: Chronic | ICD-10-CM

## 2023-11-26 LAB
ALBUMIN SERPL ELPH-MCNC: 3 G/DL — LOW (ref 3.3–5.2)
ALBUMIN SERPL ELPH-MCNC: 3 G/DL — LOW (ref 3.3–5.2)
ALP SERPL-CCNC: 138 U/L — HIGH (ref 40–120)
ALP SERPL-CCNC: 138 U/L — HIGH (ref 40–120)
ALT FLD-CCNC: 15 U/L — SIGNIFICANT CHANGE UP
ANION GAP SERPL CALC-SCNC: 14 MMOL/L — SIGNIFICANT CHANGE UP (ref 5–17)
ANION GAP SERPL CALC-SCNC: 14 MMOL/L — SIGNIFICANT CHANGE UP (ref 5–17)
AST SERPL-CCNC: 15 U/L — SIGNIFICANT CHANGE UP
BASE EXCESS BLDV CALC-SCNC: 4.7 MMOL/L — HIGH (ref -2–3)
BASE EXCESS BLDV CALC-SCNC: 4.7 MMOL/L — HIGH (ref -2–3)
BASOPHILS # BLD AUTO: 0.02 K/UL — SIGNIFICANT CHANGE UP (ref 0–0.2)
BASOPHILS # BLD AUTO: 0.02 K/UL — SIGNIFICANT CHANGE UP (ref 0–0.2)
BASOPHILS NFR BLD AUTO: 0.2 % — SIGNIFICANT CHANGE UP (ref 0–2)
BASOPHILS NFR BLD AUTO: 0.2 % — SIGNIFICANT CHANGE UP (ref 0–2)
BILIRUB SERPL-MCNC: <0.2 MG/DL — LOW (ref 0.4–2)
BILIRUB SERPL-MCNC: <0.2 MG/DL — LOW (ref 0.4–2)
BUN SERPL-MCNC: 11.4 MG/DL — SIGNIFICANT CHANGE UP (ref 8–20)
BUN SERPL-MCNC: 11.4 MG/DL — SIGNIFICANT CHANGE UP (ref 8–20)
CA-I SERPL-SCNC: 1.19 MMOL/L — SIGNIFICANT CHANGE UP (ref 1.15–1.33)
CA-I SERPL-SCNC: 1.19 MMOL/L — SIGNIFICANT CHANGE UP (ref 1.15–1.33)
CALCIUM SERPL-MCNC: 9.1 MG/DL — SIGNIFICANT CHANGE UP (ref 8.4–10.5)
CALCIUM SERPL-MCNC: 9.1 MG/DL — SIGNIFICANT CHANGE UP (ref 8.4–10.5)
CHLORIDE BLDV-SCNC: 102 MMOL/L — SIGNIFICANT CHANGE UP (ref 96–108)
CHLORIDE BLDV-SCNC: 102 MMOL/L — SIGNIFICANT CHANGE UP (ref 96–108)
CHLORIDE SERPL-SCNC: 102 MMOL/L — SIGNIFICANT CHANGE UP (ref 96–108)
CHLORIDE SERPL-SCNC: 102 MMOL/L — SIGNIFICANT CHANGE UP (ref 96–108)
CO2 SERPL-SCNC: 26 MMOL/L — SIGNIFICANT CHANGE UP (ref 22–29)
CO2 SERPL-SCNC: 26 MMOL/L — SIGNIFICANT CHANGE UP (ref 22–29)
CREAT SERPL-MCNC: 1.15 MG/DL — SIGNIFICANT CHANGE UP (ref 0.5–1.3)
CREAT SERPL-MCNC: 1.15 MG/DL — SIGNIFICANT CHANGE UP (ref 0.5–1.3)
EGFR: 48 ML/MIN/1.73M2 — LOW
EGFR: 48 ML/MIN/1.73M2 — LOW
EOSINOPHIL # BLD AUTO: 0.07 K/UL — SIGNIFICANT CHANGE UP (ref 0–0.5)
EOSINOPHIL # BLD AUTO: 0.07 K/UL — SIGNIFICANT CHANGE UP (ref 0–0.5)
EOSINOPHIL NFR BLD AUTO: 0.9 % — SIGNIFICANT CHANGE UP (ref 0–6)
EOSINOPHIL NFR BLD AUTO: 0.9 % — SIGNIFICANT CHANGE UP (ref 0–6)
GAS PNL BLDV: 138 MMOL/L — SIGNIFICANT CHANGE UP (ref 136–145)
GAS PNL BLDV: 138 MMOL/L — SIGNIFICANT CHANGE UP (ref 136–145)
GAS PNL BLDV: SIGNIFICANT CHANGE UP
GLUCOSE BLDV-MCNC: 100 MG/DL — HIGH (ref 70–99)
GLUCOSE BLDV-MCNC: 100 MG/DL — HIGH (ref 70–99)
GLUCOSE SERPL-MCNC: 100 MG/DL — HIGH (ref 70–99)
GLUCOSE SERPL-MCNC: 100 MG/DL — HIGH (ref 70–99)
HCO3 BLDV-SCNC: 29 MMOL/L — SIGNIFICANT CHANGE UP (ref 22–29)
HCO3 BLDV-SCNC: 29 MMOL/L — SIGNIFICANT CHANGE UP (ref 22–29)
HCT VFR BLD CALC: 35.1 % — SIGNIFICANT CHANGE UP (ref 34.5–45)
HCT VFR BLDA CALC: 35 % — SIGNIFICANT CHANGE UP
HCT VFR BLDA CALC: 35 % — SIGNIFICANT CHANGE UP
HGB BLD CALC-MCNC: 11.7 G/DL — SIGNIFICANT CHANGE UP (ref 11.7–16.1)
HGB BLD-MCNC: 11 G/DL — LOW (ref 11.5–15.5)
IMM GRANULOCYTES NFR BLD AUTO: 1.1 % — HIGH (ref 0–0.9)
IMM GRANULOCYTES NFR BLD AUTO: 1.1 % — HIGH (ref 0–0.9)
LACTATE BLDV-MCNC: 1.4 MMOL/L — SIGNIFICANT CHANGE UP (ref 0.5–2)
LACTATE BLDV-MCNC: 1.4 MMOL/L — SIGNIFICANT CHANGE UP (ref 0.5–2)
LYMPHOCYTES # BLD AUTO: 1.65 K/UL — SIGNIFICANT CHANGE UP (ref 1–3.3)
LYMPHOCYTES # BLD AUTO: 1.65 K/UL — SIGNIFICANT CHANGE UP (ref 1–3.3)
LYMPHOCYTES # BLD AUTO: 20.2 % — SIGNIFICANT CHANGE UP (ref 13–44)
LYMPHOCYTES # BLD AUTO: 20.2 % — SIGNIFICANT CHANGE UP (ref 13–44)
MCHC RBC-ENTMCNC: 26.6 PG — LOW (ref 27–34)
MCHC RBC-ENTMCNC: 26.6 PG — LOW (ref 27–34)
MCHC RBC-ENTMCNC: 31.3 GM/DL — LOW (ref 32–36)
MCHC RBC-ENTMCNC: 31.3 GM/DL — LOW (ref 32–36)
MCV RBC AUTO: 85 FL — SIGNIFICANT CHANGE UP (ref 80–100)
MCV RBC AUTO: 85 FL — SIGNIFICANT CHANGE UP (ref 80–100)
MONOCYTES # BLD AUTO: 0.87 K/UL — SIGNIFICANT CHANGE UP (ref 0–0.9)
MONOCYTES # BLD AUTO: 0.87 K/UL — SIGNIFICANT CHANGE UP (ref 0–0.9)
MONOCYTES NFR BLD AUTO: 10.7 % — SIGNIFICANT CHANGE UP (ref 2–14)
MONOCYTES NFR BLD AUTO: 10.7 % — SIGNIFICANT CHANGE UP (ref 2–14)
NEUTROPHILS # BLD AUTO: 5.45 K/UL — SIGNIFICANT CHANGE UP (ref 1.8–7.4)
NEUTROPHILS # BLD AUTO: 5.45 K/UL — SIGNIFICANT CHANGE UP (ref 1.8–7.4)
NEUTROPHILS NFR BLD AUTO: 66.9 % — SIGNIFICANT CHANGE UP (ref 43–77)
NEUTROPHILS NFR BLD AUTO: 66.9 % — SIGNIFICANT CHANGE UP (ref 43–77)
PCO2 BLDV: 45 MMHG — HIGH (ref 39–42)
PH BLDV: 7.42 — SIGNIFICANT CHANGE UP (ref 7.32–7.43)
PH BLDV: 7.42 — SIGNIFICANT CHANGE UP (ref 7.32–7.43)
PLATELET # BLD AUTO: 276 K/UL — SIGNIFICANT CHANGE UP (ref 150–400)
PLATELET # BLD AUTO: 276 K/UL — SIGNIFICANT CHANGE UP (ref 150–400)
PO2 BLDV: 197 MMHG — HIGH (ref 25–45)
PO2 BLDV: 197 MMHG — HIGH (ref 25–45)
POTASSIUM BLDV-SCNC: 2.6 MMOL/L — CRITICAL LOW (ref 3.5–5.1)
POTASSIUM BLDV-SCNC: 2.6 MMOL/L — CRITICAL LOW (ref 3.5–5.1)
POTASSIUM SERPL-MCNC: 2.8 MMOL/L — CRITICAL LOW (ref 3.5–5.3)
POTASSIUM SERPL-MCNC: 2.8 MMOL/L — CRITICAL LOW (ref 3.5–5.3)
POTASSIUM SERPL-SCNC: 2.8 MMOL/L — CRITICAL LOW (ref 3.5–5.3)
POTASSIUM SERPL-SCNC: 2.8 MMOL/L — CRITICAL LOW (ref 3.5–5.3)
PROT SERPL-MCNC: 5.9 G/DL — LOW (ref 6.6–8.7)
PROT SERPL-MCNC: 5.9 G/DL — LOW (ref 6.6–8.7)
RAPID RVP RESULT: SIGNIFICANT CHANGE UP
RAPID RVP RESULT: SIGNIFICANT CHANGE UP
RBC # BLD: 4.13 M/UL — SIGNIFICANT CHANGE UP (ref 3.8–5.2)
RBC # FLD: 19.9 % — HIGH (ref 10.3–14.5)
RBC # FLD: 19.9 % — HIGH (ref 10.3–14.5)
SAO2 % BLDV: 100 % — SIGNIFICANT CHANGE UP
SAO2 % BLDV: 100 % — SIGNIFICANT CHANGE UP
SARS-COV-2 RNA SPEC QL NAA+PROBE: SIGNIFICANT CHANGE UP
SARS-COV-2 RNA SPEC QL NAA+PROBE: SIGNIFICANT CHANGE UP
SODIUM SERPL-SCNC: 142 MMOL/L — SIGNIFICANT CHANGE UP (ref 135–145)
SODIUM SERPL-SCNC: 142 MMOL/L — SIGNIFICANT CHANGE UP (ref 135–145)
WBC # BLD: 8.15 K/UL — SIGNIFICANT CHANGE UP (ref 3.8–10.5)
WBC # BLD: 8.15 K/UL — SIGNIFICANT CHANGE UP (ref 3.8–10.5)
WBC # FLD AUTO: 8.15 K/UL — SIGNIFICANT CHANGE UP (ref 3.8–10.5)
WBC # FLD AUTO: 8.15 K/UL — SIGNIFICANT CHANGE UP (ref 3.8–10.5)

## 2023-11-26 PROCEDURE — 71045 X-RAY EXAM CHEST 1 VIEW: CPT | Mod: 26

## 2023-11-26 PROCEDURE — 99285 EMERGENCY DEPT VISIT HI MDM: CPT

## 2023-11-26 RX ORDER — POTASSIUM CHLORIDE 20 MEQ
40 PACKET (EA) ORAL ONCE
Refills: 0 | Status: COMPLETED | OUTPATIENT
Start: 2023-11-26 | End: 2023-11-26

## 2023-11-26 RX ORDER — MAGNESIUM SULFATE 500 MG/ML
2 VIAL (ML) INJECTION ONCE
Refills: 0 | Status: COMPLETED | OUTPATIENT
Start: 2023-11-26 | End: 2023-11-26

## 2023-11-26 RX ORDER — POTASSIUM CHLORIDE 20 MEQ
10 PACKET (EA) ORAL ONCE
Refills: 0 | Status: COMPLETED | OUTPATIENT
Start: 2023-11-26 | End: 2023-11-26

## 2023-11-26 RX ORDER — SODIUM CHLORIDE 9 MG/ML
1000 INJECTION INTRAMUSCULAR; INTRAVENOUS; SUBCUTANEOUS ONCE
Refills: 0 | Status: COMPLETED | OUTPATIENT
Start: 2023-11-26 | End: 2023-11-26

## 2023-11-26 RX ORDER — IPRATROPIUM/ALBUTEROL SULFATE 18-103MCG
3 AEROSOL WITH ADAPTER (GRAM) INHALATION
Refills: 0 | Status: DISCONTINUED | OUTPATIENT
Start: 2023-11-26 | End: 2023-11-29

## 2023-11-26 RX ADMIN — SODIUM CHLORIDE 1000 MILLILITER(S): 9 INJECTION INTRAMUSCULAR; INTRAVENOUS; SUBCUTANEOUS at 18:37

## 2023-11-26 RX ADMIN — Medication 100 MILLIEQUIVALENT(S): at 21:00

## 2023-11-26 RX ADMIN — Medication 3 MILLILITER(S): at 18:38

## 2023-11-26 RX ADMIN — SODIUM CHLORIDE 1000 MILLILITER(S): 9 INJECTION INTRAMUSCULAR; INTRAVENOUS; SUBCUTANEOUS at 19:00

## 2023-11-26 RX ADMIN — Medication 25 GRAM(S): at 19:46

## 2023-11-26 RX ADMIN — Medication 3 MILLILITER(S): at 19:53

## 2023-11-26 RX ADMIN — Medication 40 MILLIEQUIVALENT(S): at 21:00

## 2023-11-26 RX ADMIN — Medication 10 MILLIEQUIVALENT(S): at 21:00

## 2023-11-26 RX ADMIN — Medication 125 MILLIGRAM(S): at 18:38

## 2023-11-26 RX ADMIN — Medication 10 MILLIEQUIVALENT(S): at 22:25

## 2023-11-26 RX ADMIN — Medication 40 MILLIEQUIVALENT(S): at 19:45

## 2023-11-26 RX ADMIN — Medication 100 MILLIEQUIVALENT(S): at 19:48

## 2023-11-26 NOTE — ED ADULT TRIAGE NOTE - CHIEF COMPLAINT QUOTE
Pt BIBA sent in from  for tachypnea and tachycardiac at .  Pt with recent hospitalization for PNA reports increased weakness and chills since Wednesday.  Pt found to be 89% on room air; PMH COPD not on home O2

## 2023-11-26 NOTE — ED PROVIDER NOTE - PROGRESS NOTE DETAILS
patient now with PNA on CTA that is worsening since prior imaging. in setting of recent hypotension, tachycardia and worsening hypoxia.  will admit for IV abx. MD Ace:  Spoke with patient who states her pulmonologist told her she no longer needs her oxygen. states she has been off her oxygen for one week and doing well. states she suddenly felt week 48 hours ago with increasing cough, malaise and near-syncope sensation. found to be tachycardic, hypoxic, and hypotensive upon arrival to urgent care.  upon my evaluation, yamel found to have BP 87/50, and BP recovered after IVF.  will do cta to eval for PE/PNA.

## 2023-11-26 NOTE — ED PROVIDER NOTE - SHIFT CHANGE DETAILS
Patient signed out pending  labs/ CXR, all further work up and management at the discretion of receiving physician.

## 2023-11-26 NOTE — ED ADULT NURSE NOTE - OBJECTIVE STATEMENT
Pt care acquired at 1830. Pt is A&Ox4 and Is resting in bed. Pt arrives due to having flu like symptoms for a few days. Pt states she just finished course of abx for pneumonia. Pt states she has had a cough and has been feeling lethargic. Pt states she felt so lethargic she was unable to walk today. Pt arrives tachycardic and hypotensive on initial assessment. Pt is sinus tachycardia on cardiac monitor. Rhonchi noted on lung sounds.

## 2023-11-26 NOTE — ED PROVIDER NOTE - CLINICAL SUMMARY MEDICAL DECISION MAKING FREE TEXT BOX
Patient is a 77 yo female with PMHx COPD on 3LNC at home, HFrEF, multiple myeloma on promecta, former smoker, HTN, mood disorders, seizures, HLD, GERD, recent admission for SOB secondary to CHF/PNA/COPD dc'd home on 3LNC as well as abx and prednisone presenting with weakness as well as cough and congestion since Wednesday.       Will give nebs steroids for RAD likely secondary to PNA vs. uri, CXR to r.o PNA vs. HF, check cultures lactate to evaluate for bacteremia and infection, reassess.

## 2023-11-26 NOTE — ED ADULT NURSE NOTE - NSFALLRISKINTERV_ED_ALL_ED

## 2023-11-26 NOTE — ED PROVIDER NOTE - PHYSICAL EXAMINATION
Constitutional: Well appearing, awake, alert, oriented to person, place, and time/situation and in no apparent distress  ENMT: Airway patent nasal mucosa clear. Mouth with normal mucosa. Throat has no vesicles no oropharyngeal exudates and uvula is midline. No blood in the oropharynx  EYES: clear bilaterally, pupils equal, round and reactive to light  Cardiac: Regular rate, regular rhythm. Heart sounds S1, S2. No murmurs, rubs or gallops. Good capillary refill, 2+ pulses, no peripheral edema  Respiratory: Bilateral rhonchi, satting 99% on home 2L NC in no distress   Gastrointestinal: Abdomen nondistended, non-tender, no rebound guarding or peritoneal signs  Genitourinary: No CVA tenderness, pelvis nontender, bladder nondistended  Musculoskeletal: Spine appears normal, range of motion is not limited, no muscle or joint tenderness  Neurological: Alert and oriented, no focal deficits, no motor or sensory deficits. CN 2-12 intact, PERRLA, EOMI, No FND

## 2023-11-26 NOTE — ED PROVIDER NOTE - ATTENDING CONTRIBUTION TO CARE
I personally saw the patient with the resident, and completed the key components of the history and physical exam. I then discussed the management plan with the resident.    78-year-old  female with past medical history of COPD on 3 L nasal cannula at home, HFrEF, hypertension with recent admission for CHF/PNA/COPD, discharged on 3 L nasal cannula, antibiotics and prednisone presents for weakness, cough, congestion for the last 5 days.  Patient denies fevers, chest pain, nausea, vomiting, lower extremity edema.  She used her nebulizer at home and again in the ambulance which improved her breathing.      I agree with exam as documented.      Will check labs, patient satting well on her baseline 3 L nasal cannula (in triage patient was on room air resulting in SpO2 of 89),  chest x-ray, neb treatment, steroids, reassess.

## 2023-11-26 NOTE — ED PROVIDER NOTE - OBJECTIVE STATEMENT
Patient is a 79 yo female with PMHx COPD on 3LNC at home, HFrEF, multiple myeloma on promecta, former smoker, HTN, mood disorders, seizures, HLD, GERD, recent admission for SOB secondary to CHF/PNA/COPD dc'd home on 3LNC as well as abx and prednisone presenting with weakness as well as cough and congestion since Wednesday. As per patient and daughter at bedside, patient has had a dry cough with congestion and intermittent yellow/clear sputum since Wednesday. Patient has also had weakness and felt lightheaded during ambulation today. Denies falls, headstrike, blood thinners. Patient completed her recent abx and prednisone. Denies fevers, chills, dizziness, dysphagia, dysarthria, diplopia, photophobia, syncope, SOB, CP, abdominal pain, neck pain, back pain, diarrhea, dysuria, hematuria, hematochezia, hematemesis, n/v, recent travel, sick contacts, leg swelling.

## 2023-11-27 DIAGNOSIS — J18.9 PNEUMONIA, UNSPECIFIED ORGANISM: ICD-10-CM

## 2023-11-27 LAB
ANION GAP SERPL CALC-SCNC: 13 MMOL/L — SIGNIFICANT CHANGE UP (ref 5–17)
ANION GAP SERPL CALC-SCNC: 13 MMOL/L — SIGNIFICANT CHANGE UP (ref 5–17)
BUN SERPL-MCNC: 11.6 MG/DL — SIGNIFICANT CHANGE UP (ref 8–20)
BUN SERPL-MCNC: 11.6 MG/DL — SIGNIFICANT CHANGE UP (ref 8–20)
CALCIUM SERPL-MCNC: 8.5 MG/DL — SIGNIFICANT CHANGE UP (ref 8.4–10.5)
CALCIUM SERPL-MCNC: 8.5 MG/DL — SIGNIFICANT CHANGE UP (ref 8.4–10.5)
CHLORIDE SERPL-SCNC: 103 MMOL/L — SIGNIFICANT CHANGE UP (ref 96–108)
CHLORIDE SERPL-SCNC: 103 MMOL/L — SIGNIFICANT CHANGE UP (ref 96–108)
CO2 SERPL-SCNC: 22 MMOL/L — SIGNIFICANT CHANGE UP (ref 22–29)
CO2 SERPL-SCNC: 22 MMOL/L — SIGNIFICANT CHANGE UP (ref 22–29)
CREAT SERPL-MCNC: 1.09 MG/DL — SIGNIFICANT CHANGE UP (ref 0.5–1.3)
CREAT SERPL-MCNC: 1.09 MG/DL — SIGNIFICANT CHANGE UP (ref 0.5–1.3)
EGFR: 52 ML/MIN/1.73M2 — LOW
EGFR: 52 ML/MIN/1.73M2 — LOW
GLUCOSE SERPL-MCNC: 229 MG/DL — HIGH (ref 70–99)
GLUCOSE SERPL-MCNC: 229 MG/DL — HIGH (ref 70–99)
POTASSIUM SERPL-MCNC: 3.6 MMOL/L — SIGNIFICANT CHANGE UP (ref 3.5–5.3)
POTASSIUM SERPL-MCNC: 3.6 MMOL/L — SIGNIFICANT CHANGE UP (ref 3.5–5.3)
POTASSIUM SERPL-SCNC: 3.6 MMOL/L — SIGNIFICANT CHANGE UP (ref 3.5–5.3)
POTASSIUM SERPL-SCNC: 3.6 MMOL/L — SIGNIFICANT CHANGE UP (ref 3.5–5.3)
SODIUM SERPL-SCNC: 138 MMOL/L — SIGNIFICANT CHANGE UP (ref 135–145)
SODIUM SERPL-SCNC: 138 MMOL/L — SIGNIFICANT CHANGE UP (ref 135–145)

## 2023-11-27 PROCEDURE — 99223 1ST HOSP IP/OBS HIGH 75: CPT

## 2023-11-27 PROCEDURE — 71275 CT ANGIOGRAPHY CHEST: CPT | Mod: 26,MA

## 2023-11-27 RX ORDER — IPRATROPIUM/ALBUTEROL SULFATE 18-103MCG
3 AEROSOL WITH ADAPTER (GRAM) INHALATION EVERY 6 HOURS
Refills: 0 | Status: DISCONTINUED | OUTPATIENT
Start: 2023-11-27 | End: 2023-11-29

## 2023-11-27 RX ORDER — TAMSULOSIN HYDROCHLORIDE 0.4 MG/1
0.4 CAPSULE ORAL AT BEDTIME
Refills: 0 | Status: DISCONTINUED | OUTPATIENT
Start: 2023-11-27 | End: 2023-11-29

## 2023-11-27 RX ORDER — FOLIC ACID 0.8 MG
1 TABLET ORAL DAILY
Refills: 0 | Status: DISCONTINUED | OUTPATIENT
Start: 2023-11-27 | End: 2023-11-29

## 2023-11-27 RX ORDER — ALPRAZOLAM 0.25 MG
0.5 TABLET ORAL
Refills: 0 | Status: DISCONTINUED | OUTPATIENT
Start: 2023-11-27 | End: 2023-11-29

## 2023-11-27 RX ORDER — DULOXETINE HYDROCHLORIDE 30 MG/1
30 CAPSULE, DELAYED RELEASE ORAL DAILY
Refills: 0 | Status: DISCONTINUED | OUTPATIENT
Start: 2023-11-27 | End: 2023-11-29

## 2023-11-27 RX ORDER — AZITHROMYCIN 500 MG/1
500 TABLET, FILM COATED ORAL EVERY 24 HOURS
Refills: 0 | Status: DISCONTINUED | OUTPATIENT
Start: 2023-11-28 | End: 2023-11-29

## 2023-11-27 RX ORDER — CEFTRIAXONE 500 MG/1
1000 INJECTION, POWDER, FOR SOLUTION INTRAMUSCULAR; INTRAVENOUS EVERY 24 HOURS
Refills: 0 | Status: DISCONTINUED | OUTPATIENT
Start: 2023-11-27 | End: 2023-11-29

## 2023-11-27 RX ORDER — AZITHROMYCIN 500 MG/1
500 TABLET, FILM COATED ORAL ONCE
Refills: 0 | Status: COMPLETED | OUTPATIENT
Start: 2023-11-27 | End: 2023-11-27

## 2023-11-27 RX ORDER — AZITHROMYCIN 500 MG/1
TABLET, FILM COATED ORAL
Refills: 0 | Status: DISCONTINUED | OUTPATIENT
Start: 2023-11-27 | End: 2023-11-29

## 2023-11-27 RX ORDER — NICOTINE POLACRILEX 2 MG
1 GUM BUCCAL DAILY
Refills: 0 | Status: DISCONTINUED | OUTPATIENT
Start: 2023-11-27 | End: 2023-11-29

## 2023-11-27 RX ORDER — METOPROLOL TARTRATE 50 MG
25 TABLET ORAL DAILY
Refills: 0 | Status: DISCONTINUED | OUTPATIENT
Start: 2023-11-27 | End: 2023-11-28

## 2023-11-27 RX ORDER — VANCOMYCIN HCL 1 G
1000 VIAL (EA) INTRAVENOUS ONCE
Refills: 0 | Status: COMPLETED | OUTPATIENT
Start: 2023-11-27 | End: 2023-11-27

## 2023-11-27 RX ORDER — PANTOPRAZOLE SODIUM 20 MG/1
40 TABLET, DELAYED RELEASE ORAL
Refills: 0 | Status: DISCONTINUED | OUTPATIENT
Start: 2023-11-27 | End: 2023-11-29

## 2023-11-27 RX ORDER — DIPHENOXYLATE HCL/ATROPINE 2.5-.025MG
1 TABLET ORAL
Refills: 0 | Status: DISCONTINUED | OUTPATIENT
Start: 2023-11-27 | End: 2023-11-29

## 2023-11-27 RX ORDER — CEFTRIAXONE 500 MG/1
1000 INJECTION, POWDER, FOR SOLUTION INTRAMUSCULAR; INTRAVENOUS EVERY 24 HOURS
Refills: 0 | Status: DISCONTINUED | OUTPATIENT
Start: 2023-11-27 | End: 2023-11-27

## 2023-11-27 RX ORDER — INFLUENZA VIRUS VACCINE 15; 15; 15; 15 UG/.5ML; UG/.5ML; UG/.5ML; UG/.5ML
0.7 SUSPENSION INTRAMUSCULAR ONCE
Refills: 0 | Status: DISCONTINUED | OUTPATIENT
Start: 2023-11-27 | End: 2023-11-29

## 2023-11-27 RX ORDER — ACETAMINOPHEN 500 MG
650 TABLET ORAL EVERY 6 HOURS
Refills: 0 | Status: DISCONTINUED | OUTPATIENT
Start: 2023-11-27 | End: 2023-11-29

## 2023-11-27 RX ORDER — AZTREONAM 2 G
1000 VIAL (EA) INJECTION ONCE
Refills: 0 | Status: COMPLETED | OUTPATIENT
Start: 2023-11-27 | End: 2023-11-27

## 2023-11-27 RX ORDER — ENOXAPARIN SODIUM 100 MG/ML
40 INJECTION SUBCUTANEOUS EVERY 24 HOURS
Refills: 0 | Status: DISCONTINUED | OUTPATIENT
Start: 2023-11-27 | End: 2023-11-29

## 2023-11-27 RX ORDER — VALACYCLOVIR 500 MG/1
500 TABLET, FILM COATED ORAL DAILY
Refills: 0 | Status: DISCONTINUED | OUTPATIENT
Start: 2023-11-27 | End: 2023-11-29

## 2023-11-27 RX ADMIN — AZITHROMYCIN 500 MILLIGRAM(S): 500 TABLET, FILM COATED ORAL at 09:42

## 2023-11-27 RX ADMIN — Medication 1 PATCH: at 19:00

## 2023-11-27 RX ADMIN — Medication 0.5 MILLIGRAM(S): at 09:41

## 2023-11-27 RX ADMIN — TAMSULOSIN HYDROCHLORIDE 0.4 MILLIGRAM(S): 0.4 CAPSULE ORAL at 22:09

## 2023-11-27 RX ADMIN — Medication 50 MILLIGRAM(S): at 03:54

## 2023-11-27 RX ADMIN — Medication 40 MILLIGRAM(S): at 18:13

## 2023-11-27 RX ADMIN — Medication 0.5 MILLIGRAM(S): at 23:14

## 2023-11-27 RX ADMIN — Medication 250 MILLIGRAM(S): at 05:46

## 2023-11-27 RX ADMIN — Medication 1000 MILLIGRAM(S): at 05:49

## 2023-11-27 RX ADMIN — ENOXAPARIN SODIUM 40 MILLIGRAM(S): 100 INJECTION SUBCUTANEOUS at 09:45

## 2023-11-27 RX ADMIN — Medication 25 MILLIGRAM(S): at 12:13

## 2023-11-27 RX ADMIN — PANTOPRAZOLE SODIUM 40 MILLIGRAM(S): 20 TABLET, DELAYED RELEASE ORAL at 12:14

## 2023-11-27 RX ADMIN — CEFTRIAXONE 1000 MILLIGRAM(S): 500 INJECTION, POWDER, FOR SOLUTION INTRAMUSCULAR; INTRAVENOUS at 18:13

## 2023-11-27 RX ADMIN — Medication 1 MILLIGRAM(S): at 12:13

## 2023-11-27 RX ADMIN — Medication 3 MILLILITER(S): at 23:14

## 2023-11-27 RX ADMIN — Medication 1 PATCH: at 12:18

## 2023-11-27 RX ADMIN — VALACYCLOVIR 500 MILLIGRAM(S): 500 TABLET, FILM COATED ORAL at 12:13

## 2023-11-27 RX ADMIN — DULOXETINE HYDROCHLORIDE 30 MILLIGRAM(S): 30 CAPSULE, DELAYED RELEASE ORAL at 12:14

## 2023-11-27 NOTE — H&P ADULT - NSHPLABSRESULTS_GEN_ALL_CORE
11.0   8.15   )----------(  276       ( 26 Nov 2023 18:30 )               35.1      138    |  103    |  11.6   ----------------------------<  229        ( 27 Nov 2023 00:32 )  3.6     |  22.0   |  1.09     Ca    8.5        ( 27 Nov 2023 00:32 )    TPro  5.9    /  Alb  3.0    /  TBili  <0.2   /  DBili  x      /  AST  15     /  ALT  15     /  AlkPhos  138    ( 26 Nov 2023 18:30 )    LIVER FUNCTIONS - ( 26 Nov 2023 18:30 )  Alb: 3.0 g/dL / Pro: 5.9 g/dL / ALK PHOS: 138 U/L / ALT: 15 U/L / AST: 15 U/L / GGT: x               CAPILLARY BLOOD GLUCOSE      ct angio - IMPRESSION:  1.  No pulmonary embolism.  2.  Patchy consolidative opacities throughout the right upper and lower   lobes are suspicious for pneumonia.  3.  Increased right-sided small and large airways disease since   10/26/2023.  4.  Mild T7 superior endplate compression fracture has mildly progressed.          Urinalysis Basic - ( 27 Nov 2023 00:32 )    Color: x / Appearance: x / SG: x / pH: x  Gluc: 229 mg/dL / Ketone: x  / Bili: x / Urobili: x   Blood: x / Protein: x / Nitrite: x   Leuk Esterase: x / RBC: x / WBC x   Sq Epi: x / Non Sq Epi: x / Bacteria: x

## 2023-11-27 NOTE — SWALLOW BEDSIDE ASSESSMENT ADULT - SLP PERTINENT HISTORY OF CURRENT PROBLEM
78yoF w/ PMHx of COPD (on home 3LNC), MI,  HFrEF, mm, heavy ex-smoker, HTN, mood disorders, seizure disorder, urinary retention,  HLD, GERD, recent dscharged from [facility] on 10/26 for pna and COPD exacerbation (was also found to be covid positive at the time)  presents now with mildly worsening sob and fatigue along with cough. At the time, she was noticed to have hypercarbia and placed on bipap with improvement and was told to follow up with her pulmonologist.  Patient states she went home last month and felt well;  but started to develop worsening symptoms the last few days and prompted her to come to the hospital

## 2023-11-27 NOTE — PATIENT PROFILE ADULT - FALL HARM RISK - UNIVERSAL INTERVENTIONS
Bed in lowest position, wheels locked, appropriate side rails in place/Call bell, personal items and telephone in reach/Instruct patient to call for assistance before getting out of bed or chair/Non-slip footwear when patient is out of bed/[place] to call system/Physically safe environment - no spills, clutter or unnecessary equipment/Purposeful Proactive Rounding/Room/bathroom lighting operational, light cord in reach

## 2023-11-27 NOTE — H&P ADULT - NSHPREVIEWOFSYSTEMS_GEN_ALL_CORE
REVIEW OF SYSTEMS:    CONSTITUTIONAL: fatigue  EYES: No eye pain, visual disturbances, or discharge  ENMT:  No difficulty hearing, tinnitus, vertigo; No sinus or throat pain  NECK: No pain or stiffness  BREASTS: No pain, masses, or nipple discharge  RESPIRATORY: sob  CARDIOVASCULAR: No chest pain, palpitations, dizziness, or leg swelling  GASTROINTESTINAL: No abdominal or epigastric pain. No nausea, vomiting, or hematemesis; No diarrhea or constipation. No melena or hematochezia.  GENITOURINARY: No dysuria, frequency, hematuria, or incontinence  NEUROLOGICAL: No headaches, memory loss, loss of strength, numbness, or tremors  SKIN: No itching, burning, rashes, or lesions   LYMPH NODES: No enlarged glands  ENDOCRINE: No heat or cold intolerance; No hair loss  MUSCULOSKELETAL: No joint pain or swelling; No muscle, back, or extremity pain  PSYCHIATRIC: No depression, anxiety, mood swings, or difficulty sleeping  HEME/LYMPH: No easy bruising, or bleeding gums  ALLERY AND IMMUNOLOGIC: No hives or eczema

## 2023-11-27 NOTE — H&P ADULT - HISTORY OF PRESENT ILLNESS
patient is a 78yoF w/ PMHx of COPD (on home 3LNC), MI,  HFrEF, mm/thrombocytopenia/ on promecta, active smoker, HTN, mood disorders, seizure disorder, urinary retention,  HLD, GERD, recent admission for sob/chf was diuresed and treated for pna/ codp, was dcd on home o2 3 L NC, patient is a 78yoF w/ PMHx of COPD (on home 3LNC), MI,  HFrEF, mm/thrombocytopenia/ on promecta,heavy ex-smoker, HTN, mood disorders, seizure disorder, urinary retention,  HLD, GERD, recent dscharged from [facility] on 10/26 for pna and COPD exacerbation (was also found to be covid positive at the time)  presents now with mildly worsening sob and fatigue along with cough. At the time, she was noticed to have hypercarbia and placed on bipap with improvement and was told to follow up with her pulmonologist.  Patient states she went home last month and felt well;  but started to develop worsening symptoms the last few days and prompted her to come to the hospital .    patient had ct angio :      IMPRESSION:  1.  No pulmonary embolism.  2.  Patchy consolidative opacities throughout the right upper and lower   lobes are suspicious for pneumonia.  3.  Increased right-sided small and large airways disease since   10/26/2023.  4.  Mild T7 superior endplate compression fracture has mildly progressed.

## 2023-11-27 NOTE — H&P ADULT - ASSESSMENT
78yoF w/ PMHx of COPD (on home 3LNC), MI,  HFrEF, mm, heavy ex-smoker, HTN, mood disorders, seizure disorder, urinary retention,  HLD, GERD, recent dscharged from [facility] on 10/26 for pna and COPD exacerbation (was also found to be covid positive at the time)  presents now with mildly worsening sob and fatigue along with cough. At the time, she was noticed to have hypercarbia and placed on bipap with improvement and was told to follow up with her pulmonologist.  Patient states she went home last month and felt well;  but started to develop worsening symptoms the last few days and prompted her to come to the hospital .    #acute on chronic resp failure - secondary to persistent pna  - ct angio as above  - rvp neg  - will consult speech to rule out aspiration   - - iv steroids, c.w azithro and ceft  - sputum culture    #COPD - chronic home o2  - as above    #htn - metoprolol    #mood disorder - cymbalta  #anxiety - xanax prn     #dvt prop - lovenox subq    pt consult  speech consult  patient is active requiring iv steroids and iv abx

## 2023-11-27 NOTE — ED ADULT NURSE REASSESSMENT NOTE - NS ED NURSE REASSESS COMMENT FT1
Pt is A&OX4, pt is being transferred to CDU Bed A15L and report given to Lucie, pts current condition, medical history and reason for admission reviewed, IV site is clean/dry/intact, Pt is ambulatory with assistance at times, pt is not in any pain or distress at this time, antibiotics are still running, pt is aware of the transfer, the need for the transfer and acclimated to the new unit, provided the call button, personal items within reach, bed is in lowest position, wheels are locked, pt VS recorded and placed in the EMR, pt education deemed successful after teach back shows proficiency.

## 2023-11-27 NOTE — H&P ADULT - TIME-BASED
65 Interpolation Flap Text: A decision was made to reconstruct the defect utilizing an interpolation axial flap and a staged reconstruction.  A telfa template was made of the defect.  This telfa template was then used to outline the interpolation flap.  The donor area for the pedicle flap was then injected with anesthesia.  The flap was excised through the skin and subcutaneous tissue down to the layer of the underlying musculature.  The interpolation flap was carefully excised within this deep plane to maintain its blood supply.  The edges of the donor site were undermined.   The donor site was closed in a primary fashion.  The pedicle was then rotated into position and sutured.  Once the tube was sutured into place, adequate blood supply was confirmed with blanching and refill.  The pedicle was then wrapped with xeroform gauze and dressed appropriately with a telfa and gauze bandage to ensure continued blood supply and protect the attached pedicle.

## 2023-11-27 NOTE — H&P ADULT - NSHPPHYSICALEXAM_GEN_ALL_CORE
PHYSICAL EXAM:    GENERAL: NAD, pleasant, speaking In full sentences  HEAD:  Atraumatic, Normocephalic  EYES: EOMI, PERRLA, conjunctiva and sclera clear  ENMT: No tonsillar erythema, exudates, or enlargement; Moist mucous membranes, Good dentition, No lesions  NECK: Supple, No JVD, Normal thyroid  NERVOUS SYSTEM:  Alert & Oriented X3, Good concentration; Motor Strength 5/5 B/L upper and lower extremities; DTRs 2+ intact and symmetric  CHEST/LUNG: mild exp wheezing, scattered rhonchi  HEART: Regular rate and rhythm; No murmurs, rubs, or gallops  ABDOMEN: Soft, Nontender, Nondistended; Bowel sounds present  EXTREMITIES:  2+ Peripheral Pulses, No clubbing, cyanosis, or edema  LYMPH: No lymphadenopathy noted  SKIN: No rashes or lesions

## 2023-11-28 LAB
ALBUMIN SERPL ELPH-MCNC: 3 G/DL — LOW (ref 3.3–5.2)
ALBUMIN SERPL ELPH-MCNC: 3 G/DL — LOW (ref 3.3–5.2)
ALP SERPL-CCNC: 110 U/L — SIGNIFICANT CHANGE UP (ref 40–120)
ALP SERPL-CCNC: 110 U/L — SIGNIFICANT CHANGE UP (ref 40–120)
ALT FLD-CCNC: 14 U/L — SIGNIFICANT CHANGE UP
ANION GAP SERPL CALC-SCNC: 11 MMOL/L — SIGNIFICANT CHANGE UP (ref 5–17)
ANION GAP SERPL CALC-SCNC: 11 MMOL/L — SIGNIFICANT CHANGE UP (ref 5–17)
AST SERPL-CCNC: 10 U/L — SIGNIFICANT CHANGE UP
BASOPHILS # BLD AUTO: 0.02 K/UL — SIGNIFICANT CHANGE UP (ref 0–0.2)
BASOPHILS # BLD AUTO: 0.02 K/UL — SIGNIFICANT CHANGE UP (ref 0–0.2)
BASOPHILS NFR BLD AUTO: 0.2 % — SIGNIFICANT CHANGE UP (ref 0–2)
BASOPHILS NFR BLD AUTO: 0.2 % — SIGNIFICANT CHANGE UP (ref 0–2)
BILIRUB SERPL-MCNC: <0.2 MG/DL — LOW (ref 0.4–2)
BILIRUB SERPL-MCNC: <0.2 MG/DL — LOW (ref 0.4–2)
BUN SERPL-MCNC: 21.3 MG/DL — HIGH (ref 8–20)
BUN SERPL-MCNC: 21.3 MG/DL — HIGH (ref 8–20)
CALCIUM SERPL-MCNC: 8.8 MG/DL — SIGNIFICANT CHANGE UP (ref 8.4–10.5)
CALCIUM SERPL-MCNC: 8.8 MG/DL — SIGNIFICANT CHANGE UP (ref 8.4–10.5)
CHLORIDE SERPL-SCNC: 107 MMOL/L — SIGNIFICANT CHANGE UP (ref 96–108)
CHLORIDE SERPL-SCNC: 107 MMOL/L — SIGNIFICANT CHANGE UP (ref 96–108)
CO2 SERPL-SCNC: 24 MMOL/L — SIGNIFICANT CHANGE UP (ref 22–29)
CO2 SERPL-SCNC: 24 MMOL/L — SIGNIFICANT CHANGE UP (ref 22–29)
CREAT SERPL-MCNC: 1.14 MG/DL — SIGNIFICANT CHANGE UP (ref 0.5–1.3)
CREAT SERPL-MCNC: 1.14 MG/DL — SIGNIFICANT CHANGE UP (ref 0.5–1.3)
EGFR: 49 ML/MIN/1.73M2 — LOW
EGFR: 49 ML/MIN/1.73M2 — LOW
EOSINOPHIL # BLD AUTO: 0 K/UL — SIGNIFICANT CHANGE UP (ref 0–0.5)
EOSINOPHIL # BLD AUTO: 0 K/UL — SIGNIFICANT CHANGE UP (ref 0–0.5)
EOSINOPHIL NFR BLD AUTO: 0 % — SIGNIFICANT CHANGE UP (ref 0–6)
EOSINOPHIL NFR BLD AUTO: 0 % — SIGNIFICANT CHANGE UP (ref 0–6)
GLUCOSE SERPL-MCNC: 131 MG/DL — HIGH (ref 70–99)
GLUCOSE SERPL-MCNC: 131 MG/DL — HIGH (ref 70–99)
HCT VFR BLD CALC: 32.2 % — LOW (ref 34.5–45)
HGB BLD-MCNC: 9.5 G/DL — LOW (ref 11.5–15.5)
IMM GRANULOCYTES NFR BLD AUTO: 1.5 % — HIGH (ref 0–0.9)
IMM GRANULOCYTES NFR BLD AUTO: 1.5 % — HIGH (ref 0–0.9)
LYMPHOCYTES # BLD AUTO: 0.68 K/UL — LOW (ref 1–3.3)
LYMPHOCYTES # BLD AUTO: 0.68 K/UL — LOW (ref 1–3.3)
LYMPHOCYTES # BLD AUTO: 8 % — LOW (ref 13–44)
LYMPHOCYTES # BLD AUTO: 8 % — LOW (ref 13–44)
MAGNESIUM SERPL-MCNC: 2.3 MG/DL — SIGNIFICANT CHANGE UP (ref 1.8–2.6)
MAGNESIUM SERPL-MCNC: 2.3 MG/DL — SIGNIFICANT CHANGE UP (ref 1.8–2.6)
MCHC RBC-ENTMCNC: 26.6 PG — LOW (ref 27–34)
MCHC RBC-ENTMCNC: 26.6 PG — LOW (ref 27–34)
MCHC RBC-ENTMCNC: 29.5 GM/DL — LOW (ref 32–36)
MCHC RBC-ENTMCNC: 29.5 GM/DL — LOW (ref 32–36)
MCV RBC AUTO: 90.2 FL — SIGNIFICANT CHANGE UP (ref 80–100)
MCV RBC AUTO: 90.2 FL — SIGNIFICANT CHANGE UP (ref 80–100)
MONOCYTES # BLD AUTO: 0.34 K/UL — SIGNIFICANT CHANGE UP (ref 0–0.9)
MONOCYTES # BLD AUTO: 0.34 K/UL — SIGNIFICANT CHANGE UP (ref 0–0.9)
MONOCYTES NFR BLD AUTO: 4 % — SIGNIFICANT CHANGE UP (ref 2–14)
MONOCYTES NFR BLD AUTO: 4 % — SIGNIFICANT CHANGE UP (ref 2–14)
NEUTROPHILS # BLD AUTO: 7.37 K/UL — SIGNIFICANT CHANGE UP (ref 1.8–7.4)
NEUTROPHILS # BLD AUTO: 7.37 K/UL — SIGNIFICANT CHANGE UP (ref 1.8–7.4)
NEUTROPHILS NFR BLD AUTO: 86.3 % — HIGH (ref 43–77)
NEUTROPHILS NFR BLD AUTO: 86.3 % — HIGH (ref 43–77)
PLATELET # BLD AUTO: 291 K/UL — SIGNIFICANT CHANGE UP (ref 150–400)
PLATELET # BLD AUTO: 291 K/UL — SIGNIFICANT CHANGE UP (ref 150–400)
POTASSIUM SERPL-MCNC: 4.5 MMOL/L — SIGNIFICANT CHANGE UP (ref 3.5–5.3)
POTASSIUM SERPL-MCNC: 4.5 MMOL/L — SIGNIFICANT CHANGE UP (ref 3.5–5.3)
POTASSIUM SERPL-SCNC: 4.5 MMOL/L — SIGNIFICANT CHANGE UP (ref 3.5–5.3)
POTASSIUM SERPL-SCNC: 4.5 MMOL/L — SIGNIFICANT CHANGE UP (ref 3.5–5.3)
PROT SERPL-MCNC: 5.4 G/DL — LOW (ref 6.6–8.7)
PROT SERPL-MCNC: 5.4 G/DL — LOW (ref 6.6–8.7)
RBC # BLD: 3.57 M/UL — LOW (ref 3.8–5.2)
RBC # FLD: 20.4 % — HIGH (ref 10.3–14.5)
RBC # FLD: 20.4 % — HIGH (ref 10.3–14.5)
SODIUM SERPL-SCNC: 141 MMOL/L — SIGNIFICANT CHANGE UP (ref 135–145)
SODIUM SERPL-SCNC: 141 MMOL/L — SIGNIFICANT CHANGE UP (ref 135–145)
TSH SERPL-MCNC: 0.25 UIU/ML — LOW (ref 0.27–4.2)
TSH SERPL-MCNC: 0.25 UIU/ML — LOW (ref 0.27–4.2)
VIT B12 SERPL-MCNC: 197 PG/ML — LOW (ref 232–1245)
VIT B12 SERPL-MCNC: 197 PG/ML — LOW (ref 232–1245)
WBC # BLD: 8.54 K/UL — SIGNIFICANT CHANGE UP (ref 3.8–10.5)
WBC # BLD: 8.54 K/UL — SIGNIFICANT CHANGE UP (ref 3.8–10.5)
WBC # FLD AUTO: 8.54 K/UL — SIGNIFICANT CHANGE UP (ref 3.8–10.5)
WBC # FLD AUTO: 8.54 K/UL — SIGNIFICANT CHANGE UP (ref 3.8–10.5)

## 2023-11-28 PROCEDURE — 99232 SBSQ HOSP IP/OBS MODERATE 35: CPT

## 2023-11-28 RX ORDER — METOPROLOL TARTRATE 50 MG
12.5 TABLET ORAL DAILY
Refills: 0 | Status: DISCONTINUED | OUTPATIENT
Start: 2023-11-28 | End: 2023-11-29

## 2023-11-28 RX ORDER — PREGABALIN 225 MG/1
1000 CAPSULE ORAL DAILY
Refills: 0 | Status: DISCONTINUED | OUTPATIENT
Start: 2023-11-28 | End: 2023-11-29

## 2023-11-28 RX ADMIN — Medication 1 PATCH: at 20:34

## 2023-11-28 RX ADMIN — Medication 40 MILLIGRAM(S): at 05:20

## 2023-11-28 RX ADMIN — PREGABALIN 1000 MICROGRAM(S): 225 CAPSULE ORAL at 11:16

## 2023-11-28 RX ADMIN — VALACYCLOVIR 500 MILLIGRAM(S): 500 TABLET, FILM COATED ORAL at 11:08

## 2023-11-28 RX ADMIN — CEFTRIAXONE 1000 MILLIGRAM(S): 500 INJECTION, POWDER, FOR SOLUTION INTRAMUSCULAR; INTRAVENOUS at 18:10

## 2023-11-28 RX ADMIN — Medication 3 MILLILITER(S): at 11:29

## 2023-11-28 RX ADMIN — Medication 0.5 MILLIGRAM(S): at 11:29

## 2023-11-28 RX ADMIN — TAMSULOSIN HYDROCHLORIDE 0.4 MILLIGRAM(S): 0.4 CAPSULE ORAL at 23:35

## 2023-11-28 RX ADMIN — Medication 600 MILLIGRAM(S): at 18:10

## 2023-11-28 RX ADMIN — Medication 0.5 MILLIGRAM(S): at 23:34

## 2023-11-28 RX ADMIN — DULOXETINE HYDROCHLORIDE 30 MILLIGRAM(S): 30 CAPSULE, DELAYED RELEASE ORAL at 11:08

## 2023-11-28 RX ADMIN — PANTOPRAZOLE SODIUM 40 MILLIGRAM(S): 20 TABLET, DELAYED RELEASE ORAL at 06:37

## 2023-11-28 RX ADMIN — Medication 40 MILLIGRAM(S): at 18:10

## 2023-11-28 RX ADMIN — AZITHROMYCIN 255 MILLIGRAM(S): 500 TABLET, FILM COATED ORAL at 11:06

## 2023-11-28 RX ADMIN — ENOXAPARIN SODIUM 40 MILLIGRAM(S): 100 INJECTION SUBCUTANEOUS at 11:06

## 2023-11-28 RX ADMIN — Medication 1 PATCH: at 11:08

## 2023-11-28 RX ADMIN — Medication 600 MILLIGRAM(S): at 11:25

## 2023-11-28 RX ADMIN — Medication 1 MILLIGRAM(S): at 11:07

## 2023-11-28 RX ADMIN — Medication 12.5 MILLIGRAM(S): at 05:35

## 2023-11-28 NOTE — PHYSICAL THERAPY INITIAL EVALUATION ADULT - PERTINENT HX OF CURRENT PROBLEM, REHAB EVAL
78yoF w/ PMHx of COPD (on home 3LNC), MI,  HFrEF, mm, heavy ex-smoker, HTN, mood disorders, seizure disorder, urinary retention,  HLD, GERD, recent dscharged from [facility] on 10/26 for pna and COPD exacerbation (was also found to be covid positive at the time)  presents now with mildly worsening sob and fatigue along with cough. At the time, she was noticed to have hypercarbia and placed on bipap with improvement and was told to follow up with her pulmonologist.  Patient states she went home last month and felt well;  but started to develop worsening symptoms the last few days and prompted her to come to the hospital .

## 2023-11-28 NOTE — PROGRESS NOTE ADULT - ASSESSMENT
78yoF w/ PMHx of COPD (on home 3LNC), MI,  HFrEF, mm, heavy ex-smoker, HTN, mood disorders, seizure disorder, urinary retention,  HLD, GERD, recent dscharged from [facility] on 10/26 for pna and COPD exacerbation (was also found to be covid positive at the time)  presents now with mildly worsening sob and fatigue along with cough. At the time, she was noticed to have hypercarbia and placed on bipap with improvement and was told to follow up with her pulmonologist.  Patient states she went home last month and felt well;  but started to develop worsening symptoms the last few days and prompted her to come to the hospital .    #acute on chronic resp failure - secondary to persistent pna  - ct angio neg  - rvp neg  - - iv steroids- taper to po prednsione tomorrow    #COPD - chronic home o2  - as above    #htn - metoprolol    #mood disorder - cymbalta  #anxiety - xanax prn     #vit b12 def - add cyanocoablamin    #anemia - send iron stuides    #dvt prop - lovenox subq    pt consult  speech consult  patient is active requiring iv steroids and iv abx      likely dc tomorrow

## 2023-11-28 NOTE — PROGRESS NOTE ADULT - SUBJECTIVE AND OBJECTIVE BOX
BECCA JARRELL    [id number]    78y      Female    INTERVAL HPI/OVERNIGHT EVENTS:  patient being seen for COPD. patient seen at bedside and states breathing is better      REVIEW OF SYSTEMS:    CONSTITUTIONAL: No fever, weight loss, or fatigue  RESPIRATORY: No cough, wheezing, hemoptysis; No shortness of breath  CARDIOVASCULAR: No chest pain, palpitations  GASTROINTESTINAL: No abdominal or epigastric pain. No nausea, vomiting  NEUROLOGICAL: No headaches, memory loss, loss of strength.  MISCELLANEOUS:      Vital Signs Last 24 Hrs  T(C): 36.4 (28 Nov 2023 07:56), Max: 36.8 (27 Nov 2023 19:11)  T(F): 97.5 (28 Nov 2023 07:56), Max: 98.3 (27 Nov 2023 19:11)  HR: 84 (28 Nov 2023 07:56) (79 - 100)  BP: 120/65 (28 Nov 2023 07:56) (120/65 - 138/67)  BP(mean): 94 (28 Nov 2023 00:33) (92 - 98)  RR: 18 (28 Nov 2023 07:56) (18 - 18)  SpO2: 99% (28 Nov 2023 07:56) (95% - 99%)    Parameters below as of 28 Nov 2023 07:56  Patient On (Oxygen Delivery Method): nasal cannula        PHYSICAL EXAM:  GENERAL: NAD, pleasant, speaking In full sentences  HEAD:  Atraumatic, Normocephalic  EYES: EOMI, PERRLA, conjunctiva and sclera clear  ENMT: No tonsillar erythema, exudates, or enlargement; Moist mucous membranes, Good dentition, No lesions  NECK: Supple, No JVD, Normal thyroid  NERVOUS SYSTEM:  Alert & Oriented X3, Good concentration; Motor Strength 5/5 B/L upper and lower extremities; DTRs 2+ intact and symmetric  CHEST/LUNG: mild exp wheezing, scattered rhonchi  HEART: Regular rate and rhythm; No murmurs, rubs, or gallops  ABDOMEN: Soft, Nontender, Nondistended; Bowel sounds present  EXTREMITIES:  2+ Peripheral Pulses, No clubbing, cyanosis, or edema  LYMPH: No lymphadenopathy noted  SKIN: No rashes or lesions    LABS:                        9.5    8.54  )-----------( 291      ( 28 Nov 2023 02:40 )             32.2     11-28    141  |  107  |  21.3<H>  ----------------------------<  131<H>  4.5   |  24.0  |  1.14    Ca    8.8      28 Nov 2023 02:40  Mg     2.3     11-28    TPro  5.4<L>  /  Alb  3.0<L>  /  TBili  <0.2<L>  /  DBili  x   /  AST  10  /  ALT  14  /  AlkPhos  110  11-28      Urinalysis Basic - ( 28 Nov 2023 02:40 )    Color: x / Appearance: x / SG: x / pH: x  Gluc: 131 mg/dL / Ketone: x  / Bili: x / Urobili: x   Blood: x / Protein: x / Nitrite: x   Leuk Esterase: x / RBC: x / WBC x   Sq Epi: x / Non Sq Epi: x / Bacteria: x      MEDICATIONS  (STANDING):  albuterol/ipratropium for Nebulization.. 3 milliLiter(s) Nebulizer every 20 minutes  azithromycin  IVPB      azithromycin  IVPB 500 milliGRAM(s) IV Intermittent every 24 hours  cefTRIAXone Injectable. 1000 milliGRAM(s) IV Push every 24 hours  cyanocobalamin 1000 MICROGram(s) Oral daily  DULoxetine 30 milliGRAM(s) Oral daily  enoxaparin Injectable 40 milliGRAM(s) SubCutaneous every 24 hours  folic acid 1 milliGRAM(s) Oral daily  guaiFENesin  milliGRAM(s) Oral every 12 hours  influenza  Vaccine (HIGH DOSE) 0.7 milliLiter(s) IntraMuscular once  methylPREDNISolone sodium succinate Injectable 40 milliGRAM(s) IV Push two times a day  metoprolol succinate ER 12.5 milliGRAM(s) Oral daily  nicotine - 21 mG/24Hr(s) Patch 1 Patch Transdermal daily  pantoprazole    Tablet 40 milliGRAM(s) Oral before breakfast  tamsulosin 0.4 milliGRAM(s) Oral at bedtime  valACYclovir 500 milliGRAM(s) Oral daily    MEDICATIONS  (PRN):  acetaminophen     Tablet .. 650 milliGRAM(s) Oral every 6 hours PRN Temp greater or equal to 38C (100.4F), Mild Pain (1 - 3)  albuterol/ipratropium for Nebulization 3 milliLiter(s) Nebulizer every 6 hours PRN Bronchospasm  ALPRAZolam 0.5 milliGRAM(s) Oral two times a day PRN anxiety or agitation  diphenoxylate/atropine 1 Tablet(s) Oral four times a day PRN Diarrhea  guaiFENesin Oral Liquid (Sugar-Free) 100 milliGRAM(s) Oral every 6 hours PRN Cough      RADIOLOGY & ADDITIONAL TESTS:

## 2023-11-28 NOTE — PHYSICAL THERAPY INITIAL EVALUATION ADULT - NAME OF CLINICIAN
MD Tigre Dela Cruz 11/28/23 @ 11:32 Phone call regarding CT results mild T7 comp fx - no brace needed

## 2023-11-29 ENCOUNTER — TRANSCRIPTION ENCOUNTER (OUTPATIENT)
Age: 78
End: 2023-11-29

## 2023-11-29 VITALS
RESPIRATION RATE: 16 BRPM | SYSTOLIC BLOOD PRESSURE: 138 MMHG | DIASTOLIC BLOOD PRESSURE: 67 MMHG | HEART RATE: 77 BPM | OXYGEN SATURATION: 95 %

## 2023-11-29 LAB
ACANTHOCYTES BLD QL SMEAR: SLIGHT — SIGNIFICANT CHANGE UP
ACANTHOCYTES BLD QL SMEAR: SLIGHT — SIGNIFICANT CHANGE UP
ALBUMIN SERPL ELPH-MCNC: 3 G/DL — LOW (ref 3.3–5.2)
ALBUMIN SERPL ELPH-MCNC: 3 G/DL — LOW (ref 3.3–5.2)
ALP SERPL-CCNC: 110 U/L — SIGNIFICANT CHANGE UP (ref 40–120)
ALP SERPL-CCNC: 110 U/L — SIGNIFICANT CHANGE UP (ref 40–120)
ALT FLD-CCNC: 11 U/L — SIGNIFICANT CHANGE UP
ANION GAP SERPL CALC-SCNC: 10 MMOL/L — SIGNIFICANT CHANGE UP (ref 5–17)
ANION GAP SERPL CALC-SCNC: 10 MMOL/L — SIGNIFICANT CHANGE UP (ref 5–17)
ANISOCYTOSIS BLD QL: SLIGHT — SIGNIFICANT CHANGE UP
ANISOCYTOSIS BLD QL: SLIGHT — SIGNIFICANT CHANGE UP
AST SERPL-CCNC: 9 U/L — SIGNIFICANT CHANGE UP
BASOPHILS # BLD AUTO: 0.06 K/UL — SIGNIFICANT CHANGE UP (ref 0–0.2)
BASOPHILS # BLD AUTO: 0.06 K/UL — SIGNIFICANT CHANGE UP (ref 0–0.2)
BASOPHILS NFR BLD AUTO: 0.9 % — SIGNIFICANT CHANGE UP (ref 0–2)
BASOPHILS NFR BLD AUTO: 0.9 % — SIGNIFICANT CHANGE UP (ref 0–2)
BILIRUB SERPL-MCNC: <0.2 MG/DL — LOW (ref 0.4–2)
BILIRUB SERPL-MCNC: <0.2 MG/DL — LOW (ref 0.4–2)
BLD GP AB SCN SERPL QL: SIGNIFICANT CHANGE UP
BLD GP AB SCN SERPL QL: SIGNIFICANT CHANGE UP
BUN SERPL-MCNC: 22 MG/DL — HIGH (ref 8–20)
BUN SERPL-MCNC: 22 MG/DL — HIGH (ref 8–20)
CALCIUM SERPL-MCNC: 8.9 MG/DL — SIGNIFICANT CHANGE UP (ref 8.4–10.5)
CALCIUM SERPL-MCNC: 8.9 MG/DL — SIGNIFICANT CHANGE UP (ref 8.4–10.5)
CHLORIDE SERPL-SCNC: 103 MMOL/L — SIGNIFICANT CHANGE UP (ref 96–108)
CHLORIDE SERPL-SCNC: 103 MMOL/L — SIGNIFICANT CHANGE UP (ref 96–108)
CO2 SERPL-SCNC: 28 MMOL/L — SIGNIFICANT CHANGE UP (ref 22–29)
CO2 SERPL-SCNC: 28 MMOL/L — SIGNIFICANT CHANGE UP (ref 22–29)
CREAT SERPL-MCNC: 0.98 MG/DL — SIGNIFICANT CHANGE UP (ref 0.5–1.3)
CREAT SERPL-MCNC: 0.98 MG/DL — SIGNIFICANT CHANGE UP (ref 0.5–1.3)
EGFR: 59 ML/MIN/1.73M2 — LOW
EGFR: 59 ML/MIN/1.73M2 — LOW
ELLIPTOCYTES BLD QL SMEAR: SLIGHT — SIGNIFICANT CHANGE UP
ELLIPTOCYTES BLD QL SMEAR: SLIGHT — SIGNIFICANT CHANGE UP
EOSINOPHIL # BLD AUTO: 0 K/UL — SIGNIFICANT CHANGE UP (ref 0–0.5)
EOSINOPHIL # BLD AUTO: 0 K/UL — SIGNIFICANT CHANGE UP (ref 0–0.5)
EOSINOPHIL NFR BLD AUTO: 0 % — SIGNIFICANT CHANGE UP (ref 0–6)
EOSINOPHIL NFR BLD AUTO: 0 % — SIGNIFICANT CHANGE UP (ref 0–6)
FERRITIN SERPL-MCNC: 104 NG/ML — SIGNIFICANT CHANGE UP (ref 13–330)
GIANT PLATELETS BLD QL SMEAR: PRESENT — SIGNIFICANT CHANGE UP
GIANT PLATELETS BLD QL SMEAR: PRESENT — SIGNIFICANT CHANGE UP
GLUCOSE SERPL-MCNC: 109 MG/DL — HIGH (ref 70–99)
GLUCOSE SERPL-MCNC: 109 MG/DL — HIGH (ref 70–99)
HCT VFR BLD CALC: 33.1 % — LOW (ref 34.5–45)
HGB BLD-MCNC: 10.1 G/DL — LOW (ref 11.5–15.5)
IRON SATN MFR SERPL: 20 % — SIGNIFICANT CHANGE UP (ref 14–50)
IRON SATN MFR SERPL: 58 UG/DL — SIGNIFICANT CHANGE UP (ref 37–145)
LYMPHOCYTES # BLD AUTO: 0.62 K/UL — LOW (ref 1–3.3)
LYMPHOCYTES # BLD AUTO: 0.62 K/UL — LOW (ref 1–3.3)
LYMPHOCYTES # BLD AUTO: 8.8 % — LOW (ref 13–44)
LYMPHOCYTES # BLD AUTO: 8.8 % — LOW (ref 13–44)
MAGNESIUM SERPL-MCNC: 2.2 MG/DL — SIGNIFICANT CHANGE UP (ref 1.6–2.6)
MAGNESIUM SERPL-MCNC: 2.2 MG/DL — SIGNIFICANT CHANGE UP (ref 1.6–2.6)
MANUAL SMEAR VERIFICATION: SIGNIFICANT CHANGE UP
MANUAL SMEAR VERIFICATION: SIGNIFICANT CHANGE UP
MCHC RBC-ENTMCNC: 27.4 PG — SIGNIFICANT CHANGE UP (ref 27–34)
MCHC RBC-ENTMCNC: 27.4 PG — SIGNIFICANT CHANGE UP (ref 27–34)
MCHC RBC-ENTMCNC: 30.5 GM/DL — LOW (ref 32–36)
MCHC RBC-ENTMCNC: 30.5 GM/DL — LOW (ref 32–36)
MCV RBC AUTO: 89.7 FL — SIGNIFICANT CHANGE UP (ref 80–100)
MCV RBC AUTO: 89.7 FL — SIGNIFICANT CHANGE UP (ref 80–100)
MONOCYTES # BLD AUTO: 0.18 K/UL — SIGNIFICANT CHANGE UP (ref 0–0.9)
MONOCYTES # BLD AUTO: 0.18 K/UL — SIGNIFICANT CHANGE UP (ref 0–0.9)
MONOCYTES NFR BLD AUTO: 2.6 % — SIGNIFICANT CHANGE UP (ref 2–14)
MONOCYTES NFR BLD AUTO: 2.6 % — SIGNIFICANT CHANGE UP (ref 2–14)
MYELOCYTES NFR BLD: 0.9 % — HIGH (ref 0–0)
MYELOCYTES NFR BLD: 0.9 % — HIGH (ref 0–0)
NEUTROPHILS # BLD AUTO: 6.01 K/UL — SIGNIFICANT CHANGE UP (ref 1.8–7.4)
NEUTROPHILS # BLD AUTO: 6.01 K/UL — SIGNIFICANT CHANGE UP (ref 1.8–7.4)
NEUTROPHILS NFR BLD AUTO: 85.1 % — HIGH (ref 43–77)
NEUTROPHILS NFR BLD AUTO: 85.1 % — HIGH (ref 43–77)
OVALOCYTES BLD QL SMEAR: SLIGHT — SIGNIFICANT CHANGE UP
OVALOCYTES BLD QL SMEAR: SLIGHT — SIGNIFICANT CHANGE UP
PLAT MORPH BLD: NORMAL — SIGNIFICANT CHANGE UP
PLAT MORPH BLD: NORMAL — SIGNIFICANT CHANGE UP
PLATELET # BLD AUTO: 347 K/UL — SIGNIFICANT CHANGE UP (ref 150–400)
PLATELET # BLD AUTO: 347 K/UL — SIGNIFICANT CHANGE UP (ref 150–400)
POIKILOCYTOSIS BLD QL AUTO: SLIGHT — SIGNIFICANT CHANGE UP
POIKILOCYTOSIS BLD QL AUTO: SLIGHT — SIGNIFICANT CHANGE UP
POLYCHROMASIA BLD QL SMEAR: SLIGHT — SIGNIFICANT CHANGE UP
POLYCHROMASIA BLD QL SMEAR: SLIGHT — SIGNIFICANT CHANGE UP
POTASSIUM SERPL-MCNC: 4.5 MMOL/L — SIGNIFICANT CHANGE UP (ref 3.5–5.3)
POTASSIUM SERPL-MCNC: 4.5 MMOL/L — SIGNIFICANT CHANGE UP (ref 3.5–5.3)
POTASSIUM SERPL-SCNC: 4.5 MMOL/L — SIGNIFICANT CHANGE UP (ref 3.5–5.3)
POTASSIUM SERPL-SCNC: 4.5 MMOL/L — SIGNIFICANT CHANGE UP (ref 3.5–5.3)
PROT SERPL-MCNC: 5.6 G/DL — LOW (ref 6.6–8.7)
PROT SERPL-MCNC: 5.6 G/DL — LOW (ref 6.6–8.7)
RBC # BLD: 3.69 M/UL — LOW (ref 3.8–5.2)
RBC # FLD: 20.5 % — HIGH (ref 10.3–14.5)
RBC # FLD: 20.5 % — HIGH (ref 10.3–14.5)
RBC BLD AUTO: ABNORMAL
RBC BLD AUTO: ABNORMAL
RETICS #: 26.2 K/UL — SIGNIFICANT CHANGE UP (ref 25–125)
RETICS #: 26.2 K/UL — SIGNIFICANT CHANGE UP (ref 25–125)
RETICS/RBC NFR: 0.7 % — SIGNIFICANT CHANGE UP (ref 0.5–2.5)
RETICS/RBC NFR: 0.7 % — SIGNIFICANT CHANGE UP (ref 0.5–2.5)
SODIUM SERPL-SCNC: 141 MMOL/L — SIGNIFICANT CHANGE UP (ref 135–145)
SODIUM SERPL-SCNC: 141 MMOL/L — SIGNIFICANT CHANGE UP (ref 135–145)
TIBC SERPL-MCNC: 293 UG/DL — SIGNIFICANT CHANGE UP (ref 220–430)
TIBC SERPL-MCNC: 293 UG/DL — SIGNIFICANT CHANGE UP (ref 220–430)
TRANSFERRIN SERPL-MCNC: 205 MG/DL — SIGNIFICANT CHANGE UP (ref 192–382)
VARIANT LYMPHS # BLD: 1.7 % — SIGNIFICANT CHANGE UP (ref 0–6)
VARIANT LYMPHS # BLD: 1.7 % — SIGNIFICANT CHANGE UP (ref 0–6)
WBC # BLD: 7.06 K/UL — SIGNIFICANT CHANGE UP (ref 3.8–10.5)
WBC # BLD: 7.06 K/UL — SIGNIFICANT CHANGE UP (ref 3.8–10.5)
WBC # FLD AUTO: 7.06 K/UL — SIGNIFICANT CHANGE UP (ref 3.8–10.5)
WBC # FLD AUTO: 7.06 K/UL — SIGNIFICANT CHANGE UP (ref 3.8–10.5)

## 2023-11-29 PROCEDURE — 84443 ASSAY THYROID STIM HORMONE: CPT

## 2023-11-29 PROCEDURE — 82803 BLOOD GASES ANY COMBINATION: CPT

## 2023-11-29 PROCEDURE — 92610 EVALUATE SWALLOWING FUNCTION: CPT

## 2023-11-29 PROCEDURE — 80053 COMPREHEN METABOLIC PANEL: CPT

## 2023-11-29 PROCEDURE — 36415 COLL VENOUS BLD VENIPUNCTURE: CPT

## 2023-11-29 PROCEDURE — 85018 HEMOGLOBIN: CPT

## 2023-11-29 PROCEDURE — 82728 ASSAY OF FERRITIN: CPT

## 2023-11-29 PROCEDURE — 93005 ELECTROCARDIOGRAM TRACING: CPT

## 2023-11-29 PROCEDURE — 85045 AUTOMATED RETICULOCYTE COUNT: CPT

## 2023-11-29 PROCEDURE — 82435 ASSAY OF BLOOD CHLORIDE: CPT

## 2023-11-29 PROCEDURE — 85025 COMPLETE CBC W/AUTO DIFF WBC: CPT

## 2023-11-29 PROCEDURE — 82607 VITAMIN B-12: CPT

## 2023-11-29 PROCEDURE — 96365 THER/PROPH/DIAG IV INF INIT: CPT

## 2023-11-29 PROCEDURE — 82947 ASSAY GLUCOSE BLOOD QUANT: CPT

## 2023-11-29 PROCEDURE — 71275 CT ANGIOGRAPHY CHEST: CPT | Mod: MA

## 2023-11-29 PROCEDURE — 83550 IRON BINDING TEST: CPT

## 2023-11-29 PROCEDURE — 96366 THER/PROPH/DIAG IV INF ADDON: CPT

## 2023-11-29 PROCEDURE — 80048 BASIC METABOLIC PNL TOTAL CA: CPT

## 2023-11-29 PROCEDURE — 99285 EMERGENCY DEPT VISIT HI MDM: CPT | Mod: 25

## 2023-11-29 PROCEDURE — 0225U NFCT DS DNA&RNA 21 SARSCOV2: CPT

## 2023-11-29 PROCEDURE — 82330 ASSAY OF CALCIUM: CPT

## 2023-11-29 PROCEDURE — 71045 X-RAY EXAM CHEST 1 VIEW: CPT

## 2023-11-29 PROCEDURE — 83540 ASSAY OF IRON: CPT

## 2023-11-29 PROCEDURE — 84295 ASSAY OF SERUM SODIUM: CPT

## 2023-11-29 PROCEDURE — 99239 HOSP IP/OBS DSCHRG MGMT >30: CPT

## 2023-11-29 PROCEDURE — 86850 RBC ANTIBODY SCREEN: CPT

## 2023-11-29 PROCEDURE — 96367 TX/PROPH/DG ADDL SEQ IV INF: CPT

## 2023-11-29 PROCEDURE — 96375 TX/PRO/DX INJ NEW DRUG ADDON: CPT

## 2023-11-29 PROCEDURE — 86901 BLOOD TYPING SEROLOGIC RH(D): CPT

## 2023-11-29 PROCEDURE — 83605 ASSAY OF LACTIC ACID: CPT

## 2023-11-29 PROCEDURE — 85014 HEMATOCRIT: CPT

## 2023-11-29 PROCEDURE — 86900 BLOOD TYPING SEROLOGIC ABO: CPT

## 2023-11-29 PROCEDURE — 94640 AIRWAY INHALATION TREATMENT: CPT

## 2023-11-29 PROCEDURE — 83735 ASSAY OF MAGNESIUM: CPT

## 2023-11-29 PROCEDURE — 84466 ASSAY OF TRANSFERRIN: CPT

## 2023-11-29 PROCEDURE — 87040 BLOOD CULTURE FOR BACTERIA: CPT

## 2023-11-29 PROCEDURE — 84132 ASSAY OF SERUM POTASSIUM: CPT

## 2023-11-29 RX ORDER — PREGABALIN 225 MG/1
1 CAPSULE ORAL
Qty: 30 | Refills: 0
Start: 2023-11-29 | End: 2023-12-28

## 2023-11-29 RX ORDER — CEFPODOXIME PROXETIL 100 MG
1 TABLET ORAL
Qty: 10 | Refills: 0
Start: 2023-11-29 | End: 2023-12-03

## 2023-11-29 RX ORDER — FERROUS SULFATE 325(65) MG
1 TABLET ORAL
Qty: 30 | Refills: 0
Start: 2023-11-29 | End: 2023-12-28

## 2023-11-29 RX ADMIN — Medication 12.5 MILLIGRAM(S): at 05:37

## 2023-11-29 RX ADMIN — DULOXETINE HYDROCHLORIDE 30 MILLIGRAM(S): 30 CAPSULE, DELAYED RELEASE ORAL at 10:51

## 2023-11-29 RX ADMIN — Medication 600 MILLIGRAM(S): at 05:37

## 2023-11-29 RX ADMIN — Medication 1 MILLIGRAM(S): at 10:51

## 2023-11-29 RX ADMIN — Medication 40 MILLIGRAM(S): at 05:38

## 2023-11-29 RX ADMIN — Medication 1 PATCH: at 07:16

## 2023-11-29 RX ADMIN — Medication 1 PATCH: at 11:00

## 2023-11-29 RX ADMIN — PREGABALIN 1000 MICROGRAM(S): 225 CAPSULE ORAL at 10:51

## 2023-11-29 RX ADMIN — PANTOPRAZOLE SODIUM 40 MILLIGRAM(S): 20 TABLET, DELAYED RELEASE ORAL at 05:37

## 2023-11-29 RX ADMIN — Medication 1 PATCH: at 09:56

## 2023-11-29 RX ADMIN — VALACYCLOVIR 500 MILLIGRAM(S): 500 TABLET, FILM COATED ORAL at 10:50

## 2023-11-29 NOTE — DISCHARGE NOTE PROVIDER - NSDCFUSCHEDAPPT_GEN_ALL_CORE_FT
Efrain Galvan  [facility]  [address]  Scheduled Appointment: 11/30/2023

## 2023-11-29 NOTE — DISCHARGE NOTE PROVIDER - HOSPITAL COURSE
78yoF w/ PMHx of COPD (on home 3LNC), MI,  HFrEF, mm, heavy ex-smoker, HTN, mood disorders, seizure disorder, urinary retention,  HLD, GERD, recent dscharged from [facility] on 10/26 for pna and COPD exacerbation (was also found to be covid positive at the time)  presents now with mildly worsening sob and fatigue along with cough. At the time, she was noticed to have hypercarbia and placed on bipap with improvement and was told to follow up with her pulmonologist.  Patient states she went home last month and felt well;  but started to develop worsening symptoms the last few days and prompted her to come to the hospital .    #acute on chronic resp failure - secondary to persistent pna  - ct angio neg  - rvp neg  - steroid taper    #COPD - chronic home o2  - as above    #htn - metoprolol    #mood disorder - cymbalta  #anxiety - xanax prn     #vit b12 def - cyanocoablamin    #anemia - po iron     pt cleared

## 2023-11-29 NOTE — DISCHARGE NOTE PROVIDER - NSDCMRMEDTOKEN_GEN_ALL_CORE_FT
Advair Diskus 250 mcg-50 mcg inhalation powder: 1 puff(s) inhaled 2 times a day  albuterol 2.5 mg/3 mL (0.083%) inhalation solution: 3 milliliter(s) by nebulizer every 4 hours as needed for  shortness of breath and/or wheezing  cefpodoxime 200 mg oral tablet: 1 tab(s) orally 2 times a day  cyanocobalamin 1000 mcg oral tablet: 1 tab(s) orally once a day  Cymbalta 30 mg oral delayed release capsule: 1 cap(s) orally once a day  folic acid 1 mg oral tablet: 1 tab(s) orally once a day   ipratropium-albuterol 0.5 mg-2.5 mg/3 mL inhalation solution: 3 milliliter(s) by nebulizer 4 times a day as needed for  shortness of breath and/or wheezing  Lomotil 2.5 mg-0.025 mg oral tablet: 1 tab(s) orally 4 times a day, As needed, Diarrhea  metoprolol: 12.5 milligram(s) orally once a day  Nicoderm C-Q Clear 21 mg/24 hr transdermal film, extended release: 1 patch transdermal once a day transdermal  pantoprazole 40 mg oral delayed release tablet: 1 tab(s) orally once a day   predniSONE 10 mg oral tablet: 2 tab(s) orally once a day 2 tabs po daily x 2 days then 1 tab daily then stop  Spiriva 18 mcg inhalation capsule: 1 cap(s) inhaled once a day  tamsulosin 0.4 mg oral capsule: 1 cap(s) orally once a day (at bedtime)  valACYclovir 500 mg oral tablet: 1 tab(s) orally once a day  Xanax 0.5 mg oral tablet: 1 tab(s) orally 2 times a day, As needed, anxiety or agitation   Advair Diskus 250 mcg-50 mcg inhalation powder: 1 puff(s) inhaled 2 times a day  albuterol 2.5 mg/3 mL (0.083%) inhalation solution: 3 milliliter(s) by nebulizer every 4 hours as needed for  shortness of breath and/or wheezing  cefpodoxime 200 mg oral tablet: 1 tab(s) orally 2 times a day  cyanocobalamin 1000 mcg oral tablet: 1 tab(s) orally once a day  Cymbalta 30 mg oral delayed release capsule: 1 cap(s) orally once a day  ferrous sulfate 325 mg (65 mg elemental iron) oral delayed release tablet: 1 tab(s) orally once a day  folic acid 1 mg oral tablet: 1 tab(s) orally once a day   ipratropium-albuterol 0.5 mg-2.5 mg/3 mL inhalation solution: 3 milliliter(s) by nebulizer 4 times a day as needed for  shortness of breath and/or wheezing  Lomotil 2.5 mg-0.025 mg oral tablet: 1 tab(s) orally 4 times a day, As needed, Diarrhea  metoprolol: 12.5 milligram(s) orally once a day  Nicoderm C-Q Clear 21 mg/24 hr transdermal film, extended release: 1 patch transdermal once a day transdermal  pantoprazole 40 mg oral delayed release tablet: 1 tab(s) orally once a day   predniSONE 10 mg oral tablet: 2 tab(s) orally once a day 2 tabs po daily x 2 days then 1 tab daily then stop  Spiriva 18 mcg inhalation capsule: 1 cap(s) inhaled once a day  tamsulosin 0.4 mg oral capsule: 1 cap(s) orally once a day (at bedtime)  valACYclovir 500 mg oral tablet: 1 tab(s) orally once a day  Xanax 0.5 mg oral tablet: 1 tab(s) orally 2 times a day, As needed, anxiety or agitation

## 2023-11-29 NOTE — DISCHARGE NOTE PROVIDER - NSDCCPCAREPLAN_GEN_ALL_CORE_FT
PRINCIPAL DISCHARGE DIAGNOSIS  Diagnosis: Pneumonia  Assessment and Plan of Treatment:       SECONDARY DISCHARGE DIAGNOSES  Diagnosis: COPD, moderate  Assessment and Plan of Treatment:     Diagnosis: Anxiety  Assessment and Plan of Treatment:     Diagnosis: HTN (hypertension)  Assessment and Plan of Treatment:

## 2023-11-29 NOTE — DISCHARGE NOTE PROVIDER - ATTENDING DISCHARGE PHYSICAL EXAMINATION:
GENERAL: NAD, pleasant, speaking In full sentences  HEAD:  Atraumatic, Normocephalic  EYES: EOMI, PERRLA, conjunctiva and sclera clear  ENMT: No tonsillar erythema, exudates, or enlargement; Moist mucous membranes, Good dentition, No lesions  NECK: Supple, No JVD, Normal thyroid  NERVOUS SYSTEM:  Alert & Oriented X3, Good concentration; Motor Strength 5/5 B/L upper and lower extremities; DTRs 2+ intact and symmetric  CHEST/LUNG: mild exp wheezing, scattered rhonchi  HEART: Regular rate and rhythm; No murmurs, rubs, or gallops  ABDOMEN: Soft, Nontender, Nondistended; Bowel sounds present  EXTREMITIES:  2+ Peripheral Pulses, No clubbing, cyanosis, or edema  LYMPH: No lymphadenopathy noted  SKIN: No rashes or lesions

## 2023-11-29 NOTE — DISCHARGE NOTE NURSING/CASE MANAGEMENT/SOCIAL WORK - PATIENT PORTAL LINK FT
You can access the FollowMyHealth Patient Portal offered by [facility] by registering at the following website: [email or web address]. By joining [facility]’s FollowMyHealth portal, you will also be able to view your health information using other applications (apps) compatible with our system.

## 2023-11-30 ENCOUNTER — APPOINTMENT (OUTPATIENT)
Dept: PULMONOLOGY | Facility: CLINIC | Age: 78
End: 2023-11-30
Payer: MEDICARE

## 2023-11-30 VITALS
DIASTOLIC BLOOD PRESSURE: 66 MMHG | BODY MASS INDEX: 20.01 KG/M2 | HEIGHT: 61 IN | WEIGHT: 106 LBS | SYSTOLIC BLOOD PRESSURE: 106 MMHG | OXYGEN SATURATION: 93 % | RESPIRATION RATE: 16 BRPM | HEART RATE: 101 BPM

## 2023-11-30 DIAGNOSIS — J18.9 PNEUMONIA, UNSPECIFIED ORGANISM: ICD-10-CM

## 2023-11-30 DIAGNOSIS — J69.0 PNEUMONITIS DUE TO INHALATION OF FOOD AND VOMIT: ICD-10-CM

## 2023-11-30 PROCEDURE — 99215 OFFICE O/P EST HI 40 MIN: CPT

## 2023-11-30 RX ORDER — NICOTINE 21 MG/24H
21 PATCH, EXTENDED RELEASE TRANSDERMAL
Refills: 0 | Status: ACTIVE | COMMUNITY

## 2023-11-30 RX ORDER — ALBUTEROL SULFATE 2.5 MG/3ML
(2.5 MG/3ML) SOLUTION RESPIRATORY (INHALATION)
Refills: 0 | Status: ACTIVE | COMMUNITY

## 2023-11-30 RX ORDER — DULOXETINE HYDROCHLORIDE 30 MG/1
30 CAPSULE, DELAYED RELEASE ORAL
Refills: 0 | Status: ACTIVE | COMMUNITY

## 2023-11-30 RX ORDER — DIPHENOXYLATE HYDROCHLORIDE AND ATROPINE SULFATE 2.5; .025 MG/1; MG/1
2.5-0.025 TABLET ORAL
Refills: 0 | Status: ACTIVE | COMMUNITY

## 2023-11-30 RX ORDER — FLUTICASONE FUROATE, UMECLIDINIUM BROMIDE AND VILANTEROL TRIFENATATE 100; 62.5; 25 UG/1; UG/1; UG/1
100-62.5-25 POWDER RESPIRATORY (INHALATION)
Qty: 3 | Refills: 3 | Status: ACTIVE | COMMUNITY
Start: 2023-11-30 | End: 1900-01-01

## 2023-11-30 RX ORDER — IPRATROPIUM BROMIDE AND ALBUTEROL SULFATE 2.5; .5 MG/3ML; MG/3ML
0.5-2.5 (3) SOLUTION RESPIRATORY (INHALATION)
Refills: 0 | Status: ACTIVE | COMMUNITY

## 2023-12-02 LAB
CULTURE RESULTS: SIGNIFICANT CHANGE UP
SPECIMEN SOURCE: SIGNIFICANT CHANGE UP

## 2023-12-07 ENCOUNTER — EMERGENCY (EMERGENCY)
Facility: HOSPITAL | Age: 78
LOS: 1 days | Discharge: HOME | End: 2023-12-07
Attending: STUDENT IN AN ORGANIZED HEALTH CARE EDUCATION/TRAINING PROGRAM
Payer: MEDICARE

## 2023-12-07 VITALS
DIASTOLIC BLOOD PRESSURE: 82 MMHG | OXYGEN SATURATION: 93 % | TEMPERATURE: 98 F | HEIGHT: 62 IN | SYSTOLIC BLOOD PRESSURE: 135 MMHG | RESPIRATION RATE: 18 BRPM | HEART RATE: 111 BPM

## 2023-12-07 VITALS
RESPIRATION RATE: 18 BRPM | HEART RATE: 86 BPM | SYSTOLIC BLOOD PRESSURE: 107 MMHG | OXYGEN SATURATION: 94 % | DIASTOLIC BLOOD PRESSURE: 65 MMHG | TEMPERATURE: 98 F

## 2023-12-07 DIAGNOSIS — Z90.49 ACQUIRED ABSENCE OF OTHER SPECIFIED PARTS OF DIGESTIVE TRACT: Chronic | ICD-10-CM

## 2023-12-07 LAB
ALBUMIN SERPL ELPH-MCNC: 3.3 G/DL — SIGNIFICANT CHANGE UP (ref 3.3–5.2)
ALBUMIN SERPL ELPH-MCNC: 3.3 G/DL — SIGNIFICANT CHANGE UP (ref 3.3–5.2)
ALP SERPL-CCNC: 106 U/L — SIGNIFICANT CHANGE UP (ref 40–120)
ALP SERPL-CCNC: 106 U/L — SIGNIFICANT CHANGE UP (ref 40–120)
ALT FLD-CCNC: 7 U/L — SIGNIFICANT CHANGE UP
ANION GAP SERPL CALC-SCNC: 10 MMOL/L — SIGNIFICANT CHANGE UP (ref 5–17)
ANION GAP SERPL CALC-SCNC: 10 MMOL/L — SIGNIFICANT CHANGE UP (ref 5–17)
APPEARANCE UR: CLEAR — SIGNIFICANT CHANGE UP
APPEARANCE UR: CLEAR — SIGNIFICANT CHANGE UP
AST SERPL-CCNC: 7 U/L — SIGNIFICANT CHANGE UP
BACTERIA # UR AUTO: ABNORMAL /HPF
BACTERIA # UR AUTO: ABNORMAL /HPF
BASOPHILS # BLD AUTO: 0.02 K/UL — SIGNIFICANT CHANGE UP (ref 0–0.2)
BASOPHILS # BLD AUTO: 0.02 K/UL — SIGNIFICANT CHANGE UP (ref 0–0.2)
BASOPHILS NFR BLD AUTO: 0.1 % — SIGNIFICANT CHANGE UP (ref 0–2)
BASOPHILS NFR BLD AUTO: 0.1 % — SIGNIFICANT CHANGE UP (ref 0–2)
BILIRUB SERPL-MCNC: <0.2 MG/DL — LOW (ref 0.4–2)
BILIRUB SERPL-MCNC: <0.2 MG/DL — LOW (ref 0.4–2)
BILIRUB UR-MCNC: NEGATIVE — SIGNIFICANT CHANGE UP
BILIRUB UR-MCNC: NEGATIVE — SIGNIFICANT CHANGE UP
BUN SERPL-MCNC: 12.9 MG/DL — SIGNIFICANT CHANGE UP (ref 8–20)
BUN SERPL-MCNC: 12.9 MG/DL — SIGNIFICANT CHANGE UP (ref 8–20)
CALCIUM SERPL-MCNC: 8 MG/DL — LOW (ref 8.4–10.5)
CALCIUM SERPL-MCNC: 8 MG/DL — LOW (ref 8.4–10.5)
CAST: 2 /LPF — SIGNIFICANT CHANGE UP (ref 0–4)
CAST: 2 /LPF — SIGNIFICANT CHANGE UP (ref 0–4)
CHLORIDE SERPL-SCNC: 106 MMOL/L — SIGNIFICANT CHANGE UP (ref 96–108)
CHLORIDE SERPL-SCNC: 106 MMOL/L — SIGNIFICANT CHANGE UP (ref 96–108)
CO2 SERPL-SCNC: 26 MMOL/L — SIGNIFICANT CHANGE UP (ref 22–29)
CO2 SERPL-SCNC: 26 MMOL/L — SIGNIFICANT CHANGE UP (ref 22–29)
COLOR SPEC: YELLOW — SIGNIFICANT CHANGE UP
COLOR SPEC: YELLOW — SIGNIFICANT CHANGE UP
CREAT SERPL-MCNC: 0.92 MG/DL — SIGNIFICANT CHANGE UP (ref 0.5–1.3)
CREAT SERPL-MCNC: 0.92 MG/DL — SIGNIFICANT CHANGE UP (ref 0.5–1.3)
DIFF PNL FLD: NEGATIVE — SIGNIFICANT CHANGE UP
DIFF PNL FLD: NEGATIVE — SIGNIFICANT CHANGE UP
EGFR: 63 ML/MIN/1.73M2 — SIGNIFICANT CHANGE UP
EGFR: 63 ML/MIN/1.73M2 — SIGNIFICANT CHANGE UP
EOSINOPHIL # BLD AUTO: 0.04 K/UL — SIGNIFICANT CHANGE UP (ref 0–0.5)
EOSINOPHIL # BLD AUTO: 0.04 K/UL — SIGNIFICANT CHANGE UP (ref 0–0.5)
EOSINOPHIL NFR BLD AUTO: 0.3 % — SIGNIFICANT CHANGE UP (ref 0–6)
EOSINOPHIL NFR BLD AUTO: 0.3 % — SIGNIFICANT CHANGE UP (ref 0–6)
GLUCOSE SERPL-MCNC: 89 MG/DL — SIGNIFICANT CHANGE UP (ref 70–99)
GLUCOSE SERPL-MCNC: 89 MG/DL — SIGNIFICANT CHANGE UP (ref 70–99)
GLUCOSE UR QL: NEGATIVE MG/DL — SIGNIFICANT CHANGE UP
GLUCOSE UR QL: NEGATIVE MG/DL — SIGNIFICANT CHANGE UP
HCT VFR BLD CALC: 33 % — LOW (ref 34.5–45)
HGB BLD-MCNC: 10.1 G/DL — LOW (ref 11.5–15.5)
IMM GRANULOCYTES NFR BLD AUTO: 1.6 % — HIGH (ref 0–0.9)
IMM GRANULOCYTES NFR BLD AUTO: 1.6 % — HIGH (ref 0–0.9)
KETONES UR-MCNC: NEGATIVE MG/DL — SIGNIFICANT CHANGE UP
KETONES UR-MCNC: NEGATIVE MG/DL — SIGNIFICANT CHANGE UP
LACTATE BLDV-MCNC: 1.4 MMOL/L — SIGNIFICANT CHANGE UP (ref 0.5–2)
LACTATE BLDV-MCNC: 1.4 MMOL/L — SIGNIFICANT CHANGE UP (ref 0.5–2)
LEUKOCYTE ESTERASE UR-ACNC: ABNORMAL
LEUKOCYTE ESTERASE UR-ACNC: ABNORMAL
LYMPHOCYTES # BLD AUTO: 1.45 K/UL — SIGNIFICANT CHANGE UP (ref 1–3.3)
LYMPHOCYTES # BLD AUTO: 1.45 K/UL — SIGNIFICANT CHANGE UP (ref 1–3.3)
LYMPHOCYTES # BLD AUTO: 9.7 % — LOW (ref 13–44)
LYMPHOCYTES # BLD AUTO: 9.7 % — LOW (ref 13–44)
MCHC RBC-ENTMCNC: 27.2 PG — SIGNIFICANT CHANGE UP (ref 27–34)
MCHC RBC-ENTMCNC: 27.2 PG — SIGNIFICANT CHANGE UP (ref 27–34)
MCHC RBC-ENTMCNC: 30.6 GM/DL — LOW (ref 32–36)
MCHC RBC-ENTMCNC: 30.6 GM/DL — LOW (ref 32–36)
MCV RBC AUTO: 88.7 FL — SIGNIFICANT CHANGE UP (ref 80–100)
MCV RBC AUTO: 88.7 FL — SIGNIFICANT CHANGE UP (ref 80–100)
MONOCYTES # BLD AUTO: 1.08 K/UL — HIGH (ref 0–0.9)
MONOCYTES # BLD AUTO: 1.08 K/UL — HIGH (ref 0–0.9)
MONOCYTES NFR BLD AUTO: 7.3 % — SIGNIFICANT CHANGE UP (ref 2–14)
MONOCYTES NFR BLD AUTO: 7.3 % — SIGNIFICANT CHANGE UP (ref 2–14)
NEUTROPHILS # BLD AUTO: 12.06 K/UL — HIGH (ref 1.8–7.4)
NEUTROPHILS # BLD AUTO: 12.06 K/UL — HIGH (ref 1.8–7.4)
NEUTROPHILS NFR BLD AUTO: 81 % — HIGH (ref 43–77)
NEUTROPHILS NFR BLD AUTO: 81 % — HIGH (ref 43–77)
NITRITE UR-MCNC: NEGATIVE — SIGNIFICANT CHANGE UP
NITRITE UR-MCNC: NEGATIVE — SIGNIFICANT CHANGE UP
NT-PROBNP SERPL-SCNC: 616 PG/ML — HIGH (ref 0–300)
NT-PROBNP SERPL-SCNC: 616 PG/ML — HIGH (ref 0–300)
PH UR: 7 — SIGNIFICANT CHANGE UP (ref 5–8)
PH UR: 7 — SIGNIFICANT CHANGE UP (ref 5–8)
PLATELET # BLD AUTO: 328 K/UL — SIGNIFICANT CHANGE UP (ref 150–400)
PLATELET # BLD AUTO: 328 K/UL — SIGNIFICANT CHANGE UP (ref 150–400)
POTASSIUM SERPL-MCNC: 3.7 MMOL/L — SIGNIFICANT CHANGE UP (ref 3.5–5.3)
POTASSIUM SERPL-MCNC: 3.7 MMOL/L — SIGNIFICANT CHANGE UP (ref 3.5–5.3)
POTASSIUM SERPL-SCNC: 3.7 MMOL/L — SIGNIFICANT CHANGE UP (ref 3.5–5.3)
POTASSIUM SERPL-SCNC: 3.7 MMOL/L — SIGNIFICANT CHANGE UP (ref 3.5–5.3)
PROT SERPL-MCNC: 5.7 G/DL — LOW (ref 6.6–8.7)
PROT SERPL-MCNC: 5.7 G/DL — LOW (ref 6.6–8.7)
PROT UR-MCNC: SIGNIFICANT CHANGE UP MG/DL
PROT UR-MCNC: SIGNIFICANT CHANGE UP MG/DL
RAPID RVP RESULT: SIGNIFICANT CHANGE UP
RAPID RVP RESULT: SIGNIFICANT CHANGE UP
RBC # BLD: 3.72 M/UL — LOW (ref 3.8–5.2)
RBC # FLD: 19.9 % — HIGH (ref 10.3–14.5)
RBC # FLD: 19.9 % — HIGH (ref 10.3–14.5)
RBC CASTS # UR COMP ASSIST: 2 /HPF — SIGNIFICANT CHANGE UP (ref 0–4)
RBC CASTS # UR COMP ASSIST: 2 /HPF — SIGNIFICANT CHANGE UP (ref 0–4)
SARS-COV-2 RNA SPEC QL NAA+PROBE: SIGNIFICANT CHANGE UP
SARS-COV-2 RNA SPEC QL NAA+PROBE: SIGNIFICANT CHANGE UP
SODIUM SERPL-SCNC: 142 MMOL/L — SIGNIFICANT CHANGE UP (ref 135–145)
SODIUM SERPL-SCNC: 142 MMOL/L — SIGNIFICANT CHANGE UP (ref 135–145)
SP GR SPEC: 1.01 — SIGNIFICANT CHANGE UP (ref 1–1.03)
SP GR SPEC: 1.01 — SIGNIFICANT CHANGE UP (ref 1–1.03)
SQUAMOUS # UR AUTO: 2 /HPF — SIGNIFICANT CHANGE UP (ref 0–5)
SQUAMOUS # UR AUTO: 2 /HPF — SIGNIFICANT CHANGE UP (ref 0–5)
TROPONIN T, HIGH SENSITIVITY RESULT: 12 NG/L — SIGNIFICANT CHANGE UP (ref 0–51)
TROPONIN T, HIGH SENSITIVITY RESULT: 12 NG/L — SIGNIFICANT CHANGE UP (ref 0–51)
UROBILINOGEN FLD QL: 0.2 MG/DL — SIGNIFICANT CHANGE UP (ref 0.2–1)
UROBILINOGEN FLD QL: 0.2 MG/DL — SIGNIFICANT CHANGE UP (ref 0.2–1)
WBC # BLD: 14.89 K/UL — HIGH (ref 3.8–10.5)
WBC # BLD: 14.89 K/UL — HIGH (ref 3.8–10.5)
WBC # FLD AUTO: 14.89 K/UL — HIGH (ref 3.8–10.5)
WBC # FLD AUTO: 14.89 K/UL — HIGH (ref 3.8–10.5)
WBC UR QL: 99 /HPF — HIGH (ref 0–5)
WBC UR QL: 99 /HPF — HIGH (ref 0–5)
YEAST-LIKE CELLS: PRESENT
YEAST-LIKE CELLS: PRESENT

## 2023-12-07 PROCEDURE — 96374 THER/PROPH/DIAG INJ IV PUSH: CPT

## 2023-12-07 PROCEDURE — 83605 ASSAY OF LACTIC ACID: CPT

## 2023-12-07 PROCEDURE — G1004: CPT

## 2023-12-07 PROCEDURE — 93005 ELECTROCARDIOGRAM TRACING: CPT

## 2023-12-07 PROCEDURE — 83880 ASSAY OF NATRIURETIC PEPTIDE: CPT

## 2023-12-07 PROCEDURE — 99285 EMERGENCY DEPT VISIT HI MDM: CPT | Mod: 25

## 2023-12-07 PROCEDURE — 81001 URINALYSIS AUTO W/SCOPE: CPT

## 2023-12-07 PROCEDURE — 36415 COLL VENOUS BLD VENIPUNCTURE: CPT

## 2023-12-07 PROCEDURE — 74176 CT ABD & PELVIS W/O CONTRAST: CPT | Mod: 26,ME

## 2023-12-07 PROCEDURE — 84484 ASSAY OF TROPONIN QUANT: CPT

## 2023-12-07 PROCEDURE — 80053 COMPREHEN METABOLIC PANEL: CPT

## 2023-12-07 PROCEDURE — 93010 ELECTROCARDIOGRAM REPORT: CPT

## 2023-12-07 PROCEDURE — 99284 EMERGENCY DEPT VISIT MOD MDM: CPT

## 2023-12-07 PROCEDURE — 71045 X-RAY EXAM CHEST 1 VIEW: CPT | Mod: 26

## 2023-12-07 PROCEDURE — 0225U NFCT DS DNA&RNA 21 SARSCOV2: CPT

## 2023-12-07 PROCEDURE — 71045 X-RAY EXAM CHEST 1 VIEW: CPT

## 2023-12-07 PROCEDURE — 85025 COMPLETE CBC W/AUTO DIFF WBC: CPT

## 2023-12-07 PROCEDURE — 74176 CT ABD & PELVIS W/O CONTRAST: CPT | Mod: ME

## 2023-12-07 RX ORDER — IBUPROFEN 200 MG
1 TABLET ORAL
Qty: 20 | Refills: 0
Start: 2023-12-07 | End: 2023-12-11

## 2023-12-07 RX ORDER — CEFPODOXIME PROXETIL 100 MG
1 TABLET ORAL
Qty: 20 | Refills: 0
Start: 2023-12-07 | End: 2023-12-16

## 2023-12-07 RX ORDER — KETOROLAC TROMETHAMINE 30 MG/ML
15 SYRINGE (ML) INJECTION ONCE
Refills: 0 | Status: DISCONTINUED | OUTPATIENT
Start: 2023-12-07 | End: 2023-12-07

## 2023-12-07 RX ADMIN — Medication 15 MILLIGRAM(S): at 09:50

## 2023-12-07 NOTE — ED ADULT NURSE REASSESSMENT NOTE - NS ED NURSE REASSESS COMMENT FT1
Received patient lying on stretcher in NAD.  Pt A&Ox4, presents to ED with c/o b/l flank pain.  Pt states pain with movement, denies flank pain while at rest.  CT/renal stone hunt pending. Safety maintained with bed locked, in lowest position with call bell in reach.

## 2023-12-07 NOTE — ED PROVIDER NOTE - NS ED ATTENDING STATEMENT MOD
This was a shared visit with the [facility]. I reviewed and verified the documentation and independently performed the documented: Attending with

## 2023-12-07 NOTE — ED PROVIDER NOTE - CLINICAL SUMMARY MEDICAL DECISION MAKING FREE TEXT BOX
78 year old female w/PMHx COPD on 3LNC at home, HFrEF, multiple myeloma on promecta, former smoker, HTN, mood disorders, seizures, HLD, GERD, 2 recent admissions for SOB secondary to CHF/PNA/COPD. Currently on 3L nasal cannula at home. Endorses bilateral flank pain. No hematuria or dysuria. Examination significant for left sided crackles over the LL. Patient likely has recurrent pneumonia. Chest xray and renal stone hunt ordered. Blood workup pending. 78 year old female w/PMHx COPD on 3LNC at home, HFrEF, multiple myeloma on promecta, former smoker, HTN, mood disorders, seizures, HLD, GERD, 2 recent admissions for SOB secondary to CHF/PNA/COPD. Currently on 3L nasal cannula at home. Endorses bilateral flank pain. No hematuria or dysuria. Examination significant for left sided crackles over the LL. Chest xray unchanged from previously and renal stone hunt ordered shows small renal stones, UTI on UA. Discussed results with pt. Pt to be discharged on bantin 200mg BID.

## 2023-12-07 NOTE — ED PROVIDER NOTE - NSFOLLOWUPINSTRUCTIONS_ED_ALL_ED_FT
Please take bantin 200mg twice a day. Please return for any fever, worsening cough, weakness or confusion. Please take ibuprofen up to every 6 hours as needed.     Urinary Tract Infection    A urinary tract infection (UTI) is an infection of any part of the urinary tract, which includes the kidneys, ureters, bladder, and urethra. Risk factors include ignoring your need to urinate, wiping back to front if female, being an uncircumcised male, and having diabetes or a weak immune system. Symptoms include frequent urination, pain or burning with urination, foul smelling urine, cloudy urine, pain in the lower abdomen, blood in the urine, and fever. If you were prescribed an antibiotic medicine, take it as told by your health care provider. Do not stop taking the antibiotic even if you start to feel better.    SEEK IMMEDIATE MEDICAL CARE IF YOU HAVE ANY OF THE FOLLOWING SYMPTOMS: severe back or abdominal pain, fever, inability to keep fluids or medicine down, dizziness/lightheadedness, or a change in mental status.

## 2023-12-07 NOTE — ED PROVIDER NOTE - PATIENT PORTAL LINK FT
You can access the FollowMyHealth Patient Portal offered by [facility] by registering at the following website: [email or web address]. By joining [employer]’s FollowMyHealth portal, you will also be able to view your health information using other applications (apps) compatible with our system. You can access the FollowMyHealth Patient Portal offered by [facility] by registering at the following website: [email or web address]. By joining [employer]’s FollowMyHealth portal, you will also be able to view your health information using other applications (apps) compatible with our system.

## 2023-12-07 NOTE — ED PROVIDER NOTE - OBJECTIVE STATEMENT
78 year old female w/PMHx COPD on 3LNC at home, HFrEF, multiple myeloma on promecta, former smoker, HTN, mood disorders, seizures, HLD, GERD, 2 recent admissions for SOB secondary to CHF/PNA/COPD presents to the ED with flank pain. Patient notes that she developed bilateral flank pain yesterday evening that has been slowly increasing in intensity, worse on positional change. Also notes that she has had a cough from a recurrent pneumonia that she was admitted for twice now. Noticed yesterday blood tinged cough. Claims that it the phlegm has slowly changed from white to yellow to brown. Reports a history of kidney stones but says the pain feels "different". Pt denies any chest pain, fever, n/v/d, abdominal pain, dysuria, headache, cough, congestion, sore throat, neck pain, back pain, weakness, numbness, tingling, dizziness, syncope. Known allergy to penicillin.

## 2023-12-07 NOTE — ED PROVIDER NOTE - ATTENDING APP SHARED VISIT CONTRIBUTION OF CARE
78 year old female w/PMHx COPD on 3LNC at home, HFrEF, multiple myeloma on promecta, former smoker, HTN, mood disorders, seizures, HLD, GERD, chronic O2 3L at home presenting with body aches, has hx of pneumonia and uti in the past, will check labs, urinalysis, follow up studies, reassess, dispo pending clinical course and results, if non significant source of symptoms stable for outpt given patient otherwise at baseline state of health currently

## 2023-12-07 NOTE — ED ADULT NURSE NOTE - NSFALLUNIVINTERV_ED_ALL_ED
Bed/Stretcher in lowest position, wheels locked, appropriate side rails in place/Call bell, personal items and telephone in reach/Instruct patient to call for assistance before getting out of bed/chair/stretcher/Non-slip footwear applied when patient is off stretcher/[place] to call system/Physically safe environment - no spills, clutter or unnecessary equipment/Purposeful proactive rounding/Room/bathroom lighting operational, light cord in reach Bed/Stretcher in lowest position, wheels locked, appropriate side rails in place/Call bell, personal items and telephone in reach/Instruct patient to call for assistance before getting out of bed/chair/stretcher/Non-slip footwear applied when patient is off stretcher/[place] to call system/Physically safe environment - no spills, clutter or unnecessary equipment/Purposeful proactive rounding/Room/bathroom lighting operational, light cord in reach

## 2023-12-07 NOTE — ED ADULT NURSE NOTE - OBJECTIVE STATEMENT
pt A & Ox4 from home complaining of "lung pain" Respirations even and unlabored, pt on 3L baseline. Denies chest pain or SOB. Pt reports having pneumonia on and off for past few months. Seen here for same. Pt reports drastic improvement in symptoms. Pt reports back pain in area of left lung or left flank began overnight. Pt reports feeling anxious since living on own. IV established. Blood specimens sent to lab. Medications administered as ordered.

## 2023-12-07 NOTE — ED PROVIDER NOTE - PHYSICAL EXAMINATION
spontaneous/unlabored Gen: NAD, AOx3  Head: NCAT  HEENT: EOMI, oral mucosa moist, normal conjunctiva, neck supple  Lung: left sided crackles  CV: rrr, no murmur, Normal perfusion  Abd: soft, NTND  MSK: No edema, no visible deformities  Neuro: No focal neurologic deficits  Skin: No rash   Psych: normal affect

## 2023-12-07 NOTE — ED ADULT TRIAGE NOTE - CHIEF COMPLAINT QUOTE
Pt BIBEMS for bilateral flank pain that has been going on since Monday, denies any urinary symptoms at this time. Pt states she was recently receiving medication for PNA and endorses PMHx of COPD and asthma.

## 2023-12-14 ENCOUNTER — EMERGENCY (EMERGENCY)
Facility: HOSPITAL | Age: 78
LOS: 1 days | Discharge: HOME | End: 2023-12-14
Attending: STUDENT IN AN ORGANIZED HEALTH CARE EDUCATION/TRAINING PROGRAM
Payer: MEDICARE

## 2023-12-14 ENCOUNTER — OUTPATIENT (OUTPATIENT)
Dept: OUTPATIENT SERVICES | Facility: HOSPITAL | Age: 78
LOS: 1 days | End: 2023-12-14
Payer: MEDICARE

## 2023-12-14 ENCOUNTER — APPOINTMENT (OUTPATIENT)
Dept: RADIOLOGY | Facility: CLINIC | Age: 78
End: 2023-12-14
Payer: MEDICARE

## 2023-12-14 ENCOUNTER — APPOINTMENT (OUTPATIENT)
Dept: PULMONOLOGY | Facility: CLINIC | Age: 78
End: 2023-12-14
Payer: MEDICARE

## 2023-12-14 VITALS
SYSTOLIC BLOOD PRESSURE: 126 MMHG | OXYGEN SATURATION: 96 % | RESPIRATION RATE: 18 BRPM | HEIGHT: 62 IN | HEART RATE: 107 BPM | TEMPERATURE: 98 F | DIASTOLIC BLOOD PRESSURE: 80 MMHG

## 2023-12-14 VITALS — OXYGEN SATURATION: 95 %

## 2023-12-14 VITALS — HEART RATE: 101 BPM | OXYGEN SATURATION: 93 %

## 2023-12-14 VITALS
SYSTOLIC BLOOD PRESSURE: 122 MMHG | DIASTOLIC BLOOD PRESSURE: 70 MMHG | WEIGHT: 106 LBS | BODY MASS INDEX: 20.01 KG/M2 | RESPIRATION RATE: 16 BRPM | HEIGHT: 61 IN

## 2023-12-14 DIAGNOSIS — Z90.49 ACQUIRED ABSENCE OF OTHER SPECIFIED PARTS OF DIGESTIVE TRACT: Chronic | ICD-10-CM

## 2023-12-14 DIAGNOSIS — R91.8 OTHER NONSPECIFIC ABNORMAL FINDING OF LUNG FIELD: ICD-10-CM

## 2023-12-14 DIAGNOSIS — J18.9 PNEUMONIA, UNSPECIFIED ORGANISM: ICD-10-CM

## 2023-12-14 DIAGNOSIS — R05.9 COUGH, UNSPECIFIED: ICD-10-CM

## 2023-12-14 DIAGNOSIS — J44.9 CHRONIC OBSTRUCTIVE PULMONARY DISEASE, UNSPECIFIED: ICD-10-CM

## 2023-12-14 PROCEDURE — 71046 X-RAY EXAM CHEST 2 VIEWS: CPT

## 2023-12-14 PROCEDURE — 12013 RPR F/E/E/N/L/M 2.6-5.0 CM: CPT

## 2023-12-14 PROCEDURE — 99284 EMERGENCY DEPT VISIT MOD MDM: CPT | Mod: 25

## 2023-12-14 PROCEDURE — 99215 OFFICE O/P EST HI 40 MIN: CPT

## 2023-12-14 PROCEDURE — 71046 X-RAY EXAM CHEST 2 VIEWS: CPT | Mod: 26

## 2023-12-14 RX ORDER — DOXYCYCLINE HYCLATE 100 MG/1
100 TABLET ORAL
Qty: 10 | Refills: 1 | Status: DISCONTINUED | COMMUNITY
Start: 2023-11-09 | End: 2023-12-14

## 2023-12-14 RX ORDER — DEXAMETHASONE 4 MG/1
4 TABLET ORAL
Qty: 100 | Refills: 0 | Status: DISCONTINUED | COMMUNITY
Start: 2018-03-12 | End: 2023-12-14

## 2023-12-14 RX ORDER — LENALIDOMIDE 5 MG/1
5 CAPSULE ORAL
Qty: 14 | Refills: 0 | Status: DISCONTINUED | COMMUNITY
Start: 2018-05-03 | End: 2023-12-14

## 2023-12-14 RX ORDER — METHYLPREDNISOLONE 4 MG/1
4 TABLET ORAL
Qty: 1 | Refills: 0 | Status: DISCONTINUED | COMMUNITY
Start: 2018-05-16 | End: 2023-12-14

## 2023-12-14 RX ORDER — TIOTROPIUM BROMIDE 18 UG/1
18 CAPSULE ORAL; RESPIRATORY (INHALATION) DAILY
Qty: 1 | Refills: 5 | Status: DISCONTINUED | COMMUNITY
Start: 2018-05-16 | End: 2023-12-14

## 2023-12-14 RX ORDER — LENALIDOMIDE 15 MG/1
15 CAPSULE ORAL
Qty: 14 | Refills: 0 | Status: DISCONTINUED | COMMUNITY
Start: 2018-02-20 | End: 2023-12-14

## 2023-12-14 RX ORDER — PREDNISONE 10 MG/1
10 TABLET ORAL
Qty: 21 | Refills: 0 | Status: DISCONTINUED | COMMUNITY
Start: 2018-03-06 | End: 2023-12-14

## 2023-12-14 RX ORDER — CEFPODOXIME PROXETIL 200 MG/1
200 TABLET, FILM COATED ORAL
Refills: 0 | Status: DISCONTINUED | COMMUNITY
End: 2023-12-14

## 2023-12-14 RX ORDER — ASPIRIN 81 MG
81 TABLET, DELAYED RELEASE (ENTERIC COATED) ORAL
Refills: 0 | Status: DISCONTINUED | COMMUNITY
End: 2023-12-14

## 2023-12-14 RX ORDER — PREDNISONE 10 MG/1
10 TABLET ORAL
Qty: 60 | Refills: 0 | Status: DISCONTINUED | COMMUNITY
Start: 2023-11-09 | End: 2023-12-14

## 2023-12-14 RX ORDER — FLUTICASONE PROPIONATE AND SALMETEROL 50; 250 UG/1; UG/1
250-50 POWDER RESPIRATORY (INHALATION)
Refills: 0 | Status: DISCONTINUED | COMMUNITY
End: 2023-12-14

## 2023-12-14 NOTE — PHYSICAL EXAM
[No Acute Distress] : no acute distress [Normal Appearance] : normal appearance [Normal Rate/Rhythm] : normal rate/rhythm [Normal S1, S2] : normal s1, s2 [No Murmurs] : no murmurs [No Rubs] : no rubs [No Gallops] : no gallops [No Acc Muscle Use] : no acc muscle use [Normal to Percussion] : normal to percussion [No Abnormalities] : no abnormalities [No Clubbing] : no clubbing [No Cyanosis] : no cyanosis [No Edema] : no edema [FROM] : FROM [No Focal Deficits] : no focal deficits [Oriented x3] : oriented x3 [Normal Affect] : normal affect [No Resp Distress] : no resp distress [TextBox_68] : moderate air entry , faint exp rhonchi

## 2023-12-14 NOTE — HISTORY OF PRESENT ILLNESS
[Former] : former [TextBox_4] : Hospital Course: Discharge Date 29-Nov-2023 Admission Date 27-Nov-2023 06:27 Reason for Admission sob Hospital Course  78yoF w/ PMHx of COPD (on home 3LNC), MI,  HFrEF, mm, heavy ex-smoker, HTN, mood disorders, seizure disorder, urinary retention,  HLD, GERD, recent dscharged from [facility] on 10/26 for pna and COPD exacerbation (was also found to be covid positive at the time)  presents now with mildly worsening sob and fatigue along with cough. At the time, she was noticed to have hypercarbia and placed on bipap with improvement and was told to follow up with her pulmonologist.  Patient states she went home last month and felt well;  but started to develop worsening symptoms the last few days and prompted her to come to the hospital .  12/14/23 Recent UTI Did not get IVIG on vantin for UTI wheezing, feels congested has Trelegy at home, duo neb no fever, chill, chest pain did not tolerate AVAPS, doesn't have  02 3 L, using at night , has POC

## 2023-12-14 NOTE — CONSULT LETTER
[Dear  ___] : Dear  [unfilled], [Consult Letter:] : I had the pleasure of evaluating your patient, [unfilled]. [Please see my note below.] : Please see my note below. [Sincerely,] : Sincerely, [FreeTextEntry3] : Efrain Galvan DO [facility] Director Pulmonary Division Medical Director Respiratory Therapy [facility]

## 2023-12-14 NOTE — ED ADULT TRIAGE NOTE - NS ED NURSE AMBULANCES
[facility] Replaced by [facility]

## 2023-12-14 NOTE — DISCUSSION/SUMMARY
[FreeTextEntry1] : Severe COPD, hypoxemic, hypercapnic resp failure Post hospital admission, COPD/multilobar  pneumonia did not tolerate AVAPS Pco02 improved, last 45 Now with exacerbation, restart pred taper, Doxycycline, needs CXR Has POC, uses 2-3 liters exercise and nocturnal Underlying Mult myeloma, Immunoglobulins normal in hospital but on IVIG - next dose today per pt- compliance stressed Continue neb and Trelegy - technique reviewed  Needs Prevnar 20  Very anxious , not hypoxemic, discussed mgmt as above, call/ER if no improvement 3 weeks or sooner if needed

## 2023-12-14 NOTE — ED ADULT TRIAGE NOTE - CHIEF COMPLAINT QUOTE
Pt. BIBEMS s/p trip and fall hitting her head on refrigerator. Denies AC use/LOC. Approx 2 in deep laceration noted over L eyebrow, bleeding controlled on arrival.

## 2023-12-15 VITALS
OXYGEN SATURATION: 92 % | SYSTOLIC BLOOD PRESSURE: 97 MMHG | RESPIRATION RATE: 18 BRPM | HEART RATE: 105 BPM | DIASTOLIC BLOOD PRESSURE: 59 MMHG | TEMPERATURE: 98 F

## 2023-12-15 PROCEDURE — 72125 CT NECK SPINE W/O DYE: CPT | Mod: 26,MA

## 2023-12-15 PROCEDURE — 12013 RPR F/E/E/N/L/M 2.6-5.0 CM: CPT

## 2023-12-15 PROCEDURE — 90471 IMMUNIZATION ADMIN: CPT

## 2023-12-15 PROCEDURE — 90715 TDAP VACCINE 7 YRS/> IM: CPT

## 2023-12-15 PROCEDURE — 70450 CT HEAD/BRAIN W/O DYE: CPT | Mod: MA

## 2023-12-15 PROCEDURE — 99284 EMERGENCY DEPT VISIT MOD MDM: CPT | Mod: 25

## 2023-12-15 PROCEDURE — 72125 CT NECK SPINE W/O DYE: CPT | Mod: MA

## 2023-12-15 PROCEDURE — 70450 CT HEAD/BRAIN W/O DYE: CPT | Mod: 26,MA

## 2023-12-15 RX ORDER — TETANUS TOXOID, REDUCED DIPHTHERIA TOXOID AND ACELLULAR PERTUSSIS VACCINE, ADSORBED 5; 2.5; 8; 8; 2.5 [IU]/.5ML; [IU]/.5ML; UG/.5ML; UG/.5ML; UG/.5ML
0.5 SUSPENSION INTRAMUSCULAR ONCE
Refills: 0 | Status: COMPLETED | OUTPATIENT
Start: 2023-12-15 | End: 2023-12-15

## 2023-12-15 RX ORDER — ACETAMINOPHEN 500 MG
650 TABLET ORAL ONCE
Refills: 0 | Status: COMPLETED | OUTPATIENT
Start: 2023-12-15 | End: 2023-12-15

## 2023-12-15 RX ADMIN — TETANUS TOXOID, REDUCED DIPHTHERIA TOXOID AND ACELLULAR PERTUSSIS VACCINE, ADSORBED 0.5 MILLILITER(S): 5; 2.5; 8; 8; 2.5 SUSPENSION INTRAMUSCULAR at 07:43

## 2023-12-15 NOTE — ED PROVIDER NOTE - PHYSICAL EXAMINATION
General: NAD  Head: 5 cm lac above L eyebrow  EENT: no scleral icterus  Cardiac: RRR, no apparent murmurs, no lower extremity edema  Respiratory: CTABL, no respiratory distress   Abdomen: soft, ND, NT, nonperitonitic  MSK/Vascular: soft compartments, warm extremities  Neuro: grossly intact  Psych: calm, cooperative

## 2023-12-15 NOTE — ED PROVIDER NOTE - CARE PLAN
1 Principal Discharge DX:	Fall  Secondary Diagnosis:	Laceration of forehead   Principal Discharge DX:	Laceration of forehead  Secondary Diagnosis:	Closed head injury

## 2023-12-15 NOTE — ED PROVIDER NOTE - OBJECTIVE STATEMENT
77 y/o F with PMHx COPD on 3LNC at home, HFrEF, multiple myeloma on promecta, former smoker, HTN, mood disorders, seizures, HLD, GERD, recent admission for pneumonia/copd, who presents to the ED BIBEMS s/p fall at home. States that she got up to go get some cookies and milk from fridge but lost her footing and tripped and fell forward and struck her head on fridge door. Denies LOC or bloodthinner use. Otherwise denies any other injuries or complaints at this time and states that the fall was mechanical. States that she feels like her normal self.

## 2023-12-15 NOTE — ED ADULT NURSE NOTE - PAIN: PRESENCE, MLM
1) hold Spironolactone tomorrow.    2) Decrease Spironolactone to 25 mg every other day instead of every day.    3) Have labs drawn as scheduled Tuesday 5/30/23 at 5pm in the outpatient lab.  Orders are in computer.  
denies pain/discomfort (Rating = 0)

## 2023-12-15 NOTE — ED PROVIDER NOTE - ATTENDING CONTRIBUTION TO CARE
78y F w/ hx COPD on home O2,CHF, HTN, multiple myeloma; presents for head injury. Pt says she tripped and fell at home in her kitchen, hitting her head against the fridge. No LOC or blood thinners. Denies other complaints. Last tetanus unknown. On exam, pt with ~5 cm laceration to left forehead. No other signs of trauma. Neck supple and non-tender. AAOx4, no focal neuro deficits. CT head/cspine negative for injury. Pt made aware of incidental finding of lung nodule and need for outpatient f/u. Laceration repaired. Medically stable for discharge.

## 2023-12-15 NOTE — ED PROVIDER NOTE - CLINICAL SUMMARY MEDICAL DECISION MAKING FREE TEXT BOX
79 y/o F with PMHx COPD on 3LNC at home, HFrEF, multiple myeloma on promecta, former smoker, HTN, mood disorders, seizures, HLD, GERD, recent admission for pneumonia/copd, who presents to the ED BIBEMS s/p fall at home. Will obtain CT head/c-spine, control pain, repair lac, reassess. 77 y/o F with PMHx COPD on 3LNC at home, HFrEF, multiple myeloma on promecta, former smoker, HTN, mood disorders, seizures, HLD, GERD, recent admission for pneumonia/copd, who presents to the ED BIBEMS s/p fall at home. Will obtain CT head/c-spine, control pain, repair lac, reassess.

## 2023-12-15 NOTE — ED PROVIDER NOTE - PATIENT PORTAL LINK FT
You can access the FollowMyHealth Patient Portal offered by [facility] by registering at the following website: [email or web address]. By joining [employer]’s FollowMyHealth portal, you will also be able to view your health information using other applications (apps) compatible with our system. You can access the FollowMyHealth Patient Portal offered by [facility] by registering at the following website: [email or web address]. By joining [employer]’s FollowMyHealth portal, you will also be able to view your health information using other applications (apps) compatible with our system.

## 2023-12-15 NOTE — ED ADULT NURSE NOTE - NSFALLUNIVINTERV_ED_ALL_ED
Bed/Stretcher in lowest position, wheels locked, appropriate side rails in place/Call bell, personal items and telephone in reach/Instruct patient to call for assistance before getting out of bed/chair/stretcher/Non-slip footwear applied when patient is off stretcher/[place] to call system/Physically safe environment - no spills, clutter or unnecessary equipment/Purposeful proactive rounding/Room/bathroom lighting operational, light cord in reach Bed/Stretcher in lowest position, wheels locked, appropriate side rails in place/Call bell, personal items and telephone in reach/Instruct patient to call for assistance before getting out of bed/chair/stretcher/Non-slip footwear applied when patient is off stretcher/[place] to call system/Physically safe environment - no spills, clutter or unnecessary equipment/Purposeful proactive rounding/Room/bathroom lighting operational, light cord in reach

## 2023-12-15 NOTE — ED ADULT NURSE NOTE - OBJECTIVE STATEMENT
pt A & Ox4 s/p fall at home w/ left eye laceration. Respirations even and unlabored. Denies chest pain or SOB. Pt reports mechanical fall  into refrigerator. Denies LOC, thinners. Steady and even gait in department. Chest discomfort

## 2023-12-15 NOTE — ED PROVIDER NOTE - NS ED ROS FT
Constitutional: no fever, no chills  Head: NC, AT  Eyes: no redness   ENMT: no nasal congestion/drainage  CV: no chest pain, no edema  Resp: no cough, no dyspnea  GI: no abdominal pain, no nausea, no vomiting  : no dysuria  Skin: no lesions, no rashes   Neuro: no LOC, no headache, no sensory deficits
Home

## 2023-12-15 NOTE — ED PROVIDER NOTE - PROGRESS NOTE DETAILS
CT with no acute findings. Lac repaired, wound care/need for suture removal in one week instructed to patient. Well appearing, stable for d/c with PMD f/u and return precautions. - Lex CT with no acute findings. Lac repaired, wound care/need for suture removal in one week instructed to patient. Well appearing, stable for d/c with PMD f/u and return precautions. - Lex    Pt requires skilled RN and PT for weakness, falls, scalp laceration, closed head trauma,

## 2023-12-18 ENCOUNTER — NON-APPOINTMENT (OUTPATIENT)
Age: 78
End: 2023-12-18

## 2024-01-04 ENCOUNTER — OUTPATIENT (OUTPATIENT)
Dept: OUTPATIENT SERVICES | Facility: HOSPITAL | Age: 79
LOS: 1 days | End: 2024-01-04
Payer: MEDICARE

## 2024-01-04 DIAGNOSIS — Z90.49 ACQUIRED ABSENCE OF OTHER SPECIFIED PARTS OF DIGESTIVE TRACT: Chronic | ICD-10-CM

## 2024-01-04 DIAGNOSIS — J69.0 PNEUMONITIS DUE TO INHALATION OF FOOD AND VOMIT: ICD-10-CM

## 2024-01-04 PROCEDURE — 71046 X-RAY EXAM CHEST 2 VIEWS: CPT | Mod: 26

## 2024-01-05 ENCOUNTER — APPOINTMENT (OUTPATIENT)
Dept: PULMONOLOGY | Facility: CLINIC | Age: 79
End: 2024-01-05
Payer: MEDICARE

## 2024-01-05 VITALS
RESPIRATION RATE: 16 BRPM | SYSTOLIC BLOOD PRESSURE: 120 MMHG | DIASTOLIC BLOOD PRESSURE: 60 MMHG | HEART RATE: 89 BPM | OXYGEN SATURATION: 95 %

## 2024-01-05 VITALS — HEIGHT: 59.5 IN | WEIGHT: 114 LBS | BODY MASS INDEX: 22.68 KG/M2

## 2024-01-05 DIAGNOSIS — J96.12 CHRONIC RESPIRATORY FAILURE WITH HYPOXIA: ICD-10-CM

## 2024-01-05 DIAGNOSIS — Z01.811 ENCOUNTER FOR PREPROCEDURAL RESPIRATORY EXAMINATION: ICD-10-CM

## 2024-01-05 DIAGNOSIS — J44.9 CHRONIC OBSTRUCTIVE PULMONARY DISEASE, UNSPECIFIED: ICD-10-CM

## 2024-01-05 DIAGNOSIS — J96.11 CHRONIC RESPIRATORY FAILURE WITH HYPOXIA: ICD-10-CM

## 2024-01-05 DIAGNOSIS — R91.8 OTHER NONSPECIFIC ABNORMAL FINDING OF LUNG FIELD: ICD-10-CM

## 2024-01-05 DIAGNOSIS — Z87.891 PERSONAL HISTORY OF NICOTINE DEPENDENCE: ICD-10-CM

## 2024-01-05 PROCEDURE — 99214 OFFICE O/P EST MOD 30 MIN: CPT | Mod: 25

## 2024-01-05 PROCEDURE — 94010 BREATHING CAPACITY TEST: CPT

## 2024-01-05 RX ORDER — DOXYCYCLINE HYCLATE 100 MG/1
100 TABLET ORAL
Qty: 10 | Refills: 4 | Status: COMPLETED | COMMUNITY
Start: 2023-12-14 | End: 2024-01-05

## 2024-01-05 RX ORDER — DIPHENOXYLATE HYDROCHLORIDE AND ATROPINE SULFATE 2.5; .025 MG/1; MG/1
2.5-0.025 TABLET ORAL
Qty: 120 | Refills: 0 | Status: COMPLETED | COMMUNITY
Start: 2018-03-07 | End: 2024-01-05

## 2024-01-05 RX ORDER — DULOXETINE HYDROCHLORIDE 60 MG/1
60 CAPSULE, DELAYED RELEASE PELLETS ORAL
Qty: 90 | Refills: 0 | Status: COMPLETED | COMMUNITY
Start: 2018-03-12 | End: 2024-01-05

## 2024-01-05 RX ORDER — PREDNISONE 10 MG/1
10 TABLET ORAL
Qty: 30 | Refills: 1 | Status: COMPLETED | COMMUNITY
Start: 2023-12-14 | End: 2024-01-05

## 2024-01-05 RX ORDER — ALPRAZOLAM 0.5 MG/1
0.5 TABLET ORAL
Refills: 0 | Status: COMPLETED | COMMUNITY
End: 2024-01-05

## 2024-01-05 RX ORDER — VALACYCLOVIR 500 MG/1
500 TABLET, FILM COATED ORAL
Refills: 0 | Status: COMPLETED | COMMUNITY
End: 2024-01-05

## 2024-01-05 NOTE — CONSULT LETTER
[Dear  ___] : Dear  [unfilled], [Consult Letter:] : I had the pleasure of evaluating your patient, [unfilled]. [Please see my note below.] : Please see my note below. [Sincerely,] : Sincerely, [FreeTextEntry3] : Efrain Galvan DO [facility] Pul[facility] Critical Care Director Pulmonary Division Medical Director Respiratory Therapy [facility]

## 2024-01-05 NOTE — PHYSICAL EXAM
[No Acute Distress] : no acute distress [Normal Appearance] : normal appearance [Normal Rate/Rhythm] : normal rate/rhythm [Normal S1, S2] : normal s1, s2 [No Murmurs] : no murmurs [No Rubs] : no rubs [No Gallops] : no gallops [No Resp Distress] : no resp distress [No Acc Muscle Use] : no acc muscle use [Normal to Percussion] : normal to percussion [No Abnormalities] : no abnormalities [No Clubbing] : no clubbing [No Cyanosis] : no cyanosis [No Edema] : no edema [FROM] : FROM [No Focal Deficits] : no focal deficits [Oriented x3] : oriented x3 [Normal Affect] : normal affect [TextBox_68] : moderate air entry , faint exp rhonchi

## 2024-01-05 NOTE — PROCEDURE
[FreeTextEntry1] : CXR reviewed and compared, improved RUL and RLL densities spirometry today with normal flows, very mild obstruction

## 2024-01-05 NOTE — DISCUSSION/SUMMARY
[FreeTextEntry1] : Severe COPD, hypoxemic, hypercapnic resp failure- now much improved Post hospital admission, COPD/multilobar  pneumonia did not tolerate AVAPS Pco02 improved, last 45, now normoxic on RA NO acute pulmonary complaints, using Trelegy daily CXR improved to my review, will repeat CT scan next month Spirometry with normal flows, very mild obstruction Has POC, uses  nocturnal Underlying Mult myeloma, Immunoglobulins normal in hospital saw Hematology recently , not needed per pt Continue neb and Trelegy - technique reviewed  Needs Prevnar 20  Not able to tolerate AVAPS 2 months with C T scan No pulmonary contraindications to urologic procedure in hospital setting At increased risk of post operative pulmonary complications, risk/benefit favors Trelegy AM of procedure, Duo neb q 6 hrs Incentive spirometry, DVT prophylaxis Monitored bed, continuous sp02 Bipap 12/6, rate  14 with 02 if any sig desaturation

## 2024-01-05 NOTE — HISTORY OF PRESENT ILLNESS
[Former] : former [TextBox_4] : Hospital Course: Discharge Date 29-Nov-2023 Admission Date 27-Nov-2023 06:27 Reason for Admission sob Hospital Course  78yoF w/ PMHx of COPD (on home 3LNC), MI,  HFrEF, mm, heavy ex-smoker, HTN, mood disorders, seizure disorder, urinary retention,  HLD, GERD, recent dscharged from [facility] on 10/26 for pna and COPD exacerbation (was also found to be covid positive at the time)  presents now with mildly worsening sob and fatigue along with cough. At the time, she was noticed to have hypercarbia and placed on bipap with improvement and was told to follow up with her pulmonologist.  Patient states she went home last month and felt well;  but started to develop worsening symptoms the last few days and prompted her to come to the hospital .  1/5/23 Did not get IVIG, saw Hematology dyspnea at baseline has Trelegy at home, duo neb no fever, chill, chest pain did not tolerate AVAPS, doesn't want it 02 3 L, using at night , has POC For urologic procedure [facility]

## 2024-01-18 ENCOUNTER — OFFICE (OUTPATIENT)
Dept: URBAN - METROPOLITAN AREA CLINIC 113 | Facility: CLINIC | Age: 79
Setting detail: OPHTHALMOLOGY
End: 2024-01-18
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
## 2024-01-18 PROCEDURE — NO SHOW FE NO SHOW FEE: Performed by: OPHTHALMOLOGY

## 2024-01-23 ENCOUNTER — OFFICE (OUTPATIENT)
Dept: URBAN - METROPOLITAN AREA CLINIC 115 | Facility: CLINIC | Age: 79
Setting detail: OPHTHALMOLOGY
End: 2024-01-23
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
## 2024-01-23 PROCEDURE — NO SHOW FE NO SHOW FEE: Performed by: OPHTHALMOLOGY

## 2024-01-29 ENCOUNTER — APPOINTMENT (OUTPATIENT)
Dept: CT IMAGING | Facility: CLINIC | Age: 79
End: 2024-01-29

## 2024-02-02 ENCOUNTER — APPOINTMENT (OUTPATIENT)
Dept: PULMONOLOGY | Facility: CLINIC | Age: 79
End: 2024-02-02

## 2024-02-11 ENCOUNTER — INPATIENT (INPATIENT)
Facility: HOSPITAL | Age: 79
LOS: 6 days | Discharge: HOME | DRG: 871 | End: 2024-02-18
Attending: FAMILY MEDICINE | Admitting: HOSPITALIST
Payer: MEDICARE

## 2024-02-11 VITALS
DIASTOLIC BLOOD PRESSURE: 43 MMHG | HEIGHT: 62 IN | TEMPERATURE: 102 F | HEART RATE: 159 BPM | OXYGEN SATURATION: 96 % | SYSTOLIC BLOOD PRESSURE: 76 MMHG | RESPIRATION RATE: 16 BRPM | WEIGHT: 119.93 LBS

## 2024-02-11 DIAGNOSIS — A41.9 SEPSIS, UNSPECIFIED ORGANISM: ICD-10-CM

## 2024-02-11 DIAGNOSIS — Z90.49 ACQUIRED ABSENCE OF OTHER SPECIFIED PARTS OF DIGESTIVE TRACT: Chronic | ICD-10-CM

## 2024-02-11 LAB
ALBUMIN SERPL ELPH-MCNC: 3.1 G/DL — LOW (ref 3.3–5.2)
ALP SERPL-CCNC: 112 U/L — SIGNIFICANT CHANGE UP (ref 40–120)
ALT FLD-CCNC: 6 U/L — SIGNIFICANT CHANGE UP
ANION GAP SERPL CALC-SCNC: 17 MMOL/L — SIGNIFICANT CHANGE UP (ref 5–17)
ANION GAP SERPL CALC-SCNC: 20 MMOL/L — HIGH (ref 5–17)
ANISOCYTOSIS BLD QL: SLIGHT — SIGNIFICANT CHANGE UP
APPEARANCE UR: ABNORMAL
APTT BLD: 33.8 SEC — SIGNIFICANT CHANGE UP (ref 24.5–35.6)
AST SERPL-CCNC: 31 U/L — SIGNIFICANT CHANGE UP
BACTERIA # UR AUTO: ABNORMAL /HPF
BASE EXCESS BLDV CALC-SCNC: -11.2 MMOL/L — LOW (ref -2–3)
BASE EXCESS BLDV CALC-SCNC: -13.7 MMOL/L — LOW (ref -2–3)
BASOPHILS # BLD AUTO: 0 K/UL — SIGNIFICANT CHANGE UP (ref 0–0.2)
BASOPHILS NFR BLD AUTO: 0 % — SIGNIFICANT CHANGE UP (ref 0–2)
BILIRUB SERPL-MCNC: 0.2 MG/DL — LOW (ref 0.4–2)
BILIRUB UR-MCNC: NEGATIVE — SIGNIFICANT CHANGE UP
BUN SERPL-MCNC: 21.5 MG/DL — HIGH (ref 8–20)
BUN SERPL-MCNC: 23.4 MG/DL — HIGH (ref 8–20)
CA-I SERPL-SCNC: 0.76 MMOL/L — LOW (ref 1.15–1.33)
CA-I SERPL-SCNC: 0.8 MMOL/L — LOW (ref 1.15–1.33)
CALCIUM SERPL-MCNC: 5.4 MG/DL — CRITICAL LOW (ref 8.4–10.5)
CALCIUM SERPL-MCNC: 6.7 MG/DL — LOW (ref 8.4–10.5)
CAST: 1 /LPF — SIGNIFICANT CHANGE UP (ref 0–4)
CHLORIDE BLDV-SCNC: 101 MMOL/L — SIGNIFICANT CHANGE UP (ref 96–108)
CHLORIDE BLDV-SCNC: 105 MMOL/L — SIGNIFICANT CHANGE UP (ref 96–108)
CHLORIDE SERPL-SCNC: 103 MMOL/L — SIGNIFICANT CHANGE UP (ref 96–108)
CHLORIDE SERPL-SCNC: 96 MMOL/L — SIGNIFICANT CHANGE UP (ref 96–108)
CO2 SERPL-SCNC: 14 MMOL/L — LOW (ref 22–29)
CO2 SERPL-SCNC: 15 MMOL/L — LOW (ref 22–29)
COLOR SPEC: YELLOW — SIGNIFICANT CHANGE UP
CREAT SERPL-MCNC: 1.7 MG/DL — HIGH (ref 0.5–1.3)
CREAT SERPL-MCNC: 2 MG/DL — HIGH (ref 0.5–1.3)
DIFF PNL FLD: ABNORMAL
EGFR: 25 ML/MIN/1.73M2 — LOW
EGFR: 30 ML/MIN/1.73M2 — LOW
EOSINOPHIL # BLD AUTO: 0 K/UL — SIGNIFICANT CHANGE UP (ref 0–0.5)
EOSINOPHIL NFR BLD AUTO: 0 % — SIGNIFICANT CHANGE UP (ref 0–6)
GAS PNL BLDV: 131 MMOL/L — LOW (ref 136–145)
GAS PNL BLDV: 132 MMOL/L — LOW (ref 136–145)
GAS PNL BLDV: SIGNIFICANT CHANGE UP
GIANT PLATELETS BLD QL SMEAR: PRESENT — SIGNIFICANT CHANGE UP
GLUCOSE BLDV-MCNC: 139 MG/DL — HIGH (ref 70–99)
GLUCOSE BLDV-MCNC: 165 MG/DL — HIGH (ref 70–99)
GLUCOSE SERPL-MCNC: 143 MG/DL — HIGH (ref 70–99)
GLUCOSE SERPL-MCNC: 152 MG/DL — HIGH (ref 70–99)
GLUCOSE UR QL: NEGATIVE MG/DL — SIGNIFICANT CHANGE UP
HCO3 BLDV-SCNC: 14 MMOL/L — LOW (ref 22–29)
HCO3 BLDV-SCNC: 15 MMOL/L — LOW (ref 22–29)
HCT VFR BLD CALC: 35.4 % — SIGNIFICANT CHANGE UP (ref 34.5–45)
HCT VFR BLDA CALC: 33 % — SIGNIFICANT CHANGE UP
HCT VFR BLDA CALC: 35 % — SIGNIFICANT CHANGE UP
HGB BLD CALC-MCNC: 10.9 G/DL — LOW (ref 11.7–16.1)
HGB BLD CALC-MCNC: 11.5 G/DL — LOW (ref 11.7–16.1)
HGB BLD-MCNC: 10.7 G/DL — LOW (ref 11.5–15.5)
INR BLD: 1.13 RATIO — SIGNIFICANT CHANGE UP (ref 0.85–1.18)
KETONES UR-MCNC: NEGATIVE MG/DL — SIGNIFICANT CHANGE UP
LACTATE BLDV-MCNC: 1.6 MMOL/L — SIGNIFICANT CHANGE UP (ref 0.5–2)
LACTATE BLDV-MCNC: 2.3 MMOL/L — HIGH (ref 0.5–2)
LEUKOCYTE ESTERASE UR-ACNC: ABNORMAL
LYMPHOCYTES # BLD AUTO: 0.28 K/UL — LOW (ref 1–3.3)
LYMPHOCYTES # BLD AUTO: 0.9 % — LOW (ref 13–44)
MANUAL SMEAR VERIFICATION: SIGNIFICANT CHANGE UP
MCHC RBC-ENTMCNC: 24.4 PG — LOW (ref 27–34)
MCHC RBC-ENTMCNC: 30.2 GM/DL — LOW (ref 32–36)
MCV RBC AUTO: 80.8 FL — SIGNIFICANT CHANGE UP (ref 80–100)
MICROCYTES BLD QL: SLIGHT — SIGNIFICANT CHANGE UP
MONOCYTES # BLD AUTO: 0.82 K/UL — SIGNIFICANT CHANGE UP (ref 0–0.9)
MONOCYTES NFR BLD AUTO: 2.6 % — SIGNIFICANT CHANGE UP (ref 2–14)
NEUTROPHILS # BLD AUTO: 30.34 K/UL — HIGH (ref 1.8–7.4)
NEUTROPHILS NFR BLD AUTO: 96.5 % — HIGH (ref 43–77)
NITRITE UR-MCNC: NEGATIVE — SIGNIFICANT CHANGE UP
OVALOCYTES BLD QL SMEAR: SLIGHT — SIGNIFICANT CHANGE UP
PCO2 BLDV: 32 MMHG — LOW (ref 39–42)
PCO2 BLDV: 38 MMHG — LOW (ref 39–42)
PH BLDV: 7.19 — CRITICAL LOW (ref 7.32–7.43)
PH BLDV: 7.29 — LOW (ref 7.32–7.43)
PH UR: 6 — SIGNIFICANT CHANGE UP (ref 5–8)
PLAT MORPH BLD: NORMAL — SIGNIFICANT CHANGE UP
PLATELET # BLD AUTO: 362 K/UL — SIGNIFICANT CHANGE UP (ref 150–400)
PO2 BLDV: 55 MMHG — HIGH (ref 25–45)
PO2 BLDV: 56 MMHG — HIGH (ref 25–45)
POIKILOCYTOSIS BLD QL AUTO: SLIGHT — SIGNIFICANT CHANGE UP
POLYCHROMASIA BLD QL SMEAR: SIGNIFICANT CHANGE UP
POTASSIUM BLDV-SCNC: 2.8 MMOL/L — CRITICAL LOW (ref 3.5–5.1)
POTASSIUM BLDV-SCNC: 3.2 MMOL/L — LOW (ref 3.5–5.1)
POTASSIUM SERPL-MCNC: 3.1 MMOL/L — LOW (ref 3.5–5.3)
POTASSIUM SERPL-MCNC: 3.4 MMOL/L — LOW (ref 3.5–5.3)
POTASSIUM SERPL-SCNC: 3.1 MMOL/L — LOW (ref 3.5–5.3)
POTASSIUM SERPL-SCNC: 3.4 MMOL/L — LOW (ref 3.5–5.3)
PROT SERPL-MCNC: 6.6 G/DL — SIGNIFICANT CHANGE UP (ref 6.6–8.7)
PROT UR-MCNC: 100 MG/DL
PROTHROM AB SERPL-ACNC: 12.5 SEC — SIGNIFICANT CHANGE UP (ref 9.5–13)
RBC # BLD: 4.38 M/UL — SIGNIFICANT CHANGE UP (ref 3.8–5.2)
RBC # FLD: 18.6 % — HIGH (ref 10.3–14.5)
RBC BLD AUTO: ABNORMAL
RBC CASTS # UR COMP ASSIST: 2 /HPF — SIGNIFICANT CHANGE UP (ref 0–4)
SAO2 % BLDV: 83.5 % — SIGNIFICANT CHANGE UP
SAO2 % BLDV: 89.5 % — SIGNIFICANT CHANGE UP
SODIUM SERPL-SCNC: 131 MMOL/L — LOW (ref 135–145)
SODIUM SERPL-SCNC: 134 MMOL/L — LOW (ref 135–145)
SP GR SPEC: 1.01 — SIGNIFICANT CHANGE UP (ref 1–1.03)
SQUAMOUS # UR AUTO: 1 /HPF — SIGNIFICANT CHANGE UP (ref 0–5)
UROBILINOGEN FLD QL: 0.2 MG/DL — SIGNIFICANT CHANGE UP (ref 0.2–1)
WBC # BLD: 31.44 K/UL — HIGH (ref 3.8–10.5)
WBC # FLD AUTO: 31.44 K/UL — HIGH (ref 3.8–10.5)
WBC UR QL: 341 /HPF — HIGH (ref 0–5)

## 2024-02-11 PROCEDURE — 99223 1ST HOSP IP/OBS HIGH 75: CPT

## 2024-02-11 PROCEDURE — 70450 CT HEAD/BRAIN W/O DYE: CPT | Mod: 26,MA

## 2024-02-11 PROCEDURE — 93010 ELECTROCARDIOGRAM REPORT: CPT | Mod: 77

## 2024-02-11 PROCEDURE — 71250 CT THORAX DX C-: CPT | Mod: 26,MA

## 2024-02-11 PROCEDURE — 99291 CRITICAL CARE FIRST HOUR: CPT

## 2024-02-11 PROCEDURE — 93010 ELECTROCARDIOGRAM REPORT: CPT

## 2024-02-11 PROCEDURE — 71045 X-RAY EXAM CHEST 1 VIEW: CPT | Mod: 26

## 2024-02-11 PROCEDURE — 74176 CT ABD & PELVIS W/O CONTRAST: CPT | Mod: 26,MA

## 2024-02-11 RX ORDER — SODIUM CHLORIDE 9 MG/ML
500 INJECTION INTRAMUSCULAR; INTRAVENOUS; SUBCUTANEOUS ONCE
Refills: 0 | Status: COMPLETED | OUTPATIENT
Start: 2024-02-11 | End: 2024-02-11

## 2024-02-11 RX ORDER — CEFTRIAXONE 500 MG/1
1000 INJECTION, POWDER, FOR SOLUTION INTRAMUSCULAR; INTRAVENOUS ONCE
Refills: 0 | Status: DISCONTINUED | OUTPATIENT
Start: 2024-02-11 | End: 2024-02-11

## 2024-02-11 RX ORDER — CEFTRIAXONE 500 MG/1
1000 INJECTION, POWDER, FOR SOLUTION INTRAMUSCULAR; INTRAVENOUS ONCE
Refills: 0 | Status: COMPLETED | OUTPATIENT
Start: 2024-02-11 | End: 2024-02-11

## 2024-02-11 RX ORDER — ONDANSETRON 8 MG/1
4 TABLET, FILM COATED ORAL ONCE
Refills: 0 | Status: COMPLETED | OUTPATIENT
Start: 2024-02-11 | End: 2024-02-11

## 2024-02-11 RX ORDER — CALCIUM GLUCONATE 100 MG/ML
2 VIAL (ML) INTRAVENOUS ONCE
Refills: 0 | Status: COMPLETED | OUTPATIENT
Start: 2024-02-11 | End: 2024-02-11

## 2024-02-11 RX ORDER — SODIUM CHLORIDE 9 MG/ML
2000 INJECTION INTRAMUSCULAR; INTRAVENOUS; SUBCUTANEOUS ONCE
Refills: 0 | Status: DISCONTINUED | OUTPATIENT
Start: 2024-02-11 | End: 2024-02-11

## 2024-02-11 RX ORDER — ALPRAZOLAM 0.25 MG
0.25 TABLET ORAL ONCE
Refills: 0 | Status: DISCONTINUED | OUTPATIENT
Start: 2024-02-11 | End: 2024-02-11

## 2024-02-11 RX ORDER — VANCOMYCIN HCL 1 G
1000 VIAL (EA) INTRAVENOUS ONCE
Refills: 0 | Status: COMPLETED | OUTPATIENT
Start: 2024-02-11 | End: 2024-02-11

## 2024-02-11 RX ORDER — ACETAMINOPHEN 500 MG
1000 TABLET ORAL ONCE
Refills: 0 | Status: COMPLETED | OUTPATIENT
Start: 2024-02-11 | End: 2024-02-11

## 2024-02-11 RX ORDER — HYDROCORTISONE 20 MG
100 TABLET ORAL ONCE
Refills: 0 | Status: COMPLETED | OUTPATIENT
Start: 2024-02-11 | End: 2024-02-11

## 2024-02-11 RX ORDER — POTASSIUM CHLORIDE 20 MEQ
40 PACKET (EA) ORAL ONCE
Refills: 0 | Status: COMPLETED | OUTPATIENT
Start: 2024-02-11 | End: 2024-02-11

## 2024-02-11 RX ORDER — SODIUM CHLORIDE 9 MG/ML
1000 INJECTION INTRAMUSCULAR; INTRAVENOUS; SUBCUTANEOUS ONCE
Refills: 0 | Status: COMPLETED | OUTPATIENT
Start: 2024-02-11 | End: 2024-02-11

## 2024-02-11 RX ORDER — METRONIDAZOLE 500 MG
500 TABLET ORAL ONCE
Refills: 0 | Status: COMPLETED | OUTPATIENT
Start: 2024-02-11 | End: 2024-02-11

## 2024-02-11 RX ADMIN — ONDANSETRON 4 MILLIGRAM(S): 8 TABLET, FILM COATED ORAL at 17:31

## 2024-02-11 RX ADMIN — Medication 0.25 MILLIGRAM(S): at 20:31

## 2024-02-11 RX ADMIN — Medication 40 MILLIEQUIVALENT(S): at 22:49

## 2024-02-11 RX ADMIN — Medication 200 GRAM(S): at 21:38

## 2024-02-11 RX ADMIN — Medication 100 MILLIGRAM(S): at 19:05

## 2024-02-11 RX ADMIN — SODIUM CHLORIDE 1000 MILLILITER(S): 9 INJECTION INTRAMUSCULAR; INTRAVENOUS; SUBCUTANEOUS at 18:24

## 2024-02-11 RX ADMIN — Medication 400 MILLIGRAM(S): at 17:29

## 2024-02-11 RX ADMIN — SODIUM CHLORIDE 500 MILLILITER(S): 9 INJECTION INTRAMUSCULAR; INTRAVENOUS; SUBCUTANEOUS at 19:06

## 2024-02-11 RX ADMIN — Medication 1000 MILLIGRAM(S): at 18:02

## 2024-02-11 RX ADMIN — Medication 2 GRAM(S): at 23:00

## 2024-02-11 RX ADMIN — CEFTRIAXONE 1000 MILLIGRAM(S): 500 INJECTION, POWDER, FOR SOLUTION INTRAMUSCULAR; INTRAVENOUS at 17:30

## 2024-02-11 RX ADMIN — Medication 250 MILLIGRAM(S): at 17:30

## 2024-02-11 RX ADMIN — Medication 100 MILLIGRAM(S): at 18:13

## 2024-02-11 RX ADMIN — SODIUM CHLORIDE 1000 MILLILITER(S): 9 INJECTION INTRAMUSCULAR; INTRAVENOUS; SUBCUTANEOUS at 17:29

## 2024-02-11 NOTE — ED PROVIDER NOTE - CARE PLAN
1 Principal Discharge DX:	Sepsis  Secondary Diagnosis:	Colitis  Secondary Diagnosis:	CLIFF (acute kidney injury)

## 2024-02-11 NOTE — H&P ADULT - NSHPPHYSICALEXAM_GEN_ALL_CORE
Vital Signs Last 24 Hrs  T(C): 36.7 (12 Feb 2024 00:50), Max: 38.8 (11 Feb 2024 16:55)  T(F): 98.1 (12 Feb 2024 00:50), Max: 101.8 (11 Feb 2024 16:55)  HR: 110 (12 Feb 2024 00:50) (110 - 159)  BP: 86/60 (12 Feb 2024 00:50) (76/43 - 110/62)  BP(mean): 68 (12 Feb 2024 00:50) (66 - 68)  RR: 20 (12 Feb 2024 00:50) (16 - 20)  SpO2: 97% (12 Feb 2024 00:50) (94% - 100%)    Parameters below as of 12 Feb 2024 00:50  Patient On (Oxygen Delivery Method): room air    Constitutional: Well-appearing, NAD, VSS  Head: NC/AT  Eyes: PERRL, EOMI, anicteric sclera, conjunctiva WNL  ENT: Normal Pharynx, MMM, No tonsillar exudate/erythema  Neck: Supple, Non-tender  Chest: Non-tender, no rashes  Cardio: +Tachycardic, s1/s2, no appreciable murmurs/rubs/gallops  Resp: BS CTA bilaterally, no wheezing/rhonchi/rales  Abd: Soft, Non-tender, Non-distended, no rebound/guarding/rigidity  : not examined  Rectal: not examined  MSK: moving all extremities, no motor weakness, full ROM x4  Ext: palpable distal pulses, good capillary refill  Psych: appropriate, cooperative  Neuro: CN II-XII grossly intact, no focal deficits  Skin: Warm/Dry. No rashes.

## 2024-02-11 NOTE — H&P ADULT - ASSESSMENT
ASSESSMENT:  78F with PMHX COPD on Home O2, HFrEF, HTN, HLD, Multiple Myeloma on Ninlaro, Chronic ITP on Promacta, Ulcerative Colitis, MDD, Anxiety, GERD BIB daughter to [facility] ER s/p fall with AMS and abd pain and diarrhea admitted for Severe Sepsis 2/2 Infectious v Inflammatory Colitis and Acute UTI c/b ARF, AGMA, Multiple Electrolyte Abnormalities, AMS, and Fall.    PLAN:  Severe Sepsis 2/2 Infectious v Inflammatory Colitis and Acute UTI   -SIRS +Leukocytosis WBC 33/+Tachy 160s + Lactate 2.3/ARF/AMS + Infectious/Inflammatory Colitis and UTI  -CT ABDPEL +Colitis +Cystitis +multiple incidental findings see above  -Hypotension BP 76/43 improving with IVF and Steroids repeat BP 85/56  -Admit to SDU with VS q2  -BCX x2 Pending  -GI PCR pending collection  -Rx'd six different abx in past 6 months including Doxycycline/Bactrim/Cefpodoxime/Azithromycin/Levaquin/Cephelexin -> Check CDIFF PCR  -Repeat Labs  -Trend Leukocytosis  -Check CRP/ESR  -Lactate 2.3 -> Lactate 1.6 cleared  -2.5L IVFB NSS given in ED  -Continue IVF LR 125cc/hr  -Solucortef 100mg IV x1 in ED  -Solucortef 50mg IV q6 for possible inflammatory colitis c/b hypotension  -PCN Allergy noted but tolerating Cephalosporins  -Ceftriaxone 1g x1 + Flagyl 500mg x1 + Vanco 1g IV x1 in ED  -Broaden empiric IV ABX to Cefepime 1g IV q8 and Flagyl 500mg IV q8 given patient on immunosuppressants  -Hold Vanco IV for now  -GI Consulted    Acute UTI  -CT AP +Thick walled urinary bladder possible Cystitis  -UA positive. UCX/BCX pending as above  -Continue ABX as above    AMS  -CTH WO negative  -Likely toxic metablolic encephalopathy from hypotension/sepsis/colitis/UTI    AGMA 2/2 Lactic Acidosis  -Lactate cleared. Repeat Labs with improving AG  -Repeat VBG with worsening pH   -Continue IVF/Steroids/Eletrolyte replaement  -Labs in AM    ARF  -CT ABDPEL no obstruction. CPK minimally elevated and UA with small blood doubt rhabdo  -Improving. Continue IVF. Trend Renal Function.   -Calc Cr Cl 23 -> renal dose abx/meds  -Hold all NSAIDs including home Ibuprofen    Hypomagnesemia, Hypocalcemia, Hypokalemia  -Likely related to ongoing N/V/D  -Corrected CA   -Calcium Gluconate 2g IV x1   -Magnesium Sulfate 2g IV x1  -KCL 40meq PO x1  -Repeat Labs/electrolytes in AM    Chronic Compression Fractures, Incidental T5/T9 compression deformity, Incidental Sacral Sclerosis  -PT consulted  -Ortho Consulted    Multiple Myeloma, Chronic ITP?  -Need to bring Promacta in from home -> F/u Daughter in AM  -Need to obtain Ninlaro from home -> F/u Daughter in AM  -Hold off on Pharmacological VTE PPX  -VTE PPX SCDs only    MDD, Anxiety  -Duloxetine 60mg q24  -Alprazolam 0.5mg QID PRN    HTN  -Restart Metoprolol Succinate 25mg q24 tomorrow if BP continues to improve    COPD  -No current hypoxia  -CT Chest improving infiltrates from prior CT  -O2 via NC PRN  -Trelegy Ellipta Sub and Albuterol PRN    Dispo  -Patient's daughter concerned about patient living alone  -PT consulted  -ERNST consulted for possible LORENZA

## 2024-02-11 NOTE — ED PROVIDER NOTE - PHYSICAL EXAMINATION
General: well appearing, NAD  Head: NC, AT  EENT: PERRLA, EOMI, no scleral icterus  Cardiac: tachycardic but regular, no apparent murmurs, no lower extremity edema  Respiratory: CTABL, no respiratory distress on 3LNC  Abdomen: soft, ND, NT, nonperitonitic  MSK/Vascular: full ROM, soft compartments, warm extremities, 2+ peripheral pulses b/l  Neuro: AAOx3, sensation to light touch intact  Psych: anxious, cooperative

## 2024-02-11 NOTE — ED PROVIDER NOTE - NS_EDPROVIDERDISPOUSERTYPE_ED_A_ED
It is time for your dexa!  Please call our clinic to schedule this test. You do not need to fast prior to the test, although it is recommended that you NOT take calcium on the day of the test.    If you have any questions, please contact the clinic or schedule an appointment with me, thank you!     [facility]  [address] [place] [address]     Phone: [phone]  Fax: [phone]    Your provider has referred you to: [facility]: [facility] [phone]            Attending Attestation (For Attendings USE Only)...

## 2024-02-11 NOTE — ED ADULT NURSE NOTE - OBJECTIVE STATEMENT
patient brought to ED by EMS  reporting nausea, vomiting, and diarrhea x " a few day," endorses taking imodium with no relief. Patient reports chill but denies body aches and fevers. Denies abdominal pain, cp, and SOB on 3L n/c from home.  Pt. reports heart palpitations.    Patient also notes a fall last night but poor historian related to event. Reports pain to back of head and neck. Unable to determine LOC, endorses head trauma, denies blood thinning medication.     Patient alert and oriented x3 disoriented to time, as per EMS report daughter stated she is altered. Patient calm, cooperative, and conversational able to speak in full sentences.

## 2024-02-11 NOTE — ED PROVIDER NOTE - PROGRESS NOTE DETAILS
Stanley: WBC of 31, CLIFF on labs. Vanc + Ceftriaxone + Flagyl given. 2.5L NS given, improvement in Cr from 2.0 to 1.7. CT Abd/Pelv shows Colitis. BPs holding steady in 90s-100s. Maintenance LR started, electrolytes repleted. UA pending. Admitted to SDU.

## 2024-02-11 NOTE — H&P ADULT - HISTORY OF PRESENT ILLNESS
Patient seen/examined prior to midnight on 2/11/24    78F with PMHX COPD on Home O2, HFrEF, HTN, HLD, Multiple Myeloma on Ninlaro, Chronic ITP on Promacta, Ulcerative Colitis, MDD, Anxiety, GERD BIB daughter to [facility] ER s/p fall with AMS and abd pain and diarrhea. Patient hypotensive, tachycardic, febrile on arrival. Treated for Sepsis with IVF and IV ABX. CT Imaging with Colitis and UTI as well as multiple other incidental findings. Hypotension imrpoved s/p IVFB and Stress dose steroids. Pt seen/examined. AMS improving currently AAOx3. CTH WO negative. Labs significant for leukocytosis, ARF, AGMA, and multiple electrolyte abnormalities. Med list verified. Patient feeling much better since arrival to ER. No other complaints. Daughter concerned about mother missing doctor's appointments and found her on floor at home with significant fecal incontinence she is concerned about ability to take care of herself.     ROS Negative unless mentioned.    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 +famhx ARF  Social Hx: Former heavy smoker (quit), Denies ETOH abuse  Allergies: PCN

## 2024-02-11 NOTE — ED PROVIDER NOTE - ATTENDING CONTRIBUTION TO CARE
I have personally provided __40_ minutes of critical care time exclusive of time spent on separately billable procedures. Time includes review of laboratory data, radiology results, discussion with consultants, and monitoring for potential decompensation. Interventions were performed as documented above    I personally saw the patient with the resident, and completed the key components of the history and physical exam. I then discussed the management plan with the resident.    seen with resident; adult female with cough and fever, general faituge and dairrhea; low BP initially, responded to resuscitation and antibx and steroids; imaging noted; agree withr esident plan of care

## 2024-02-11 NOTE — H&P ADULT - NSHPLABSRESULTS_GEN_ALL_CORE
CTH WO IMPRESSION:  No acute intracranial pathology.  Atrophy and chronic appearing white matter changes.    CT Chest/Abd/Pelvis WO IMPRESSION:  Slight stranding seen adjacent to the sigmoid colon. This may be related   to segment of colitis for which infectious or inflammatory etiology   should be considered. This is also in a region of diverticulosis,   possibility of diverticulitis is raised, though there is no focal   inflamed diverticulum identified.    Biliary duct dilatation, with significant increase in common bile duct   size. This is nonspecific. Further correlation with endoscopy or MRI/MRCP   can be obtained for further delineation.    Thick-walled urinary bladder which may be related to its underdistended   nature versus cystitis. Correlate with urinalysis.    Interval partial resolution of right upper lobe opacity, with residual   smaller nodular densities seen. Follow-up to complete resolution is   advised to exclude underlying nodule/mass. Small residual reticular   nodular densities in previously noted right lower lobe opacities as above.    Mild interlobular septal thickening and peripheral reticulations are   nonspecific but can be seen with edema or other interstitial disease.    Multiple vertebral body compression deformities as above. Compression   deformities of T7 and L2 are stable compared to previous exam.   Compression deformities involving T5 and T9 are new compared to the   previous exam and are of indeterminate age.    Subtle sacral sclerosis parallel to the SI joints, suspicious for   sequelae of sacral insufficiency fractures. This is new when compared to   12/7/2023.

## 2024-02-11 NOTE — ED PROVIDER NOTE - OBJECTIVE STATEMENT
78-year-old female with past medical history of COPD on 3 L nasal cannula, heart failure reduced ejection fraction, multiple myeloma, former smoker, hypertension mood disorder, seizure disorder, hyperlipidemia, GERD presenting to the ER w/2-3 days of nausea, generalized weakness, and diarrhea. Pt states she fell yday and hit her head. 78-year-old female with past medical history of COPD on 3 L nasal cannula, heart failure reduced ejection fraction, Ulcerative Colitis, multiple myeloma, former smoker, hypertension, mood disorder, seizure disorder, hyperlipidemia, GERD presenting to the ER w/2-3 days of nausea, generalized weakness, and diarrhea. Pt states she fell yday and hit her head. Pt has a hx of UC, but notes she is having many more bowel movements than usual.    Per daughter pt has not been going to her doctor's appointments, has had significant diarrhea and fecal incontinence at home over the last 2-3 days. Concerned about her ability to care for herself. States she is not at her baseline mental status, appears more confused than usual.

## 2024-02-11 NOTE — ED PROVIDER NOTE - CLINICAL SUMMARY MEDICAL DECISION MAKING FREE TEXT BOX
79 y/o female BIBEMS from home for generalized weakness, nausea, diarrhea, and confusion. Febrile, tachycardic, and hypotensive on arrival. IVF given, septic workup initiated. CTH, CT Chest/Abd/Pelv ordered.

## 2024-02-11 NOTE — H&P ADULT - TIME BILLING
Labs/Imaging/Notes reviewed. Orders placed. Med rec confirmed. GI consulted. Spoke with ER. Discussed with pharmacy. Ortho consulted. PT and SW consutled.

## 2024-02-12 LAB
ALBUMIN SERPL ELPH-MCNC: 2.8 G/DL — LOW (ref 3.3–5.2)
ALP SERPL-CCNC: 96 U/L — SIGNIFICANT CHANGE UP (ref 40–120)
ALT FLD-CCNC: 7 U/L — SIGNIFICANT CHANGE UP
ANION GAP SERPL CALC-SCNC: 16 MMOL/L — SIGNIFICANT CHANGE UP (ref 5–17)
AST SERPL-CCNC: 24 U/L — SIGNIFICANT CHANGE UP
BASE EXCESS BLDV CALC-SCNC: -12.4 MMOL/L — LOW (ref -2–3)
BASOPHILS # BLD AUTO: 0.03 K/UL — SIGNIFICANT CHANGE UP (ref 0–0.2)
BASOPHILS NFR BLD AUTO: 0.2 % — SIGNIFICANT CHANGE UP (ref 0–2)
BILIRUB SERPL-MCNC: <0.2 MG/DL — LOW (ref 0.4–2)
BUN SERPL-MCNC: 23.9 MG/DL — HIGH (ref 8–20)
CA-I SERPL-SCNC: 0.91 MMOL/L — LOW (ref 1.15–1.33)
CALCIUM SERPL-MCNC: 6.3 MG/DL — CRITICAL LOW (ref 8.4–10.5)
CHLORIDE BLDV-SCNC: 107 MMOL/L — SIGNIFICANT CHANGE UP (ref 96–108)
CHLORIDE SERPL-SCNC: 103 MMOL/L — SIGNIFICANT CHANGE UP (ref 96–108)
CO2 SERPL-SCNC: 16 MMOL/L — LOW (ref 22–29)
CREAT SERPL-MCNC: 1.74 MG/DL — HIGH (ref 0.5–1.3)
CRP SERPL-MCNC: 199 MG/L — HIGH
EGFR: 29 ML/MIN/1.73M2 — LOW
EOSINOPHIL # BLD AUTO: 0 K/UL — SIGNIFICANT CHANGE UP (ref 0–0.5)
EOSINOPHIL NFR BLD AUTO: 0 % — SIGNIFICANT CHANGE UP (ref 0–6)
ERYTHROCYTE [SEDIMENTATION RATE] IN BLOOD: 46 MM/HR — HIGH (ref 0–20)
GAS PNL BLDV: 134 MMOL/L — LOW (ref 136–145)
GAS PNL BLDV: SIGNIFICANT CHANGE UP
GAS PNL BLDV: SIGNIFICANT CHANGE UP
GLUCOSE BLDV-MCNC: 130 MG/DL — HIGH (ref 70–99)
GLUCOSE SERPL-MCNC: 129 MG/DL — HIGH (ref 70–99)
HCO3 BLDV-SCNC: 16 MMOL/L — LOW (ref 22–29)
HCT VFR BLD CALC: 26.9 % — LOW (ref 34.5–45)
HCT VFR BLDA CALC: 26 % — SIGNIFICANT CHANGE UP
HGB BLD CALC-MCNC: 8.8 G/DL — LOW (ref 11.7–16.1)
HGB BLD-MCNC: 8.4 G/DL — LOW (ref 11.5–15.5)
IMM GRANULOCYTES NFR BLD AUTO: 0.9 % — SIGNIFICANT CHANGE UP (ref 0–0.9)
LACTATE BLDV-MCNC: 1.5 MMOL/L — SIGNIFICANT CHANGE UP (ref 0.5–2)
LYMPHOCYTES # BLD AUTO: 0.42 K/UL — LOW (ref 1–3.3)
LYMPHOCYTES # BLD AUTO: 2.2 % — LOW (ref 13–44)
MAGNESIUM SERPL-MCNC: 2 MG/DL — SIGNIFICANT CHANGE UP (ref 1.6–2.6)
MCHC RBC-ENTMCNC: 25.5 PG — LOW (ref 27–34)
MCHC RBC-ENTMCNC: 31.2 GM/DL — LOW (ref 32–36)
MCV RBC AUTO: 81.5 FL — SIGNIFICANT CHANGE UP (ref 80–100)
MONOCYTES # BLD AUTO: 0.37 K/UL — SIGNIFICANT CHANGE UP (ref 0–0.9)
MONOCYTES NFR BLD AUTO: 1.9 % — LOW (ref 2–14)
NEUTROPHILS # BLD AUTO: 18.52 K/UL — HIGH (ref 1.8–7.4)
NEUTROPHILS NFR BLD AUTO: 94.8 % — HIGH (ref 43–77)
PCO2 BLDV: 40 MMHG — SIGNIFICANT CHANGE UP (ref 39–42)
PH BLDV: 7.2 — CRITICAL LOW (ref 7.32–7.43)
PHOSPHATE SERPL-MCNC: 2.6 MG/DL — SIGNIFICANT CHANGE UP (ref 2.4–4.7)
PLATELET # BLD AUTO: 233 K/UL — SIGNIFICANT CHANGE UP (ref 150–400)
PO2 BLDV: 65 MMHG — HIGH (ref 25–45)
POTASSIUM BLDV-SCNC: 3.3 MMOL/L — LOW (ref 3.5–5.1)
POTASSIUM SERPL-MCNC: 3.5 MMOL/L — SIGNIFICANT CHANGE UP (ref 3.5–5.3)
POTASSIUM SERPL-SCNC: 3.5 MMOL/L — SIGNIFICANT CHANGE UP (ref 3.5–5.3)
PROT SERPL-MCNC: 5.6 G/DL — LOW (ref 6.6–8.7)
RBC # BLD: 3.3 M/UL — LOW (ref 3.8–5.2)
RBC # FLD: 18.8 % — HIGH (ref 10.3–14.5)
SAO2 % BLDV: 92.6 % — SIGNIFICANT CHANGE UP
SODIUM SERPL-SCNC: 135 MMOL/L — SIGNIFICANT CHANGE UP (ref 135–145)
WBC # BLD: 19.51 K/UL — HIGH (ref 3.8–10.5)
WBC # FLD AUTO: 19.51 K/UL — HIGH (ref 3.8–10.5)

## 2024-02-12 PROCEDURE — 99223 1ST HOSP IP/OBS HIGH 75: CPT

## 2024-02-12 PROCEDURE — 99233 SBSQ HOSP IP/OBS HIGH 50: CPT

## 2024-02-12 RX ORDER — ACETAMINOPHEN 500 MG
650 TABLET ORAL EVERY 6 HOURS
Refills: 0 | Status: DISCONTINUED | OUTPATIENT
Start: 2024-02-11 | End: 2024-02-18

## 2024-02-12 RX ORDER — BUDESONIDE AND FORMOTEROL FUMARATE DIHYDRATE 160; 4.5 UG/1; UG/1
2 AEROSOL RESPIRATORY (INHALATION)
Refills: 0 | Status: DISCONTINUED | OUTPATIENT
Start: 2024-02-12 | End: 2024-02-18

## 2024-02-12 RX ORDER — POTASSIUM CHLORIDE 20 MEQ
40 PACKET (EA) ORAL ONCE
Refills: 0 | Status: COMPLETED | OUTPATIENT
Start: 2024-02-12 | End: 2024-02-12

## 2024-02-12 RX ORDER — TIOTROPIUM BROMIDE 18 UG/1
1 CAPSULE ORAL; RESPIRATORY (INHALATION)
Qty: 0 | Refills: 0 | DISCHARGE

## 2024-02-12 RX ORDER — SODIUM CHLORIDE 9 MG/ML
1000 INJECTION, SOLUTION INTRAVENOUS
Refills: 0 | Status: DISCONTINUED | OUTPATIENT
Start: 2024-02-11 | End: 2024-02-13

## 2024-02-12 RX ORDER — MAGNESIUM SULFATE 500 MG/ML
2 VIAL (ML) INJECTION ONCE
Refills: 0 | Status: COMPLETED | OUTPATIENT
Start: 2024-02-12 | End: 2024-02-12

## 2024-02-12 RX ORDER — SODIUM BICARBONATE 1 MEQ/ML
650 SYRINGE (ML) INTRAVENOUS THREE TIMES A DAY
Refills: 0 | Status: DISCONTINUED | OUTPATIENT
Start: 2024-02-12 | End: 2024-02-18

## 2024-02-12 RX ORDER — DULOXETINE HYDROCHLORIDE 30 MG/1
1 CAPSULE, DELAYED RELEASE ORAL
Refills: 0 | DISCHARGE

## 2024-02-12 RX ORDER — CEFEPIME 1 G/1
INJECTION, POWDER, FOR SOLUTION INTRAMUSCULAR; INTRAVENOUS
Refills: 0 | Status: DISCONTINUED | OUTPATIENT
Start: 2024-02-12 | End: 2024-02-16

## 2024-02-12 RX ORDER — HYDROCORTISONE 20 MG
50 TABLET ORAL EVERY 6 HOURS
Refills: 0 | Status: DISCONTINUED | OUTPATIENT
Start: 2024-02-11 | End: 2024-02-13

## 2024-02-12 RX ORDER — PANTOPRAZOLE SODIUM 20 MG/1
40 TABLET, DELAYED RELEASE ORAL
Refills: 0 | Status: DISCONTINUED | OUTPATIENT
Start: 2024-02-12 | End: 2024-02-18

## 2024-02-12 RX ORDER — ONDANSETRON 8 MG/1
4 TABLET, FILM COATED ORAL EVERY 8 HOURS
Refills: 0 | Status: DISCONTINUED | OUTPATIENT
Start: 2024-02-11 | End: 2024-02-18

## 2024-02-12 RX ORDER — FLUTICASONE FUROATE, UMECLIDINIUM BROMIDE AND VILANTEROL TRIFENATATE 200; 62.5; 25 UG/1; UG/1; UG/1
1 POWDER RESPIRATORY (INHALATION)
Refills: 0 | DISCHARGE

## 2024-02-12 RX ORDER — ALPRAZOLAM 0.25 MG
0.5 TABLET ORAL
Refills: 0 | Status: DISCONTINUED | OUTPATIENT
Start: 2024-02-12 | End: 2024-02-13

## 2024-02-12 RX ORDER — CEFEPIME 1 G/1
2000 INJECTION, POWDER, FOR SOLUTION INTRAMUSCULAR; INTRAVENOUS ONCE
Refills: 0 | Status: COMPLETED | OUTPATIENT
Start: 2024-02-12 | End: 2024-02-12

## 2024-02-12 RX ORDER — LANOLIN ALCOHOL/MO/W.PET/CERES
3 CREAM (GRAM) TOPICAL AT BEDTIME
Refills: 0 | Status: DISCONTINUED | OUTPATIENT
Start: 2024-02-11 | End: 2024-02-18

## 2024-02-12 RX ORDER — CEFEPIME 1 G/1
1000 INJECTION, POWDER, FOR SOLUTION INTRAMUSCULAR; INTRAVENOUS EVERY 12 HOURS
Refills: 0 | Status: DISCONTINUED | OUTPATIENT
Start: 2024-02-12 | End: 2024-02-16

## 2024-02-12 RX ORDER — DULOXETINE HYDROCHLORIDE 30 MG/1
60 CAPSULE, DELAYED RELEASE ORAL DAILY
Refills: 0 | Status: DISCONTINUED | OUTPATIENT
Start: 2024-02-12 | End: 2024-02-18

## 2024-02-12 RX ORDER — CEFEPIME 1 G/1
INJECTION, POWDER, FOR SOLUTION INTRAMUSCULAR; INTRAVENOUS
Refills: 0 | Status: DISCONTINUED | OUTPATIENT
Start: 2024-02-12 | End: 2024-02-12

## 2024-02-12 RX ORDER — TIOTROPIUM BROMIDE 18 UG/1
2 CAPSULE ORAL; RESPIRATORY (INHALATION) DAILY
Refills: 0 | Status: DISCONTINUED | OUTPATIENT
Start: 2024-02-12 | End: 2024-02-18

## 2024-02-12 RX ORDER — PANTOPRAZOLE SODIUM 20 MG/1
1 TABLET, DELAYED RELEASE ORAL
Refills: 0 | DISCHARGE

## 2024-02-12 RX ORDER — MIDODRINE HYDROCHLORIDE 2.5 MG/1
10 TABLET ORAL THREE TIMES A DAY
Refills: 0 | Status: DISCONTINUED | OUTPATIENT
Start: 2024-02-12 | End: 2024-02-14

## 2024-02-12 RX ORDER — HEPARIN SODIUM 5000 [USP'U]/ML
5000 INJECTION INTRAVENOUS; SUBCUTANEOUS EVERY 12 HOURS
Refills: 0 | Status: DISCONTINUED | OUTPATIENT
Start: 2024-02-11 | End: 2024-02-12

## 2024-02-12 RX ORDER — ELTROMBOPAG OLAMINE 50 MG/1
1 TABLET, FILM COATED ORAL
Refills: 0 | DISCHARGE

## 2024-02-12 RX ORDER — METRONIDAZOLE 500 MG
500 TABLET ORAL EVERY 8 HOURS
Refills: 0 | Status: DISCONTINUED | OUTPATIENT
Start: 2024-02-11 | End: 2024-02-16

## 2024-02-12 RX ORDER — ALBUTEROL 90 UG/1
2 AEROSOL, METERED ORAL
Refills: 0 | DISCHARGE

## 2024-02-12 RX ORDER — CALCIUM GLUCONATE 100 MG/ML
2 VIAL (ML) INTRAVENOUS EVERY 6 HOURS
Refills: 0 | Status: COMPLETED | OUTPATIENT
Start: 2024-02-12 | End: 2024-02-12

## 2024-02-12 RX ORDER — METOPROLOL TARTRATE 50 MG
1 TABLET ORAL
Refills: 0 | DISCHARGE

## 2024-02-12 RX ADMIN — MIDODRINE HYDROCHLORIDE 10 MILLIGRAM(S): 2.5 TABLET ORAL at 17:50

## 2024-02-12 RX ADMIN — Medication 100 MILLIGRAM(S): at 06:09

## 2024-02-12 RX ADMIN — CEFEPIME 1000 MILLIGRAM(S): 1 INJECTION, POWDER, FOR SOLUTION INTRAMUSCULAR; INTRAVENOUS at 17:49

## 2024-02-12 RX ADMIN — SODIUM CHLORIDE 1000 MILLILITER(S): 9 INJECTION INTRAMUSCULAR; INTRAVENOUS; SUBCUTANEOUS at 05:59

## 2024-02-12 RX ADMIN — Medication 50 MILLIGRAM(S): at 00:36

## 2024-02-12 RX ADMIN — CEFEPIME 2000 MILLIGRAM(S): 1 INJECTION, POWDER, FOR SOLUTION INTRAMUSCULAR; INTRAVENOUS at 00:36

## 2024-02-12 RX ADMIN — Medication 0.5 MILLIGRAM(S): at 01:11

## 2024-02-12 RX ADMIN — Medication 650 MILLIGRAM(S): at 13:57

## 2024-02-12 RX ADMIN — SODIUM CHLORIDE 125 MILLILITER(S): 9 INJECTION, SOLUTION INTRAVENOUS at 23:04

## 2024-02-12 RX ADMIN — Medication 50 MILLIGRAM(S): at 17:50

## 2024-02-12 RX ADMIN — Medication 25 GRAM(S): at 00:36

## 2024-02-12 RX ADMIN — Medication 200 GRAM(S): at 08:08

## 2024-02-12 RX ADMIN — Medication 30 MILLILITER(S): at 00:37

## 2024-02-12 RX ADMIN — SODIUM CHLORIDE 500 MILLILITER(S): 9 INJECTION INTRAMUSCULAR; INTRAVENOUS; SUBCUTANEOUS at 05:59

## 2024-02-12 RX ADMIN — DULOXETINE HYDROCHLORIDE 60 MILLIGRAM(S): 30 CAPSULE, DELAYED RELEASE ORAL at 11:20

## 2024-02-12 RX ADMIN — Medication 650 MILLIGRAM(S): at 23:03

## 2024-02-12 RX ADMIN — MIDODRINE HYDROCHLORIDE 10 MILLIGRAM(S): 2.5 TABLET ORAL at 10:49

## 2024-02-12 RX ADMIN — Medication 0.5 MILLIGRAM(S): at 23:03

## 2024-02-12 RX ADMIN — Medication 40 MILLIEQUIVALENT(S): at 11:19

## 2024-02-12 RX ADMIN — Medication 100 MILLIGRAM(S): at 23:03

## 2024-02-12 RX ADMIN — Medication 100 MILLIGRAM(S): at 13:21

## 2024-02-12 RX ADMIN — SODIUM CHLORIDE 125 MILLILITER(S): 9 INJECTION, SOLUTION INTRAVENOUS at 01:11

## 2024-02-12 RX ADMIN — BUDESONIDE AND FORMOTEROL FUMARATE DIHYDRATE 2 PUFF(S): 160; 4.5 AEROSOL RESPIRATORY (INHALATION) at 08:08

## 2024-02-12 RX ADMIN — Medication 200 GRAM(S): at 14:37

## 2024-02-12 RX ADMIN — Medication 30 MILLILITER(S): at 08:46

## 2024-02-12 RX ADMIN — Medication 50 MILLIGRAM(S): at 11:21

## 2024-02-12 RX ADMIN — SODIUM CHLORIDE 125 MILLILITER(S): 9 INJECTION, SOLUTION INTRAVENOUS at 08:17

## 2024-02-12 RX ADMIN — Medication 50 MILLIGRAM(S): at 06:09

## 2024-02-12 RX ADMIN — PANTOPRAZOLE SODIUM 40 MILLIGRAM(S): 20 TABLET, DELAYED RELEASE ORAL at 06:10

## 2024-02-12 RX ADMIN — Medication 0.5 MILLIGRAM(S): at 08:18

## 2024-02-12 RX ADMIN — Medication 0.5 MILLIGRAM(S): at 13:57

## 2024-02-12 RX ADMIN — SODIUM CHLORIDE 125 MILLILITER(S): 9 INJECTION, SOLUTION INTRAVENOUS at 12:48

## 2024-02-12 NOTE — CONSULT NOTE ADULT - SUBJECTIVE AND OBJECTIVE BOX
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 Patient hypotensive, tachycardic, febrile on arrival. Treated for Sepsis with IVF and IV ABX. CT Imaging with Colitis and UTI as well as multiple other incidental findings. Hypotension imrpoved s/p IVFB and Stress dose steroids. Pt seen/examined. AMS improving currently AAOx3. CTH WO negative. Labs significant for leukocytosis, ARF, AGMA, and multiple electrolyte abnormalities. Med list verified. Patient feeling much better since arrival to ER. No other complaints. Daughter concerned about mother missing doctor's appointments and found her on floor at home with significant fecal incontinence she is concerned about ability to take care of herself.     ROS Negative unless mentioned.    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 +famhx ARF  Social Hx: Former heavy smoker (quit), Denies ETOH abuse  Allergies: PCN   (2024 23:59)       as above - asked to evaluate 78F for compression Fx found on CT C/A/P done to surveil for trauma s/p fall.  Pt c/o abd pain and diarrhea, admitted for sepsis.  She denies any new back pain tingling numbness and weakness.  She admits to 10yr history of back pain down the right buttocks which she states prompted investigation that revealed her multiple myeloma.      PAST MEDICAL & SURGICAL HISTORY:  MI (myocardial infarction)      Kidney stones      Hearing loss, unspecified hearing loss type, unspecified laterality      Anxiety      Multiple myeloma      COPD (chronic obstructive pulmonary disease)      S/P cholecystectomy        FAMILY HISTORY:  Family history of renal failure        HOME MEDICATIONS:  Home Medications:  Albuterol (Eqv-ProAir HFA) 90 mcg/inh inhalation aerosol: 2 puff(s) inhaled 3 times a day as needed for  shortness of breath and/or wheezing (2024 00:44)  ALPRAZolam 0.5 mg oral tablet: 1 tab(s) orally 4 times a day (2024 00:44)  DULoxetine 60 mg oral delayed release capsule: 1 cap(s) orally once a day (:48)  ibuprofen 800 mg oral tablet: 1 tab(s) orally 3 times a day as needed for  moderate pain (:48)  metoprolol succinate 25 mg oral tablet, extended release: 1 tab(s) orally once a day (:48)  Ninlaro 3 mg oral capsule: 1 cap(s) orally once a day (:44)  Promacta 12.5 mg oral tablet: 1 tab(s) orally once a day (:44)  Protonix 40 mg oral delayed release tablet: 1 tab(s) orally once a day (44)  Trelegy Ellipta 100 mcg-62.5 mcg-25 mcg/inh inhalation powder: 1 puff(s) inhaled once a day (:44)      MEDICATIONS:  Antibiotics:  cefepime  Injectable.      cefepime  Injectable. 1000 milliGRAM(s) IV Push every 12 hours  metroNIDAZOLE  IVPB 500 milliGRAM(s) IV Intermittent every 8 hours    Neuro:  acetaminophen     Tablet .. 650 milliGRAM(s) Oral every 6 hours PRN  ALPRAZolam 0.5 milliGRAM(s) Oral four times a day PRN  DULoxetine 60 milliGRAM(s) Oral daily  melatonin 3 milliGRAM(s) Oral at bedtime PRN  ondansetron Injectable 4 milliGRAM(s) IV Push every 8 hours PRN    Anticoagulation:    OTHER:  aluminum hydroxide/magnesium hydroxide/simethicone Suspension 30 milliLiter(s) Oral every 4 hours PRN  budesonide  80 MICROgram(s)/formoterol 4.5 MICROgram(s) Inhaler 2 Puff(s) Inhalation two times a day  hydrocortisone sodium succinate Injectable 50 milliGRAM(s) IV Push every 6 hours  pantoprazole    Tablet 40 milliGRAM(s) Oral before breakfast  tiotropium 2.5 MICROgram(s) Inhaler 2 Puff(s) Inhalation daily    IVF:  lactated ringers. 1000 milliLiter(s) IV Continuous <Continuous>        Allergies    penicillin (Unknown)    Intolerances          SOCIAL HISTORY:  Tobacco Use:  EtOH use:   Substance:    LABS:                        10.7   31.44 )-----------( 362      ( 2024 17:00 )             35.4         134<L>  |  103  |  21.5<H>  ----------------------------<  143<H>  3.1<L>   |  14.0<L>  |  1.70<H>    Ca    5.4<LL>      2024 19:49  Mg     1.2         TPro  6.6  /  Alb  3.1<L>  /  TBili  0.2<L>  /  DBili  x   /  AST  31  /  ALT  6   /  AlkPhos  112      PT/INR - ( 2024 17:00 )   PT: 12.5 sec;   INR: 1.13 ratio         PTT - ( 2024 17:00 )  PTT:33.8 sec  Urinalysis Basic - ( 2024 21:40 )    Color: Yellow / Appearance: Cloudy / S.013 / pH: x  Gluc: x / Ketone: Negative mg/dL  / Bili: Negative / Urobili: 0.2 mg/dL   Blood: x / Protein: 100 mg/dL / Nitrite: Negative   Leuk Esterase: Large / RBC: 2 /HPF /  /HPF   Sq Epi: x / Non Sq Epi: 1 /HPF / Bacteria: Occasional /HPF        CULTURES:      RADIOLOGY & ADDITIONAL STUDIES:    CT Chest  Multiple vertebral body compression deformities as above. Compression   deformities of T7 and L2 are stable compared to previous exam.   Compression deformities involving T5 and T9 are new compared to the   previous exam and are of indeterminate age.          REVIEW OF SYSTEMS  RESPIRATORY: No cough  CARDIOVASCULAR: No chest pain      Vital Signs Last 24 Hrs  T(C): 36.7 (2024 00:50), Max: 38.8 (2024 16:55)  T(F): 98.1 (2024 00:50), Max: 101.8 (2024 16:55)  HR: 110 (2024 00:50) (110 - 159)  BP: 86/60 (2024 00:50) (76/43 - 110/62)  BP(mean): 68 (2024 00:50) (66 - 68)  RR: 20 (2024 00:50) (16 - 20)  SpO2: 97% (2024 00:50) (94% - 100%)    Parameters below as of 2024 00:50  Patient On (Oxygen Delivery Method): room air          PHYSICAL EXAM:  GENERAL: NAD, well-groomed  MOTOR: strength 5/5 b/l lower extremities  Lowers      HF(L1/L2)     KE (L3)     DF (L4)     EHL (L5)     PF (S1)      R                     5/5              5/5           5/5           5/5            5/5  L                     5/5               5/5          5/5            5/5            5/5  SENSATION: SILT L3-S1 b/l DTR 2+= Knees

## 2024-02-12 NOTE — PROGRESS NOTE ADULT - ASSESSMENT
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    Sepsis 2/2 Infectious v Inflammatory Colitis and Acute UTI   -CT ABD/PEL +Colitis +Cystitis   -BCX x2 Pending  -GI PCR pending collection  -Rx'd six different abx in past 6 months including Doxycycline/Bactrim/Cefpodoxime/Azithromycin/Levaquin/Cephelexin -> Check CDIFF PCR  -ESR 46 and   -Lactate 2.3 -> Lactate 1.6   -2.5L IVFB NSS given in ED  -Continue IVF LR 125cc/hr  -Solucortef 100mg IV x1 in ED  -Solucortef 50mg IV q6 for possible inflammatory colitis c/b hypotension  -PCN Allergy noted but tolerating Cephalosporins  -Ceftriaxone 1g x1 + Flagyl 500mg x1 + Vanco 1g IV x1 in ED  -Broaden empiric IV ABX to Cefepime 1g IV q8 and Flagyl 500mg IV q8 given patient on immunosuppressants  -Hold Vanco IV for now  -Add Midodrine 10 mg TID  -Seen by GI recs pending     Acute UTI  -CT AP +Thick walled urinary bladder possible Cystitis  -UA positive. UCX/BCX pending as above  -Continue ABX as above    AMS  -CTH WO negative  -Likely toxic metablolic encephalopathy from hypotension/sepsis/colitis/UTI  -Resolving     CLIFF / AGMA 2/2 Lactic Acidosis  -CT ABDPEL no obstruction. CPK minimally elevated and UA with small blood doubt rhabdo  -Lactate cleared. Repeat Labs with improved AG  -Continue IVF  -Add Sodium Bicarb   -Avoid nephrotoxic medications. Hold home Ibuprofen  -Monitor     Hypomagnesemia, Hypocalcemia, Hypokalemia  -Replete     Chronic Compression Fractures, Incidental T5/T9 compression deformity + suspected sequelae of sacral insufficiency fractures   -TLSO Brace when OOB  -WBAT   -Pain control   -PT eval   -Outpatient follow up with Neurosurgery in 2-4 weeks    Multiple Myeloma, Chronic ITP?  -Will hold Ninlaro and Promacta for now till acute infection is resolved    Depression + Anxiety  -Duloxetine 60mg q24  -Alprazolam 0.5mg QID PRN    HTN  -Restart Metoprolol Succinate 25mg q24 once BP improves     COPD  -No current hypoxia  -CT Chest improving infiltrates from prior CT  -O2 via NC PRN  -Continue Symbicort and Spiriva     DVT Prophylaxis -- Venodyne    Dispo: Likely LORENZA once stable.

## 2024-02-12 NOTE — ED ADULT NURSE REASSESSMENT NOTE - NS ED NURSE REASSESS COMMENT FT1
Assumed care of patient at this time.  Pt A&Ox4, resting on stretcher in NAD at this time, endorsing mild anxiety.  V/S as charted, pt denies sob/chest pain.  Safety maintained with bed locked, in lowest position.
Patient A&Ox4, resting on stretcher in NAD at this time.  Due medication administered as ordered, V/S as charted.  Pt remains in CC area of ED for v/s monitoring.  Pt to be admitted to SDU under medicine.  Currently awaiting room.  Safety maintained with bed locked, in lowest position.
Patient A&Ox4, resting on stretcher, in NAD at this time.  Pt remains in CC area of ED, admitted to medicine as SDU pt with v/s q2h monitoring in progress.  Pt is on contact isolation, pending stool collection to r/o c.diff.  MD to be made aware for MAP below 65.  Safety maintained with v/s as charted.
To CDU

## 2024-02-12 NOTE — CONSULT NOTE ADULT - SUBJECTIVE AND OBJECTIVE BOX
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 Patient hypotensive, tachycardic, febrile on arrival. Treated for Sepsis with IVF and IV ABX. CT Imaging with Colitis and UTI as well as multiple other incidental findings. Hypotension improved s/p IVFB and Stress dose steroids. Pt seen/examined. AMS improving currently AAOx3. CTH WO negative. Labs significant for leukocytosis, ARF, AGMA, and multiple electrolyte abnormalities.   GI consulted after radiology finding of colitis. Patient reports diarrhea with the episode of LOC, denies prior episodes diarrhea, abdominal pain, nausea, vomiting. Denies sick contact or  long distance travel. Last EGD/ colonoscopy was many years ago. Patient denies hematochezia, melena, or hematochezia. In ED her hemoglobin was 10.7 gm and dropped to 8.4 gm this morning. Patient reports "my heart was raising" denies chest pain, shortness of breath, dizziness.     Review of Systems:  . Constitutional: No fever, chills  . HEENT: Negative  · Respiratory and Thorax: No shortness of breath, no cough  · Cardiovascular: No chest pain, palpitation, no dizziness  · Gastrointestinal: see above  · Genitourinary: No hematuria  · Musculoskeletal: Negative  · Neurological: negative  · Psychiatric: no agitation, no anxiety      PAST MEDICAL/SURGICAL HISTORY:  MI (myocardial infarction)    Kidney stones    Hearing loss, unspecified hearing loss type, unspecified laterality    Anxiety    Multiple myeloma    COPD (chronic obstructive pulmonary disease)    S/P cholecystectomy      SOCIAL HISTORY:  - TOBACCO: Denies  - ALCOHOL: Denies  - ILLICIT DRUG USE: Denies    FAMILY HISTORY:  No known history of gastrointestinal or liver disease;  Family history of renal failure        HOME MEDICATIONS:  Albuterol (Eqv-ProAir HFA) 90 mcg/inh inhalation aerosol: 2 puff(s) inhaled 3 times a day as needed for  shortness of breath and/or wheezing (12 Feb 2024 00:44)  ALPRAZolam 0.5 mg oral tablet: 1 tab(s) orally 4 times a day (12 Feb 2024 00:44)  DULoxetine 60 mg oral delayed release capsule: 1 cap(s) orally once a day (12 Feb 2024 00:48)  ibuprofen 800 mg oral tablet: 1 tab(s) orally 3 times a day as needed for  moderate pain (12 Feb 2024 00:48)  metoprolol succinate 25 mg oral tablet, extended release: 1 tab(s) orally once a day (12 Feb 2024 00:48)  Ninlaro 3 mg oral capsule: 1 cap(s) orally once a day (12 Feb 2024 00:44)  Promacta 12.5 mg oral tablet: 1 tab(s) orally once a day (12 Feb 2024 00:44)  Protonix 40 mg oral delayed release tablet: 1 tab(s) orally once a day (12 Feb 2024 00:44)  Trelegy Ellipta 100 mcg-62.5 mcg-25 mcg/inh inhalation powder: 1 puff(s) inhaled once a day (12 Feb 2024 00:44)    INPATIENT MEDICATIONS:  MEDICATIONS  (STANDING):  budesonide  80 MICROgram(s)/formoterol 4.5 MICROgram(s) Inhaler 2 Puff(s) Inhalation two times a day  calcium gluconate IVPB 2 Gram(s) IV Intermittent every 6 hours  cefepime  Injectable.      cefepime  Injectable. 1000 milliGRAM(s) IV Push every 12 hours  DULoxetine 60 milliGRAM(s) Oral daily  hydrocortisone sodium succinate Injectable 50 milliGRAM(s) IV Push every 6 hours  lactated ringers. 1000 milliLiter(s) (125 mL/Hr) IV Continuous <Continuous>  metroNIDAZOLE  IVPB 500 milliGRAM(s) IV Intermittent every 8 hours  midodrine. 10 milliGRAM(s) Oral three times a day  pantoprazole    Tablet 40 milliGRAM(s) Oral before breakfast  potassium chloride    Tablet ER 40 milliEquivalent(s) Oral once  tiotropium 2.5 MICROgram(s) Inhaler 2 Puff(s) Inhalation daily    MEDICATIONS  (PRN):  acetaminophen     Tablet .. 650 milliGRAM(s) Oral every 6 hours PRN Temp greater or equal to 38C (100.4F), Mild Pain (1 - 3)  ALPRAZolam 0.5 milliGRAM(s) Oral four times a day PRN anxiety  aluminum hydroxide/magnesium hydroxide/simethicone Suspension 30 milliLiter(s) Oral every 4 hours PRN Dyspepsia  melatonin 3 milliGRAM(s) Oral at bedtime PRN Insomnia  ondansetron Injectable 4 milliGRAM(s) IV Push every 8 hours PRN Nausea and/or Vomiting    ALLERGIES:  penicillin (Unknown)    T(C): 36.5 (02-12-24 @ 07:23), Max: 38.8 (02-11-24 @ 16:55)  HR: 117 (02-12-24 @ 10:00) (100 - 159)  BP: 84/53 (02-12-24 @ 10:00) (76/43 - 110/62)  RR: 19 (02-12-24 @ 10:00) (16 - 20)  SpO2: 99% (02-12-24 @ 10:00) (94% - 100%)      PHYSICAL EXAM:  Constitutional: No acute distress  Neuro: Awake alert, oriented  HEENT: anicteric sclerae  CV: regular rate, regular rhythm  Pulm/chest: lung sounds clear/diminished bilaterally, no accessory muscle use noted  Abd: soft, nontender, sotly distended,  +bowel sounds. No rigidity, rebound tenderness, or guarding  Ext: no edema  Skin: warm, no jaundice   Psych: calm, cooperative      LABS:             8.4    19.51 )-----------( 233      ( 02-12 @ 06:00 )             26.9                10.7   31.44 )-----------( 362      ( 02-11 @ 17:00 )             35.4       PT/INR - ( 11 Feb 2024 17:00 )   PT: 12.5 sec;   INR: 1.13 ratio         PTT - ( 11 Feb 2024 17:00 )  PTT:33.8 sec  02-12    135  |  103  |  23.9<H>  ----------------------------<  129<H>  3.5   |  16.0<L>  |  1.74<H>    Ca    6.3<LL>      12 Feb 2024 06:00  Phos  2.6     02-12  Mg     2.0     02-12    TPro  5.6<L>  /  Alb  2.8<L>  /  TBili  <0.2<L>  /  DBili  x   /  AST  24  /  ALT  7   /  AlkPhos  96  02-12    LIVER FUNCTIONS - ( 12 Feb 2024 06:00 )  Alb: 2.8 g/dL / Pro: 5.6 g/dL / ALK PHOS: 96 U/L / ALT: 7 U/L / AST: 24 U/L / GGT: x             Urinalysis Basic - ( 12 Feb 2024 06:00 )    Color: x / Appearance: x / SG: x / pH: x  Gluc: 129 mg/dL / Ketone: x  / Bili: x / Urobili: x   Blood: x / Protein: x / Nitrite: x   Leuk Esterase: x / RBC: x / WBC x   Sq Epi: x / Non Sq Epi: x / Bacteria: x      < from: CT Abdomen and Pelvis No Cont (02.11.24 @ 21:05) >  PROCEDURE:  CT of the Chest, Abdomen and Pelvis was performed.  Sagittal and coronal reformats were performed.    FINDINGS:  CHEST:  LUNGS AND LARGE AIRWAYS: Patent central tracheal bronchial tree. Mild   emphysematous changes. Mild residual and nodular patchy opacity of the   right upper lobe, significantly decreased compared to 11/26/2023.   Previously noted patchy nodular opacities in the right lower lobe are   nearly resolved with small residual reticular nodular opacities are   appreciated, for example 4:206, 4:194 and 4:191. There is a 6 mm   branching nodular opacity of the left lower lobe is stable compared to   previous exams dating back to 8/27/2018.. There is mild intralobular   septal thickening. Peripheral reticulations are also appreciated. There   is Posterior dependent atelectasis. Biapical pleural parenchymal scarring   is appreciated.  PLEURA: No pleural effusion. No pneumothorax.  VESSELS: Atherosclerotic changes of the aorta and coronary arteries. The   aorta and pulmonary artery are normal caliber.  HEART: Heart size is normal. Valvular calcifications are noted. No   pericardial effusion.  MEDIASTINUM AND MK: No pathologically enlarged lymph nodes. There is a   small hiatal hernia.  CHEST WALL AND LOWER NECK: Within normal limits.    ABDOMEN AND PELVIS:  LIVER: Within normal limits.  BILE DUCTS: Marked biliary ductal dilatation, the common bile duct   measuring up to 1.3 cm. This is increased compared to previous exam.   There is also dilated cystic duct remnant. Mild intrahepatic biliary duct   dilatation is appreciated.  GALLBLADDER: Cholecystectomy.  SPLEEN: Within normal limits.  PANCREAS: Within normal limits.  ADRENALS: Within normal limits.  KIDNEYS/URETERS: 4 mm right lower pole renal calculus. There is no   urinary tract obstruction.    BLADDER: Diffusely thick-walled.  REPRODUCTIVE ORGANS: Fibroid uterus. No abnormal adnexal enlargement.    BOWEL: No bowel obstruction. Appendix is normal. There is extensive   colonic diverticulosis. Question mild fat stranding of the sigmoid   mesentery . No definite focal wall thickening appreciated. There is a   small hiatal hernia.  PERITONEUM: No ascites.  VESSELS: Atherosclerotic changes. Diffuse ectasia of the abdominal aorta   is appreciated, with multiple with areas of more focal dilatation, the   largest within the infrarenal region measuring up to 2.8 cm in diameter.  RETROPERITONEUM/LYMPH NODES: No lymphadenopathy.  ABDOMINAL WALL: Postsurgical changes.  BONES: Degenerative changes generalized osteopenia. T5 compression   deformity is new compared to previous exam. T7 compression deformity is   relatively stable. Additional compression deformity of T9 is new compared   to previous exam. Chronic appearing L2 compression deformity is stable   compared to previous exam. Subtle areas of linear sclerosis along the   bilateral sacral parallel to the sacroiliac joint may represent slight   insufficiency fractures. Multiple old bilateral rib fractures.    IMPRESSION:  Slight stranding seen adjacent to the sigmoid colon. This may be related   to segment of colitis for which infectious or inflammatory etiology   should be considered. This is also in a region of diverticulosis,   possibility of diverticulitis is raised, though there is no focal   inflamed diverticulum identified.    Biliary duct dilatation, with significant increase in common bile duct   size. This is nonspecific. Further correlation with endoscopy or MRI/MRCP   can be obtained for further delineation.    Thick-walled urinary bladder which may be related to its underdistended   nature versus cystitis. Correlate with urinalysis.    Interval partial resolution of right upper lobe opacity, with residual   smaller nodular densities seen. Follow-up to complete resolution is   advised to exclude underlying nodule/mass. Small residual reticular   nodular densities in previously noted right lower lobe opacities as above.    Mild interlobular septal thickening and peripheral reticulations are   nonspecific but can be seen with edema or other interstitial disease.    Multiple vertebral body compression deformities as above. Compression   deformities of T7 and L2 are stable compared to previous exam.   Compression deformities involving T5 and T9 are new compared to the   previous exam and are of indeterminate age.    Subtle sacral sclerosis parallel to the SI joints, suspicious for   sequelae of sacral insufficiency fractures. This is new when compared to   12/7/2023.    Additional findings as above.      < end of copied text >

## 2024-02-12 NOTE — PROGRESS NOTE ADULT - SUBJECTIVE AND OBJECTIVE BOX
Pt is a 78 YOF who has a T5,T9 compression fracture. Pt was evaluated for an [place] TLSO . Pt was instructed on the donning and doffing and was given instructions on the care and use. Brace was sized to fit her short stature. Brace was delivered . Any questions ,please call [facility] at [phone]

## 2024-02-12 NOTE — CONSULT NOTE ADULT - NS ATTEND AMEND GEN_ALL_CORE FT
I agree with assessment and plan as above. Pt with history of COPD, MM, ITP, ?UC, presented with septic shock in setting of UTI and colitis on imaging. Continue IVF resuscitation and IV antibiotics. Check cdiff and GI PCR. Will continue to follow.

## 2024-02-12 NOTE — ED ADULT NURSE REASSESSMENT NOTE - NSFALLUNIVINTERV_ED_ALL_ED
Bed/Stretcher in lowest position, wheels locked, appropriate side rails in place/Call bell, personal items and telephone in reach/Instruct patient to call for assistance before getting out of bed/chair/stretcher/Non-slip footwear applied when patient is off stretcher/Outing to call system/Physically safe environment - no spills, clutter or unnecessary equipment/Purposeful proactive rounding/Room/bathroom lighting operational, light cord in reach

## 2024-02-12 NOTE — PROGRESS NOTE ADULT - SUBJECTIVE AND OBJECTIVE BOX
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 Patient hypotensive, tachycardic, febrile on arrival. Treated for Sepsis with IVF and IV ABX. CT Imaging with Colitis and UTI as well as multiple other incidental findings. Hypotension imrpoved s/p IVFB and Stress dose steroids. Pt seen/examined. AMS improving currently AAOx3. CTH WO negative. Labs significant for leukocytosis, ARF, AGMA, and multiple electrolyte abnormalities. Med list verified. Patient feeling much better since arrival to ER. No other complaints. Daughter concerned about mother missing doctor's appointments and found her on floor at home with significant fecal incontinence she is concerned about ability to take care of herself. (11 Feb 2024 23:59)    Interval History:  Patient was seen and examined at bedside around 10:25 am.  Feels OK.  No episodes of diarrhea in the hospital.  Denies abdominal pain, nausea, vomiting or urinary symptoms.   Denies back pain or paresthesia.     ROS:  As per interval history otherwise unremarkable.    PHYSICAL EXAM:  Vital Signs   T(C): 36.5 (12 Feb 2024 07:23), Max: 38.8 (11 Feb 2024 16:55)  T(F): 97.7 (12 Feb 2024 07:23), Max: 101.8 (11 Feb 2024 16:55)  HR: 111 (12 Feb 2024 11:56) (100 - 159)  BP: 91/59 (12 Feb 2024 11:56) (76/43 - 110/62)  BP(mean): 64 (12 Feb 2024 10:00) (64 - 74)  RR: 17 (12 Feb 2024 11:56) (16 - 20)  SpO2: 99% (12 Feb 2024 11:56) (94% - 100%)  Parameters below as of 12 Feb 2024 11:56  Patient On (Oxygen Delivery Method): room air  General: Elderly female sitting in bed comfortably. No acute distress  HEENT: EOMI. Clear conjunctivae. Moist mucus membrane. Hard of hearing.   Neck: Supple.   Chest: Good air entry. No wheezing, rales or rhonchi.   Heart: S1 & S2 with tachycardia.   Abdomen: Non distended. Soft. Non-tender. + BS  Ext: No pedal edema. No calf tenderness   Neuro: Awake and alert. No focal deficit. Speech clear.   Skin: Warm and Dry  Psychiatry: Normal mood and affect    LABS:                        8.4    19.51 )-----------( 233      ( 12 Feb 2024 06:00 )             26.9     02-12    135  |  103  |  23.9<H>  ----------------------------<  129<H>  3.5   |  16.0<L>  |  1.74<H>    Ca    6.3<LL>      12 Feb 2024 06:00  Phos  2.6     02-12  Mg     2.0     02-12    TPro  5.6<L>  /  Alb  2.8<L>  /  TBili  <0.2<L>  /  DBili  x   /  AST  24  /  ALT  7   /  AlkPhos  96  02-12    PT/INR - ( 11 Feb 2024 17:00 )   PT: 12.5 sec;   INR: 1.13 ratio       PTT - ( 11 Feb 2024 17:00 )  PTT:33.8 sec  Urinalysis Basic - ( 12 Feb 2024 06:00 )    Color: x / Appearance: x / SG: x / pH: x  Gluc: 129 mg/dL / Ketone: x  / Bili: x / Urobili: x   Blood: x / Protein: x / Nitrite: x   Leuk Esterase: x / RBC: x / WBC x   Sq Epi: x / Non Sq Epi: x / Bacteria: x    RADIOLOGY & ADDITIONAL STUDIES:  Reviewed     MEDICATIONS  (STANDING):  budesonide  80 MICROgram(s)/formoterol 4.5 MICROgram(s) Inhaler 2 Puff(s) Inhalation two times a day  calcium gluconate IVPB 2 Gram(s) IV Intermittent every 6 hours  cefepime  Injectable.      cefepime  Injectable. 1000 milliGRAM(s) IV Push every 12 hours  DULoxetine 60 milliGRAM(s) Oral daily  hydrocortisone sodium succinate Injectable 50 milliGRAM(s) IV Push every 6 hours  lactated ringers. 1000 milliLiter(s) (125 mL/Hr) IV Continuous <Continuous>  metroNIDAZOLE  IVPB 500 milliGRAM(s) IV Intermittent every 8 hours  midodrine. 10 milliGRAM(s) Oral three times a day  pantoprazole    Tablet 40 milliGRAM(s) Oral before breakfast  tiotropium 2.5 MICROgram(s) Inhaler 2 Puff(s) Inhalation daily    MEDICATIONS  (PRN):  acetaminophen     Tablet .. 650 milliGRAM(s) Oral every 6 hours PRN Temp greater or equal to 38C (100.4F), Mild Pain (1 - 3)  ALPRAZolam 0.5 milliGRAM(s) Oral four times a day PRN anxiety  aluminum hydroxide/magnesium hydroxide/simethicone Suspension 30 milliLiter(s) Oral every 4 hours PRN Dyspepsia  melatonin 3 milliGRAM(s) Oral at bedtime PRN Insomnia  ondansetron Injectable 4 milliGRAM(s) IV Push every 8 hours PRN Nausea and/or Vomiting

## 2024-02-12 NOTE — CONSULT NOTE ADULT - ASSESSMENT
78F with PMHX COPD on Home O2, HFrEF, HTN, HLD, Multiple Myeloma on Ninlaro, Chronic ITP on Promacta, Ulcerative Colitis, MDD, Anxiety, GERD BIB daughter to [facility] ER s/p fall with AMS and abd pain and diarrhea admitted for Severe Sepsis 2/2 Infectious v Inflammatory Colitis and Acute UTI c/b ARF, AGMA, Multiple Electrolyte Abnormalities, AMS, and Fall.    Colitis:   Admitted with SIRS/ UTI/ Colitis:  CT ABDPEL +Colitis +Cystitis +multiple incidental findings see above  Hemoglobin dropped from 10.7 to 8.4 gm    Trend  78F with PMHX COPD on Home O2, HFrEF, HTN, HLD, Multiple Myeloma on Ninlaro, Chronic ITP on Promacta, Ulcerative Colitis, MDD, Anxiety, GERD BIB daughter to [facility] ER s/p fall with AMS and abd pain and diarrhea admitted for Severe Sepsis 2/2 Infectious v Inflammatory Colitis and Acute UTI c/b ARF, AGMA, Multiple Electrolyte Abnormalities, AMS, and Fall.    Colitis:   Admitted with SIRS/ UTI/ Colitis:  CT ABDPEL +Colitis +Cystitis +pna +biliary ductal dilation  Hemoglobin dropped from 10.7 to 8.4 gm  Liver tests are WNL, low suspicion for cholangitis    Continue IVF resuscitation  Trend CBC  Check GI PCR and cdiff  Continue IV antibiotics  Trend leukocytosis

## 2024-02-12 NOTE — CONSULT NOTE ADULT - ASSESSMENT
Imp T5 & 9 compression deformities age indeterminate but new compared to prior study (11/26/2023) no retropulsion    Plan No Neurosurgical intervention, analgesics, possible brace for comfort

## 2024-02-12 NOTE — CHART NOTE - NSCHARTNOTEFT_GEN_A_CORE
pt at baseline   pt is stable from NSx standpoint   TLSO brace as needed when OOB for comfort given new new T5 T9 compression fxs and chronic/ stable T5 & T9 fx as noted on trauma CT evals of C/A/P ( Orthotist aware)  endocrine OP eval for osteoporosis/ fxs further management ( OP endo team referral initiated)   PT eval for dispo planning   fall precaution   defer further care/ management to primary team   reconsult as needed   f/u w Dr Sadler OP in 2-4 weeks  d/w Dr Vides pt at baseline   pt is stable from NSx standpoint   TLSO brace as needed when OOB for comfort given new new T5 T9 compression fxs and chronic/ stable T5 & T9 fx as noted on trauma CT evals of C/A/P ( Orthotist aware)  endocrine OP eval for osteoporosis/ fxs further management ( OP endo team referral initiated)   PT eval for dispo planning   fall precaution   defer further care/ management to primary team   reconsult as needed   f/u w Dr Yarelis Edwards OP in 2-4 weeks  d/w Dr Vides

## 2024-02-13 LAB
ANION GAP SERPL CALC-SCNC: 16 MMOL/L — SIGNIFICANT CHANGE UP (ref 5–17)
BASOPHILS # BLD AUTO: 0.01 K/UL — SIGNIFICANT CHANGE UP (ref 0–0.2)
BASOPHILS NFR BLD AUTO: 0.1 % — SIGNIFICANT CHANGE UP (ref 0–2)
BUN SERPL-MCNC: 24.4 MG/DL — HIGH (ref 8–20)
CALCIUM SERPL-MCNC: 7 MG/DL — LOW (ref 8.4–10.5)
CHLORIDE SERPL-SCNC: 108 MMOL/L — SIGNIFICANT CHANGE UP (ref 96–108)
CO2 SERPL-SCNC: 15 MMOL/L — LOW (ref 22–29)
CREAT SERPL-MCNC: 1.75 MG/DL — HIGH (ref 0.5–1.3)
EGFR: 29 ML/MIN/1.73M2 — LOW
EOSINOPHIL # BLD AUTO: 0 K/UL — SIGNIFICANT CHANGE UP (ref 0–0.5)
EOSINOPHIL NFR BLD AUTO: 0 % — SIGNIFICANT CHANGE UP (ref 0–6)
GLUCOSE SERPL-MCNC: 130 MG/DL — HIGH (ref 70–99)
HCT VFR BLD CALC: 24.8 % — LOW (ref 34.5–45)
HGB BLD-MCNC: 7.7 G/DL — LOW (ref 11.5–15.5)
IMM GRANULOCYTES NFR BLD AUTO: 1.3 % — HIGH (ref 0–0.9)
LYMPHOCYTES # BLD AUTO: 0.38 K/UL — LOW (ref 1–3.3)
LYMPHOCYTES # BLD AUTO: 2.2 % — LOW (ref 13–44)
MAGNESIUM SERPL-MCNC: 1.9 MG/DL — SIGNIFICANT CHANGE UP (ref 1.6–2.6)
MCHC RBC-ENTMCNC: 25 PG — LOW (ref 27–34)
MCHC RBC-ENTMCNC: 31 GM/DL — LOW (ref 32–36)
MCV RBC AUTO: 80.5 FL — SIGNIFICANT CHANGE UP (ref 80–100)
MONOCYTES # BLD AUTO: 0.45 K/UL — SIGNIFICANT CHANGE UP (ref 0–0.9)
MONOCYTES NFR BLD AUTO: 2.6 % — SIGNIFICANT CHANGE UP (ref 2–14)
NEUTROPHILS # BLD AUTO: 16.38 K/UL — HIGH (ref 1.8–7.4)
NEUTROPHILS NFR BLD AUTO: 93.8 % — HIGH (ref 43–77)
PLATELET # BLD AUTO: 246 K/UL — SIGNIFICANT CHANGE UP (ref 150–400)
POTASSIUM SERPL-MCNC: 4.1 MMOL/L — SIGNIFICANT CHANGE UP (ref 3.5–5.3)
POTASSIUM SERPL-SCNC: 4.1 MMOL/L — SIGNIFICANT CHANGE UP (ref 3.5–5.3)
RBC # BLD: 3.08 M/UL — LOW (ref 3.8–5.2)
RBC # FLD: 18.8 % — HIGH (ref 10.3–14.5)
SODIUM SERPL-SCNC: 139 MMOL/L — SIGNIFICANT CHANGE UP (ref 135–145)
WBC # BLD: 17.44 K/UL — HIGH (ref 3.8–10.5)
WBC # FLD AUTO: 17.44 K/UL — HIGH (ref 3.8–10.5)

## 2024-02-13 PROCEDURE — 99233 SBSQ HOSP IP/OBS HIGH 50: CPT

## 2024-02-13 PROCEDURE — 71045 X-RAY EXAM CHEST 1 VIEW: CPT | Mod: 26

## 2024-02-13 RX ORDER — IPRATROPIUM/ALBUTEROL SULFATE 18-103MCG
3 AEROSOL WITH ADAPTER (GRAM) INHALATION EVERY 6 HOURS
Refills: 0 | Status: COMPLETED | OUTPATIENT
Start: 2024-02-13 | End: 2024-02-15

## 2024-02-13 RX ORDER — ALPRAZOLAM 0.25 MG
0.25 TABLET ORAL EVERY 6 HOURS
Refills: 0 | Status: DISCONTINUED | OUTPATIENT
Start: 2024-02-13 | End: 2024-02-18

## 2024-02-13 RX ORDER — ACETYLCYSTEINE 200 MG/ML
4 VIAL (ML) MISCELLANEOUS ONCE
Refills: 0 | Status: COMPLETED | OUTPATIENT
Start: 2024-02-13 | End: 2024-02-13

## 2024-02-13 RX ORDER — CALCIUM GLUCONATE 100 MG/ML
2 VIAL (ML) INTRAVENOUS EVERY 6 HOURS
Refills: 0 | Status: COMPLETED | OUTPATIENT
Start: 2024-02-13 | End: 2024-02-13

## 2024-02-13 RX ADMIN — MIDODRINE HYDROCHLORIDE 10 MILLIGRAM(S): 2.5 TABLET ORAL at 18:34

## 2024-02-13 RX ADMIN — DULOXETINE HYDROCHLORIDE 60 MILLIGRAM(S): 30 CAPSULE, DELAYED RELEASE ORAL at 12:38

## 2024-02-13 RX ADMIN — Medication 100 MILLIGRAM(S): at 21:52

## 2024-02-13 RX ADMIN — Medication 100 MILLIGRAM(S): at 13:56

## 2024-02-13 RX ADMIN — Medication 100 MILLIGRAM(S): at 05:08

## 2024-02-13 RX ADMIN — CEFEPIME 1000 MILLIGRAM(S): 1 INJECTION, POWDER, FOR SOLUTION INTRAMUSCULAR; INTRAVENOUS at 05:07

## 2024-02-13 RX ADMIN — MIDODRINE HYDROCHLORIDE 10 MILLIGRAM(S): 2.5 TABLET ORAL at 12:41

## 2024-02-13 RX ADMIN — CEFEPIME 1000 MILLIGRAM(S): 1 INJECTION, POWDER, FOR SOLUTION INTRAMUSCULAR; INTRAVENOUS at 18:33

## 2024-02-13 RX ADMIN — Medication 0.5 MILLIGRAM(S): at 12:41

## 2024-02-13 RX ADMIN — Medication 50 MILLIGRAM(S): at 00:19

## 2024-02-13 RX ADMIN — Medication 0.25 MILLIGRAM(S): at 21:52

## 2024-02-13 RX ADMIN — Medication 3 MILLILITER(S): at 20:33

## 2024-02-13 RX ADMIN — Medication 0.5 MILLIGRAM(S): at 06:03

## 2024-02-13 RX ADMIN — Medication 50 MILLIGRAM(S): at 05:08

## 2024-02-13 RX ADMIN — Medication 650 MILLIGRAM(S): at 14:40

## 2024-02-13 RX ADMIN — BUDESONIDE AND FORMOTEROL FUMARATE DIHYDRATE 2 PUFF(S): 160; 4.5 AEROSOL RESPIRATORY (INHALATION) at 20:34

## 2024-02-13 RX ADMIN — MIDODRINE HYDROCHLORIDE 10 MILLIGRAM(S): 2.5 TABLET ORAL at 05:05

## 2024-02-13 RX ADMIN — PANTOPRAZOLE SODIUM 40 MILLIGRAM(S): 20 TABLET, DELAYED RELEASE ORAL at 05:06

## 2024-02-13 RX ADMIN — Medication 200 GRAM(S): at 08:50

## 2024-02-13 RX ADMIN — Medication 650 MILLIGRAM(S): at 13:56

## 2024-02-13 RX ADMIN — Medication 650 MILLIGRAM(S): at 05:05

## 2024-02-13 RX ADMIN — Medication 4 MILLILITER(S): at 16:19

## 2024-02-13 RX ADMIN — Medication 200 GRAM(S): at 13:56

## 2024-02-13 RX ADMIN — Medication 3 MILLILITER(S): at 16:20

## 2024-02-13 RX ADMIN — Medication 50 MILLIGRAM(S): at 12:38

## 2024-02-13 RX ADMIN — Medication 650 MILLIGRAM(S): at 14:07

## 2024-02-13 RX ADMIN — BUDESONIDE AND FORMOTEROL FUMARATE DIHYDRATE 2 PUFF(S): 160; 4.5 AEROSOL RESPIRATORY (INHALATION) at 09:03

## 2024-02-13 RX ADMIN — Medication 650 MILLIGRAM(S): at 21:52

## 2024-02-13 NOTE — PROGRESS NOTE ADULT - ASSESSMENT
78F with PMHX COPD on Home O2, HFrEF, HTN, HLD, Multiple Myeloma on Ninlaro, Chronic ITP on Promacta, Ulcerative Colitis, GERD, Depression and Anxiety BIB daughter to [facility] ER s/p fall with AMS and abd pain and diarrhea -- admitted for Sepsis 2/2 Infectious v Inflammatory Colitis and Acute UTI c/b ARF, AGMA, Multiple Electrolyte Abnormalities, AMS, and Fall.    Sepsis 2/2 Infectious v Inflammatory Colitis and Acute UTI   -CT ABD/PEL +Colitis +Cystitis   -BCX x2 with no growth so far   -GI PCR pending collection  -Rx'd six different abx in past 6 months including Doxycycline/Bactrim/Cefpodoxime/Azithromycin/Levaquin/Cephelexin -> CDIFF PCR ordered but cancelled today as patient with no BM since admission  -ESR 46 and   -Lactate 2.3 -> Lactate 1.6   -2.5L IVFB NSS given in ED  -s/p IVF LR 125cc/hr  -Solucortef 100mg IV x1 in ED  -D/C Solucortef 50mg IV q6, unlikely inflammatory flare   -PCN Allergy noted but tolerating Cephalosporins  -Ceftriaxone 1g x1 + Flagyl 500mg x1 + Vanco 1g IV x1 in ED  -Broaden empiric IV ABX to Cefepime 1g IV q8 and Flagyl 500mg IV q8 given patient on immunosuppressants  -Hold Vanco IV for now  -Added Midodrine 10 mg TID  -GI following     Acute UTI  -CT AP +Thick walled urinary bladder possible Cystitis  -UA positive. UCX/BCX pending as above  -Continue ABX as above    AMS  -CTH WO negative  -Likely toxic metablolic encephalopathy from hypotension/sepsis/colitis/UTI  -Resolving     CLIFF / AGMA 2/2 Lactic Acidosis  -CT ABDPEL no obstruction. CPK minimally elevated and UA with small blood doubt rhabdo  -Lactate cleared. Repeat Labs with improved AG  -s/p IVF  -Added Sodium Bicarb   -Avoid nephrotoxic medications. Hold home Ibuprofen  -Monitor     Hypomagnesemia, Hypocalcemia, Hypokalemia  -Replete     Chronic Compression Fractures, Incidental T5/T9 compression deformity + suspected sequelae of sacral insufficiency fractures   -TLSO Brace when OOB  -WBAT   -Pain control   -PT eval   -Outpatient follow up with Neurosurgery (Dr. Yarelis Edwards) in 2-4 weeks    Multiple Myeloma, Anemia, Chronic ITP?  -Will hold Ninlaro and Promacta for now till acute infection is resolved    Depression + Anxiety  -Duloxetine 60mg q24  -Alprazolam 0.5mg QID PRN    HTN  -Restart Metoprolol Succinate 25mg q24 once BP improves     COPD  -No current hypoxia  -CT Chest improving infiltrates from prior CT  -O2 via NC PRN  -Continue Symbicort   -DuoNebs    DVT Prophylaxis -- Venodyne    Dispo: Likely [facility] once stable.

## 2024-02-13 NOTE — PROGRESS NOTE ADULT - ASSESSMENT
78F with PMHX COPD on Home O2, HFrEF, HTN, HLD, Multiple Myeloma on Ninlaro, Chronic ITP on Promacta, Ulcerative Colitis, MDD, Anxiety, GERD BIB daughter to [facility] ER s/p fall with AMS and abd pain and diarrhea.     #abdominal pain  #diarrhea  #anemia   - 2/11/2024 CT A/P:  Marked biliary ductal dilatation, the common bile duct measuring up to 1.3 cm. This is increased compared to previous exam. There is also dilated cystic duct remnant. Mild intrahepatic biliary duct dilatation is appreciated. Cholecystectomy. There is extensive   colonic diverticulosis. Question mild fat stranding of the sigmoid mesentery  - WBC 17.44, afebrile  - Hemoglobin 7.7, no overt signs of GIB  - Trend CBC, transfuse as needed. Monitor for bleeding  - Avoid NSAIDs  - GI PCR pending, no bowel movements overnight  - Continue antibiotics   - MRCP ordered  _________________________________________________________________  Assessment and recommendations are final when note is signed by the attending physician.      78F with PMHX COPD on Home O2, HFrEF, HTN, HLD, Multiple Myeloma on Ninlaro, Chronic ITP on Promacta, collagenous Colitis (dx at [facility] "many years ago" not on any meds), MDD, Anxiety, GERD BIB daughter to [facility] ER s/p fall with AMS and abd pain and diarrhea.     #abdominal pain  #diarrhea  #anemia   - 2/11/2024 CT A/P:  Marked biliary ductal dilatation, the common bile duct measuring up to 1.3 cm. This is increased compared to previous exam. There is also dilated cystic duct remnant. Mild intrahepatic biliary duct dilatation is appreciated. Cholecystectomy. There is extensive   colonic diverticulosis. Question mild fat stranding of the sigmoid mesentery  - WBC 17.44, afebrile  - Hemoglobin 7.7, no overt signs of GIB  - Trend CBC, transfuse as needed. Monitor for bleeding  - Avoid NSAIDs  - GI PCR pending, no bowel movements overnight  - Continue antibiotics   - MRCP ordered  _________________________________________________________________  Assessment and recommendations are final when note is signed by the attending physician.

## 2024-02-13 NOTE — PROGRESS NOTE ADULT - SUBJECTIVE AND OBJECTIVE BOX
Sepsis    HPI:  Patient seen/examined prior to midnight on 2/11/24    78F with PMHX COPD on Home O2, HFrEF, HTN, HLD, Multiple Myeloma on Ninlaro, Chronic ITP on Promacta, Ulcerative Colitis, MDD, Anxiety, GERD BIB daughter to [facility] ER s/p fall with AMS and abd pain and diarrhea. Patient hypotensive, tachycardic, febrile on arrival. Treated for Sepsis with IVF and IV ABX. CT Imaging with Colitis and UTI as well as multiple other incidental findings. Hypotension imrpoved s/p IVFB and Stress dose steroids. Pt seen/examined. AMS improving currently AAOx3. CTH WO negative. Labs significant for leukocytosis, ARF, AGMA, and multiple electrolyte abnormalities. Med list verified. Patient feeling much better since arrival to ER. No other complaints. Daughter concerned about mother missing doctor's appointments and found her on floor at home with significant fecal incontinence she is concerned about ability to take care of herself. (11 Feb 2024 23:59)    Interval History:  Patient was seen and examined at bedside around 11 am.  Doing well.   No episodes of diarrhea in the hospital.  Denies abdominal pain, nausea, vomiting or urinary symptoms.   Denies back pain or paresthesia.     ROS:  As per interval history otherwise unremarkable.    PHYSICAL EXAM:  Vital Signs   T(C): 36.4 (13 Feb 2024 08:04), Max: 36.6 (13 Feb 2024 00:00)  T(F): 97.5 (13 Feb 2024 08:04), Max: 97.8 (13 Feb 2024 00:00)  HR: 98 (13 Feb 2024 08:00) (86 - 117)  BP: 99/70 (13 Feb 2024 08:00) (87/70 - 114/62)  BP(mean): 78 (13 Feb 2024 06:00) (69 - 83)  RR: 18 (13 Feb 2024 08:00) (17 - 20)  SpO2: 99% (13 Feb 2024 08:00) (92% - 100%)  Parameters below as of 13 Feb 2024 08:00  Patient On (Oxygen Delivery Method): nasal cannula  O2 Flow (L/min): 3  General: Elderly female sitting in bed comfortably. No acute distress  HEENT: EOMI. Clear conjunctivae. Moist mucus membrane. Hard of hearing.   Neck: Supple.   Chest: Good air entry. No wheezing, rales or rhonchi.   Heart: S1 & S2 with tachycardia.   Abdomen: Non distended. Soft. Non-tender. + BS  Ext: No pedal edema. No calf tenderness   Neuro: Awake and alert. No focal deficit. Speech clear.   Skin: Warm and Dry  Psychiatry: Normal mood and affect    LABS:                        7.7    17.44 )-----------( 246      ( 13 Feb 2024 06:35 )             24.8     02-13    139  |  108  |  24.4<H>  ----------------------------<  130<H>  4.1   |  15.0<L>  |  1.75<H>    Ca    7.0<L>      13 Feb 2024 06:35  Phos  2.6     02-12  Mg     1.9     02-13    TPro  5.6<L>  /  Alb  2.8<L>  /  TBili  <0.2<L>  /  DBili  x   /  AST  24  /  ALT  7   /  AlkPhos  96  02-12    PT/INR - ( 11 Feb 2024 17:00 )   PT: 12.5 sec;   INR: 1.13 ratio         PTT - ( 11 Feb 2024 17:00 )  PTT:33.8 sec  CARDIAC MARKERS ( 11 Feb 2024 17:00 )  x     / x     / 287 U/L / x     / 19.0 ng/mL    Blood Culture: 02-11 @ 21:40  Organism --  Gram Stain Blood -- Gram Stain --  Specimen Source Clean Catch Clean Catch (Midstream)  Culture-Blood --    02-11 @ 17:00  Organism --  Gram Stain Blood -- Gram Stain --  Specimen Source .Blood Blood-Peripheral  Culture-Blood --    02-11 @ 16:55  Organism --  Gram Stain Blood -- Gram Stain --  Specimen Source .Blood Blood-Peripheral  Culture-Blood --    RADIOLOGY & ADDITIONAL STUDIES:  Reviewed     MEDICATIONS  (STANDING):  acetylcysteine 10%  Inhalation 4 milliLiter(s) Inhalation once  albuterol/ipratropium for Nebulization 3 milliLiter(s) Nebulizer every 6 hours  budesonide  80 MICROgram(s)/formoterol 4.5 MICROgram(s) Inhaler 2 Puff(s) Inhalation two times a day  calcium gluconate IVPB 2 Gram(s) IV Intermittent every 6 hours  cefepime  Injectable.      cefepime  Injectable. 1000 milliGRAM(s) IV Push every 12 hours  DULoxetine 60 milliGRAM(s) Oral daily  hydrocortisone sodium succinate Injectable 50 milliGRAM(s) IV Push every 6 hours  metroNIDAZOLE  IVPB 500 milliGRAM(s) IV Intermittent every 8 hours  midodrine. 10 milliGRAM(s) Oral three times a day  pantoprazole    Tablet 40 milliGRAM(s) Oral before breakfast  sodium bicarbonate 650 milliGRAM(s) Oral three times a day  tiotropium 2.5 MICROgram(s) Inhaler 2 Puff(s) Inhalation daily    MEDICATIONS  (PRN):  acetaminophen     Tablet .. 650 milliGRAM(s) Oral every 6 hours PRN Temp greater or equal to 38C (100.4F), Mild Pain (1 - 3)  ALPRAZolam 0.5 milliGRAM(s) Oral four times a day PRN anxiety  aluminum hydroxide/magnesium hydroxide/simethicone Suspension 30 milliLiter(s) Oral every 4 hours PRN Dyspepsia  melatonin 3 milliGRAM(s) Oral at bedtime PRN Insomnia  ondansetron Injectable 4 milliGRAM(s) IV Push every 8 hours PRN Nausea and/or Vomiting

## 2024-02-13 NOTE — CHART NOTE - NSCHARTNOTEFT_GEN_A_CORE
Called by RN for c/o left chest wall pain. Seen and assessed at bedside, patient with c/o sharp pain that began an hour ago after deep coughing, pain worse with deep inspiration. Pain radiates along left chest wall over left ~8/9th rib. Denies any other acute complaints. No chest pain, SOB, nausea, lightheadedness. Patient has not had any trauma today, however reports history of spontaneous rib fractures in the setting of MM.    VS: , /74, RR 18, SpO2 99%    PE:  General: Laying comfortably in bed chatting on the phone, NAD  Respiratory: Nonlabored, clear to auscultation b/l, no accessory muscle use, speaks in full sentences  Cardiac: Clear S1/S2, brisk cap refill  MSK: Reproducible pain while palpating along left lateral chest wall/rib cage. Positive AP rib compression test.  Abdomen: Soft, nondistended, nontender to palpation. + BS. No guarding or rebound tenderness  Neuro: A&Ox3, nonfocal, follows commands    A/P: Musculoskeletal pain, r/o rib fracture in the setting of malignancy vs. costochondritis.  - Urgent CXR  - Analgesia PRN  - Tessalon perle PRN  - RN to continue to monitor, to alert provider w/ any change in patient status. Called by RN for c/o left chest wall pain. Seen and assessed at bedside, patient with c/o sharp pain that began an hour ago after deep coughing, pain worse with deep inspiration. Pain radiates along left chest wall over left ~8/9th rib. Denies any other acute complaints. No chest pain, SOB, nausea, lightheadedness. Patient has not had any trauma today, however reports history of spontaneous rib fractures in the setting of MM.    VS: , /74, RR 18, SpO2 99%    PE:  General: Laying comfortably in bed chatting on the phone, NAD  Respiratory: Nonlabored, clear to auscultation b/l, no accessory muscle use, speaks in full sentences  Cardiac: Clear S1/S2, brisk cap refill  MSK: Reproducible pain while palpating along left lateral chest wall/rib cage. Positive AP rib compression test.  Abdomen: Soft, nondistended, nontender to palpation. + BS. No guarding or rebound tenderness  Neuro: A&Ox3, nonfocal, follows commands    A/P: Musculoskeletal pain, r/o rib fracture in the setting of malignancy vs. costochondritis.  - Urgent CXR  - Analgesia PRN  - Tessalon olga PRN  - RN to continue to monitor, to alert provider w/ any change in patient status.    ADDENDUM:  CXR w/o acute fracture. Demonstrated new L infiltrate. Clinically, patient has been afebrile with downtrending WBC and is already on antibiotics. D/w hospitalist, will continue to monitor clinically. Called by RN for c/o left chest wall pain. Seen and assessed at bedside, patient with c/o sharp pain that began an hour ago after deep coughing, pain worse with deep inspiration. Pain radiates along left chest wall over left ~8/9th rib. Denies any other acute complaints. No chest pain, SOB, nausea, lightheadedness. Patient has not had any trauma today, however reports history of spontaneous rib fractures in the setting of MM.    VS: , /74, RR 18, SpO2 99%    PE:  General: Laying comfortably in bed chatting on the phone, NAD  Respiratory: Nonlabored, clear to auscultation b/l, no accessory muscle use, speaks in full sentences  Cardiac: Clear S1/S2, brisk cap refill  MSK: Reproducible pain while palpating along left lateral chest wall/rib cage. Positive AP rib compression test.  Abdomen: Soft, nondistended, nontender to palpation. + BS. No guarding or rebound tenderness  Neuro: A&Ox3, nonfocal, follows commands    A/P: Musculoskeletal pain, r/o rib fracture in the setting of malignancy vs. costochondritis.  - Urgent CXR  - Analgesia PRN  - Tessalon olga PRN  - RN to continue to monitor, to alert provider w/ any change in patient status.    ADDENDUM:  CXR w/o acute fracture. Demonstrated new L infiltrate. Clinically, patient has been afebrile with downtrending WBC and is already on antibiotics. D/w hospitalist, will continue to monitor.

## 2024-02-14 LAB
-  AMPICILLIN: SIGNIFICANT CHANGE UP
-  CIPROFLOXACIN: SIGNIFICANT CHANGE UP
-  LEVOFLOXACIN: SIGNIFICANT CHANGE UP
-  NITROFURANTOIN: SIGNIFICANT CHANGE UP
-  TETRACYCLINE: SIGNIFICANT CHANGE UP
-  VANCOMYCIN: SIGNIFICANT CHANGE UP
ALBUMIN SERPL ELPH-MCNC: 3 G/DL — LOW (ref 3.3–5.2)
ALP SERPL-CCNC: 80 U/L — SIGNIFICANT CHANGE UP (ref 40–120)
ALT FLD-CCNC: <5 U/L — SIGNIFICANT CHANGE UP
ANION GAP SERPL CALC-SCNC: 13 MMOL/L — SIGNIFICANT CHANGE UP (ref 5–17)
AST SERPL-CCNC: 8 U/L — SIGNIFICANT CHANGE UP
BASOPHILS # BLD AUTO: 0.03 K/UL — SIGNIFICANT CHANGE UP (ref 0–0.2)
BASOPHILS NFR BLD AUTO: 0.2 % — SIGNIFICANT CHANGE UP (ref 0–2)
BILIRUB SERPL-MCNC: 0.2 MG/DL — LOW (ref 0.4–2)
BLD GP AB SCN SERPL QL: SIGNIFICANT CHANGE UP
BUN SERPL-MCNC: 26.6 MG/DL — HIGH (ref 8–20)
CALCIUM SERPL-MCNC: 7.8 MG/DL — LOW (ref 8.4–10.5)
CHLORIDE SERPL-SCNC: 109 MMOL/L — HIGH (ref 96–108)
CO2 SERPL-SCNC: 18 MMOL/L — LOW (ref 22–29)
CREAT SERPL-MCNC: 1.68 MG/DL — HIGH (ref 0.5–1.3)
CULTURE RESULTS: ABNORMAL
EGFR: 31 ML/MIN/1.73M2 — LOW
EOSINOPHIL # BLD AUTO: 0 K/UL — SIGNIFICANT CHANGE UP (ref 0–0.5)
EOSINOPHIL NFR BLD AUTO: 0 % — SIGNIFICANT CHANGE UP (ref 0–6)
GLUCOSE SERPL-MCNC: 88 MG/DL — SIGNIFICANT CHANGE UP (ref 70–99)
HCT VFR BLD CALC: 26.8 % — LOW (ref 34.5–45)
HGB BLD-MCNC: 8.2 G/DL — LOW (ref 11.5–15.5)
IMM GRANULOCYTES NFR BLD AUTO: 1.4 % — HIGH (ref 0–0.9)
LYMPHOCYTES # BLD AUTO: 1.36 K/UL — SIGNIFICANT CHANGE UP (ref 1–3.3)
LYMPHOCYTES # BLD AUTO: 9.3 % — LOW (ref 13–44)
MAGNESIUM SERPL-MCNC: 1.8 MG/DL — SIGNIFICANT CHANGE UP (ref 1.6–2.6)
MCHC RBC-ENTMCNC: 25.4 PG — LOW (ref 27–34)
MCHC RBC-ENTMCNC: 30.6 GM/DL — LOW (ref 32–36)
MCV RBC AUTO: 83 FL — SIGNIFICANT CHANGE UP (ref 80–100)
METHOD TYPE: SIGNIFICANT CHANGE UP
MONOCYTES # BLD AUTO: 1.04 K/UL — HIGH (ref 0–0.9)
MONOCYTES NFR BLD AUTO: 7.1 % — SIGNIFICANT CHANGE UP (ref 2–14)
NEUTROPHILS # BLD AUTO: 12.03 K/UL — HIGH (ref 1.8–7.4)
NEUTROPHILS NFR BLD AUTO: 82 % — HIGH (ref 43–77)
ORGANISM # SPEC MICROSCOPIC CNT: ABNORMAL
ORGANISM # SPEC MICROSCOPIC CNT: SIGNIFICANT CHANGE UP
PLATELET # BLD AUTO: 268 K/UL — SIGNIFICANT CHANGE UP (ref 150–400)
POTASSIUM SERPL-MCNC: 3.2 MMOL/L — LOW (ref 3.5–5.3)
POTASSIUM SERPL-SCNC: 3.2 MMOL/L — LOW (ref 3.5–5.3)
PROT SERPL-MCNC: 5.7 G/DL — LOW (ref 6.6–8.7)
RBC # BLD: 3.23 M/UL — LOW (ref 3.8–5.2)
RBC # FLD: 19.2 % — HIGH (ref 10.3–14.5)
SODIUM SERPL-SCNC: 140 MMOL/L — SIGNIFICANT CHANGE UP (ref 135–145)
SPECIMEN SOURCE: SIGNIFICANT CHANGE UP
WBC # BLD: 14.67 K/UL — HIGH (ref 3.8–10.5)
WBC # FLD AUTO: 14.67 K/UL — HIGH (ref 3.8–10.5)

## 2024-02-14 PROCEDURE — 99233 SBSQ HOSP IP/OBS HIGH 50: CPT

## 2024-02-14 PROCEDURE — 99232 SBSQ HOSP IP/OBS MODERATE 35: CPT

## 2024-02-14 RX ORDER — METOPROLOL TARTRATE 50 MG
12.5 TABLET ORAL EVERY 12 HOURS
Refills: 0 | Status: DISCONTINUED | OUTPATIENT
Start: 2024-02-14 | End: 2024-02-18

## 2024-02-14 RX ORDER — POTASSIUM CHLORIDE 20 MEQ
40 PACKET (EA) ORAL EVERY 4 HOURS
Refills: 0 | Status: COMPLETED | OUTPATIENT
Start: 2024-02-14 | End: 2024-02-15

## 2024-02-14 RX ORDER — MIDODRINE HYDROCHLORIDE 2.5 MG/1
5 TABLET ORAL THREE TIMES A DAY
Refills: 0 | Status: DISCONTINUED | OUTPATIENT
Start: 2024-02-14 | End: 2024-02-16

## 2024-02-14 RX ORDER — MAGNESIUM OXIDE 400 MG ORAL TABLET 241.3 MG
400 TABLET ORAL
Refills: 0 | Status: COMPLETED | OUTPATIENT
Start: 2024-02-14 | End: 2024-02-16

## 2024-02-14 RX ORDER — INFLUENZA VIRUS VACCINE 15; 15; 15; 15 UG/.5ML; UG/.5ML; UG/.5ML; UG/.5ML
0.7 SUSPENSION INTRAMUSCULAR ONCE
Refills: 0 | Status: DISCONTINUED | OUTPATIENT
Start: 2024-02-14 | End: 2024-02-18

## 2024-02-14 RX ADMIN — Medication 100 MILLIGRAM(S): at 06:05

## 2024-02-14 RX ADMIN — DULOXETINE HYDROCHLORIDE 60 MILLIGRAM(S): 30 CAPSULE, DELAYED RELEASE ORAL at 13:01

## 2024-02-14 RX ADMIN — Medication 100 MILLIGRAM(S): at 13:01

## 2024-02-14 RX ADMIN — Medication 0.25 MILLIGRAM(S): at 12:56

## 2024-02-14 RX ADMIN — Medication 650 MILLIGRAM(S): at 06:05

## 2024-02-14 RX ADMIN — Medication 12.5 MILLIGRAM(S): at 21:26

## 2024-02-14 RX ADMIN — Medication 40 MILLIEQUIVALENT(S): at 21:17

## 2024-02-14 RX ADMIN — Medication 650 MILLIGRAM(S): at 13:00

## 2024-02-14 RX ADMIN — Medication 0.25 MILLIGRAM(S): at 06:05

## 2024-02-14 RX ADMIN — Medication 3 MILLILITER(S): at 16:41

## 2024-02-14 RX ADMIN — MIDODRINE HYDROCHLORIDE 10 MILLIGRAM(S): 2.5 TABLET ORAL at 06:05

## 2024-02-14 RX ADMIN — Medication 3 MILLILITER(S): at 09:57

## 2024-02-14 RX ADMIN — BUDESONIDE AND FORMOTEROL FUMARATE DIHYDRATE 2 PUFF(S): 160; 4.5 AEROSOL RESPIRATORY (INHALATION) at 09:58

## 2024-02-14 RX ADMIN — Medication 100 MILLIGRAM(S): at 21:17

## 2024-02-14 RX ADMIN — MIDODRINE HYDROCHLORIDE 5 MILLIGRAM(S): 2.5 TABLET ORAL at 19:58

## 2024-02-14 RX ADMIN — PANTOPRAZOLE SODIUM 40 MILLIGRAM(S): 20 TABLET, DELAYED RELEASE ORAL at 06:05

## 2024-02-14 RX ADMIN — CEFEPIME 1000 MILLIGRAM(S): 1 INJECTION, POWDER, FOR SOLUTION INTRAMUSCULAR; INTRAVENOUS at 19:52

## 2024-02-14 RX ADMIN — Medication 12.5 MILLIGRAM(S): at 10:24

## 2024-02-14 RX ADMIN — Medication 0.25 MILLIGRAM(S): at 19:52

## 2024-02-14 RX ADMIN — CEFEPIME 1000 MILLIGRAM(S): 1 INJECTION, POWDER, FOR SOLUTION INTRAMUSCULAR; INTRAVENOUS at 06:05

## 2024-02-14 RX ADMIN — Medication 650 MILLIGRAM(S): at 21:43

## 2024-02-14 RX ADMIN — MAGNESIUM OXIDE 400 MG ORAL TABLET 400 MILLIGRAM(S): 241.3 TABLET ORAL at 19:52

## 2024-02-14 NOTE — PROGRESS NOTE ADULT - SUBJECTIVE AND OBJECTIVE BOX
Chief Complaint:  Patient is a 78y old  Female who presents with a chief complaint of Severe Sepsis/Colitis/UTI/Lactic Acidosis/ARF/Multiple Electrolyte Derangements (13 Feb 2024 13:52)    Interval HPI/ 24 hr events: Patient seen and examined at bedside. Patient feeling well this morning. On supplemental oxygen. Tolerating diet. Reports a soft bowel movement overnight. Patient tachycardic this morning, denies chest pain or palpitations. Leukocytosis improving, afebrile.       REVIEW OF SYSTEMS:   General: Negative  HEENT: Negative  CV: Negative  Respiratory: Negative  GI: See HPI  : Negative  MSK: Negative  Hematologic: Negative  Skin: Negative    MEDICATIONS:   MEDICATIONS  (STANDING):  albuterol/ipratropium for Nebulization 3 milliLiter(s) Nebulizer every 6 hours  budesonide  80 MICROgram(s)/formoterol 4.5 MICROgram(s) Inhaler 2 Puff(s) Inhalation two times a day  cefepime  Injectable. 1000 milliGRAM(s) IV Push every 12 hours  cefepime  Injectable.      DULoxetine 60 milliGRAM(s) Oral daily  influenza  Vaccine (HIGH DOSE) 0.7 milliLiter(s) IntraMuscular once  metoprolol tartrate 12.5 milliGRAM(s) Oral every 12 hours  metroNIDAZOLE  IVPB 500 milliGRAM(s) IV Intermittent every 8 hours  midodrine. 5 milliGRAM(s) Oral three times a day  pantoprazole    Tablet 40 milliGRAM(s) Oral before breakfast  sodium bicarbonate 650 milliGRAM(s) Oral three times a day  tiotropium 2.5 MICROgram(s) Inhaler 2 Puff(s) Inhalation daily    MEDICATIONS  (PRN):  acetaminophen     Tablet .. 650 milliGRAM(s) Oral every 6 hours PRN Temp greater or equal to 38C (100.4F), Mild Pain (1 - 3)  ALPRAZolam 0.25 milliGRAM(s) Oral every 6 hours PRN Anxiety  aluminum hydroxide/magnesium hydroxide/simethicone Suspension 30 milliLiter(s) Oral every 4 hours PRN Dyspepsia  benzonatate 100 milliGRAM(s) Oral three times a day PRN Cough  melatonin 3 milliGRAM(s) Oral at bedtime PRN Insomnia  ondansetron Injectable 4 milliGRAM(s) IV Push every 8 hours PRN Nausea and/or Vomiting      DIET:  Diet, Regular (02-12-24 @ 00:00) [Active]          ALLERGIES:   Allergies    penicillin (Unknown)    Intolerances        VITAL SIGNS:   Vital Signs Last 24 Hrs  T(C): 36.5 (14 Feb 2024 08:00), Max: 36.9 (14 Feb 2024 00:40)  T(F): 97.7 (14 Feb 2024 08:00), Max: 98.4 (14 Feb 2024 00:40)  HR: 112 (14 Feb 2024 12:00) (98 - 126)  BP: 121/79 (14 Feb 2024 12:00) (96/67 - 151/79)  BP(mean): 90 (14 Feb 2024 12:00) (71 - 115)  RR: 16 (14 Feb 2024 12:00) (15 - 24)  SpO2: 95% (14 Feb 2024 12:00) (93% - 100%)    Parameters below as of 14 Feb 2024 12:00  Patient On (Oxygen Delivery Method): nasal cannula  O2 Flow (L/min): 3    I&O's Summary    13 Feb 2024 07:01  -  14 Feb 2024 07:00  --------------------------------------------------------  IN: 600 mL / OUT: 600 mL / NET: 0 mL        PHYSICAL EXAM:   GENERAL:  No acute distress  HEENT:  NC/AT, conjunctiva clear, sclera anicteric  CHEST:  + dyspneic on exertion  HEART:  + tachycardic   ABDOMEN:  Soft, non-tender, non-distended, normoactive bowel sounds, no rebound or guarding  EXTREMITIES: No edema  SKIN:  Warm, dry  NEURO:  Calm, cooperative    LABS:                        8.2    14.67 )-----------( 268      ( 14 Feb 2024 07:11 )             26.8     Hemoglobin: 8.2 g/dL (02-14-24 @ 07:11)  Hemoglobin: 7.7 g/dL (02-13-24 @ 06:35)  Hemoglobin: 8.4 g/dL (02-12-24 @ 06:00)  Hemoglobin: 10.7 g/dL (02-11-24 @ 17:00)    02-14    140  |  109<H>  |  26.6<H>  ----------------------------<  88  3.2<L>   |  18.0<L>  |  1.68<H>    Ca    7.8<L>      14 Feb 2024 07:11  Mg     1.8     02-14    TPro  5.7<L>  /  Alb  3.0<L>  /  TBili  0.2<L>  /  DBili  x   /  AST  8   /  ALT  <5  /  AlkPhos  80  02-14    LIVER FUNCTIONS - ( 14 Feb 2024 07:11 )  Alb: 3.0 g/dL / Pro: 5.7 g/dL / ALK PHOS: 80 U/L / ALT: <5 U/L / AST: 8 U/L / GGT: x           Culture - Urine (collected 11 Feb 2024 21:40)  Source: Clean Catch Clean Catch (Midstream)  Preliminary Report (13 Feb 2024 19:42):    >100,000 CFU/ml Enterococcus faecalis    Culture - Blood (collected 11 Feb 2024 17:00)  Source: .Blood Blood-Peripheral  Preliminary Report (14 Feb 2024 02:01):    No growth at 48 Hours    Culture - Blood (collected 11 Feb 2024 16:55)  Source: .Blood Blood-Peripheral  Preliminary Report (14 Feb 2024 02:01):    No growth at 48 Hours    RADIOLOGY & ADDITIONAL STUDIES:      ACC: [id number] EXAM:  CT CHEST   ORDERED BY: FABIO RAMÍREZ     ACC: [id number] EXAM:  CT ABDOMEN AND PELVIS   ORDERED BY: FABIO RAMÍREZ     PROCEDURE DATE:  02/11/2024          INTERPRETATION:  CLINICAL INFORMATION: Abdominal pain. Diarrhea.    COMPARISON: 12/7/2023 CT abdomen and pelvis and 11/27/2023 CT chest..    CONTRAST/COMPLICATIONS:  IV Contrast: NONE  Oral Contrast: NONE  Complications: None reported at time of study completion    PROCEDURE:  CT of the Chest, Abdomen and Pelvis was performed.  Sagittal and coronal reformats were performed.    FINDINGS:  CHEST:  LUNGS AND LARGE AIRWAYS: Patent central tracheal bronchial tree. Mild   emphysematous changes. Mild residual and nodular patchy opacity of the   right upper lobe, significantly decreased compared to 11/26/2023.   Previously noted patchy nodular opacities in the right lower lobe are   nearly resolved with small residual reticular nodular opacities are   appreciated, for example 4:206, 4:194 and 4:191. There is a 6 mm   branching nodular opacity of the left lower lobe is stable compared to   previous exams dating back to 8/27/2018.. There is mild intralobular   septal thickening. Peripheral reticulations are also appreciated. There   is Posterior dependent atelectasis. Biapical pleural parenchymal scarring   is appreciated.  PLEURA: No pleural effusion. No pneumothorax.  VESSELS: Atherosclerotic changes of the aorta and coronary arteries. The   aorta and pulmonary artery are normal caliber.  HEART: Heart size is normal. Valvular calcifications are noted. No   pericardial effusion.  MEDIASTINUM AND MK: No pathologically enlarged lymph nodes. There is a   small hiatal hernia.  CHEST WALL AND LOWER NECK: Within normal limits.    ABDOMEN AND PELVIS:  LIVER: Within normal limits.  BILE DUCTS: Marked biliary ductal dilatation, the common bile duct   measuring up to 1.3 cm. This is increased compared to previous exam.   There is also dilated cystic duct remnant. Mild intrahepatic biliary duct   dilatation is appreciated.  GALLBLADDER: Cholecystectomy.  SPLEEN: Within normal limits.  PANCREAS: Within normal limits.  ADRENALS: Within normal limits.  KIDNEYS/URETERS: 4 mm right lower pole renal calculus. There is no   urinary tract obstruction.    BLADDER: Diffusely thick-walled.  REPRODUCTIVE ORGANS: Fibroid uterus. No abnormal adnexal enlargement.    BOWEL: No bowel obstruction. Appendix is normal. There is extensive   colonic diverticulosis. Question mild fat stranding of the sigmoid   mesentery . No definite focal wall thickening appreciated. There is a   small hiatal hernia.  PERITONEUM: No ascites.  VESSELS: Atherosclerotic changes. Diffuse ectasia of the abdominal aorta   is appreciated, with multiple with areas of more focal dilatation, the   largest within the infrarenal region measuring up to 2.8 cm in diameter.  RETROPERITONEUM/LYMPH NODES: No lymphadenopathy.  ABDOMINAL WALL: Postsurgical changes.  BONES: Degenerative changes generalized osteopenia. T5 compression   deformity is new compared to previous exam. T7 compression deformity is   relatively stable. Additional compression deformity of T9 is new compared   to previous exam. Chronic appearing L2 compression deformity is stable   compared to previous exam. Subtle areas of linear sclerosis along the   bilateral sacral parallel to the sacroiliac joint may represent slight   insufficiency fractures. Multiple old bilateral rib fractures.    IMPRESSION:  Slight stranding seen adjacent to the sigmoid colon. This may be related   to segment of colitis for which infectious or inflammatory etiology   should be considered. This is also in a region of diverticulosis,   possibility of diverticulitis is raised, though there is no focal   inflamed diverticulum identified.    Biliary duct dilatation, with significant increase in common bile duct   size. This is nonspecific. Further correlation with endoscopy or MRI/MRCP   can be obtained for further delineation.    Thick-walled urinary bladder which may be related to its underdistended   nature versus cystitis. Correlate with urinalysis.    Interval partial resolution of right upper lobe opacity, with residual   smaller nodular densities seen. Follow-up to complete resolution is   advised to exclude underlying nodule/mass. Small residual reticular   nodular densities in previously noted right lower lobe opacities as above.    Mild interlobular septal thickening and peripheral reticulations are   nonspecific but can be seen with edema or other interstitial disease.    Multiple vertebral body compression deformities as above. Compression   deformities of T7 and L2 are stable compared to previous exam.   Compression deformities involving T5 and T9 are new compared to the   previous exam and are of indeterminate age.    Subtle sacral sclerosis parallel to the SI joints, suspicious for   sequelae of sacral insufficiency fractures. This is new when compared to   12/7/2023.    Additional findings as above.        --- End of Report ---    SALLY FUENTES MD; Attending Radiologist  This document has been electronically signed. Feb 11 2024 10:02PM  02-11-24 @ 21:05

## 2024-02-14 NOTE — PROGRESS NOTE ADULT - SUBJECTIVE AND OBJECTIVE BOX
Sepsis    HPI:  Patient seen/examined prior to midnight on 2/11/24    78F with PMHX COPD on Home O2, HFrEF, HTN, HLD, Multiple Myeloma on Ninlaro, Chronic ITP on Promacta, Ulcerative Colitis, MDD, Anxiety, GERD BIB daughter to [facility] ER s/p fall with AMS and abd pain and diarrhea. Patient hypotensive, tachycardic, febrile on arrival. Treated for Sepsis with IVF and IV ABX. CT Imaging with Colitis and UTI as well as multiple other incidental findings. Hypotension imrpoved s/p IVFB and Stress dose steroids. Pt seen/examined. AMS improving currently AAOx3. CTH WO negative. Labs significant for leukocytosis, ARF, AGMA, and multiple electrolyte abnormalities. Med list verified. Patient feeling much better since arrival to ER. No other complaints. Daughter concerned about mother missing doctor's appointments and found her on floor at home with significant fecal incontinence she is concerned about ability to take care of herself. (11 Feb 2024 23:59)    Interval History:  Patient was seen and examined at bedside around 10:45 am.  Feels better.   No episodes of diarrhea in the hospital.  Had small bowel movement last night.   Denies abdominal pain, nausea, vomiting or urinary symptoms.   Denies back pain or paresthesia.   Denies chest pain, palpitations or shortness of breath.     ROS:  As per interval history otherwise unremarkable.    PHYSICAL EXAM:  Vital Signs   T(C): 36.9 (14 Feb 2024 14:22), Max: 36.9 (14 Feb 2024 00:40)  T(F): 98.4 (14 Feb 2024 14:22), Max: 98.4 (14 Feb 2024 00:40)  HR: 122 (14 Feb 2024 16:46) (98 - 126)  BP: 93/58 (14 Feb 2024 14:22) (93/58 - 135/97)  BP(mean): 90 (14 Feb 2024 12:00) (71 - 115)  RR: 18 (14 Feb 2024 14:22) (15 - 24)  SpO2: 94% (14 Feb 2024 16:46) (93% - 100%)  Parameters below as of 14 Feb 2024 14:22  Patient On (Oxygen Delivery Method): nasal cannula  O2 Flow (L/min): 3  General: Elderly female sitting in recliner comfortably. No acute distress  HEENT: EOMI. Clear conjunctivae. Moist mucus membrane. Hard of hearing.   Neck: Supple.   Chest: Good air entry. No wheezing, rales or rhonchi.   Heart: S1 & S2 with tachycardia.   Abdomen: Non distended. Soft. Non-tender. + BS  Ext: No pedal edema. No calf tenderness   Neuro: Awake and alert. No focal deficit. Speech clear.   Skin: Warm and Dry  Psychiatry: Normal mood and affect    LABS:                        8.2    14.67 )-----------( 268      ( 14 Feb 2024 07:11 )             26.8     02-14    140  |  109<H>  |  26.6<H>  ----------------------------<  88  3.2<L>   |  18.0<L>  |  1.68<H>    Ca    7.8<L>      14 Feb 2024 07:11  Mg     1.8     02-14    TPro  5.7<L>  /  Alb  3.0<L>  /  TBili  0.2<L>  /  DBili  x   /  AST  8   /  ALT  <5  /  AlkPhos  80  02-14    Blood Culture: 02-11 @ 21:40  Organism --  Gram Stain Blood -- Gram Stain --  Specimen Source Clean Catch Clean Catch (Midstream)  Culture-Blood --    02-11 @ 17:00  Organism --  Gram Stain Blood -- Gram Stain --  Specimen Source .Blood Blood-Peripheral  Culture-Blood --    02-11 @ 16:55  Organism --  Gram Stain Blood -- Gram Stain --  Specimen Source .Blood Blood-Peripheral  Culture-Blood --    RADIOLOGY & ADDITIONAL STUDIES:  Reviewed     MEDICATIONS  (STANDING):  albuterol/ipratropium for Nebulization 3 milliLiter(s) Nebulizer every 6 hours  budesonide  80 MICROgram(s)/formoterol 4.5 MICROgram(s) Inhaler 2 Puff(s) Inhalation two times a day  cefepime  Injectable.      cefepime  Injectable. 1000 milliGRAM(s) IV Push every 12 hours  DULoxetine 60 milliGRAM(s) Oral daily  influenza  Vaccine (HIGH DOSE) 0.7 milliLiter(s) IntraMuscular once  metoprolol tartrate 12.5 milliGRAM(s) Oral every 12 hours  metroNIDAZOLE  IVPB 500 milliGRAM(s) IV Intermittent every 8 hours  midodrine. 5 milliGRAM(s) Oral three times a day  pantoprazole    Tablet 40 milliGRAM(s) Oral before breakfast  sodium bicarbonate 650 milliGRAM(s) Oral three times a day  tiotropium 2.5 MICROgram(s) Inhaler 2 Puff(s) Inhalation daily    MEDICATIONS  (PRN):  acetaminophen     Tablet .. 650 milliGRAM(s) Oral every 6 hours PRN Temp greater or equal to 38C (100.4F), Mild Pain (1 - 3)  ALPRAZolam 0.25 milliGRAM(s) Oral every 6 hours PRN Anxiety  aluminum hydroxide/magnesium hydroxide/simethicone Suspension 30 milliLiter(s) Oral every 4 hours PRN Dyspepsia  benzonatate 100 milliGRAM(s) Oral three times a day PRN Cough  melatonin 3 milliGRAM(s) Oral at bedtime PRN Insomnia  ondansetron Injectable 4 milliGRAM(s) IV Push every 8 hours PRN Nausea and/or Vomiting

## 2024-02-14 NOTE — PROGRESS NOTE ADULT - ASSESSMENT
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 ER s/p fall with AMS and abd pain and diarrhea.     #abdominal pain  #diarrhea  #anemia   - 2/11/2024 CT A/P:  Marked biliary ductal dilatation, the common bile duct measuring up to 1.3 cm. This is increased compared to previous exam. There is also dilated cystic duct remnant. Mild intrahepatic biliary duct dilatation is appreciated. Cholecystectomy. There is extensive   colonic diverticulosis. Question mild fat stranding of the sigmoid mesentery  - WBC 14.67, afebrile  - Diarrhea resolved  - Hemoglobin 8.2, no overt signs of GIB  - Trend CBC, transfuse as needed. Monitor for bleeding  - Avoid NSAIDs  - GI PCR ordered, not sent  - Continue antibiotics   - MRCP ordered  _________________________________________________________________  Assessment and recommendations are final when note is signed by the attending physician.

## 2024-02-14 NOTE — PROGRESS NOTE ADULT - ASSESSMENT
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    Sepsis 2/2 Infectious v Inflammatory Colitis and Acute UTI   -CT ABD/PEL +Colitis +Cystitis   -BCX x2 with no growth so far   -GI PCR pending collection  -Rx'd six different abx in past 6 months including Doxycycline/Bactrim/Cefpodoxime/Azithromycin/Levaquin/Cephelexin -> CDIFF PCR ordered but cancelled on 2/13 as patient with no diarrhea since admission   -ESR 46 and   -Lactate 2.3 -> Lactate 1.6   -2.5L IVFB NSS given in ED  -s/p IVF LR 125cc/hr  -Solucortef 100mg IV x1 in ED  -D/C Solucortef 50mg IV q6, unlikely inflammatory flare   -PCN Allergy noted but tolerating Cephalosporins  -Ceftriaxone 1g x1 + Flagyl 500mg x1 + Vanco 1g IV x1 in ED  -Broaden empiric IV ABX to Cefepime 1g IV q8 and Flagyl 500mg IV q8 given patient on immunosuppressants  -Hold Vanco IV for now  -Added Midodrine 10 mg TID, decrease to 5 mg TID   -GI following     Acute UTI  -CT AP +Thick walled urinary bladder possible Cystitis  -UA positive. UCX with Enterococcus Faecalis sensitivity pending   -Continue ABX as above    AMS  -CTH WO negative  -Likely toxic metablolic encephalopathy from hypotension/sepsis/colitis/UTI  -Resolving     CLIFF / AGMA 2/2 Lactic Acidosis  -CT ABDPEL no obstruction. CPK minimally elevated and UA with small blood doubt rhabdo  -Lactate cleared. Repeat Labs with improved AG  -s/p IVF  -Added Sodium Bicarb   -Avoid nephrotoxic medications. Hold home Ibuprofen  -Monitor     Hypomagnesemia, Hypocalcemia, Hypokalemia  -Replete     Chronic Compression Fractures, Incidental T5/T9 compression deformity + suspected sequelae of sacral insufficiency fractures   -TLSO Brace when OOB  -WBAT   -Pain control   -PT eval   -Outpatient follow up with Neurosurgery (Dr. Yarelis Edwards) in 2-4 weeks    Multiple Myeloma, Anemia, Chronic ITP?  -Will hold Ninlaro and Promacta for now till acute infection is resolved    Depression + Anxiety  -Duloxetine 60mg q24  -Alprazolam 0.25mg QID PRN    HTN  -Restart Metoprolol     COPD  -No current hypoxia  -CT Chest improving infiltrates from prior CT  -O2 via NC PRN  -Continue Symbicort   -DuoNebs    DVT Prophylaxis -- Venodyne    Dispo: Likely [facility] once stable.

## 2024-02-14 NOTE — PATIENT PROFILE ADULT - FALL HARM RISK - HARM RISK INTERVENTIONS
Assistance with ambulation/Assistance OOB with selected safe patient handling equipment/Communicate Risk of Fall with Harm to all staff/Discuss with provider need for PT consult/Monitor gait and stability/Reinforce activity limits and safety measures with patient and family/Tailored Fall Risk Interventions/Use of alarms - bed, chair and/or voice tab/Visual Cue: Yellow wristband and red socks/Bed in lowest position, wheels locked, appropriate side rails in place/Call bell, personal items and telephone in reach/Instruct patient to call for assistance before getting out of bed or chair/Non-slip footwear when patient is out of bed/[place] to call system/Physically safe environment - no spills, clutter or unnecessary equipment/Purposeful Proactive Rounding/Room/bathroom lighting operational, light cord in reach

## 2024-02-14 NOTE — PATIENT PROFILE ADULT - NSPROPOAPRESSUREINJURY_GEN_A_NUR
no Implemented All Universal Safety Interventions:  [place] to call system. Call bell, personal items and telephone within reach. Instruct patient to call for assistance. Room bathroom lighting operational. Non-slip footwear when patient is off stretcher. Physically safe environment: no spills, clutter or unnecessary equipment. Stretcher in lowest position, wheels locked, appropriate side rails in place.

## 2024-02-15 LAB
ANION GAP SERPL CALC-SCNC: 15 MMOL/L — SIGNIFICANT CHANGE UP (ref 5–17)
BASOPHILS # BLD AUTO: 0.01 K/UL — SIGNIFICANT CHANGE UP (ref 0–0.2)
BASOPHILS NFR BLD AUTO: 0.1 % — SIGNIFICANT CHANGE UP (ref 0–2)
BUN SERPL-MCNC: 23.9 MG/DL — HIGH (ref 8–20)
CALCIUM SERPL-MCNC: 7.5 MG/DL — LOW (ref 8.4–10.5)
CHLORIDE SERPL-SCNC: 110 MMOL/L — HIGH (ref 96–108)
CO2 SERPL-SCNC: 18 MMOL/L — LOW (ref 22–29)
CREAT SERPL-MCNC: 1.4 MG/DL — HIGH (ref 0.5–1.3)
EGFR: 38 ML/MIN/1.73M2 — LOW
EOSINOPHIL # BLD AUTO: 0.01 K/UL — SIGNIFICANT CHANGE UP (ref 0–0.5)
EOSINOPHIL NFR BLD AUTO: 0.1 % — SIGNIFICANT CHANGE UP (ref 0–6)
GLUCOSE SERPL-MCNC: 96 MG/DL — SIGNIFICANT CHANGE UP (ref 70–99)
HCT VFR BLD CALC: 24.3 % — LOW (ref 34.5–45)
HCT VFR BLD CALC: 25.5 % — LOW (ref 34.5–45)
HGB BLD-MCNC: 7.4 G/DL — LOW (ref 11.5–15.5)
HGB BLD-MCNC: 7.7 G/DL — LOW (ref 11.5–15.5)
IMM GRANULOCYTES NFR BLD AUTO: 1.5 % — HIGH (ref 0–0.9)
LYMPHOCYTES # BLD AUTO: 0.84 K/UL — LOW (ref 1–3.3)
LYMPHOCYTES # BLD AUTO: 11.6 % — LOW (ref 13–44)
MAGNESIUM SERPL-MCNC: 1.8 MG/DL — SIGNIFICANT CHANGE UP (ref 1.8–2.6)
MCHC RBC-ENTMCNC: 25.1 PG — LOW (ref 27–34)
MCHC RBC-ENTMCNC: 30.5 GM/DL — LOW (ref 32–36)
MCV RBC AUTO: 82.4 FL — SIGNIFICANT CHANGE UP (ref 80–100)
MONOCYTES # BLD AUTO: 0.92 K/UL — HIGH (ref 0–0.9)
MONOCYTES NFR BLD AUTO: 12.7 % — SIGNIFICANT CHANGE UP (ref 2–14)
NEUTROPHILS # BLD AUTO: 5.34 K/UL — SIGNIFICANT CHANGE UP (ref 1.8–7.4)
NEUTROPHILS NFR BLD AUTO: 74 % — SIGNIFICANT CHANGE UP (ref 43–77)
PLATELET # BLD AUTO: 197 K/UL — SIGNIFICANT CHANGE UP (ref 150–400)
POTASSIUM SERPL-MCNC: 4 MMOL/L — SIGNIFICANT CHANGE UP (ref 3.5–5.3)
POTASSIUM SERPL-SCNC: 4 MMOL/L — SIGNIFICANT CHANGE UP (ref 3.5–5.3)
RBC # BLD: 2.95 M/UL — LOW (ref 3.8–5.2)
RBC # FLD: 19.2 % — HIGH (ref 10.3–14.5)
SODIUM SERPL-SCNC: 143 MMOL/L — SIGNIFICANT CHANGE UP (ref 135–145)
WBC # BLD: 7.23 K/UL — SIGNIFICANT CHANGE UP (ref 3.8–10.5)
WBC # FLD AUTO: 7.23 K/UL — SIGNIFICANT CHANGE UP (ref 3.8–10.5)

## 2024-02-15 PROCEDURE — 99233 SBSQ HOSP IP/OBS HIGH 50: CPT

## 2024-02-15 PROCEDURE — 71045 X-RAY EXAM CHEST 1 VIEW: CPT | Mod: 26

## 2024-02-15 PROCEDURE — 78582 LUNG VENTILAT&PERFUS IMAGING: CPT | Mod: 26

## 2024-02-15 PROCEDURE — 74181 MRI ABDOMEN W/O CONTRAST: CPT | Mod: 26

## 2024-02-15 PROCEDURE — 99232 SBSQ HOSP IP/OBS MODERATE 35: CPT

## 2024-02-15 RX ORDER — SODIUM CHLORIDE 9 MG/ML
500 INJECTION, SOLUTION INTRAVENOUS
Refills: 0 | Status: DISCONTINUED | OUTPATIENT
Start: 2024-02-15 | End: 2024-02-16

## 2024-02-15 RX ORDER — HEPARIN SODIUM 5000 [USP'U]/ML
5000 INJECTION INTRAVENOUS; SUBCUTANEOUS EVERY 8 HOURS
Refills: 0 | Status: DISCONTINUED | OUTPATIENT
Start: 2024-02-15 | End: 2024-02-18

## 2024-02-15 RX ORDER — MAGNESIUM SULFATE 500 MG/ML
2 VIAL (ML) INJECTION ONCE
Refills: 0 | Status: COMPLETED | OUTPATIENT
Start: 2024-02-15 | End: 2024-02-15

## 2024-02-15 RX ORDER — POTASSIUM CHLORIDE 20 MEQ
40 PACKET (EA) ORAL EVERY 4 HOURS
Refills: 0 | Status: DISCONTINUED | OUTPATIENT
Start: 2024-02-15 | End: 2024-02-15

## 2024-02-15 RX ORDER — MAGNESIUM SULFATE 500 MG/ML
2 VIAL (ML) INJECTION ONCE
Refills: 0 | Status: DISCONTINUED | OUTPATIENT
Start: 2024-02-15 | End: 2024-02-15

## 2024-02-15 RX ADMIN — Medication 0.25 MILLIGRAM(S): at 02:00

## 2024-02-15 RX ADMIN — CEFEPIME 1000 MILLIGRAM(S): 1 INJECTION, POWDER, FOR SOLUTION INTRAMUSCULAR; INTRAVENOUS at 05:10

## 2024-02-15 RX ADMIN — Medication 3 MILLILITER(S): at 08:41

## 2024-02-15 RX ADMIN — HEPARIN SODIUM 5000 UNIT(S): 5000 INJECTION INTRAVENOUS; SUBCUTANEOUS at 21:31

## 2024-02-15 RX ADMIN — Medication 100 MILLIGRAM(S): at 05:09

## 2024-02-15 RX ADMIN — SODIUM CHLORIDE 250 MILLILITER(S): 9 INJECTION, SOLUTION INTRAVENOUS at 17:23

## 2024-02-15 RX ADMIN — Medication 650 MILLIGRAM(S): at 13:48

## 2024-02-15 RX ADMIN — MAGNESIUM OXIDE 400 MG ORAL TABLET 400 MILLIGRAM(S): 241.3 TABLET ORAL at 12:07

## 2024-02-15 RX ADMIN — Medication 12.5 MILLIGRAM(S): at 17:22

## 2024-02-15 RX ADMIN — CEFEPIME 1000 MILLIGRAM(S): 1 INJECTION, POWDER, FOR SOLUTION INTRAMUSCULAR; INTRAVENOUS at 17:22

## 2024-02-15 RX ADMIN — Medication 25 GRAM(S): at 11:15

## 2024-02-15 RX ADMIN — Medication 100 MILLIGRAM(S): at 13:48

## 2024-02-15 RX ADMIN — BUDESONIDE AND FORMOTEROL FUMARATE DIHYDRATE 2 PUFF(S): 160; 4.5 AEROSOL RESPIRATORY (INHALATION) at 20:37

## 2024-02-15 RX ADMIN — MAGNESIUM OXIDE 400 MG ORAL TABLET 400 MILLIGRAM(S): 241.3 TABLET ORAL at 17:22

## 2024-02-15 RX ADMIN — MIDODRINE HYDROCHLORIDE 5 MILLIGRAM(S): 2.5 TABLET ORAL at 05:11

## 2024-02-15 RX ADMIN — Medication 100 MILLIGRAM(S): at 21:29

## 2024-02-15 RX ADMIN — Medication 3 MILLIGRAM(S): at 21:32

## 2024-02-15 RX ADMIN — Medication 650 MILLIGRAM(S): at 05:11

## 2024-02-15 RX ADMIN — Medication 40 MILLIEQUIVALENT(S): at 02:00

## 2024-02-15 RX ADMIN — MAGNESIUM OXIDE 400 MG ORAL TABLET 400 MILLIGRAM(S): 241.3 TABLET ORAL at 08:23

## 2024-02-15 RX ADMIN — DULOXETINE HYDROCHLORIDE 60 MILLIGRAM(S): 30 CAPSULE, DELAYED RELEASE ORAL at 12:07

## 2024-02-15 RX ADMIN — Medication 650 MILLIGRAM(S): at 21:30

## 2024-02-15 RX ADMIN — Medication 0.25 MILLIGRAM(S): at 17:22

## 2024-02-15 RX ADMIN — BUDESONIDE AND FORMOTEROL FUMARATE DIHYDRATE 2 PUFF(S): 160; 4.5 AEROSOL RESPIRATORY (INHALATION) at 08:41

## 2024-02-15 RX ADMIN — Medication 0.25 MILLIGRAM(S): at 10:20

## 2024-02-15 RX ADMIN — PANTOPRAZOLE SODIUM 40 MILLIGRAM(S): 20 TABLET, DELAYED RELEASE ORAL at 05:10

## 2024-02-15 NOTE — PROGRESS NOTE ADULT - SUBJECTIVE AND OBJECTIVE BOX
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 Patient hypotensive, tachycardic, febrile on arrival. Treated for Sepsis with IVF and IV ABX. CT Imaging with Colitis and UTI as well as multiple other incidental findings. Hypotension imrpoved s/p IVFB and Stress dose steroids. Pt seen/examined. AMS improving currently AAOx3. CTH WO negative. Labs significant for leukocytosis, ARF, AGMA, and multiple electrolyte abnormalities. Med list verified. Patient feeling much better since arrival to ER. No other complaints. Daughter concerned about mother missing doctor's appointments and found her on floor at home with significant fecal incontinence she is concerned about ability to take care of herself. (11 Feb 2024 23:59)    Interval History:  Patient was seen and examined at bedside   Reports feeling like her heart is ppounding  Tele with sinus tach  Also SOB with min ambulation        ROS:  As per interval history otherwise unremarkable.    PHYSICAL EXAM:      General: Elderly female sitting in recliner comfortably. No acute distress  HEENT: EOMI. Clear conjunctivae. Moist mucus membrane. Hard of hearing.   Neck: Supple.   Chest: Good air entry. No wheezing, rales or rhonchi.   Heart: S1 & S2 with tachycardia.   Abdomen: Non distended. Soft. Non-tender. + BS  Ext: No pedal edema. No calf tenderness   Neuro: Awake and alert. No focal deficit. Speech clear.   Skin: Warm and Dry  Psychiatry: Normal mood and affect    LABS:                        8.2    14.67 )-----------( 268      ( 14 Feb 2024 07:11 )             26.8                         7.4    7.23  )-----------( 197      ( 15 Feb 2024 06:52 )             24.3   02-15    143  |  110<H>  |  23.9<H>  ----------------------------<  96  4.0   |  18.0<L>  |  1.40<H>    Ca    7.5<L>      15 Feb 2024 06:52  Mg     1.8     02-15    TPro  5.7<L>  /  Alb  3.0<L>  /  TBili  0.2<L>  /  DBili  x   /  AST  8   /  ALT  <5  /  AlkPhos  80  02-14    Blood Culture: 02-11 @ 21:40  Organism --  Gram Stain Blood -- Gram Stain --  Specimen Source Clean Catch Clean Catch (Midstream)  Culture-Blood --    02-11 @ 17:00  Organism --  Gram Stain Blood -- Gram Stain --  Specimen Source .Blood Blood-Peripheral  Culture-Blood --    02-11 @ 16:55  Organism --  Gram Stain Blood -- Gram Stain --  Specimen Source .Blood Blood-Peripheral  Culture-Blood --    RADIOLOGY & ADDITIONAL STUDIES:  Reviewed     MEDICATIONS  (STANDING):  albuterol/ipratropium for Nebulization 3 milliLiter(s) Nebulizer every 6 hours  budesonide  80 MICROgram(s)/formoterol 4.5 MICROgram(s) Inhaler 2 Puff(s) Inhalation two times a day  cefepime  Injectable.      cefepime  Injectable. 1000 milliGRAM(s) IV Push every 12 hours  DULoxetine 60 milliGRAM(s) Oral daily  influenza  Vaccine (HIGH DOSE) 0.7 milliLiter(s) IntraMuscular once  metoprolol tartrate 12.5 milliGRAM(s) Oral every 12 hours  metroNIDAZOLE  IVPB 500 milliGRAM(s) IV Intermittent every 8 hours  midodrine. 5 milliGRAM(s) Oral three times a day  pantoprazole    Tablet 40 milliGRAM(s) Oral before breakfast  sodium bicarbonate 650 milliGRAM(s) Oral three times a day  tiotropium 2.5 MICROgram(s) Inhaler 2 Puff(s) Inhalation daily    MEDICATIONS  (PRN):  acetaminophen     Tablet .. 650 milliGRAM(s) Oral every 6 hours PRN Temp greater or equal to 38C (100.4F), Mild Pain (1 - 3)  ALPRAZolam 0.25 milliGRAM(s) Oral every 6 hours PRN Anxiety  aluminum hydroxide/magnesium hydroxide/simethicone Suspension 30 milliLiter(s) Oral every 4 hours PRN Dyspepsia  benzonatate 100 milliGRAM(s) Oral three times a day PRN Cough  melatonin 3 milliGRAM(s) Oral at bedtime PRN Insomnia  ondansetron Injectable 4 milliGRAM(s) IV Push every 8 hours PRN Nausea and/or Vomiting

## 2024-02-15 NOTE — PHYSICAL THERAPY INITIAL EVALUATION ADULT - NSPTDISCHREC_GEN_A_CORE
expect pt to return back home with home PT and RW, however if pt does not progress as expected then may need subacute rehab

## 2024-02-15 NOTE — PROGRESS NOTE ADULT - NS ATTEND AMEND GEN_ALL_CORE FT
I evaluated this pt. with my ACP and agree with the above assessment and management plan. Pt. presently asymptomatic. MRCP negative for choledocholithiasis. No c/o diarrhea or abdominal pain. Pt. is stable for discharge home from a GI standpoint. No plans or need for continued GI f/u. Signing off. Reconsult as needed. Thank you.
Patient with colitis and dilated bile duct. MRCP is ordered. GI will follow up.
78F with PMHX COPD on Home O2, HFrEF, HTN, HLD, Multiple Myeloma on Ninlaro, Chronic ITP on Promacta, collagenous Colitis (dx at [place] "many years ago" not on any meds), MDD, Anxiety, GERD BIB daughter to [facility] ER s/p fall with AMS and abd pain and diarrhea.   BMs decreased, she was pending one for my visit to get stool studies   Titus regular PO, appropriate mental status  Plan  f/u stool studies

## 2024-02-15 NOTE — CONSULT NOTE ADULT - SUBJECTIVE AND OBJECTIVE BOX
[facility], THE [facility]                                   [address] [place] [address]                                                      PHONE: [phone]                                                         FAX: [phone]  [email or web address]  ---------------------------------------------------------------------------------------------------------------------------------    Reason for Consult: Sinus tachycardia    HPI:  BECCA JARRELL is an 78y Female with a PMHx of NICM, HFrecEF, HTN, HLD, multiple myeloma, ITP, ulcerative colitis, MDD, anxiety, GERD who initially presented with a fall associated with AMS, abdominal pain, and diarrhea. Patient brought to the hospital and was found to be septic with hypotension and tachycardia. Patient diagnosed with acute UTI, acute sigmoid colitis, CLIFF. Patient has been treated with antibiotics. Patient has remained tachycardic during her hospitalization. Cardiology consulted.         PAST MEDICAL & SURGICAL HISTORY:  MI (myocardial infarction)      Kidney stones      Hearing loss, unspecified hearing loss type, unspecified laterality      Anxiety      Multiple myeloma      COPD (chronic obstructive pulmonary disease)      S/P cholecystectomy          penicillin (Unknown)      MEDICATIONS  (STANDING):  budesonide  80 MICROgram(s)/formoterol 4.5 MICROgram(s) Inhaler 2 Puff(s) Inhalation two times a day  cefepime  Injectable.      cefepime  Injectable. 1000 milliGRAM(s) IV Push every 12 hours  DULoxetine 60 milliGRAM(s) Oral daily  influenza  Vaccine (HIGH DOSE) 0.7 milliLiter(s) IntraMuscular once  magnesium oxide 400 milliGRAM(s) Oral three times a day with meals  metoprolol tartrate 12.5 milliGRAM(s) Oral every 12 hours  metroNIDAZOLE  IVPB 500 milliGRAM(s) IV Intermittent every 8 hours  midodrine. 5 milliGRAM(s) Oral three times a day  pantoprazole    Tablet 40 milliGRAM(s) Oral before breakfast  sodium bicarbonate 650 milliGRAM(s) Oral three times a day  tiotropium 2.5 MICROgram(s) Inhaler 2 Puff(s) Inhalation daily    MEDICATIONS  (PRN):  acetaminophen     Tablet .. 650 milliGRAM(s) Oral every 6 hours PRN Temp greater or equal to 38C (100.4F), Mild Pain (1 - 3)  ALPRAZolam 0.25 milliGRAM(s) Oral every 6 hours PRN Anxiety  aluminum hydroxide/magnesium hydroxide/simethicone Suspension 30 milliLiter(s) Oral every 4 hours PRN Dyspepsia  benzonatate 100 milliGRAM(s) Oral three times a day PRN Cough  melatonin 3 milliGRAM(s) Oral at bedtime PRN Insomnia  ondansetron Injectable 4 milliGRAM(s) IV Push every 8 hours PRN Nausea and/or Vomiting      Social History:  Cigarettes:  Denies current tobacco use                  Alcohol:  Denies daily etoh            Illicit Drug Abuse:  Denies    Family History:  denies CAD, MI, CVA, or sudden death.    ROS: Negative other than as mentioned in HPI.    Vital Signs Last 24 Hrs  T(C): 36.6 (15 Feb 2024 12:03), Max: 37 (15 Feb 2024 03:22)  T(F): 97.9 (15 Feb 2024 12:03), Max: 98.6 (15 Feb 2024 03:22)  HR: 128 (15 Feb 2024 12:03) (111 - 132)  BP: 115/78 (15 Feb 2024 12:03) (96/62 - 115/78)  BP(mean): --  RR: 18 (15 Feb 2024 12:03) (18 - 18)  SpO2: 92% (15 Feb 2024 12:03) (92% - 100%)    Parameters below as of 15 Feb 2024 12:03  Patient On (Oxygen Delivery Method): nasal cannula      ICU Vital Signs Last 24 Hrs  BECCA JARRELL  I&O's Detail    I&O's Summary    Drug Dosing Weight  BECCA JARRELL      PHYSICAL EXAM:  General: NAD  HEENT: Head; normocephalic, atraumatic.  Eyes: EOMI, conjunctiva normal  Neck: Supple, no JVD noted  CARDIOVASCULAR: RRR, S1 S2, No murmurs or gallops  LUNGS: Clear to auscultation b/l, No rales, rhonchi or wheeze, normal inspiratory effort  ABDOMEN: Soft, nontender, non-distended, +bowel sounds  EXTREMITIES: No edema b/l, no cyanosis   SKIN: warm and dry  NEURO: Alert/oriented x 3  PSYCH: normal affect.        LABS:                        7.4    7.23  )-----------( 197      ( 15 Feb 2024 06:52 )             24.3     02-15    143  |  110<H>  |  23.9<H>  ----------------------------<  96  4.0   |  18.0<L>  |  1.40<H>    Ca    7.5<L>      15 Feb 2024 06:52  Mg     1.8     02-15    TPro  5.7<L>  /  Alb  3.0<L>  /  TBili  0.2<L>  /  DBili  x   /  AST  8   /  ALT  <5  /  AlkPhos  80  02-14    BECCA JARRELL        Urinalysis Basic - ( 15 Feb 2024 06:52 )    Color: x / Appearance: x / SG: x / pH: x  Gluc: 96 mg/dL / Ketone: x  / Bili: x / Urobili: x   Blood: x / Protein: x / Nitrite: x   Leuk Esterase: x / RBC: x / WBC x   Sq Epi: x / Non Sq Epi: x / Bacteria: x        RADIOLOGY & ADDITIONAL STUDIES:    INTERPRETATION OF TELEMETRY (personally reviewed):    ECG: Sinus tachycardia, septal infarct, non-specific T wave changes     ECHO: 10/9/23  Summary:   1. Endocardial visualization was enhanced with intravenous echo contrast.   2. Normal left ventricular internal cavity size.   3. Normal global left ventricular systolic function.   4. Left ventricular ejection fraction, by visual estimation, is 60 to   65%.   5. Spectral Doppler shows impaired relaxation pattern of left   ventricular myocardial filling (Grade I diastolic dysfunction).   6. Elevated mean left atrial pressure.   7. Normal right ventricular size and function.   8. The left atrium is normal in size.   9. The right atrium is normal in size.  10. Sclerotic aortic valve with normal opening.  11. Mild thickening and calcification of the anterior and posterior   mitral valve leaflets.  12. Moderate mitral annular calcification.  13. Mild mitral valve regurgitation.  14. Mild tricuspid regurgitation.      Assessment and Plan:  78y Female with a PMHx of NICM, HFrecEF, HTN, HLD, multiple myeloma, ITP, ulcerative colitis, MDD, anxiety, GERD who presents with severe sepsis from UTI, colitis and remains to be tachycardic.    Tachycardia  -      Thank you for letting [facility] to assist in the management of this patient. Please call with any questions.                 [facility], THE [facility]                                   [address] [place] [address]                                                      PHONE: [phone]                                                         FAX: [phone]  [email or web address]  ---------------------------------------------------------------------------------------------------------------------------------    Reason for Consult: Sinus tachycardia    HPI:  BECCA JARRELL is an 78y Female with a PMHx of NICM, HFrecEF, HTN, HLD, multiple myeloma, ITP, ulcerative colitis, MDD, anxiety, GERD who initially presented with a fall associated with AMS, abdominal pain, and diarrhea. Patient brought to the hospital and was found to be septic with hypotension and tachycardia. Patient diagnosed with acute UTI, acute sigmoid colitis, CLIFF. Patient has been treated with antibiotics. Patient has remained tachycardic during her hospitalization. Cardiology consulted. Patient states that her diarrhea has resolved. She states that she is drinking fluid however she states that she is not urinating much. Patient does state that she feels palpitations throughout the day and that she gets very short of breath on exertion (even going to the commode). She states that she suffers from severe anxiety. Patient denies any current chest pain, near syncope, syncope, abdominal pain, diarrhea. Of note, on prior office visits, patient has had sinus tachycardia in the 100s.        PAST MEDICAL & SURGICAL HISTORY:  MI (myocardial infarction)      Kidney stones      Hearing loss, unspecified hearing loss type, unspecified laterality      Anxiety      Multiple myeloma      COPD (chronic obstructive pulmonary disease)      S/P cholecystectomy          penicillin (Unknown)      MEDICATIONS  (STANDING):  budesonide  80 MICROgram(s)/formoterol 4.5 MICROgram(s) Inhaler 2 Puff(s) Inhalation two times a day  cefepime  Injectable.      cefepime  Injectable. 1000 milliGRAM(s) IV Push every 12 hours  DULoxetine 60 milliGRAM(s) Oral daily  influenza  Vaccine (HIGH DOSE) 0.7 milliLiter(s) IntraMuscular once  magnesium oxide 400 milliGRAM(s) Oral three times a day with meals  metoprolol tartrate 12.5 milliGRAM(s) Oral every 12 hours  metroNIDAZOLE  IVPB 500 milliGRAM(s) IV Intermittent every 8 hours  midodrine. 5 milliGRAM(s) Oral three times a day  pantoprazole    Tablet 40 milliGRAM(s) Oral before breakfast  sodium bicarbonate 650 milliGRAM(s) Oral three times a day  tiotropium 2.5 MICROgram(s) Inhaler 2 Puff(s) Inhalation daily    MEDICATIONS  (PRN):  acetaminophen     Tablet .. 650 milliGRAM(s) Oral every 6 hours PRN Temp greater or equal to 38C (100.4F), Mild Pain (1 - 3)  ALPRAZolam 0.25 milliGRAM(s) Oral every 6 hours PRN Anxiety  aluminum hydroxide/magnesium hydroxide/simethicone Suspension 30 milliLiter(s) Oral every 4 hours PRN Dyspepsia  benzonatate 100 milliGRAM(s) Oral three times a day PRN Cough  melatonin 3 milliGRAM(s) Oral at bedtime PRN Insomnia  ondansetron Injectable 4 milliGRAM(s) IV Push every 8 hours PRN Nausea and/or Vomiting      Social History:  Cigarettes:  Denies current tobacco use                  Alcohol:  Denies daily etoh            Illicit Drug Abuse:  Denies    Family History:  denies CAD, MI, CVA, or sudden death.    ROS: Negative other than as mentioned in HPI.    Vital Signs Last 24 Hrs  T(C): 36.6 (15 Feb 2024 12:03), Max: 37 (15 Feb 2024 03:22)  T(F): 97.9 (15 Feb 2024 12:03), Max: 98.6 (15 Feb 2024 03:22)  HR: 128 (15 Feb 2024 12:03) (111 - 132)  BP: 115/78 (15 Feb 2024 12:03) (96/62 - 115/78)  BP(mean): --  RR: 18 (15 Feb 2024 12:03) (18 - 18)  SpO2: 92% (15 Feb 2024 12:03) (92% - 100%)    Parameters below as of 15 Feb 2024 12:03  Patient On (Oxygen Delivery Method): nasal cannula      ICU Vital Signs Last 24 Hrs  BECCA JARRELL  I&O's Detail    I&O's Summary    Drug Dosing Weight  BECCA WESLY      PHYSICAL EXAM:  General: NAD  HEENT: Head; normocephalic, atraumatic.  Eyes: EOMI, conjunctiva normal  Neck: Supple  CARDIOVASCULAR: Tachycardic, regular rhythm, S1 S2, No murmurs or gallops  LUNGS: Clear to auscultation b/l, No rales, rhonchi or wheeze, normal inspiratory effort  ABDOMEN: Soft, nontender, non-distended  EXTREMITIES: No edema b/l, no cyanosis   SKIN: warm and dry  NEURO: Alert  PSYCH: appears anxious        LABS:                        7.4    7.23  )-----------( 197      ( 15 Feb 2024 06:52 )             24.3     02-15    143  |  110<H>  |  23.9<H>  ----------------------------<  96  4.0   |  18.0<L>  |  1.40<H>    Ca    7.5<L>      15 Feb 2024 06:52  Mg     1.8     02-15    TPro  5.7<L>  /  Alb  3.0<L>  /  TBili  0.2<L>  /  DBili  x   /  AST  8   /  ALT  <5  /  AlkPhos  80  02-14    BECCA JARRELL        Urinalysis Basic - ( 15 Feb 2024 06:52 )    Color: x / Appearance: x / SG: x / pH: x  Gluc: 96 mg/dL / Ketone: x  / Bili: x / Urobili: x   Blood: x / Protein: x / Nitrite: x   Leuk Esterase: x / RBC: x / WBC x   Sq Epi: x / Non Sq Epi: x / Bacteria: x        RADIOLOGY & ADDITIONAL STUDIES:    INTERPRETATION OF TELEMETRY (personally reviewed): Sinus tachycardia ranging from 100s-130s. No other significant cardiac events.     ECG: Sinus tachycardia, septal infarct, non-specific T wave changes     ECHO: 10/9/23  Summary:   1. Endocardial visualization was enhanced with intravenous echo contrast.   2. Normal left ventricular internal cavity size.   3. Normal global left ventricular systolic function.   4. Left ventricular ejection fraction, by visual estimation, is 60 to   65%.   5. Spectral Doppler shows impaired relaxation pattern of left   ventricular myocardial filling (Grade I diastolic dysfunction).   6. Elevated mean left atrial pressure.   7. Normal right ventricular size and function.   8. The left atrium is normal in size.   9. The right atrium is normal in size.  10. Sclerotic aortic valve with normal opening.  11. Mild thickening and calcification of the anterior and posterior   mitral valve leaflets.  12. Moderate mitral annular calcification.  13. Mild mitral valve regurgitation.  14. Mild tricuspid regurgitation.      Assessment and Plan:  78y Female with a PMHx of NICM, HFrecEF, HTN, HLD, multiple myeloma, ITP, ulcerative colitis, MDD, anxiety, GERD who presents with severe sepsis from UTI, colitis and remains to be tachycardic associated with dyspnea on exertion.    Sinus Tachycardia/VERAS  -patient with multiple reasons for sinus tachycardia  -patient denies any chest pain  -tele with sinus tachycardia ranging from 100s-130s    -ECG from 2/11/24 with sinus tachycardia  -will repeat ECG now  -patient initially presented with sepsis (UTI/colitis) however patient no longer has leukocytosis  -patient states that her diarrhea has resolved however she states that she is not urinating much   -would recommend trial of IV fluids (500cc x1)  -patient's hgb now 7.4. Monitor closely and assess for bleeding (patient denies any bleeding, dark stools)   -patient suffers from anxiety  -would recommend V/Q scan to assess for PE (patient with elevated creatinine)    Discussed with Dr. Soni.      Thank you for letting [facility] to assist in the management of this patient. Please call with any questions.

## 2024-02-15 NOTE — PHYSICAL THERAPY INITIAL EVALUATION ADULT - GAIT PATTERN USED, PT EVAL
decreased gait velocity, pt declining further ambulation due to fatigue,  and O2 sat 91% after ambulation-RN aware

## 2024-02-15 NOTE — PHYSICAL THERAPY INITIAL EVALUATION ADULT - PERTINENT HX OF CURRENT PROBLEM, REHAB EVAL
s/p fall with AMS and abd pain and diarrhea -- admitted for Sepsis 2/2 Infectious v Inflammatory Colitis and Acute UTI c/b ARF, AGMA, Multiple Electrolyte Abnormalities, AMS, and Fall. Chronic Compression Fractures, Incidental T5/T9 compression deformity + suspected sequelae of sacral insufficiency fractures

## 2024-02-15 NOTE — CONSULT NOTE ADULT - REASON FOR ADMISSION
Severe Sepsis/Colitis/UTI/Lactic Acidosis/ARF/Multiple Electrolyte Derangements

## 2024-02-15 NOTE — PROGRESS NOTE ADULT - ASSESSMENT
78F with PMHX COPD on Home O2, HFrEF, HTN, HLD, Multiple Myeloma on Ninlaro, Chronic ITP on Promacta, Collagenous Colitis, GERD, Depression and Anxiety BIB daughter to [facility] ER s/p fall with AMS and abd pain and diarrhea -- admitted for Sepsis 2/2 Infectious v Inflammatory Colitis and Acute UTI c/b ARF, AGMA, Multiple Electrolyte Abnormalities, AMS, and Fall.    Sepsis 2/2 Infectious v Inflammatory Colitis and Acute UTI   -CT ABD/PEL +Colitis +Cystitis   -BCX x2 with no growth so far   -GI PCR pending collection  -Rx'd six different abx in past 6 months including Doxycycline/Bactrim/Cefpodoxime/Azithromycin/Levaquin/Cephelexin -> CDIFF PCR ordered but cancelled on 2/13 as patient with no diarrhea since admission   -ESR 46 and   -Lactate 2.3 -> Lactate 1.6   -2.5L IVFB NSS given in ED  -s/p IVF LR 125cc/hr  -Solucortef 100mg IV x1 in ED  -D/C Solucortef 50mg IV q6, unlikely inflammatory flare   -PCN Allergy noted but tolerating Cephalosporins  -Ceftriaxone 1g x1 + Flagyl 500mg x1 + Vanco 1g IV x1 in ED  -Broaden empiric IV ABX to Cefepime 1g IV q8 and Flagyl 500mg IV q8 given patient on immunosuppressants  -Hold Vanco IV for now  -Added Midodrine 10 mg TID, decrease to 5 mg TID   -GI following     Tachycaria with aute on ch hypoxic resp failure  V/Q scan   Additional 500 ml IVF bolus  Repeat H/H 20:00. If Hb < 7 1 unit PRBC   Cards eval appreciated       Acute UTI  -CT AP +Thick walled urinary bladder possible Cystitis  -UA positive. UCX with Enterococcus Faecalis sensitivity pending   -Continue ABX as above    AMS  -CTH WO negative  -Likely toxic metablolic encephalopathy from hypotension/sepsis/colitis/UTI  -Resolving     CLIFF / AGMA 2/2 Lactic Acidosis  -CT ABDPEL no obstruction. CPK minimally elevated and UA with small blood doubt rhabdo  -Lactate cleared. Repeat Labs with improved AG  -s/p IVF  -Added Sodium Bicarb   -Avoid nephrotoxic medications. Hold home Ibuprofen  -Monitor     Hypomagnesemia, Hypocalcemia, Hypokalemia  -Replete     Chronic Compression Fractures, Incidental T5/T9 compression deformity + suspected sequelae of sacral insufficiency fractures   -TLSO Brace when OOB  -WBAT   -Pain control   -PT eval   -Outpatient follow up with Neurosurgery (Dr. Yarelis Edwards) in 2-4 weeks    Multiple Myeloma, Anemia, Chronic ITP?  -Will hold Ninlaro and Promacta for now till acute infection is resolved    Depression + Anxiety  -Duloxetine 60mg q24  -Alprazolam 0.25mg QID PRN    HTN  -Restart Metoprolol     COPD  -No current hypoxia  -CT Chest improving infiltrates from prior CT  -O2 via NC PRN  -Continue Symbicort   -DuoNebs    DVT Prophylaxis -- Venodyne    Dispo: Likely [facility] once stable.

## 2024-02-15 NOTE — PROGRESS NOTE ADULT - ASSESSMENT
78F with PMHX COPD on Home O2, HFrEF, HTN, HLD, Multiple Myeloma on Ninlaro, Chronic ITP on Promacta, collagenous Colitis (dx at [facility] "many years ago" not on any meds), MDD, Anxiety, GERD BIB daughter to [facility] ER s/p fall with AMS and abd pain and diarrhea.     Colitis:   #abdominal pain  #diarrhea  #anemia     2/11/2024 CT A/P:  Marked biliary ductal dilatation, the common bile duct measuring up to 1.3 cm. This is increased compared to previous exam. There is also dilated cystic duct remnant. Mild intrahepatic biliary duct dilatation is appreciated. Cholecystectomy. There is extensive  colonic diverticulosis. Question mild fat stranding of the sigmoid mesentery.   02/14/24: MRCP  shows no choledocholithiasis. Slightly dilated common bile duct and central intrahepatic ducts, s/p cholecystectomy. Shows mild edema adjacent to the sigmoid colon, c/w acute sigmoid colitis.     Leucocytosis normalized, remains afebrile. Diarrhea resolved.   Tolerating diet. Hemoglobin 7.4 gm this morning, no hematemesis, melena, hematochezia reported. No overt signs of GIB  - Trend CBC, transfuse as needed. Monitor for bleeding  - Avoid NSAIDs  - GI PCR ordered, sample cancelled by lab  - Continue antibiotics   - Serial abdominal exam, monitor fever, avoid hypotension.   - Diet as tolerated, low fiber diet    No endoscopic evaluation planned this time.

## 2024-02-15 NOTE — PROGRESS NOTE ADULT - SUBJECTIVE AND OBJECTIVE BOX
Chief Complaint:  Patient is a 78y old  Female who presents with a chief complaint of Severe Sepsis/Colitis/UTI/Lactic Acidosis/ARF/Multiple Electrolyte Derangements (13 Feb 2024 13:52)    Interval HPI/ 24 hr events: Patient seen and examined at bedside. Patient feeling well this morning. Tolerating regular diet. Diarrhea now resolved. Denies abdominal pain, nausea, vomiting, fevr, chills. Leukocytosis normalized, remains afebrile. MRCP (02/14/24) shows no choledocholithiasis. Slightly dilated common bile duct and central intrahepatic ducts, s/p cholecystectomy. Shows mild edema adjacent to the sigmoid colon, c/w acute sigmoid colitis.     REVIEW OF SYSTEMS:   General: Negative  HEENT: Negative  CV: Negative  Respiratory: Negative  GI: See HPI  : Negative  MSK: Negative  Hematologic: Negative  Skin: Negative      PAST MEDICAL/SURGICAL HISTORY:  MI (myocardial infarction)    Kidney stones    Hearing loss, unspecified hearing loss type, unspecified laterality    Anxiety    Multiple myeloma    COPD (chronic obstructive pulmonary disease)    S/P cholecystectomy      MEDICATIONS  (STANDING):  budesonide  80 MICROgram(s)/formoterol 4.5 MICROgram(s) Inhaler 2 Puff(s) Inhalation two times a day  cefepime  Injectable.      cefepime  Injectable. 1000 milliGRAM(s) IV Push every 12 hours  DULoxetine 60 milliGRAM(s) Oral daily  influenza  Vaccine (HIGH DOSE) 0.7 milliLiter(s) IntraMuscular once  magnesium oxide 400 milliGRAM(s) Oral three times a day with meals  magnesium sulfate  IVPB 2 Gram(s) IV Intermittent once  metoprolol tartrate 12.5 milliGRAM(s) Oral every 12 hours  metroNIDAZOLE  IVPB 500 milliGRAM(s) IV Intermittent every 8 hours  midodrine. 5 milliGRAM(s) Oral three times a day  pantoprazole    Tablet 40 milliGRAM(s) Oral before breakfast  sodium bicarbonate 650 milliGRAM(s) Oral three times a day  tiotropium 2.5 MICROgram(s) Inhaler 2 Puff(s) Inhalation daily    MEDICATIONS  (PRN):  acetaminophen     Tablet .. 650 milliGRAM(s) Oral every 6 hours PRN Temp greater or equal to 38C (100.4F), Mild Pain (1 - 3)  ALPRAZolam 0.25 milliGRAM(s) Oral every 6 hours PRN Anxiety  aluminum hydroxide/magnesium hydroxide/simethicone Suspension 30 milliLiter(s) Oral every 4 hours PRN Dyspepsia  benzonatate 100 milliGRAM(s) Oral three times a day PRN Cough  melatonin 3 milliGRAM(s) Oral at bedtime PRN Insomnia  ondansetron Injectable 4 milliGRAM(s) IV Push every 8 hours PRN Nausea and/or Vomiting    penicillin (Unknown)    T(C): 36.9 (02-15-24 @ 05:17), Max: 37 (02-15-24 @ 03:22)  HR: 123 (02-15-24 @ 05:17) (111 - 126)  BP: 97/63 (02-15-24 @ 05:17) (93/58 - 135/97)  RR: 18 (02-15-24 @ 05:17) (16 - 18)  SpO2: 96% (02-15-24 @ 05:17) (93% - 99%)        PHYSICAL EXAM:   GENERAL:  No acute distress  HEENT:  NC/AT, conjunctiva clear, sclera anicteric  CHEST:  + dyspneic on exertion  HEART:  + tachycardic   ABDOMEN:  Soft, non-tender, non-distended, normoactive bowel sounds, no rebound or guarding  EXTREMITIES: No edema  SKIN:  Warm, dry  NEURO:  Calm, cooperative      LABS:               7.4    7.23  )-----------( 197      ( 02-15 @ 06:52 )             24.3                8.2    14.67 )-----------( 268      ( 02-14 @ 07:11 )             26.8                7.7    17.44 )-----------( 246      ( 02-13 @ 06:35 )             24.8       02-15    143  |  110<H>  |  23.9<H>  ----------------------------<  96  4.0   |  18.0<L>  |  1.40<H>    Ca    7.5<L>      15 Feb 2024 06:52  Mg     1.8     02-15    TPro  5.7<L>  /  Alb  3.0<L>  /  TBili  0.2<L>  /  DBili  x   /  AST  8   /  ALT  <5  /  AlkPhos  80  02-14    LIVER FUNCTIONS - ( 14 Feb 2024 07:11 )  Alb: 3.0 g/dL / Pro: 5.7 g/dL / ALK PHOS: 80 U/L / ALT: <5 U/L / AST: 8 U/L / GGT: x             < from: MR MRCP No Cont (02.15.24 @ 04:54) >  FINDINGS:  LOWER CHEST: Small bilateral pleural effusions.    LIVER: Within normal limits.  BILE DUCTS: Dilated biliary tree measuring up to 8 mm at the proximal   CBD. Mildly dilated central intrahepatic ducts with slight irregularity   of the left intrahepatic bile ducts, incompletely assessed. No   choledocholithiasis. The common bile duct tapers to the level of the   ampulla.  GALLBLADDER: Cholecystectomy.  SPLEEN: Within normal limits.  PANCREAS: Within normal limits.  ADRENALS: Within normal limits.  KIDNEYS/URETERS: Fatty infiltration of the kidneys. No hydronephrosis.    VISUALIZED PORTIONS:  BOWEL: Mild pericolonic edema adjacent to the sigmoid colon.  PERITONEUM: No ascites.  VESSELS: Normal T2 flow voids in the major vessels of the upper abdomen.   Ectatic infrarenal abdominal aorta with atherosclerotic changes.  RETROPERITONEUM/LYMPH NODES: No lymphadenopathy.  ABDOMINAL WALL: Within normal limits.  BONES: Degenerative changes.    IMPRESSION:  Slightly dilated common bile duct and central intrahepatic ducts. No   choledocholithiasis. Status post cholecystectomy.    Mild edema adjacent to the sigmoid colon, as seen on recent CT, possibly   representing acute sigmoid colitis.    Small bilateral pleural effusions.    < end of copied text >

## 2024-02-16 LAB
ANION GAP SERPL CALC-SCNC: 14 MMOL/L — SIGNIFICANT CHANGE UP (ref 5–17)
BUN SERPL-MCNC: 19.2 MG/DL — SIGNIFICANT CHANGE UP (ref 8–20)
CALCIUM SERPL-MCNC: 7 MG/DL — LOW (ref 8.4–10.5)
CHLORIDE SERPL-SCNC: 108 MMOL/L — SIGNIFICANT CHANGE UP (ref 96–108)
CO2 SERPL-SCNC: 20 MMOL/L — LOW (ref 22–29)
CREAT SERPL-MCNC: 1.22 MG/DL — SIGNIFICANT CHANGE UP (ref 0.5–1.3)
EGFR: 45 ML/MIN/1.73M2 — LOW
GLUCOSE SERPL-MCNC: 101 MG/DL — HIGH (ref 70–99)
HCT VFR BLD CALC: 24.5 % — LOW (ref 34.5–45)
HGB BLD-MCNC: 7.6 G/DL — LOW (ref 11.5–15.5)
MAGNESIUM SERPL-MCNC: 2.2 MG/DL — SIGNIFICANT CHANGE UP (ref 1.6–2.6)
MCHC RBC-ENTMCNC: 25.6 PG — LOW (ref 27–34)
MCHC RBC-ENTMCNC: 31 GM/DL — LOW (ref 32–36)
MCV RBC AUTO: 82.5 FL — SIGNIFICANT CHANGE UP (ref 80–100)
PLATELET # BLD AUTO: 216 K/UL — SIGNIFICANT CHANGE UP (ref 150–400)
POTASSIUM SERPL-MCNC: 3.7 MMOL/L — SIGNIFICANT CHANGE UP (ref 3.5–5.3)
POTASSIUM SERPL-SCNC: 3.7 MMOL/L — SIGNIFICANT CHANGE UP (ref 3.5–5.3)
RBC # BLD: 2.97 M/UL — LOW (ref 3.8–5.2)
RBC # FLD: 19.1 % — HIGH (ref 10.3–14.5)
SODIUM SERPL-SCNC: 142 MMOL/L — SIGNIFICANT CHANGE UP (ref 135–145)
WBC # BLD: 8.06 K/UL — SIGNIFICANT CHANGE UP (ref 3.8–10.5)
WBC # FLD AUTO: 8.06 K/UL — SIGNIFICANT CHANGE UP (ref 3.8–10.5)

## 2024-02-16 PROCEDURE — 99233 SBSQ HOSP IP/OBS HIGH 50: CPT

## 2024-02-16 RX ORDER — VANCOMYCIN HCL 1 G
VIAL (EA) INTRAVENOUS
Refills: 0 | Status: DISCONTINUED | OUTPATIENT
Start: 2024-02-16 | End: 2024-02-16

## 2024-02-16 RX ORDER — VANCOMYCIN HCL 1 G
1000 VIAL (EA) INTRAVENOUS EVERY 24 HOURS
Refills: 0 | Status: DISCONTINUED | OUTPATIENT
Start: 2024-02-16 | End: 2024-02-18

## 2024-02-16 RX ORDER — METRONIDAZOLE 500 MG
500 TABLET ORAL EVERY 8 HOURS
Refills: 0 | Status: DISCONTINUED | OUTPATIENT
Start: 2024-02-16 | End: 2024-02-18

## 2024-02-16 RX ORDER — CEFEPIME 1 G/1
1000 INJECTION, POWDER, FOR SOLUTION INTRAMUSCULAR; INTRAVENOUS EVERY 12 HOURS
Refills: 0 | Status: DISCONTINUED | OUTPATIENT
Start: 2024-02-16 | End: 2024-02-18

## 2024-02-16 RX ORDER — MIDODRINE HYDROCHLORIDE 2.5 MG/1
2.5 TABLET ORAL THREE TIMES A DAY
Refills: 0 | Status: DISCONTINUED | OUTPATIENT
Start: 2024-02-16 | End: 2024-02-18

## 2024-02-16 RX ORDER — MIDODRINE HYDROCHLORIDE 2.5 MG/1
2.5 TABLET ORAL THREE TIMES A DAY
Refills: 0 | Status: DISCONTINUED | OUTPATIENT
Start: 2024-02-16 | End: 2024-02-17

## 2024-02-16 RX ADMIN — Medication 100 MILLIGRAM(S): at 05:05

## 2024-02-16 RX ADMIN — Medication 12.5 MILLIGRAM(S): at 18:19

## 2024-02-16 RX ADMIN — DULOXETINE HYDROCHLORIDE 60 MILLIGRAM(S): 30 CAPSULE, DELAYED RELEASE ORAL at 11:11

## 2024-02-16 RX ADMIN — MIDODRINE HYDROCHLORIDE 5 MILLIGRAM(S): 2.5 TABLET ORAL at 11:10

## 2024-02-16 RX ADMIN — Medication 650 MILLIGRAM(S): at 21:16

## 2024-02-16 RX ADMIN — Medication 0.25 MILLIGRAM(S): at 05:15

## 2024-02-16 RX ADMIN — PANTOPRAZOLE SODIUM 40 MILLIGRAM(S): 20 TABLET, DELAYED RELEASE ORAL at 05:08

## 2024-02-16 RX ADMIN — Medication 100 MILLIGRAM(S): at 14:30

## 2024-02-16 RX ADMIN — CEFEPIME 1000 MILLIGRAM(S): 1 INJECTION, POWDER, FOR SOLUTION INTRAMUSCULAR; INTRAVENOUS at 05:09

## 2024-02-16 RX ADMIN — Medication 250 MILLIGRAM(S): at 19:40

## 2024-02-16 RX ADMIN — HEPARIN SODIUM 5000 UNIT(S): 5000 INJECTION INTRAVENOUS; SUBCUTANEOUS at 21:16

## 2024-02-16 RX ADMIN — BUDESONIDE AND FORMOTEROL FUMARATE DIHYDRATE 2 PUFF(S): 160; 4.5 AEROSOL RESPIRATORY (INHALATION) at 21:08

## 2024-02-16 RX ADMIN — Medication 0.25 MILLIGRAM(S): at 12:45

## 2024-02-16 RX ADMIN — MAGNESIUM OXIDE 400 MG ORAL TABLET 400 MILLIGRAM(S): 241.3 TABLET ORAL at 11:12

## 2024-02-16 RX ADMIN — CEFEPIME 1000 MILLIGRAM(S): 1 INJECTION, POWDER, FOR SOLUTION INTRAMUSCULAR; INTRAVENOUS at 18:19

## 2024-02-16 RX ADMIN — MAGNESIUM OXIDE 400 MG ORAL TABLET 400 MILLIGRAM(S): 241.3 TABLET ORAL at 08:02

## 2024-02-16 RX ADMIN — Medication 650 MILLIGRAM(S): at 14:28

## 2024-02-16 RX ADMIN — Medication 12.5 MILLIGRAM(S): at 05:07

## 2024-02-16 RX ADMIN — Medication 100 MILLIGRAM(S): at 21:15

## 2024-02-16 RX ADMIN — Medication 0.25 MILLIGRAM(S): at 21:16

## 2024-02-16 RX ADMIN — HEPARIN SODIUM 5000 UNIT(S): 5000 INJECTION INTRAVENOUS; SUBCUTANEOUS at 14:28

## 2024-02-16 RX ADMIN — HEPARIN SODIUM 5000 UNIT(S): 5000 INJECTION INTRAVENOUS; SUBCUTANEOUS at 05:08

## 2024-02-16 RX ADMIN — MIDODRINE HYDROCHLORIDE 2.5 MILLIGRAM(S): 2.5 TABLET ORAL at 18:19

## 2024-02-16 RX ADMIN — BUDESONIDE AND FORMOTEROL FUMARATE DIHYDRATE 2 PUFF(S): 160; 4.5 AEROSOL RESPIRATORY (INHALATION) at 08:28

## 2024-02-16 RX ADMIN — Medication 650 MILLIGRAM(S): at 05:07

## 2024-02-16 NOTE — PROGRESS NOTE ADULT - SUBJECTIVE AND OBJECTIVE BOX
[facility] - [facility], [facility]                                   [address] [place] [address]                                                      PHONE: [phone]                                                         FAX: [phone]  [email or web address]  ---------------------------------------------------------------------------------------------------------------------------------    Overnight events/patient complaints: Patient states that she feels much better this AM.       penicillin (Unknown)    MEDICATIONS  (STANDING):  budesonide  80 MICROgram(s)/formoterol 4.5 MICROgram(s) Inhaler 2 Puff(s) Inhalation two times a day  cefepime  Injectable.      cefepime  Injectable. 1000 milliGRAM(s) IV Push every 12 hours  DULoxetine 60 milliGRAM(s) Oral daily  heparin   Injectable 5000 Unit(s) SubCutaneous every 8 hours  influenza  Vaccine (HIGH DOSE) 0.7 milliLiter(s) IntraMuscular once  lactated ringers. 500 milliLiter(s) (250 mL/Hr) IV Continuous <Continuous>  magnesium oxide 400 milliGRAM(s) Oral three times a day with meals  metoprolol tartrate 12.5 milliGRAM(s) Oral every 12 hours  metroNIDAZOLE  IVPB 500 milliGRAM(s) IV Intermittent every 8 hours  midodrine. 5 milliGRAM(s) Oral three times a day  pantoprazole    Tablet 40 milliGRAM(s) Oral before breakfast  sodium bicarbonate 650 milliGRAM(s) Oral three times a day  tiotropium 2.5 MICROgram(s) Inhaler 2 Puff(s) Inhalation daily    MEDICATIONS  (PRN):  acetaminophen     Tablet .. 650 milliGRAM(s) Oral every 6 hours PRN Temp greater or equal to 38C (100.4F), Mild Pain (1 - 3)  ALPRAZolam 0.25 milliGRAM(s) Oral every 6 hours PRN Anxiety  aluminum hydroxide/magnesium hydroxide/simethicone Suspension 30 milliLiter(s) Oral every 4 hours PRN Dyspepsia  benzonatate 100 milliGRAM(s) Oral three times a day PRN Cough  melatonin 3 milliGRAM(s) Oral at bedtime PRN Insomnia  ondansetron Injectable 4 milliGRAM(s) IV Push every 8 hours PRN Nausea and/or Vomiting      Vital Signs Last 24 Hrs  T(C): 37.3 (16 Feb 2024 10:11), Max: 37.3 (16 Feb 2024 10:11)  T(F): 99.1 (16 Feb 2024 10:11), Max: 99.1 (16 Feb 2024 10:11)  HR: 108 (16 Feb 2024 10:11) (70 - 128)  BP: 108/69 (16 Feb 2024 10:11) (107/76 - 126/66)  BP(mean): --  RR: 20 (16 Feb 2024 10:11) (18 - 20)  SpO2: 97% (16 Feb 2024 10:11) (92% - 97%)    Parameters below as of 16 Feb 2024 10:11  Patient On (Oxygen Delivery Method): nasal cannula      ICU Vital Signs Last 24 Hrs  BECCA JARRELL  I&O's Detail    Drug Dosing Weight  BECCA JARRELL      PHYSICAL EXAM:  General: NAD  HEENT: Head; normocephalic, atraumatic.  Eyes: EOMI, conjunctiva normal  Neck: Supple, no JVD noted  CARDIOVASCULAR: Regular rhythm, mildly tachycardic, S1 S2, No murmurs or gallops  LUNGS: Clear to auscultation b/l, No rales, rhonchi or wheeze, normal inspiratory effort  ABDOMEN: Soft, nontender, non-distended, +bowel sounds  EXTREMITIES: No edema b/l, no cyanosis   SKIN: warm and dry  NEURO: Alert/oriented x 3  PSYCH: normal affect.        LABS:                        7.6    8.06  )-----------( 216      ( 16 Feb 2024 07:23 )             24.5     02-16    142  |  108  |  19.2  ----------------------------<  101<H>  3.7   |  20.0<L>  |  1.22    Ca    7.0<L>      16 Feb 2024 07:23  Mg     2.2     02-16      BECCA JARRELL        Urinalysis Basic - ( 16 Feb 2024 07:23 )    Color: x / Appearance: x / SG: x / pH: x  Gluc: 101 mg/dL / Ketone: x  / Bili: x / Urobili: x   Blood: x / Protein: x / Nitrite: x   Leuk Esterase: x / RBC: x / WBC x   Sq Epi: x / Non Sq Epi: x / Bacteria: x        RADIOLOGY & ADDITIONAL STUDIES:    INTERPRETATION OF TELEMETRY (personally reviewed): SInus rhythm, HR 100s    ASSESSMENT AND PLAN:  78 year old female with a PMHx of NICM, HFrecEF, HTN, HLD, multiple myeloma, ITP, ulcerative colitis, MDD, anxiety, GERD who presents with severe sepsis from UTI, colitis and remains to be tachycardic associated with dyspnea on exertion.    Sinus Tachycardia/VERAS  -s/p IV fluids yesterday  -patient states that she feels improved today  -VERAS has also improved  -HR now 100s in sinus rhythm  -V/Q with low probability for PE  -can give additional IV fluids 500cc today however primary team planning on giving one unit pRBC     Thank you for letting [facility] to assist in the management of this patient. Please call with any questions.

## 2024-02-16 NOTE — PROGRESS NOTE ADULT - SUBJECTIVE AND OBJECTIVE BOX
[facility] Division of Hospital Medicine    Chief Complaint:  Sepsis    SUBJECTIVE / OVERNIGHT EVENTS:  Pt examined lying in bed  States she woke up this am feeling better  Less tachycardic today s/p IVF bolus  Patient denies chest pain, SOB, abd pain, N/V, fever, chills, dysuria or any other complaints. All remainder ROS negative.     MEDICATIONS  (STANDING):  budesonide  80 MICROgram(s)/formoterol 4.5 MICROgram(s) Inhaler 2 Puff(s) Inhalation two times a day  cefepime  Injectable.      cefepime  Injectable. 1000 milliGRAM(s) IV Push every 12 hours  DULoxetine 60 milliGRAM(s) Oral daily  heparin   Injectable 5000 Unit(s) SubCutaneous every 8 hours  influenza  Vaccine (HIGH DOSE) 0.7 milliLiter(s) IntraMuscular once  lactated ringers. 500 milliLiter(s) (250 mL/Hr) IV Continuous <Continuous>  metoprolol tartrate 12.5 milliGRAM(s) Oral every 12 hours  metroNIDAZOLE  IVPB 500 milliGRAM(s) IV Intermittent every 8 hours  midodrine. 5 milliGRAM(s) Oral three times a day  pantoprazole    Tablet 40 milliGRAM(s) Oral before breakfast  sodium bicarbonate 650 milliGRAM(s) Oral three times a day  tiotropium 2.5 MICROgram(s) Inhaler 2 Puff(s) Inhalation daily    MEDICATIONS  (PRN):  acetaminophen     Tablet .. 650 milliGRAM(s) Oral every 6 hours PRN Temp greater or equal to 38C (100.4F), Mild Pain (1 - 3)  ALPRAZolam 0.25 milliGRAM(s) Oral every 6 hours PRN Anxiety  aluminum hydroxide/magnesium hydroxide/simethicone Suspension 30 milliLiter(s) Oral every 4 hours PRN Dyspepsia  benzonatate 100 milliGRAM(s) Oral three times a day PRN Cough  melatonin 3 milliGRAM(s) Oral at bedtime PRN Insomnia  ondansetron Injectable 4 milliGRAM(s) IV Push every 8 hours PRN Nausea and/or Vomiting        I&O's Summary      PHYSICAL EXAM:  Vital Signs Last 24 Hrs  T(C): 37.3 (16 Feb 2024 10:11), Max: 37.3 (16 Feb 2024 10:11)  T(F): 99.1 (16 Feb 2024 10:11), Max: 99.1 (16 Feb 2024 10:11)  HR: 108 (16 Feb 2024 10:11) (70 - 128)  BP: 108/69 (16 Feb 2024 10:11) (107/76 - 126/66)  BP(mean): --  RR: 20 (16 Feb 2024 10:11) (18 - 20)  SpO2: 97% (16 Feb 2024 10:11) (92% - 97%)    Parameters below as of 16 Feb 2024 10:11  Patient On (Oxygen Delivery Method): nasal cannula            CONSTITUTIONAL: Non toxic appearing, lying in bed  ENMT: Moist oral mucosa, no pharyngeal injection or exudates; normal dentition  RESPIRATORY: Normal respiratory effort; lungs are clear to auscultation bilaterally  CARDIOVASCULAR: Regular rate and rhythm, improving tafchycardia,  normal S1 and S2, no murmur/rub/gallop; No lower extremity edema; Peripheral pulses are 2+ bilaterally  ABDOMEN: Nontender to palpation, normoactive bowel sounds, no rebound/guarding; No hepatosplenomegaly  MUSCLOSKELETAL:  Normal gait; no clubbing or cyanosis of digits; no joint swelling or tenderness to palpation  PSYCH: A+O to person, place, and time; affect appropriate  NEUROLOGY: CN 2-12 are intact and symmetric; no gross sensory deficits;   SKIN: No rashes; no palpable lesions    LABS:                        7.6    8.06  )-----------( 216      ( 16 Feb 2024 07:23 )             24.5     02-16    142  |  108  |  19.2  ----------------------------<  101<H>  3.7   |  20.0<L>  |  1.22    Ca    7.0<L>      16 Feb 2024 07:23  Mg     2.2     02-16            Urinalysis Basic - ( 16 Feb 2024 07:23 )    Color: x / Appearance: x / SG: x / pH: x  Gluc: 101 mg/dL / Ketone: x  / Bili: x / Urobili: x   Blood: x / Protein: x / Nitrite: x   Leuk Esterase: x / RBC: x / WBC x   Sq Epi: x / Non Sq Epi: x / Bacteria: x        CAPILLARY BLOOD GLUCOSE            RADIOLOGY & ADDITIONAL TESTS:    IMPRESSION:  V/Q scan 2/16  Low probability of pulmonary embolus.

## 2024-02-16 NOTE — PROGRESS NOTE ADULT - ASSESSMENT
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 Admitted for Sepsis 2/2 Infectious v Inflammatory Colitis and Acute UTI c/b ARF, AGMA, Multiple Electrolyte Abnormalities. Of note has been intermittently tachycardic with a gradual downward drift in H/H     Sepsis 2/2 Infectious v Inflammatory Colitis and Acute UTI   CT ABD/PEL +Colitis +Cystitis   BCX x2 with no growth so far   GI PCR collection cancelled.   Rx'd six different abx in past 6 months including Doxycycline/Bactrim/Cefpodoxime/Azithromycin/Levaquin/Cephelexin -> CDIFF PCR ordered but cancelled on 2/13 as patient with no diarrhea since admission   ESR 46 and   PCN Allergy noted but tolerating Cephalosporins  Has tolerated cephalosporins however urine culture growing E Facalis   -Ceftriaxone 1g x1 + Flagyl 500mg x1 + Vanco 1g IV x1 in ED  -Broaden empiric IV ABX to Cefepime 1g IV q8 and Flagyl 500mg IV q8 given patient on immunosuppressants  -Hold Vanco IV for now  -Added Midodrine 10 mg TID, decrease to 5 mg TID   -GI following     Tachycaria with aute on ch hypoxic resp failure  V/Q scan   Additional 500 ml IVF bolus  Repeat H/H 20:00. If Hb < 7 1 unit PRBC   Cards eval appreciated       Acute UTI  -CT AP +Thick walled urinary bladder possible Cystitis  -UA positive. UCX with Enterococcus Faecalis sensitivity pending   -Continue ABX as above    AMS  -CTH WO negative  -Likely toxic metablolic encephalopathy from hypotension/sepsis/colitis/UTI  -Resolving     CLIFF / AGMA 2/2 Lactic Acidosis  -CT ABDPEL no obstruction. CPK minimally elevated and UA with small blood doubt rhabdo  -Lactate cleared. Repeat Labs with improved AG  -s/p IVF  -Added Sodium Bicarb   -Avoid nephrotoxic medications. Hold home Ibuprofen  -Monitor     Hypomagnesemia, Hypocalcemia, Hypokalemia  -Replete     Chronic Compression Fractures, Incidental T5/T9 compression deformity + suspected sequelae of sacral insufficiency fractures   -TLSO Brace when OOB  -WBAT   -Pain control   -PT eval   -Outpatient follow up with Neurosurgery (Dr. Yarelis Edwards) in 2-4 weeks    Multiple Myeloma, Anemia, Chronic ITP?  -Will hold Ninlaro and Promacta for now till acute infection is resolved    Depression + Anxiety  -Duloxetine 60mg q24  -Alprazolam 0.25mg QID PRN    HTN  -Restart Metoprolol     COPD  -No current hypoxia  -CT Chest improving infiltrates from prior CT  -O2 via NC PRN  -Continue Symbicort   -DuoNebs    DVT Prophylaxis -- Venodyne    Dispo: Likely [facility] once stable.      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 Admitted for Sepsis 2/2 Infectious v Inflammatory Colitis and Acute UTI c/b ARF, AGMA, Multiple Electrolyte Abnormalities. Of note has been intermittently tachycardic with a gradual downward drift in H/H     Sepsis 2/2 Infectious v Inflammatory Colitis and Acute UTI   CT ABD/PEL +Colitis +Cystitis   BCX x2 with no growth so far   GI PCR collection cancelled.   Rx'd six different abx in past 6 months including Doxycycline/Bactrim/Cefpodoxime/Azithromycin/Levaquin/Cephelexin -> CDIFF PCR ordered but cancelled on 2/13 as patient with no diarrhea since admission   ESR 46 and   PCN Allergy and as such was started on Cefepime and Flagyl with which has had complete symptomatic abd improvement. Would continue for now to complete 7 days total   Urine culture however with E. Faecalis (but given PCN allergy start Vancomcin x 3 days)   BPs improving, midodrine decxreased from 10 mg to 5. Further decrease to 2.5 mg Q8 with additional 2.5 mg Q8 PRN     Tachycaria with acute on ch hypoxic resp failure  HR improved post IVF  Still with low grade tach,   Given anemia, degree of lung disease will proceed with 1 unit PRBC now   V/Q scan low prob for PE  Suspect also degree of underlying severe anxiety disorder   Cards recs appreciated    CTH WO negative  Likely toxic metabolic encephalopathy from hypotension/sepsis/colitis/UTI  Resolved     CLIFF / AGMA 2/2 Lactic Acidosis  CT ABDPEL no obstruction. CPK minimally elevated and UA with small blood doubt rhabdo  Lactate cleared. Repeat Labs with improved AG  CLIFF improving, Suspect prerenal ATN from intravascular volume depletion   s/p IVF  Added Sodium Bicarb   Avoid nephrotoxic medications. Hold home Ibuprofen  Monitor     Hypomagnesemia, Hypocalcemia, Hypokalemia  Repleted     Chronic Compression Fractures, Incidental T5/T9 compression deformity + suspected sequelae of sacral insufficiency fractures   TLSO Brace when OOB  WBAT   Pain control   PT eval   Outpatient follow up with Neurosurgery (Dr. Yarelis Edwards) in 2-4 weeks    Multiple Myeloma, Anemia, Chronic ITP?  Will hold Ninlaro and Promacta for now till acute infection is resolved  Give 1 unit PRBC     Depression + Anxiety  Duloxetine 60mg q24  Alprazolam 0.25mg QID PRN    HTN  Continue low dose Metoprolol  12.5 mg bid     COPD  On nocturnal home O2, now also requiring during day  Suspect lives with O2s in mid to high 80s at baseline  CT Chest improving infiltrates from prior CT  O2 via NC PRN  Continue Symbicort   DuoNebs    DVT Prophylaxis -- Venodyne    Dispo: Likely [facility] once stable.

## 2024-02-17 ENCOUNTER — TRANSCRIPTION ENCOUNTER (OUTPATIENT)
Age: 79
End: 2024-02-17

## 2024-02-17 LAB
ANION GAP SERPL CALC-SCNC: 16 MMOL/L — SIGNIFICANT CHANGE UP (ref 5–17)
BUN SERPL-MCNC: 12.8 MG/DL — SIGNIFICANT CHANGE UP (ref 8–20)
CALCIUM SERPL-MCNC: 6.6 MG/DL — LOW (ref 8.4–10.5)
CHLORIDE SERPL-SCNC: 105 MMOL/L — SIGNIFICANT CHANGE UP (ref 96–108)
CO2 SERPL-SCNC: 23 MMOL/L — SIGNIFICANT CHANGE UP (ref 22–29)
CREAT SERPL-MCNC: 1.02 MG/DL — SIGNIFICANT CHANGE UP (ref 0.5–1.3)
CULTURE RESULTS: SIGNIFICANT CHANGE UP
CULTURE RESULTS: SIGNIFICANT CHANGE UP
EGFR: 56 ML/MIN/1.73M2 — LOW
GLUCOSE SERPL-MCNC: 108 MG/DL — HIGH (ref 70–99)
HCT VFR BLD CALC: 29.4 % — LOW (ref 34.5–45)
HGB BLD-MCNC: 9.2 G/DL — LOW (ref 11.5–15.5)
MCHC RBC-ENTMCNC: 25.6 PG — LOW (ref 27–34)
MCHC RBC-ENTMCNC: 31.3 GM/DL — LOW (ref 32–36)
MCV RBC AUTO: 81.7 FL — SIGNIFICANT CHANGE UP (ref 80–100)
PLATELET # BLD AUTO: 248 K/UL — SIGNIFICANT CHANGE UP (ref 150–400)
POTASSIUM SERPL-MCNC: 3 MMOL/L — LOW (ref 3.5–5.3)
POTASSIUM SERPL-SCNC: 3 MMOL/L — LOW (ref 3.5–5.3)
RBC # BLD: 3.6 M/UL — LOW (ref 3.8–5.2)
RBC # FLD: 18.4 % — HIGH (ref 10.3–14.5)
SODIUM SERPL-SCNC: 144 MMOL/L — SIGNIFICANT CHANGE UP (ref 135–145)
SPECIMEN SOURCE: SIGNIFICANT CHANGE UP
SPECIMEN SOURCE: SIGNIFICANT CHANGE UP
WBC # BLD: 9.93 K/UL — SIGNIFICANT CHANGE UP (ref 3.8–10.5)
WBC # FLD AUTO: 9.93 K/UL — SIGNIFICANT CHANGE UP (ref 3.8–10.5)

## 2024-02-17 PROCEDURE — 99232 SBSQ HOSP IP/OBS MODERATE 35: CPT

## 2024-02-17 RX ORDER — CALCIUM GLUCONATE 100 MG/ML
2 VIAL (ML) INTRAVENOUS EVERY 6 HOURS
Refills: 0 | Status: COMPLETED | OUTPATIENT
Start: 2024-02-17 | End: 2024-02-17

## 2024-02-17 RX ORDER — POTASSIUM CHLORIDE 20 MEQ
40 PACKET (EA) ORAL EVERY 4 HOURS
Refills: 0 | Status: COMPLETED | OUTPATIENT
Start: 2024-02-17 | End: 2024-02-17

## 2024-02-17 RX ORDER — CALCIUM CARBONATE 500(1250)
1 TABLET ORAL EVERY 6 HOURS
Refills: 0 | Status: DISCONTINUED | OUTPATIENT
Start: 2024-02-17 | End: 2024-02-18

## 2024-02-17 RX ADMIN — HEPARIN SODIUM 5000 UNIT(S): 5000 INJECTION INTRAVENOUS; SUBCUTANEOUS at 15:02

## 2024-02-17 RX ADMIN — Medication 1 TABLET(S): at 14:10

## 2024-02-17 RX ADMIN — Medication 250 MILLIGRAM(S): at 12:16

## 2024-02-17 RX ADMIN — Medication 650 MILLIGRAM(S): at 21:06

## 2024-02-17 RX ADMIN — Medication 200 GRAM(S): at 18:24

## 2024-02-17 RX ADMIN — Medication 650 MILLIGRAM(S): at 15:02

## 2024-02-17 RX ADMIN — Medication 40 MILLIEQUIVALENT(S): at 15:02

## 2024-02-17 RX ADMIN — Medication 3 MILLIGRAM(S): at 21:05

## 2024-02-17 RX ADMIN — Medication 200 GRAM(S): at 23:43

## 2024-02-17 RX ADMIN — HEPARIN SODIUM 5000 UNIT(S): 5000 INJECTION INTRAVENOUS; SUBCUTANEOUS at 05:19

## 2024-02-17 RX ADMIN — Medication 0.25 MILLIGRAM(S): at 15:41

## 2024-02-17 RX ADMIN — MIDODRINE HYDROCHLORIDE 2.5 MILLIGRAM(S): 2.5 TABLET ORAL at 11:46

## 2024-02-17 RX ADMIN — Medication 12.5 MILLIGRAM(S): at 05:20

## 2024-02-17 RX ADMIN — Medication 0.25 MILLIGRAM(S): at 05:20

## 2024-02-17 RX ADMIN — BUDESONIDE AND FORMOTEROL FUMARATE DIHYDRATE 2 PUFF(S): 160; 4.5 AEROSOL RESPIRATORY (INHALATION) at 21:07

## 2024-02-17 RX ADMIN — DULOXETINE HYDROCHLORIDE 60 MILLIGRAM(S): 30 CAPSULE, DELAYED RELEASE ORAL at 11:45

## 2024-02-17 RX ADMIN — Medication 40 MILLIEQUIVALENT(S): at 10:00

## 2024-02-17 RX ADMIN — Medication 650 MILLIGRAM(S): at 05:21

## 2024-02-17 RX ADMIN — CEFEPIME 1000 MILLIGRAM(S): 1 INJECTION, POWDER, FOR SOLUTION INTRAMUSCULAR; INTRAVENOUS at 18:24

## 2024-02-17 RX ADMIN — Medication 100 MILLIGRAM(S): at 15:02

## 2024-02-17 RX ADMIN — CEFEPIME 1000 MILLIGRAM(S): 1 INJECTION, POWDER, FOR SOLUTION INTRAMUSCULAR; INTRAVENOUS at 05:21

## 2024-02-17 RX ADMIN — Medication 100 MILLIGRAM(S): at 05:17

## 2024-02-17 RX ADMIN — HEPARIN SODIUM 5000 UNIT(S): 5000 INJECTION INTRAVENOUS; SUBCUTANEOUS at 21:05

## 2024-02-17 RX ADMIN — Medication 100 MILLIGRAM(S): at 21:05

## 2024-02-17 RX ADMIN — PANTOPRAZOLE SODIUM 40 MILLIGRAM(S): 20 TABLET, DELAYED RELEASE ORAL at 05:21

## 2024-02-17 RX ADMIN — Medication 12.5 MILLIGRAM(S): at 18:25

## 2024-02-17 RX ADMIN — Medication 200 GRAM(S): at 10:05

## 2024-02-17 NOTE — PROGRESS NOTE ADULT - SUBJECTIVE AND OBJECTIVE BOX
[facility], [facility]                                   [address] [place] [address]                                                      PHONE: [phone]                                                         FAX: [phone]  [email or web address]  ---------------------------------------------------------------------------------------------------------------------------------    Overnight events/patient complaints: Patient s/p one unit pRBC. Patient's HR improved to 70s. Patient without any SOB.       penicillin (Unknown)    MEDICATIONS  (STANDING):  budesonide  80 MICROgram(s)/formoterol 4.5 MICROgram(s) Inhaler 2 Puff(s) Inhalation two times a day  cefepime  Injectable. 1000 milliGRAM(s) IV Push every 12 hours  DULoxetine 60 milliGRAM(s) Oral daily  heparin   Injectable 5000 Unit(s) SubCutaneous every 8 hours  influenza  Vaccine (HIGH DOSE) 0.7 milliLiter(s) IntraMuscular once  metoprolol tartrate 12.5 milliGRAM(s) Oral every 12 hours  metroNIDAZOLE  IVPB 500 milliGRAM(s) IV Intermittent every 8 hours  midodrine. 2.5 milliGRAM(s) Oral three times a day  pantoprazole    Tablet 40 milliGRAM(s) Oral before breakfast  sodium bicarbonate 650 milliGRAM(s) Oral three times a day  tiotropium 2.5 MICROgram(s) Inhaler 2 Puff(s) Inhalation daily  vancomycin  IVPB 1000 milliGRAM(s) IV Intermittent every 24 hours    MEDICATIONS  (PRN):  acetaminophen     Tablet .. 650 milliGRAM(s) Oral every 6 hours PRN Temp greater or equal to 38C (100.4F), Mild Pain (1 - 3)  ALPRAZolam 0.25 milliGRAM(s) Oral every 6 hours PRN Anxiety  aluminum hydroxide/magnesium hydroxide/simethicone Suspension 30 milliLiter(s) Oral every 4 hours PRN Dyspepsia  benzonatate 100 milliGRAM(s) Oral three times a day PRN Cough  melatonin 3 milliGRAM(s) Oral at bedtime PRN Insomnia  midodrine. 2.5 milliGRAM(s) Oral three times a day PRN If SBP < 90 despite standing dose  ondansetron Injectable 4 milliGRAM(s) IV Push every 8 hours PRN Nausea and/or Vomiting      Vital Signs Last 24 Hrs  T(C): 36.7 (17 Feb 2024 08:47), Max: 37.3 (16 Feb 2024 10:11)  T(F): 98.1 (17 Feb 2024 08:47), Max: 99.1 (16 Feb 2024 10:11)  HR: 89 (17 Feb 2024 08:47) (87 - 116)  BP: 126/80 (17 Feb 2024 08:47) (108/69 - 132/90)  BP(mean): --  RR: 18 (17 Feb 2024 08:47) (18 - 20)  SpO2: 98% (17 Feb 2024 08:47) (96% - 99%)    Parameters below as of 17 Feb 2024 08:47  Patient On (Oxygen Delivery Method): nasal cannula      ICU Vital Signs Last 24 Hrs  BECCA JARRELL  I&O's Detail    Drug Dosing Weight  BECCA WESLY      PHYSICAL EXAM:  General: NAD  HEENT: Head; normocephalic, atraumatic.  Eyes: EOMI, conjunctiva normal  Neck: Supple, no JVD noted  CARDIOVASCULAR: RRR, S1 S2  LUNGS: Clear to auscultation b/l, No rales, rhonchi or wheeze, normal inspiratory effort  ABDOMEN: Soft, nontender, non-distended, +bowel sounds  EXTREMITIES: No edema b/l, no cyanosis   SKIN: warm and dry  NEURO: Alert/oriented x 3  PSYCH: normal affect.        LABS:                        9.2    9.93  )-----------( 248      ( 17 Feb 2024 07:01 )             29.4     02-17    144  |  105  |  12.8  ----------------------------<  108<H>  3.0<L>   |  23.0  |  1.02    Ca    6.6<L>      17 Feb 2024 07:01  Mg     2.2     02-16      BECCA JARRELL        Urinalysis Basic - ( 17 Feb 2024 07:01 )    Color: x / Appearance: x / SG: x / pH: x  Gluc: 108 mg/dL / Ketone: x  / Bili: x / Urobili: x   Blood: x / Protein: x / Nitrite: x   Leuk Esterase: x / RBC: x / WBC x   Sq Epi: x / Non Sq Epi: x / Bacteria: x        RADIOLOGY & ADDITIONAL STUDIES:    INTERPRETATION OF TELEMETRY (personally reviewed): SInus rhythm, HR 70s     ASSESSMENT AND PLAN:  78 year old female with a PMHx of NICM, HFrecEF, HTN, HLD, multiple myeloma, ITP, ulcerative colitis, MDD, anxiety, GERD who presents with severe sepsis from UTI, colitis who had sinus tachycardia associated with dyspnea on exertion.    Sinus Tachycardia/VERAS  -resolved after IV fluids and pRBCs  -HR now in the 70s  -no further cardiac workup required    Cardiology to sign off. Will follow patient closely in the office.     Thank you for letting [facility] to assist in the management of this patient. Please call with any questions.

## 2024-02-17 NOTE — PROGRESS NOTE ADULT - PROVIDER SPECIALTY LIST ADULT
Gastroenterology
Hospitalist
Orthopedics
Cardiology
Gastroenterology
Internal Medicine
Internal Medicine
Cardiology
Gastroenterology
Hospitalist

## 2024-02-17 NOTE — PROGRESS NOTE ADULT - REASON FOR ADMISSION
Severe Sepsis/Colitis/UTI/Lactic Acidosis/ARF/Multiple Electrolyte Derangements

## 2024-02-17 NOTE — PROGRESS NOTE ADULT - ASSESSMENT
78F with PMHX COPD on Home O2, HFrEF, HTN, HLD, Multiple Myeloma on Ninlaro, Chronic ITP on Promacta, Collagenous Colitis, GERD, Depression and Anxiety BIB daughter to [facility] ER s/p fall with AMS and abd pain and diarrhea -- admitted for Sepsis 2/2 Infectious v Inflammatory Colitis and Acute UTI c/b ARF, AGMA, Multiple Electrolyte Abnormalities, AMS, and Fall.    Sepsis 2/2 Infectious v Inflammatory Colitis and Acute UTI   -CT ABD/PEL +Colitis +Cystitis   -BCX x2 with no growth so far   -GI PCR pending collection  -Rx'd six different abx in past 6 months including Doxycycline/Bactrim/Cefpodoxime/Azithromycin/Levaquin/Cephelexin -> CDIFF PCR ordered but cancelled on 2/13 as patient with no diarrhea since admission   -ESR 46 and   -Lactate 2.3 -> Lactate 1.6   -2.5L IVFB NSS given in ED  -s/p IVF LR 125cc/hr  -Solucortef 100mg IV x1 in ED  -D/C Solucortef 50mg IV q6, unlikely inflammatory flare   -PCN Allergy noted but tolerating Cephalosporins  -Ceftriaxone 1g x1 + Flagyl 500mg x1 + Vanco 1g IV x1 in ED  -Broaden empiric IV ABX to Cefepime 1g IV q8 and Flagyl 500mg IV q8 given patient on immunosuppressants  -Added Midodrine 10 mg TID, decrease to 2.5 mg TID   -GI follow up noted      Acute UTI  -CT AP +Thick walled urinary bladder possible Cystitis  -UA positive. UCX with Enterococcus Faecalis   -Was started on Vancomycin for 3 days     Tachycaria with acute on ch hypoxic resp failure  HR improved post IVF  Given anemia, degree of lung disease s/p 1 unit PRBC   V/Q scan low prob for PE  Suspect also degree of underlying severe anxiety disorder   Cards recs appreciated    AMS  -CTH WO negative  -Likely toxic metablolic encephalopathy from hypotension/sepsis/colitis/UTI  -Resolving     CLIFF / AGMA 2/2 Lactic Acidosis  -CT ABDPEL no obstruction. CPK minimally elevated and UA with small blood doubt rhabdo  -Lactate cleared. Repeat Labs with improved AG  -s/p IVF  -Added Sodium Bicarb   -Avoid nephrotoxic medications. Hold home Ibuprofen  -Monitor     Hypomagnesemia, Hypocalcemia, Hypokalemia  -Replete     Chronic Compression Fractures, Incidental T5/T9 compression deformity + suspected sequelae of sacral insufficiency fractures   -TLSO Brace when OOB  -WBAT   -Pain control   -PT eval   -Outpatient follow up with Neurosurgery (Dr. Yarelis Edwards) in 2-4 weeks    Multiple Myeloma, Anemia, Chronic ITP?  -Will hold Ninlaro and Promacta for now till acute infection is resolved    Depression + Anxiety  -Duloxetine 60mg q24  -Alprazolam 0.25mg QID PRN    HTN  -Restarted low dose Metoprolol     COPD  -No current hypoxia  -CT Chest improving infiltrates from prior CT  -O2 via NC PRN  -Continue Symbicort   -DuoNebs    DVT Prophylaxis -- Venodyne    Dispo: Home with PT likely tomorrow.

## 2024-02-17 NOTE — PROGRESS NOTE ADULT - SUBJECTIVE AND OBJECTIVE BOX
Sepsis    HPI:  Patient seen/examined prior to midnight on 2/11/24    78F with PMHX COPD on Home O2, HFrEF, HTN, HLD, Multiple Myeloma on Ninlaro, Chronic ITP on Promacta, Ulcerative Colitis, MDD, Anxiety, GERD BIB daughter to [facility] ER s/p fall with AMS and abd pain and diarrhea. Patient hypotensive, tachycardic, febrile on arrival. Treated for Sepsis with IVF and IV ABX. CT Imaging with Colitis and UTI as well as multiple other incidental findings. Hypotension imrpoved s/p IVFB and Stress dose steroids. Pt seen/examined. AMS improving currently AAOx3. CTH WO negative. Labs significant for leukocytosis, ARF, AGMA, and multiple electrolyte abnormalities. Med list verified. Patient feeling much better since arrival to ER. No other complaints. Daughter concerned about mother missing doctor's appointments and found her on floor at home with significant fecal incontinence she is concerned about ability to take care of herself. (11 Feb 2024 23:59)    Interval History:  Patient was seen and examined at bedside around 11 am.  Doing well.   Denies abdominal pain, nausea, vomiting or urinary symptoms.   Denies back pain or paresthesia.   Denies chest pain, palpitations or shortness of breath.     ROS:  As per interval history otherwise unremarkable.    PHYSICAL EXAM:  Vital Signs  T(C): 36.9 (17 Feb 2024 11:43), Max: 37 (16 Feb 2024 20:20)  T(F): 98.5 (17 Feb 2024 11:43), Max: 98.6 (16 Feb 2024 20:20)  HR: 99 (17 Feb 2024 11:43) (87 - 110)  BP: 122/81 (17 Feb 2024 11:43) (116/75 - 132/90)  RR: 18 (17 Feb 2024 11:43) (18 - 18)  SpO2: 99% (17 Feb 2024 11:43) (97% - 99%)  Parameters below as of 17 Feb 2024 11:43  Patient On (Oxygen Delivery Method): nasal cannula  O2 Flow (L/min): 3  General: Elderly female sitting in bed comfortably. No acute distress  HEENT: EOMI. Clear conjunctivae. Moist mucus membrane. Hard of hearing.   Neck: Supple.   Chest: Good air entry. No wheezing, rales or rhonchi.   Heart: S1 & S2 with RRR.   Abdomen: Non distended. Soft. Non-tender. + BS  Ext: No pedal edema. No calf tenderness   Neuro: Awake and alert. No focal deficit. Speech clear.   Skin: Warm and Dry  Psychiatry: Normal mood and affect    LABS:                        9.2    9.93  )-----------( 248      ( 17 Feb 2024 07:01 )             29.4     02-17    144  |  105  |  12.8  ----------------------------<  108<H>  3.0<L>   |  23.0  |  1.02    Ca    6.6<L>      17 Feb 2024 07:01  Mg     2.2     02-16    RADIOLOGY & ADDITIONAL STUDIES:  Reviewed     MEDICATIONS  (STANDING):  budesonide  80 MICROgram(s)/formoterol 4.5 MICROgram(s) Inhaler 2 Puff(s) Inhalation two times a day  calcium gluconate IVPB 2 Gram(s) IV Intermittent every 6 hours  cefepime  Injectable. 1000 milliGRAM(s) IV Push every 12 hours  DULoxetine 60 milliGRAM(s) Oral daily  heparin   Injectable 5000 Unit(s) SubCutaneous every 8 hours  influenza  Vaccine (HIGH DOSE) 0.7 milliLiter(s) IntraMuscular once  metoprolol tartrate 12.5 milliGRAM(s) Oral every 12 hours  metroNIDAZOLE  IVPB 500 milliGRAM(s) IV Intermittent every 8 hours  midodrine. 2.5 milliGRAM(s) Oral three times a day  pantoprazole    Tablet 40 milliGRAM(s) Oral before breakfast  sodium bicarbonate 650 milliGRAM(s) Oral three times a day  tiotropium 2.5 MICROgram(s) Inhaler 2 Puff(s) Inhalation daily  vancomycin  IVPB 1000 milliGRAM(s) IV Intermittent every 24 hours    MEDICATIONS  (PRN):  acetaminophen     Tablet .. 650 milliGRAM(s) Oral every 6 hours PRN Temp greater or equal to 38C (100.4F), Mild Pain (1 - 3)  ALPRAZolam 0.25 milliGRAM(s) Oral every 6 hours PRN Anxiety  aluminum hydroxide/magnesium hydroxide/simethicone Suspension 30 milliLiter(s) Oral every 4 hours PRN Dyspepsia  benzonatate 100 milliGRAM(s) Oral three times a day PRN Cough  calcium carbonate    500 mG (Tums) Chewable 1 Tablet(s) Chew every 6 hours PRN Indigestion  melatonin 3 milliGRAM(s) Oral at bedtime PRN Insomnia  midodrine. 2.5 milliGRAM(s) Oral three times a day PRN If SBP < 90 despite standing dose  ondansetron Injectable 4 milliGRAM(s) IV Push every 8 hours PRN Nausea and/or Vomiting

## 2024-02-18 ENCOUNTER — TRANSCRIPTION ENCOUNTER (OUTPATIENT)
Age: 79
End: 2024-02-18

## 2024-02-18 VITALS
TEMPERATURE: 98 F | SYSTOLIC BLOOD PRESSURE: 119 MMHG | HEART RATE: 95 BPM | RESPIRATION RATE: 16 BRPM | OXYGEN SATURATION: 98 % | DIASTOLIC BLOOD PRESSURE: 85 MMHG

## 2024-02-18 LAB
ANION GAP SERPL CALC-SCNC: 12 MMOL/L — SIGNIFICANT CHANGE UP (ref 5–17)
BUN SERPL-MCNC: 11.3 MG/DL — SIGNIFICANT CHANGE UP (ref 8–20)
CALCIUM SERPL-MCNC: 8.3 MG/DL — LOW (ref 8.4–10.5)
CHLORIDE SERPL-SCNC: 105 MMOL/L — SIGNIFICANT CHANGE UP (ref 96–108)
CO2 SERPL-SCNC: 24 MMOL/L — SIGNIFICANT CHANGE UP (ref 22–29)
CREAT SERPL-MCNC: 0.96 MG/DL — SIGNIFICANT CHANGE UP (ref 0.5–1.3)
EGFR: 60 ML/MIN/1.73M2 — SIGNIFICANT CHANGE UP
GLUCOSE SERPL-MCNC: 92 MG/DL — SIGNIFICANT CHANGE UP (ref 70–99)
HCT VFR BLD CALC: 30.3 % — LOW (ref 34.5–45)
HGB BLD-MCNC: 9.4 G/DL — LOW (ref 11.5–15.5)
MAGNESIUM SERPL-MCNC: 1.6 MG/DL — LOW (ref 1.8–2.6)
MCHC RBC-ENTMCNC: 25.9 PG — LOW (ref 27–34)
MCHC RBC-ENTMCNC: 31 GM/DL — LOW (ref 32–36)
MCV RBC AUTO: 83.5 FL — SIGNIFICANT CHANGE UP (ref 80–100)
PLATELET # BLD AUTO: 251 K/UL — SIGNIFICANT CHANGE UP (ref 150–400)
POTASSIUM SERPL-MCNC: 3.9 MMOL/L — SIGNIFICANT CHANGE UP (ref 3.5–5.3)
POTASSIUM SERPL-SCNC: 3.9 MMOL/L — SIGNIFICANT CHANGE UP (ref 3.5–5.3)
RBC # BLD: 3.63 M/UL — LOW (ref 3.8–5.2)
RBC # FLD: 18.8 % — HIGH (ref 10.3–14.5)
SODIUM SERPL-SCNC: 141 MMOL/L — SIGNIFICANT CHANGE UP (ref 135–145)
WBC # BLD: 7.46 K/UL — SIGNIFICANT CHANGE UP (ref 3.8–10.5)
WBC # FLD AUTO: 7.46 K/UL — SIGNIFICANT CHANGE UP (ref 3.8–10.5)

## 2024-02-18 PROCEDURE — 83735 ASSAY OF MAGNESIUM: CPT

## 2024-02-18 PROCEDURE — 96375 TX/PRO/DX INJ NEW DRUG ADDON: CPT

## 2024-02-18 PROCEDURE — 87186 SC STD MICRODIL/AGAR DIL: CPT

## 2024-02-18 PROCEDURE — 82330 ASSAY OF CALCIUM: CPT

## 2024-02-18 PROCEDURE — 82803 BLOOD GASES ANY COMBINATION: CPT

## 2024-02-18 PROCEDURE — 99291 CRITICAL CARE FIRST HOUR: CPT | Mod: 25

## 2024-02-18 PROCEDURE — 70450 CT HEAD/BRAIN W/O DYE: CPT | Mod: MA

## 2024-02-18 PROCEDURE — 87077 CULTURE AEROBIC IDENTIFY: CPT

## 2024-02-18 PROCEDURE — 85027 COMPLETE CBC AUTOMATED: CPT

## 2024-02-18 PROCEDURE — 84132 ASSAY OF SERUM POTASSIUM: CPT

## 2024-02-18 PROCEDURE — 71250 CT THORAX DX C-: CPT | Mod: MA

## 2024-02-18 PROCEDURE — 85652 RBC SED RATE AUTOMATED: CPT

## 2024-02-18 PROCEDURE — 83605 ASSAY OF LACTIC ACID: CPT

## 2024-02-18 PROCEDURE — P9040: CPT

## 2024-02-18 PROCEDURE — 84295 ASSAY OF SERUM SODIUM: CPT

## 2024-02-18 PROCEDURE — 86140 C-REACTIVE PROTEIN: CPT

## 2024-02-18 PROCEDURE — 96365 THER/PROPH/DIAG IV INF INIT: CPT

## 2024-02-18 PROCEDURE — 86900 BLOOD TYPING SEROLOGIC ABO: CPT

## 2024-02-18 PROCEDURE — A9567: CPT

## 2024-02-18 PROCEDURE — 86901 BLOOD TYPING SEROLOGIC RH(D): CPT

## 2024-02-18 PROCEDURE — 82435 ASSAY OF BLOOD CHLORIDE: CPT

## 2024-02-18 PROCEDURE — 93005 ELECTROCARDIOGRAM TRACING: CPT

## 2024-02-18 PROCEDURE — 82550 ASSAY OF CK (CPK): CPT

## 2024-02-18 PROCEDURE — 71045 X-RAY EXAM CHEST 1 VIEW: CPT

## 2024-02-18 PROCEDURE — 36415 COLL VENOUS BLD VENIPUNCTURE: CPT

## 2024-02-18 PROCEDURE — 87040 BLOOD CULTURE FOR BACTERIA: CPT

## 2024-02-18 PROCEDURE — 94760 N-INVAS EAR/PLS OXIMETRY 1: CPT

## 2024-02-18 PROCEDURE — 82553 CREATINE MB FRACTION: CPT

## 2024-02-18 PROCEDURE — 85014 HEMATOCRIT: CPT

## 2024-02-18 PROCEDURE — 82947 ASSAY GLUCOSE BLOOD QUANT: CPT

## 2024-02-18 PROCEDURE — 80048 BASIC METABOLIC PNL TOTAL CA: CPT

## 2024-02-18 PROCEDURE — 85730 THROMBOPLASTIN TIME PARTIAL: CPT

## 2024-02-18 PROCEDURE — 74176 CT ABD & PELVIS W/O CONTRAST: CPT | Mod: MA

## 2024-02-18 PROCEDURE — 78582 LUNG VENTILAT&PERFUS IMAGING: CPT

## 2024-02-18 PROCEDURE — 86850 RBC ANTIBODY SCREEN: CPT

## 2024-02-18 PROCEDURE — A9540: CPT

## 2024-02-18 PROCEDURE — 84100 ASSAY OF PHOSPHORUS: CPT

## 2024-02-18 PROCEDURE — 81001 URINALYSIS AUTO W/SCOPE: CPT

## 2024-02-18 PROCEDURE — 80053 COMPREHEN METABOLIC PANEL: CPT

## 2024-02-18 PROCEDURE — 99239 HOSP IP/OBS DSCHRG MGMT >30: CPT

## 2024-02-18 PROCEDURE — 87086 URINE CULTURE/COLONY COUNT: CPT

## 2024-02-18 PROCEDURE — 86923 COMPATIBILITY TEST ELECTRIC: CPT

## 2024-02-18 PROCEDURE — 0225U NFCT DS DNA&RNA 21 SARSCOV2: CPT

## 2024-02-18 PROCEDURE — 74181 MRI ABDOMEN W/O CONTRAST: CPT

## 2024-02-18 PROCEDURE — 85025 COMPLETE CBC W/AUTO DIFF WBC: CPT

## 2024-02-18 PROCEDURE — 85610 PROTHROMBIN TIME: CPT

## 2024-02-18 PROCEDURE — 36430 TRANSFUSION BLD/BLD COMPNT: CPT

## 2024-02-18 PROCEDURE — 94640 AIRWAY INHALATION TREATMENT: CPT

## 2024-02-18 PROCEDURE — 97163 PT EVAL HIGH COMPLEX 45 MIN: CPT

## 2024-02-18 PROCEDURE — 85018 HEMOGLOBIN: CPT

## 2024-02-18 RX ORDER — ACETAMINOPHEN 500 MG
2 TABLET ORAL
Qty: 0 | Refills: 0 | DISCHARGE
Start: 2024-02-18

## 2024-02-18 RX ORDER — MAGNESIUM SULFATE 500 MG/ML
2 VIAL (ML) INJECTION ONCE
Refills: 0 | Status: COMPLETED | OUTPATIENT
Start: 2024-02-18 | End: 2024-02-18

## 2024-02-18 RX ORDER — VALACYCLOVIR 500 MG/1
1 TABLET, FILM COATED ORAL
Qty: 0 | Refills: 0 | DISCHARGE

## 2024-02-18 RX ORDER — ALPRAZOLAM 0.25 MG
0.5 TABLET ORAL
Qty: 0 | Refills: 0 | DISCHARGE

## 2024-02-18 RX ORDER — POTASSIUM CHLORIDE 20 MEQ
20 PACKET (EA) ORAL ONCE
Refills: 0 | Status: COMPLETED | OUTPATIENT
Start: 2024-02-18 | End: 2024-02-18

## 2024-02-18 RX ORDER — IBUPROFEN 200 MG
1 TABLET ORAL
Refills: 0 | DISCHARGE

## 2024-02-18 RX ORDER — IXAZOMIB 3 MG/1
1 CAPSULE ORAL
Qty: 0 | Refills: 0 | DISCHARGE

## 2024-02-18 RX ADMIN — Medication 0.25 MILLIGRAM(S): at 07:48

## 2024-02-18 RX ADMIN — HEPARIN SODIUM 5000 UNIT(S): 5000 INJECTION INTRAVENOUS; SUBCUTANEOUS at 05:04

## 2024-02-18 RX ADMIN — PANTOPRAZOLE SODIUM 40 MILLIGRAM(S): 20 TABLET, DELAYED RELEASE ORAL at 05:06

## 2024-02-18 RX ADMIN — BUDESONIDE AND FORMOTEROL FUMARATE DIHYDRATE 2 PUFF(S): 160; 4.5 AEROSOL RESPIRATORY (INHALATION) at 08:49

## 2024-02-18 RX ADMIN — Medication 100 MILLIGRAM(S): at 05:04

## 2024-02-18 RX ADMIN — Medication 25 GRAM(S): at 08:48

## 2024-02-18 RX ADMIN — CEFEPIME 1000 MILLIGRAM(S): 1 INJECTION, POWDER, FOR SOLUTION INTRAMUSCULAR; INTRAVENOUS at 05:04

## 2024-02-18 RX ADMIN — Medication 0.25 MILLIGRAM(S): at 01:27

## 2024-02-18 RX ADMIN — Medication 12.5 MILLIGRAM(S): at 05:07

## 2024-02-18 RX ADMIN — Medication 650 MILLIGRAM(S): at 05:06

## 2024-02-18 RX ADMIN — Medication 20 MILLIEQUIVALENT(S): at 10:09

## 2024-02-18 NOTE — DISCHARGE NOTE NURSING/CASE MANAGEMENT/SOCIAL WORK - NSDCVIVACCINE_GEN_ALL_CORE_FT
Tdap; 15-Dec-2023 07:43; Florencia Currie (RN); [employer]; [id number] (Exp. Date: 20-Jul-2025); IntraMuscular; Deltoid Left.; 0.5 milliLiter(s); VIS (VIS Published: 09-May-2013, VIS Presented: 15-Dec-2023);

## 2024-02-18 NOTE — DISCHARGE NOTE PROVIDER - CARE PROVIDERS DIRECT ADDRESSES
,[email or web address],DirectAddress_Unknown,[email or web address].[facility],[email or web address].net ,[email or web address],DirectAddress_Unknown,[email or web address]

## 2024-02-18 NOTE — DISCHARGE NOTE PROVIDER - CARE PROVIDER_API CALL
Yarelis Edwards  Neurosurgery  [address] [place] [address]  Phone: [phone]  Fax: [phone]  Follow Up Time: 1 month    Jesus Ruiz  Internal Medicine  [address] [place] [address]  Phone: [phone]  Fax: [phone]  Established Patient  Follow Up Time: 1 week    Yung Huffman  Hematology  [address] [place] [address]  Phone: [phone]  Fax: [phone]  Established Patient  Follow Up Time: 1 week    Steve Evans  Urology  [address] [place] [address]  Phone: [phone]  Fax: [phone]  Established Patient  Follow Up Time: Routine    Efrain Galvan  Pulmonary Disease  [address] [facility], [address]  [place] [address]  Phone: [phone]  Fax: [phone]  Established Patient  Follow Up Time: Routine   Yarelis Edwards  Neurosurgery  [address] [place] [address]  Phone: [phone]  Fax: [phone]  Follow Up Time: 1 month    Jesus Ruiz  Internal Medicine  [address] [place] [address]  Phone: [phone]  Fax: [phone]  Established Patient  Follow Up Time: 1 week    Yung Huffman  Hematology  [address] [place] [address]  Phone: [phone]  Fax: [phone]  Established Patient  Follow Up Time: 1 week    Steve Evans  Urology  [address] [place] [address]  Phone: [phone]  Fax: [phone]  Established Patient  Follow Up Time: Routine    Efrain Galvan  Pulmonary Disease  [address] [facility], [address]  [place] [address]  Phone: [phone]  Fax: [phone]  Established Patient  Follow Up Time: Routine    Robyn Avendaño  Gastroenterology  [address] [facility], [address]  [place] [address]  Phone: [phone]  Fax: [phone]  Follow Up Time: 2 weeks

## 2024-02-18 NOTE — DISCHARGE NOTE PROVIDER - PREFACE STATEMENT FOR MINUTES SPENT
This RN informed by Rivera Sharif, case management, at approximately 1700 that a form would be faxed to the unit from [facility] that needs to be signed by the patient to admit patient to in the inpatient psych unit as a voluntary admission at [facility]. Form has not yet arrived to [address] [place] [address] Str.. This RN attempted to page the on-call  to determine the next steps required to obtain this paperwork. This RN informed by the  Laura Mcneill that a message has been left w/ the on-call . This RN to receive call back from social work. Charge nurse Erlinda Lawson alerted to situation. I personally spent

## 2024-02-18 NOTE — DISCHARGE NOTE PROVIDER - NSDCMRMEDTOKEN_GEN_ALL_CORE_FT
acetaminophen 325 mg oral tablet: 2 tab(s) orally every 6 hours As needed Temp greater or equal to 38C (100.4F), Mild Pain (1 - 3)  Albuterol (Eqv-ProAir HFA) 90 mcg/inh inhalation aerosol: 2 puff(s) inhaled 3 times a day as needed for  shortness of breath and/or wheezing  ALPRAZolam 0.5 mg oral tablet: 0.5 tab(s) orally 4 times a day as needed for Anxiety  DULoxetine 60 mg oral delayed release capsule: 1 cap(s) orally once a day  metoprolol succinate 25 mg oral tablet, extended release: 1 tab(s) orally once a day  Ninlaro 3 mg oral capsule: 1 cap(s) orally once a week As prescribed by Oncologist  Promacta 12.5 mg oral tablet: 1 tab(s) orally once a day  Protonix 40 mg oral delayed release tablet: 1 tab(s) orally once a day  Trelegy Ellipta 100 mcg-62.5 mcg-25 mcg/inh inhalation powder: 1 puff(s) inhaled once a day  valACYclovir 1 g oral tablet: 1 tab(s) orally once a day   acetaminophen 325 mg oral tablet: 2 tab(s) orally every 6 hours As needed Temp greater or equal to 38C (100.4F), Mild Pain (1 - 3)  Albuterol (Eqv-ProAir HFA) 90 mcg/inh inhalation aerosol: 2 puff(s) inhaled 3 times a day as needed for  shortness of breath and/or wheezing  ALPRAZolam 0.5 mg oral tablet: 0.5 tab(s) orally 4 times a day as needed for Anxiety After 3 days, decrease to 0.5 tab three times a day then after 3 days decrease to 0.5 tab twice a day then after three days decrease to 0.5 tab once a day as needed.  DULoxetine 60 mg oral delayed release capsule: 1 cap(s) orally once a day  metoprolol succinate 25 mg oral tablet, extended release: 1 tab(s) orally once a day  Ninlaro 3 mg oral capsule: 1 cap(s) orally once a week As prescribed by Oncologist  Promacta 12.5 mg oral tablet: 1 tab(s) orally once a day  Protonix 40 mg oral delayed release tablet: 1 tab(s) orally once a day  Trelegy Ellipta 100 mcg-62.5 mcg-25 mcg/inh inhalation powder: 1 puff(s) inhaled once a day  valACYclovir 1 g oral tablet: 1 tab(s) orally once a day

## 2024-02-18 NOTE — DISCHARGE NOTE PROVIDER - HOSPITAL COURSE
78F with PMHX COPD on Home O2, HFrEF, HTN, HLD, Multiple Myeloma on Ninlaro, Chronic ITP on Promacta, Collagenous Colitis, GERD, Depression and Anxiety BIB daughter to [facility] ER s/p fall with AMS and abd pain and diarrhea -- admitted for Sepsis 2/2 Infectious v Inflammatory Colitis and Acute UTI c/b ARF, AGMA, Multiple Electrolyte Abnormalities, AMS, and Fall.  She was started on IV antibiotics and monitored closely. No diarrhea during hospitalization. GI followed closely.   Renal function improved.  She was noted to be tachycardiac likely due to dehydration and unable to take Metoprolol due to hypotension. Now resolved.   Electrolytes were replaced.  She was transfused 1 PRBC for anemia. No signs of active bleeding.   She was found to have Chronic Compression Fractures, Incidental T5/T9 compression deformity + suspected sequelae of sacral insufficiency fractures. Seen by Neurosurgery and was recommended TLSO brace when OOB. She will follow up with Dr. Yarelis Edwards in 2-4 weeks.  Seen by PT and was recommended home PT.   She is stable for discharge with close outpatient follow ups.

## 2024-02-18 NOTE — DISCHARGE NOTE PROVIDER - NSDCFUSCHEDAPPT_GEN_ALL_CORE_FT
Yarelis Edwards  [facility] Physician [facility]  NEUROSURG [address] [place] [address]  Scheduled Appointment: 04/01/2024

## 2024-02-18 NOTE — DISCHARGE NOTE NURSING/CASE MANAGEMENT/SOCIAL WORK - NSDCPEFALRISK_GEN_ALL_CORE
Mother notes child pulling at bilateral ears. For information on Fall & Injury Prevention, visit: [email or web address] OR  [email or web address] OR  https://www.cdc.gov/steadi/patient.html

## 2024-02-18 NOTE — DISCHARGE NOTE PROVIDER - ATTENDING DISCHARGE PHYSICAL EXAMINATION:
Vital Signs   T(C): 36.8 (18 Feb 2024 08:00), Max: 37 (18 Feb 2024 00:08)  T(F): 98.2 (18 Feb 2024 08:00), Max: 98.6 (18 Feb 2024 00:08)  HR: 93 (18 Feb 2024 08:00) (93 - 101)  BP: 126/86 (18 Feb 2024 08:00) (122/81 - 131/96)  RR: 16 (18 Feb 2024 08:00) (16 - 18)  SpO2: 98% (18 Feb 2024 08:00) (98% - 100%)  Parameters below as of 18 Feb 2024 08:00  Patient On (Oxygen Delivery Method): nasal cannula  O2 Flow (L/min): 3  General: Elderly female sitting in bed comfortably. No acute distress  HEENT: EOMI. Clear conjunctivae. Moist mucus membrane. Hard of hearing.   Neck: Supple.   Chest: Good air entry. No wheezing, rales or rhonchi.   Heart: S1 & S2 with RRR.   Abdomen: Non distended. Soft. Non-tender. + BS  Ext: No pedal edema. No calf tenderness   Neuro: Awake and alert. No focal deficit. Speech clear.   Skin: Warm and Dry  Psychiatry: Normal mood and affect

## 2024-02-18 NOTE — DISCHARGE NOTE PROVIDER - NPI NUMBER (FOR SYSADMIN USE ONLY) :
[[id number]],[[id number]] [[id number]],[[id number]],[[id number]],[[id number]]

## 2024-02-18 NOTE — DISCHARGE NOTE NURSING/CASE MANAGEMENT/SOCIAL WORK - PATIENT PORTAL LINK FT
You can access the FollowMyHealth Patient Portal offered by [facility] by registering at the following website: [email or web address]. By joining [employer]’s FollowMyHealth portal, you will also be able to view your health information using other applications (apps) compatible with our system.

## 2024-02-18 NOTE — DISCHARGE NOTE PROVIDER - NSDCCPCAREPLAN_GEN_ALL_CORE_FT
PRINCIPAL DISCHARGE DIAGNOSIS  Diagnosis: Sepsis  Assessment and Plan of Treatment: Likely due to Colitis and Cystitis (UTI).  s/p antibiotics.  Sepsis resolved.  Follow up with PMD.      SECONDARY DISCHARGE DIAGNOSES  Diagnosis: Compression fracture of thoracic vertebra  Assessment and Plan of Treatment: TLSO Brace when OOB.  Follow up with Neurosurgery.

## 2024-02-18 NOTE — DISCHARGE NOTE PROVIDER - PROVIDER TOKENS
PROVIDER:[TOKEN:[[id number]],FOLLOWUP:[1 month]],PROVIDER:[TOKEN:[[id number]],FOLLOWUP:[1 week],ESTABLISHEDPATIENT:[T]],PROVIDER:[TOKEN:[[id number]],FOLLOWUP:[1 week],ESTABLISHEDPATIENT:[T]],PROVIDER:[TOKEN:[[id number]],FOLLOWUP:[Routine],ESTABLISHEDPATIENT:[T]],PROVIDER:[TOKEN:[[id number]],FOLLOWUP:[Routine],ESTABLISHEDPATIENT:[T]] PROVIDER:[TOKEN:[[id number]],FOLLOWUP:[1 month]],PROVIDER:[TOKEN:[[id number]],FOLLOWUP:[1 week],ESTABLISHEDPATIENT:[T]],PROVIDER:[TOKEN:[[id number]],FOLLOWUP:[1 week],ESTABLISHEDPATIENT:[T]],PROVIDER:[TOKEN:[[id number]],FOLLOWUP:[Routine],ESTABLISHEDPATIENT:[T]],PROVIDER:[TOKEN:[[id number]],FOLLOWUP:[Routine],ESTABLISHEDPATIENT:[T]],PROVIDER:[TOKEN:[[id number]],FOLLOWUP:[2 weeks]]

## 2024-04-01 ENCOUNTER — APPOINTMENT (OUTPATIENT)
Dept: NEUROSURGERY | Facility: CLINIC | Age: 79
End: 2024-04-01

## 2024-04-23 ENCOUNTER — APPOINTMENT (OUTPATIENT)
Dept: PULMONOLOGY | Facility: CLINIC | Age: 79
End: 2024-04-23

## 2024-07-17 NOTE — DISCHARGE NOTE NURSING/CASE MANAGEMENT/SOCIAL WORK - NSPROEXTENSIONSOFSELF_GEN_A_NUR
48yo withHTN, obesity with h/o elin-en-y and h/o stage IA endometrial adenocarcinoma, figo grade 1 s/p RA-TLH/BSO presents for follow up.    Clinically SETH  Exam with likely granulation tissue at apex removed entirely and sent to pathology    Continue with q3-6 month surveillance visits or sooner with symptoms.             
eyeglasses

## 2025-01-10 NOTE — DISCHARGE NOTE ADULT - DISCHARGE DATE
Patient Specific Otc Recommendations (Will Not Stick From Patient To Patient): SPF 30+ daily \\nVitamin C serum 1-2x daily.
Detail Level: Zone
08-Mar-2019

## 2025-02-18 NOTE — DISCHARGE NOTE PROVIDER - HOSPITAL COURSE
78 year old female with known  hfref, thrombocytopenia, active smoker, Htn, mood disorders, seizure disorder, urinary retention, Multiple myeloma  HLD, GERD, COPD - presents with sob,cough admitted for respiratory failure suspected PNA,COPD exacerbation. oxygen supplement, wean off as tolerated. 2 l nc now ,so2 85% RA. not on home o2 . will discharge with home oxygen with po augment and tapering dose prednisone.TTE improved ef 60-65%.Hold lasix for hypotension per cardiology. will f/u with cardiology as out pt.Counselled to qiut smoking.                 none 78 year old female with known  hfref, thrombocytopenia, active smoker, Htn, mood disorders, seizure disorder, urinary retention, Multiple myeloma  HLD, GERD, COPD - presents with sob,cough admitted for respiratory failure suspected PNA,COPD exacerbation. oxygen supplement, wean off as tolerated. 2 l nc now ,so2 85% RA. not on home o2 . Base on pt oxymetry testing pt is hypoxic requiring home oxygen. will discharge with home oxygen with po augment and tapering dose prednisone.TTE improved ef 60-65%.Hold lasix for hypotension per cardiology. will f/u with cardiology as out pt.Counselled to qiut smoking.

## 2025-07-07 ENCOUNTER — APPOINTMENT (OUTPATIENT)
Dept: OBGYN | Facility: CLINIC | Age: 80
End: 2025-07-07
Payer: MEDICARE

## 2025-07-07 VITALS
WEIGHT: 114 LBS | BODY MASS INDEX: 22.68 KG/M2 | HEIGHT: 59.5 IN | SYSTOLIC BLOOD PRESSURE: 127 MMHG | DIASTOLIC BLOOD PRESSURE: 83 MMHG

## 2025-07-07 PROCEDURE — 99204 OFFICE O/P NEW MOD 45 MIN: CPT | Mod: 25

## 2025-07-07 PROCEDURE — G0101: CPT

## 2025-07-07 RX ORDER — MISOPROSTOL 200 UG/1
200 TABLET ORAL
Qty: 2 | Refills: 0 | Status: ACTIVE | COMMUNITY
Start: 2025-07-07 | End: 1900-01-01

## 2025-07-09 LAB — HPV HIGH+LOW RISK DNA PNL CVX: NOT DETECTED

## 2025-07-10 LAB — CYTOLOGY CVX/VAG DOC THIN PREP: ABNORMAL
